# Patient Record
Sex: MALE | Race: WHITE | NOT HISPANIC OR LATINO | Employment: FULL TIME | URBAN - METROPOLITAN AREA
[De-identification: names, ages, dates, MRNs, and addresses within clinical notes are randomized per-mention and may not be internally consistent; named-entity substitution may affect disease eponyms.]

---

## 2017-01-25 ENCOUNTER — APPOINTMENT (OUTPATIENT)
Dept: LAB | Facility: HOSPITAL | Age: 42
End: 2017-01-25
Payer: COMMERCIAL

## 2017-01-25 ENCOUNTER — TRANSCRIBE ORDERS (OUTPATIENT)
Dept: LAB | Facility: HOSPITAL | Age: 42
End: 2017-01-25

## 2017-01-25 DIAGNOSIS — R80.9 PROTEINURIA: ICD-10-CM

## 2017-01-25 DIAGNOSIS — E78.1 PURE HYPERGLYCERIDEMIA: ICD-10-CM

## 2017-01-25 LAB
BILIRUB UR QL STRIP: NEGATIVE
CHOLEST SERPL-MCNC: 304 MG/DL (ref 50–200)
CLARITY UR: CLEAR
COLOR UR: NORMAL
GLUCOSE UR STRIP-MCNC: NEGATIVE MG/DL
HDLC SERPL-MCNC: 43 MG/DL (ref 40–60)
HGB UR QL STRIP.AUTO: NEGATIVE
KETONES UR STRIP-MCNC: NEGATIVE MG/DL
LEUKOCYTE ESTERASE UR QL STRIP: NEGATIVE
NITRITE UR QL STRIP: NEGATIVE
PH UR STRIP.AUTO: 6 [PH] (ref 4.5–8)
PROT UR STRIP-MCNC: NEGATIVE MG/DL
SP GR UR STRIP.AUTO: 1.02 (ref 1–1.03)
TRIGL SERPL-MCNC: 1459 MG/DL
UROBILINOGEN UR QL STRIP.AUTO: 1 E.U./DL

## 2017-01-25 PROCEDURE — 81003 URINALYSIS AUTO W/O SCOPE: CPT

## 2017-01-25 PROCEDURE — 36415 COLL VENOUS BLD VENIPUNCTURE: CPT

## 2017-01-25 PROCEDURE — 80061 LIPID PANEL: CPT

## 2017-01-30 ENCOUNTER — GENERIC CONVERSION - ENCOUNTER (OUTPATIENT)
Dept: OTHER | Facility: OTHER | Age: 42
End: 2017-01-30

## 2017-06-06 ENCOUNTER — GENERIC CONVERSION - ENCOUNTER (OUTPATIENT)
Dept: OTHER | Facility: OTHER | Age: 42
End: 2017-06-06

## 2017-06-20 ENCOUNTER — GENERIC CONVERSION - ENCOUNTER (OUTPATIENT)
Dept: OTHER | Facility: OTHER | Age: 42
End: 2017-06-20

## 2017-07-28 DIAGNOSIS — I10 ESSENTIAL (PRIMARY) HYPERTENSION: ICD-10-CM

## 2017-07-28 DIAGNOSIS — E78.1 PURE HYPERGLYCERIDEMIA: ICD-10-CM

## 2017-07-28 DIAGNOSIS — R80.9 PROTEINURIA: ICD-10-CM

## 2017-08-01 ENCOUNTER — GENERIC CONVERSION - ENCOUNTER (OUTPATIENT)
Dept: OTHER | Facility: OTHER | Age: 42
End: 2017-08-01

## 2017-12-15 ENCOUNTER — LAB CONVERSION - ENCOUNTER (OUTPATIENT)
Dept: OTHER | Facility: OTHER | Age: 42
End: 2017-12-15

## 2017-12-15 LAB
A/G RATIO (HISTORICAL): 1.5 (CALC) (ref 1–2.5)
ALBUMIN SERPL BCP-MCNC: 4.3 G/DL (ref 3.6–5.1)
ALP SERPL-CCNC: 25 U/L (ref 40–115)
ALT SERPL W P-5'-P-CCNC: 42 U/L (ref 9–46)
AST SERPL W P-5'-P-CCNC: 40 U/L (ref 10–40)
BACTERIA UR QL AUTO: ABNORMAL /HPF
BASOPHILS # BLD AUTO: 0.8 %
BASOPHILS # BLD AUTO: 50 CELLS/UL (ref 0–200)
BILIRUB SERPL-MCNC: 0.9 MG/DL (ref 0.2–1.2)
BILIRUB UR QL STRIP: NEGATIVE
BUN SERPL-MCNC: 12 MG/DL (ref 7–25)
BUN/CREA RATIO (HISTORICAL): ABNORMAL (CALC) (ref 6–22)
CALCIUM SERPL-MCNC: 8.9 MG/DL (ref 8.6–10.3)
CHLORIDE SERPL-SCNC: 104 MMOL/L (ref 98–110)
CHOLEST SERPL-MCNC: 284 MG/DL
CHOLEST/HDLC SERPL: 6.8 (CALC)
CO2 SERPL-SCNC: 20 MMOL/L (ref 20–31)
COLOR UR: ABNORMAL
COMMENT (HISTORICAL): ABNORMAL
CREAT SERPL-MCNC: 0.8 MG/DL (ref 0.6–1.35)
DEPRECATED RDW RBC AUTO: 13.1 % (ref 11–15)
EGFR AFRICAN AMERICAN (HISTORICAL): 130 ML/MIN/1.73M2
EGFR-AMERICAN CALC (HISTORICAL): 112 ML/MIN/1.73M2
EOSINOPHIL # BLD AUTO: 1.7 %
EOSINOPHIL # BLD AUTO: 107 CELLS/UL (ref 15–500)
FECAL OCCULT BLOOD DIAGNOSTIC (HISTORICAL): NEGATIVE
GAMMA GLOBULIN (HISTORICAL): 2.8 G/DL (CALC) (ref 1.9–3.7)
GLUCOSE (HISTORICAL): 101 MG/DL (ref 65–99)
GLUCOSE (HISTORICAL): NEGATIVE
HCT VFR BLD AUTO: 42 % (ref 38.5–50)
HDLC SERPL-MCNC: 42 MG/DL
HGB BLD-MCNC: 14.4 G/DL (ref 13.2–17.1)
HYALINE CASTS #/AREA URNS LPF: ABNORMAL /LPF
KETONES UR STRIP-MCNC: NEGATIVE MG/DL
LEUKOCYTE ESTERASE UR QL STRIP: NEGATIVE
LYMPHOCYTES # BLD AUTO: 1569 CELLS/UL (ref 850–3900)
LYMPHOCYTES # BLD AUTO: 24.9 %
MCH RBC QN AUTO: 34.2 PG (ref 27–33)
MCHC RBC AUTO-ENTMCNC: 34.3 G/DL (ref 32–36)
MCV RBC AUTO: 99.8 FL (ref 80–100)
MONOCYTES # BLD AUTO: 611 CELLS/UL (ref 200–950)
MONOCYTES (HISTORICAL): 9.7 %
NEUTROPHILS # BLD AUTO: 3963 CELLS/UL (ref 1500–7800)
NEUTROPHILS # BLD AUTO: 62.9 %
NITRITE UR QL STRIP: NEGATIVE
NON-HDL-CHOL (CHOL-HDL) (HISTORICAL): 242 MG/DL (CALC)
PH UR STRIP.AUTO: 6 [PH] (ref 5–8)
PLATELET # BLD AUTO: 171 THOUSAND/UL (ref 140–400)
PMV BLD AUTO: 11.4 FL (ref 7.5–12.5)
POTASSIUM SERPL-SCNC: 3.8 MMOL/L (ref 3.5–5.3)
PROT UR STRIP-MCNC: ABNORMAL MG/DL
RBC # BLD AUTO: 4.21 MILLION/UL (ref 4.2–5.8)
RBC (HISTORICAL): ABNORMAL /HPF
SODIUM SERPL-SCNC: 136 MMOL/L (ref 135–146)
SP GR UR STRIP.AUTO: 1.02 (ref 1–1.03)
SQUAMOUS EPITHELIAL CELLS (HISTORICAL): ABNORMAL /HPF
TOTAL PROTEIN (HISTORICAL): 7.1 G/DL (ref 6.1–8.1)
TRIGL SERPL-MCNC: 741 MG/DL
WBC # BLD AUTO: 6.3 THOUSAND/UL (ref 3.8–10.8)
WBC # BLD AUTO: ABNORMAL /HPF

## 2017-12-21 ENCOUNTER — ALLSCRIPTS OFFICE VISIT (OUTPATIENT)
Dept: OTHER | Facility: OTHER | Age: 42
End: 2017-12-21

## 2018-01-02 ENCOUNTER — GENERIC CONVERSION - ENCOUNTER (OUTPATIENT)
Dept: OTHER | Facility: OTHER | Age: 43
End: 2018-01-02

## 2018-01-08 ENCOUNTER — LAB CONVERSION - ENCOUNTER (OUTPATIENT)
Dept: OTHER | Facility: OTHER | Age: 43
End: 2018-01-08

## 2018-01-08 ENCOUNTER — GENERIC CONVERSION - ENCOUNTER (OUTPATIENT)
Dept: OTHER | Facility: OTHER | Age: 43
End: 2018-01-08

## 2018-01-08 LAB
CLINICAL COMMENT (HISTORICAL): NORMAL
HEPATITIS B SURFACE ANTIGEN (HISTORICAL): NORMAL
HEPATITIS C ANTIBODY (HISTORICAL): NORMAL
HERPES SIMPLEX VIRUS 1 IGG (HISTORICAL): <0.9 INDEX
HERPES SIMPLEX VIRUS 2 IGG (HISTORICAL): <0.9 INDEX
HIV AG/AB, 4TH GEN (HISTORICAL): NORMAL
RPR SCREEN (HISTORICAL): NORMAL
SIGNAL TO CUT-OFF (HISTORICAL): 0.01

## 2018-01-10 NOTE — MISCELLANEOUS
Provider Comments  Provider Comments:   Pt was a n/s  LMOM to call back and make new apt        Signatures   Electronically signed by : ALFREDO Knox ; Jan 30 2017  6:11PM EST                       (Review)

## 2018-01-14 NOTE — RESULT NOTES
Verified Results  (1) CBC/PLT/DIFF [id number] 08:16AM ki work     Test Name Result Flag Reference   WBC COUNT 5 71 Thousand/uL  4 31-10 16   RBC COUNT 4 51 Million/uL  3 88-5 62   HEMOGLOBIN 14 8 g/dL  12 0-17 0   HEMATOCRIT 43 4 %  36 5-49 3   MCV 96 fL  82-98   MCH 32 8 pg  26 8-34 3   MCHC 34 1 g/dL  31 4-37 4   RDW 12 2 %  11 6-15 1   MPV 10 6 fL  8 9-12 7   PLATELET COUNT 956 Thousands/uL  149-390   nRBC AUTOMATED 0 /100 WBCs     NEUTROPHILS RELATIVE PERCENT 60 %  43-75   LYMPHOCYTES RELATIVE PERCENT 28 %  14-44   MONOCYTES RELATIVE PERCENT 9 %  4-12   EOSINOPHILS RELATIVE PERCENT 2 %  0-6   BASOPHILS RELATIVE PERCENT 1 %  0-1   NEUTROPHILS ABSOLUTE COUNT 3 45 Thousands/?L  1 85-7 62   LYMPHOCYTES ABSOLUTE COUNT 1 58 Thousands/?L  0 60-4 47   MONOCYTES ABSOLUTE COUNT 0 52 Thousand/?L  0 17-1 22   EOSINOPHILS ABSOLUTE COUNT 0 12 Thousand/?L  0 00-0 61   BASOPHILS ABSOLUTE COUNT 0 03 Thousands/?L  0 00-0 10     (1) URINALYSIS (will reflex a microscopy if leukocytes, occult blood, protein or nitrites are not within normal limits) [id number] 08:16AM ki work     Test Name Result Flag Reference   COLOR Dk Yellow     CLARITY Clear     SPECIFIC GRAVITY UA 1 025  1 003-1 030   PH UA 6 0  4 5-8 0   LEUKOCYTE ESTERASE UA Negative  Negative   NITRITE UA Negative  Negative   PROTEIN UA Trace mg/dl A Negative   GLUCOSE UA Negative mg/dl  Negative   KETONES UA Negative mg/dl  Negative   UROBILINOGEN UA 1 0 E U /dl  0 2, 1 0 E U /dl   BILIRUBIN UA  A Negative   Interference- unable to analyze   The dipstick result may be falsely positive do to interfering substances  We recommend reliance upon serum bilirubin, liver & kidney function tests to guide patient care if clinically indicated     BLOOD UA Negative  Negative     (1) URINALYSIS (will reflex a microscopy if leukocytes, occult blood, protein or nitrites are not within normal limits) [id number] 08:16AM ki work     Test Name Result Flag Reference   BACTERIA Occasional /hpf  None Seen, Occasional   EPITHELIAL CELLS None Seen /hpf  None Seen, Occasional   RBC UA None Seen /hpf  None Seen   WBC UA None Seen /hpf  None Seen     (1) COMPREHENSIVE METABOLIC PANEL [id number] 69:94NK Natasha Capseo     Test Name Result Flag Reference   GLUCOSE,RANDM 110 mg/dL     If the patient is fasting, the ADA then defines impaired fasting glucose as > 100 mg/dL and diabetes as > or equal to 123 mg/dL  SODIUM 137 mmol/L  136-145   POTASSIUM 3 7 mmol/L  3 5-5 3   CHLORIDE 102 mmol/L  100-108   CARBON DIOXIDE 25 mmol/L  21-32   ANION GAP (CALC) 10 mmol/L  4-13   BLOOD UREA NITROGEN 13 mg/dL  5-25   CREATININE 0 90 mg/dL  0 60-1 30   Standardized to IDMS reference method   CALCIUM 8 5 mg/dL  8 3-10 1   BILI, TOTAL 0 99 mg/dL  0 20-1 00   ALK PHOSPHATAS 31 U/L L    ALT (SGPT) 60 U/L  12-78   AST(SGOT) 45 U/L  5-45   ALBUMIN 4 1 g/dL  3 5-5 0   TOTAL PROTEIN 7 5 g/dL  6 4-8 2   eGFR Non-African American      >60 0 ml/min/1 73sq [facility] Disease Education Program recommendations are as follows:  GFR calculation is accurate only with a steady state creatinine  Chronic Kidney disease less than 60 ml/min/1 73 sq  meters  Kidney failure less than 15 ml/min/1 73 sq  meters  (1) LIPID PANEL FASTING W DIRECT LDL REFLEX [id number] 08:16AM Natasha Capseo     Test Name Result Flag Reference   CHOLESTEROL 253 mg/dL H    LDL CHOLESTEROL CALCULATED (Report)  0-100   Calculated LDL invalid, triglycerides >400 mg/dl    Triglyceride:       Normal       <150 mg/dl     Borderline High  150-199 mg/dl     High        200-499 mg/dl     Very High     >499 mg/dl  Cholesterol:       Desirable    <200 mg/dl    Borderline High 200-239 mg/dl    High       >239 mg/dl  HDL Cholesterol:      High  >59 mg/dL    Low   <41 mg/dL  LDL CALCULATED:    This screening LDL is a calculated result    It does not have the accuracy of the Direct Measured LDL in the monitoring of patients with hyperlipidemia and/or statin therapy  Direct Measure LDL (AUZ459) must be ordered separately in these patients  Calculated LDL invalid, triglycerides >400 mg/dl    Triglyceride:         Normal              <150 mg/dl       Borderline High    150-199 mg/dl       High               200-499 mg/dl       Very High          >499 mg/dl  Cholesterol:         Desirable        <200 mg/dl      Borderline High  200-239 mg/dl      High             >239 mg/dl  HDL Cholesterol:        High    >59 mg/dL      Low     <41 mg/dL  LDL CALCULATED:    This screening LDL is a calculated result  It does not have the accuracy of the Direct Measured LDL in the monitoring of patients with hyperlipidemia and/or statin therapy  Direct Measure LDL (DMZ257) must be ordered separately in these patients  TRIGLYCERIDES 900 mg/dL H <=150   Specimen collection should occur prior to N-Acetylcysteine or Metamizole administration due to the potential for falsely depressed results  HDL,DIRECT 39 mg/dL L 40-60   Specimen collection should occur prior to Metamizole administration due to the potential for falsely depressed results  (1) LIPID PANEL FASTING W DIRECT LDL REFLEX [id number] 08:16AM Domingo Garay     Test Name Result Flag Reference   LDL, DIRECT 93 mg/dl  0-100   LDL Cholesterol:        Optimal          <100 mg/dl        Near Optimal     100-129 mg/dl        Above Optimal          Borderline High   130-159 mg/dl          High              160-189 mg/dl          Very High        >189 mg/dl       Discussion/Summary   Raji Marcelino, your triglyceride level is very high at 900  The rest of the labs look fine  Alcohol does raise this significantly  We can further discuss this on your follow up       Signatures   Electronically signed by : ALFREDO Palmer ; Sep  1 2016  5:30PM EST                       (Author)

## 2018-01-17 NOTE — PROGRESS NOTES
Assessment    1  Encounter for preventive health examination (V70 0) (Z00 00)   2  Alcoholism (303 90) (F10 20)   3  Depression with anxiety (300 4) (F41 8)   4  Benign essential hypertension (401 1) (I10)   5  Essential hypertriglyceridemia (272 1) (E78 1)    Plan  Alcoholism, Depression with anxiety    · 1 - Merary Hope (Licensed Social Worker) Physician Referral  Consult  Status:  Hold For - Scheduling  Requested for: 67LLW3741  Care Summary provided  : Yes  Benign essential hypertension    · Losartan Potassium 25 MG Oral Tablet; TAKE 1 TABLET DAILY  Benign essential hypertension, Essential hypertriglyceridemia    · (Q) CBC (INCLUDES DIFF/PLT) (REFL); Status:Active; Requested for:03Kbp7374;    · (Q) COMPREHENSIVE METABOLIC PNL W/ADJUSTED CALCIUM; Status:Active; Requested for:80Etm8447;    · (Q) LIPID PANEL WITH REFLEX TO DIRECT LDL; Status:Active; Requested  for:86Psy7034;    · (Q) URINALYSIS REFLEX; Status:Active; Requested for:98Lwa4769;     Discussion/Summary  Impression: health maintenance visit  Currently, he eats a poor diet and has an inadequate exercise regimen  Advice and education were given regarding alcohol use  Patient discussion: discussed with the patient, 30 minute visit, greater than half of the time was spent on counseling  I would like him to see Chris Gonzalez 2-3 weeks with blood work  The patient was counseled regarding instructions for management, impressions  History of Present Illness  , Adult Male: The patient is being seen for a health maintenance evaluation  The last health maintenance visit was >1 year(s) ago  Social History: Work status: working full time and occupation: working in a [employer]  General Health: The patient's health since the last visit is described as poor  Lifestyle:  He does not have a healthy diet  He has weight concerns  He uses tobacco  The patient is a former cigarette smoker  He consumes alcohol  He reports heavy alcohol consumption  He typically drinks hard liquor  Alcohol concern: tolerance to alcohol, personal concern about alcohol use, family concern about alcohol use and feeling guilty about alcohol use, but no attempts to cut alcohol use, no annoyance by criticism of alcohol use and no morning drinking  He is ready to quit drinking and wants alcohol treatment  Screening: Prostate cancer screening includes no previous evaluation  Colorectal cancer screening includes no previous screening  Metabolic screening includes lipid profile performed within the past five years and glucose screening performed 2014  Cardiovascular risk factors: hypertension, but no diabetes  HPI: Justina Morales was last seen in 2014  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)  UNfortunately, financially, it did not work out  He was there for 6months  He has been back for about a year, did not have insurance until 6months ago  He was hesitant to make an appt bec of poor health, heavy alcohol consumption  He would like to resume losartan for his BP  He also is ready for alcohol treatment  Years ago, after a breakup, he started drinking more, had DUI and went to rehab, AA  He found himself quite depressed staying away from his friends who drank as well  He went back to that lifestyle  He is worried he will go into depression again when he quits drinking  Review of Systems    Constitutional: recent weight loss and weight loss- physical work, better diet in general    Psychiatric: as noted in HPI and not suicidal       Active Problems    1  Anxiety (300 00) (F41 9)   2  Benign essential hypertension (401 1) (I10)   3  Depression (311) (F32 9)   4  Esophageal reflux (530 81) (K21 9)   5  Essential hypertriglyceridemia (272 1) (E78 1)   6  History of tobacco use (V15 82) (Z87 891)   7   Need for prophylactic vaccination and inoculation against influenza (V04 81) (Z23)    Past Medical History    · History of Blood pressure elevated (401  9) (I10)   · History of Cough (786 2) (R05)   · History of tendinitis (V13 59) (Z87 39)   · Need for prophylactic vaccination and inoculation against influenza (V04 81) (Z23)    Surgical History    · History of Knee Surgery   · History of Shoulder Arthroscopy With Biceps Tenodesis    Family History  Family History    · Family history of Coronary artery disease (414 00) (I25 10)   · Family history of Diabetes Mellitus (250 00)   · Family history of hypertension (V17 49) (Z82 49)   · Family history of malignant neoplasm (V16 9) (Z80 9)    Social History    · Being A Social Drinker   · History of Current Every Day Smoker (305 1)   · Denied: Current Smoker (305 1)   · History of tobacco use (V15 82) (Z87 891)    Current Meds   1  Omeprazole 20 MG Oral Capsule Delayed Release; TAKE ONE CAPSULE BY MOUTH   EVERY DAY; Therapy: 43Jvm5812 to (Evaluate:16Mar2015)  Requested for: 82Atu5930; Last   Rx:38Fwv5936 Ordered    Allergies    1  Neomycin-Bacitracin-Polymyxin CREA    2  No Known Environmental Allergies   3  No Known Food Allergies    Vitals   Recorded: 63FIA5312 66:90OO   Systolic 158, RUE, Sitting   Diastolic 374, RUE, Sitting   Heart Rate 72   Respiration 16   Height 5 ft 10 in   Weight 205 lb 0 8 oz   BMI Calculated 29 42   BSA Calculated 2 11     Physical Exam    Constitutional   General appearance: No acute distress, well appearing and well nourished  Head and Face   Head and face: Normal     Eyes   Conjunctiva and lids: No erythema, swelling or discharge  Ears, Nose, Mouth, and Throat   Otoscopic examination: Tympanic membranes translucent with normal light reflex  Canals patent without erythema  Oropharynx: Normal with no erythema, edema, exudate or lesions  Neck   Neck: Supple, symmetric, trachea midline, no masses  Thyroid: Normal, no thyromegaly  Pulmonary   Respiratory effort: No increased work of breathing or signs of respiratory distress      Auscultation of lungs: Clear to auscultation  Cardiovascular   Auscultation of heart: Normal rate and rhythm, normal S1 and S2, no murmurs  Abdomen   Abdomen: Non-tender, no masses  Liver and spleen: No hepatomegaly or splenomegaly  Lymphatic   Palpation of lymph nodes in neck: No lymphadenopathy      Musculoskeletal   Gait and station: Normal     Psychiatric   Orientation to person, place and time: Normal     Mood and affect: Normal        Signatures   Electronically signed by : ALFREDO Llanes ; Aug 16 2016  8:34AM EST                       (Author)

## 2018-01-18 NOTE — PSYCH
History of Present Illness  Psychotherapy Provided [facility]: Individual Psychotherapy 30 minutes minutes provided today  Goals addressed in session:   Patient looking for assistance in addressing his alcoholism  Has underlying anxiety issues but he is again drinking daily and is aware he needs to make this primary focus at moment  Patient doing well at new job and fearful this will be compromised because of his addiction  Did welll in rehab prior at [address] [place] [address] 8 years ago  AA has not been helpful over long haul  Patient verbal and cooperative  HPI - Psych: Patient struggling wiith alcoholism and this is extensive problem on his father's side of family  Continued use affecting relationships with friends and fearful of its effect on job where he is very happy  Has had some issues with withdrawal  Denies these issues today as he has not drank for two days  Denies feeling depressed other than after using  Denies any SI   Note   Note:   Began developing plan with him  Provided contact information for [facility] who has detox, inpatient, IOP and straight outpatient treatment at the [facility] location  He will contact them and then contact me with their plan/intervention  Stated we could augment thee services down the road to address issues with anxiety and low self--esteem that may contribute  Provided my contact information and will await his call  Assessment    1   Alcoholism (303 90) (F10 20)    Signatures   Electronically signed by : Teofilo Leone LCSW; Aug 24 2016  6:46PM EST                       (Author)

## 2018-01-22 VITALS
WEIGHT: 210.04 LBS | OXYGEN SATURATION: 95 % | HEART RATE: 99 BPM | SYSTOLIC BLOOD PRESSURE: 128 MMHG | TEMPERATURE: 98.4 F | DIASTOLIC BLOOD PRESSURE: 90 MMHG | RESPIRATION RATE: 18 BRPM | BODY MASS INDEX: 29.4 KG/M2 | HEIGHT: 71 IN

## 2018-01-23 NOTE — PROGRESS NOTES
Assessment    1  Encounter for preventive health examination (V70 0) (Z00 00)   2  Alcoholism (303 90) (F10 20)   3  Benign essential hypertension (401 1) (I10)   4  Esophageal reflux (530 81) (K21 9)   5  Essential hypertriglyceridemia (272 1) (E78 1)   6  Anxiety (300 00) (F41 9)    Plan  Benign essential hypertension    · Losartan Potassium 25 MG Oral Tablet; Take 1 tablet daily  Esophageal reflux    · Omeprazole 20 MG Oral Capsule Delayed Release; TAKE ONE CAPSULE BY  MOUTH EVERY DAY    Discussion/Summary  Impression: health maintenance visit  Currently, he eats a poor diet and has an inadequate exercise regimen  Prostate cancer screening: PSA is not indicated  Colorectal cancer screening: colorectal cancer screening is not indicated  The patient declines immunizations  Advice and education were given regarding alcohol use  I will call George Bustillo to get him set up for intense counseling  BP controlled  GERD controlled- worse with alcohol intake  Triglycerides high related to alcoholism  Anxiety-took trazodone in the past for sleep  Also took clonazepam in the past which helped  Tried a few SSRIs (Paxil Lexapro) but this made him worse  rto 2months  The patient was counseled regarding impressions  The treatment plan was reviewed with the patient/guardian  The patient/guardian understands and agrees with the treatment plan      Chief Complaint  The patient presents today for annual wellness exam       History of Present Illness  HM, Adult Male: The patient is being seen for a health maintenance evaluation  The last health maintenance visit was 1 year(s) ago  Social History: He is unmarried  Work status: working full time and   The patient is a former cigarette smoker  He reports frequent alcohol use  Alcohol concern:  personal concern about alcohol use and family concern about alcohol use  He is ready to quit drinking  General Health:  The patient's health since the last visit is described as poor  He has regular dental visits  He complains of vision problems  He denies hearing loss  Immunizations status: not up to date The patient needs the following immunization(s): influenza vaccine  Lifestyle:  He does not have a healthy diet  He has weight concerns  He does not exercise regularly  He does not use tobacco  He consumes alcohol  He reports heavy alcohol consumption  He typically drinks hard liquor  Alcohol concern: personal concern about alcohol use and family concern about alcohol use  He is ready to quit drinking and wants alcohol treatment  He denies drug use  Screening: Prostate cancer screening includes no previous evaluation  Colorectal cancer screening includes no previous screening  metabolic screening reviewed and current  risk screening reviewed and current  HPI: Still struggling with alcoholism  Tried calling Bruna Rinne but did not get a call back  Tried setting up with [facility] but did not find the building   He attempted to reschedule but did not hear back either  He was sober for a few months, only until January  Looking into counseling again  Back to drinking excessive amounts of alcohol      Review of Systems    Constitutional: recent weight gain, but no fever and no chills  Cardiovascular: no chest pain    The patient presents with complaints of occasional episodes of palpitations  Respiratory: no shortness of breath and no cough  Gastrointestinal: no abdominal pain, no constipation and no diarrhea  Psychiatric: anxiety and sleep disturbances  Active Problems    1  Alcoholism (303 90) (F10 20)   2  Anxiety (300 00) (F41 9)   3  Benign essential hypertension (401 1) (I10)   4  Depression (311) (F32 9)   5  Depression with anxiety (300 4) (F41 8)   6  Esophageal reflux (530 81) (K21 9)   7  Essential hypertriglyceridemia (272 1) (E78 1)   8  History of tobacco use (V15 82) (Z87 891)   9   Need for prophylactic vaccination and inoculation against influenza (V04 81) (Z23)   10  Proteinuria (791 0) (R80 9)    Past Medical History    · History of Blood pressure elevated (401 9) (I10)   · History of Cough (786 2) (R05)   · History of tendinitis (V13 59) (Z87 39)   · Need for prophylactic vaccination and inoculation against influenza (V04 81) (Z23)    Surgical History    · History of Knee Surgery   · History of Shoulder Arthroscopy With Biceps Tenodesis    Family History  Family History    · Family history of Coronary artery disease (414 00) (I25 10)   · Family history of Diabetes Mellitus (250 00)   · Family history of hypertension (V17 49) (Z82 49)   · Family history of malignant neoplasm (V16 9) (Z80 9)    Social History    · Being A Social Drinker   · History of Current Every Day Smoker (305 1)   · Denied: Current Smoker (305 1)   · History of tobacco use (V15 82) (Z87 891)    Current Meds   1  Losartan Potassium 25 MG Oral Tablet; Take 1 tablet daily; Therapy: 68KMD2625 to (Evaluate:30Oct2017)  Requested for: 56IOL0559; Last   CO:07HCW5768 Ordered   2  Omeprazole 20 MG Oral Capsule Delayed Release; TAKE ONE CAPSULE BY MOUTH   EVERY DAY; Therapy: 60Kvv3600 to (Evaluate:30Oct2017)  Requested for: 06IOA7815; Last   TT:20YRS8590 Ordered    Allergies    1  Neomycin-Bacitracin-Polymyxin CREA    2  No Known Environmental Allergies   3  No Known Food Allergies    Vitals   Recorded: 45Avk5540 10:09AM   Temperature 98 4 F, Oral   Heart Rate 99   Respiration 18   Systolic 303, Sitting   Diastolic 90, Sitting   Height 5 ft 11 in   Weight 210 lb 0 6 oz   BMI Calculated 29 29   BSA Calculated 2 15   O2 Saturation 95     Physical Exam    Constitutional   General appearance: No acute distress, well appearing and well nourished  Head and Face   Head and face: Normal     Eyes   Conjunctiva and lids: No erythema, swelling or discharge  Ears, Nose, Mouth, and Throat   Otoscopic examination: Tympanic membranes translucent with normal light reflex  Canals patent without erythema  Oropharynx: Normal with no erythema, edema, exudate or lesions  Neck   Neck: Supple, symmetric, trachea midline, no masses  Thyroid: Normal, no thyromegaly  Pulmonary   Respiratory effort: No increased work of breathing or signs of respiratory distress  Auscultation of lungs: Clear to auscultation  Cardiovascular   Auscultation of heart: Normal rate and rhythm, normal S1 and S2, no murmurs  Abdomen   Abdomen: Non-tender, no masses  Liver and spleen: No hepatomegaly or splenomegaly  Lymphatic   Palpation of lymph nodes in neck: No lymphadenopathy      Musculoskeletal   Gait and station: Normal     Psychiatric   Orientation to person, place and time: Normal     Mood and affect: Normal        Signatures   Electronically signed by : ALFREDO Salomon ; Dec 21 2017 10:42AM EST                       (Author)

## 2018-01-23 NOTE — RESULT NOTES
Message   Blood and urine tests are normal/negative        Verified Results  (Q) RPR (MONITOR) W/REFL TITER [id number] 11:18AM Merribeth Bang     Test Name Result Flag Reference   RPR (MONITOR)$W/REFL TITER NON-REACTIVE  NON-REACTIVE     (Q) HEPATITIS C ANTIBODY [id number] 11:18AM Merribeth Bang     Test Name Result Flag Reference   HEPATITIS C ANTIBODY NON-REACTIVE  NON-REACTIVE   SIGNAL TO CUT-OFF 0 01  <1 00     (1) HEP B SURFACE ANTIGEN [id number] 11:18AM Merribeth Bang     Test Name Result Flag Reference   HEPATITIS B SURFACE ANTIGEN NON-REACTIVE  NON-REACTIVE     (Q) HSV 1/2 IGG, HERPESELECT W/REFLEX [id number] 11:18AM Merribeth Bang     Test Name Result Flag Reference   HSV 1 IGG TYPE SPECIFIC$AB <0 90 index     HSV 2 IGG TYPE SPECIFIC$AB <0 90 index     Index          Interpretation                            -----          --------------                            <0 90          Negative                            0  90-1 09      Equivocal                            >1 09          Positive     This assay utilizes recombinant type-specific antigens  to differentiate HSV-1 from HSV-2 infections  A  positive result cannot distinguish between recent and  past infection  If recent HSV infection is suspected  but the results are negative or equivocal, the assay  should be repeated in 4-6 weeks  The performance  characteristics of the assay have not been established  for pediatric populations, immunocompromised patients,  or  screening      (1) HIV AG/AB Harpal Quintero GEN [id number] 11:18AM Merribeth Bang     Test Name Result Flag Reference   HIV AG/AB, 4TH GEN NON-REACTIVE  NON-REACTIVE   HIV-1 antigen and HIV-1/HIV-2 antibodies were not  detected  There is no laboratory evidence of HIV  infection  PLEASE NOTE: This information has been disclosed to  you from records whose confidentiality may be  protected by state law    If your state requires such  protection, then the state law prohibits you from  making any further disclosure of the information  without the specific written consent of the person  to whom it pertains, or as otherwise permitted by law  A general authorization for the release of medical or  other information is NOT sufficient for this purpose  For additional information please refer to  http://Find That File/faq/PFB469  (This link is being provided for informational/  educational purposes only )        The performance of this assay has not been clinically  validated in patients less than 3years old  SUREAB(R) CHLAMYDIA/ N  GONORRHOEAE RNA, TMA [id number] 11:18AM Angela Baker     Test Name Result Flag Reference   CHLAMYDIA TRACHOMATIS$RNA, TMA NOT DETECTED  NOT DETECTED   NEISSERIA [address], TMA NOT DETECTED  NOT DETECTED   This test was performed using the [facility]  ([address] )  The analytical performance characteristics of this   assay, when used to test Sure[employer] specimens have  been determined by [employer]               Signatures   Electronically signed by : ALFREDO Oliveira ; Jan 8 2018 11:17PM EST                       (Author)

## 2018-02-27 ENCOUNTER — OFFICE VISIT (OUTPATIENT)
Dept: INTERNAL MEDICINE CLINIC | Facility: CLINIC | Age: 43
End: 2018-02-27
Payer: COMMERCIAL

## 2018-02-27 VITALS
HEART RATE: 90 BPM | BODY MASS INDEX: 29.46 KG/M2 | DIASTOLIC BLOOD PRESSURE: 100 MMHG | OXYGEN SATURATION: 98 % | SYSTOLIC BLOOD PRESSURE: 154 MMHG | WEIGHT: 210.4 LBS | HEIGHT: 71 IN

## 2018-02-27 DIAGNOSIS — K21.9 GASTROESOPHAGEAL REFLUX DISEASE, ESOPHAGITIS PRESENCE NOT SPECIFIED: ICD-10-CM

## 2018-02-27 DIAGNOSIS — R80.9 PROTEINURIA, UNSPECIFIED TYPE: ICD-10-CM

## 2018-02-27 DIAGNOSIS — F41.8 DEPRESSION WITH ANXIETY: ICD-10-CM

## 2018-02-27 DIAGNOSIS — R39.15 URINARY URGENCY: ICD-10-CM

## 2018-02-27 DIAGNOSIS — F10.20 ALCOHOLISM (HCC): ICD-10-CM

## 2018-02-27 DIAGNOSIS — M75.112 INCOMPLETE TEAR OF LEFT ROTATOR CUFF: ICD-10-CM

## 2018-02-27 DIAGNOSIS — E78.1 ESSENTIAL HYPERTRIGLYCERIDEMIA: ICD-10-CM

## 2018-02-27 DIAGNOSIS — I10 BENIGN ESSENTIAL HYPERTENSION: Primary | ICD-10-CM

## 2018-02-27 PROCEDURE — 99214 OFFICE O/P EST MOD 30 MIN: CPT | Performed by: INTERNAL MEDICINE

## 2018-02-27 RX ORDER — LOSARTAN POTASSIUM 25 MG/1
1 TABLET ORAL DAILY
COMMUNITY
Start: 2016-08-16 | End: 2019-03-18 | Stop reason: SDUPTHER

## 2018-02-27 RX ORDER — OMEPRAZOLE 20 MG/1
1 CAPSULE, DELAYED RELEASE ORAL DAILY
COMMUNITY
Start: 2012-02-27 | End: 2019-09-23 | Stop reason: SDUPTHER

## 2018-02-27 NOTE — ASSESSMENT & PLAN NOTE
Ongoing counseling  Understands complications from long standing alcohol use  Discussed the seriousness of withdrawal from alcohol as well- he MUST tell his mother/family and go to the ER if he is worsening

## 2018-02-27 NOTE — PROGRESS NOTES
Assessment/Plan:    Incomplete tear of left rotator cuff  Dr Land Begun    Benign essential hypertension  Start checking BP at home as it is high today  May need to increase losartan    Alcoholism ([facility] [address] )  Ongoing counseling  Understands complications from long standing alcohol use  Discussed the seriousness of withdrawal from alcohol as well- he MUST tell his mother/family and go to the ER if he is worsening    Urinary urgency  Obtain US         Problem List Items Addressed This Visit     Alcoholism ([facility] [address] )     Ongoing counseling  Understands complications from long standing alcohol use  Discussed the seriousness of withdrawal from alcohol as well- he MUST tell his mother/family and go to the ER if he is worsening         Relevant Orders    Comprehensive metabolic panel    Lipid panel    Benign essential hypertension - Primary     Start checking BP at home as it is high today  May need to increase losartan         Relevant Medications    losartan (COZAAR) 25 mg tablet    Other Relevant Orders    Comprehensive metabolic panel    Lipid panel    Depression with anxiety    Esophageal reflux    Relevant Medications    omeprazole (PriLOSEC) 20 mg delayed release capsule    Essential hypertriglyceridemia    Relevant Orders    Comprehensive metabolic panel    Lipid panel    Proteinuria    Incomplete tear of left rotator cuff     Dr Shanda Gonsalves         Urinary urgency     Obtain US         Relevant Orders    US retroperitoneal complete            Subjective:      Patient ID: Tod Jaramillo is a 38 y o  male      HPI   Ongoing alcohol counseling weekly  Unfortunately, relapsed recently   Binged a week and a half ago and felt horrible, the worst he has ever felt primarily from the guilt of relapsing  Had withdrawal symptoms-fever, chills, shaking, stomach upset  Inpatient treatment has been recommended but he has a new job and does not think this is feasible  Says he only feels depressed and anxious after he relapses but fine after  Denies SI      The following portions of the patient's history were reviewed and updated as appropriate: allergies, current medications, past family history, past medical history, past social history, past surgical history and problem list     Review of Systems   Constitutional: Negative for fatigue, fever and unexpected weight change  HENT: Negative for ear pain, hearing loss, sinus pain, sinus pressure and sore throat  Respiratory: Negative for cough, shortness of breath and wheezing  Cardiovascular: Negative for chest pain, palpitations and leg swelling  Gastrointestinal: Negative for abdominal pain, constipation, diarrhea, nausea and vomiting  Genitourinary: Positive for urgency  Negative for difficulty urinating, discharge, dysuria, enuresis, flank pain, frequency, genital sores and hematuria  Constant burning discomfort in the penis since October   Musculoskeletal: Positive for arthralgias  Negative for myalgias  Left rotator cuff tear, just received an injection today  Will start PT   Psychiatric/Behavioral:        See hpi         Objective:      /100   Pulse 90   Ht 5' 11" (1 803 m)   Wt 95 4 kg (210 lb 6 4 oz)   SpO2 98%   BMI 29 34 kg/m²          Physical Exam   Constitutional: He is oriented to person, place, and time  He appears well-developed and well-nourished  HENT:   Head: Normocephalic and atraumatic  Right Ear: External ear normal    Left Ear: External ear normal    Mouth/Throat: Oropharynx is clear and moist    Eyes: Conjunctivae are normal    Neck: Neck supple  Cardiovascular: Normal rate, regular rhythm and normal heart sounds  No murmur heard  Pulmonary/Chest: Effort normal and breath sounds normal  No respiratory distress  He has no wheezes  He has no rales  Abdominal: Soft  Bowel sounds are normal  He exhibits no distension and no mass  There is no tenderness  There is no rebound and no guarding     Musculoskeletal: Normal range of motion  Neurological: He is alert and oriented to person, place, and time  Skin: Skin is warm and dry  Psychiatric: He has a normal mood and affect   His behavior is normal  Judgment and thought content normal

## 2018-03-16 ENCOUNTER — TRANSCRIBE ORDERS (OUTPATIENT)
Dept: RADIOLOGY | Facility: HOSPITAL | Age: 43
End: 2018-03-16

## 2018-03-16 ENCOUNTER — HOSPITAL ENCOUNTER (OUTPATIENT)
Dept: RADIOLOGY | Facility: HOSPITAL | Age: 43
Discharge: HOME | End: 2018-03-16
Payer: COMMERCIAL

## 2018-03-16 PROCEDURE — 76770 US EXAM ABDO BACK WALL COMP: CPT

## 2018-03-22 ENCOUNTER — TELEPHONE (OUTPATIENT)
Dept: INTERNAL MEDICINE CLINIC | Facility: CLINIC | Age: 43
End: 2018-03-22

## 2018-03-27 DIAGNOSIS — R18.8 ABDOMINAL FLUID COLLECTION: Primary | ICD-10-CM

## 2018-04-04 LAB
ALBUMIN SERPL-MCNC: 4.3 G/DL (ref 3.6–5.1)
ALBUMIN/GLOB SERPL: 1.7 (CALC) (ref 1–2.5)
ALP SERPL-CCNC: 27 U/L (ref 40–115)
ALT SERPL-CCNC: 29 U/L (ref 9–46)
AST SERPL-CCNC: 24 U/L (ref 10–40)
BILIRUB SERPL-MCNC: 0.6 MG/DL (ref 0.2–1.2)
BUN SERPL-MCNC: 15 MG/DL (ref 7–25)
BUN/CREAT SERPL: ABNORMAL (CALC) (ref 6–22)
CALCIUM SERPL-MCNC: 9.3 MG/DL (ref 8.6–10.3)
CHLORIDE SERPL-SCNC: 108 MMOL/L (ref 98–110)
CHOLEST SERPL-MCNC: 260 MG/DL
CHOLEST/HDLC SERPL: 7 (CALC)
CO2 SERPL-SCNC: 21 MMOL/L (ref 20–31)
CREAT SERPL-MCNC: 0.84 MG/DL (ref 0.6–1.35)
GLOBULIN SER CALC-MCNC: 2.5 G/DL (CALC) (ref 1.9–3.7)
GLUCOSE SERPL-MCNC: 88 MG/DL (ref 65–99)
HDLC SERPL-MCNC: 37 MG/DL
LDLC SERPL CALC-MCNC: 171 MG/DL (CALC)
NONHDLC SERPL-MCNC: 223 MG/DL (CALC)
POTASSIUM SERPL-SCNC: 3.9 MMOL/L (ref 3.5–5.3)
PROT SERPL-MCNC: 6.8 G/DL (ref 6.1–8.1)
SL AMB EGFR AFRICAN AMERICAN: 127 ML/MIN/1.73M2
SL AMB EGFR NON AFRICAN AMERICAN: 110 ML/MIN/1.73M2
SODIUM SERPL-SCNC: 140 MMOL/L (ref 135–146)
TRIGL SERPL-MCNC: 289 MG/DL

## 2018-04-09 ENCOUNTER — HOSPITAL ENCOUNTER (OUTPATIENT)
Dept: RADIOLOGY | Age: 43
Discharge: HOME | End: 2018-04-09
Payer: COMMERCIAL

## 2018-04-09 DIAGNOSIS — R18.8 ABDOMINAL FLUID COLLECTION: ICD-10-CM

## 2018-04-09 PROCEDURE — 74177 CT ABD & PELVIS W/CONTRAST: CPT

## 2018-04-09 RX ADMIN — IOHEXOL 100 ML: 350 INJECTION, SOLUTION INTRAVENOUS at 10:01

## 2018-04-12 DIAGNOSIS — R18.8 ABDOMINAL FLUID COLLECTION: Primary | ICD-10-CM

## 2018-04-13 ENCOUNTER — TELEPHONE (OUTPATIENT)
Dept: INTERNAL MEDICINE CLINIC | Facility: CLINIC | Age: 43
End: 2018-04-13

## 2018-04-13 NOTE — TELEPHONE ENCOUNTER
Pt states he had a CT scan done on Monday and would like you to please call him regarding the results when you are free - he states he got your message but would like to discuss it with you

## 2018-05-14 ENCOUNTER — HOSPITAL ENCOUNTER (OUTPATIENT)
Facility: HOSPITAL | Age: 43
Setting detail: OUTPATIENT SURGERY
Discharge: HOME | End: 2018-05-14
Attending: SURGERY | Admitting: SURGERY
Payer: COMMERCIAL

## 2018-05-14 ENCOUNTER — ANESTHESIA EVENT (OUTPATIENT)
Dept: GASTROENTEROLOGY | Facility: HOSPITAL | Age: 43
End: 2018-05-14
Payer: COMMERCIAL

## 2018-05-14 ENCOUNTER — ANESTHESIA (OUTPATIENT)
Dept: GASTROENTEROLOGY | Facility: HOSPITAL | Age: 43
End: 2018-05-14
Payer: COMMERCIAL

## 2018-05-14 VITALS
HEIGHT: 71 IN | BODY MASS INDEX: 29.4 KG/M2 | TEMPERATURE: 98 F | WEIGHT: 210 LBS | RESPIRATION RATE: 18 BRPM | HEART RATE: 96 BPM | SYSTOLIC BLOOD PRESSURE: 161 MMHG | OXYGEN SATURATION: 97 % | DIASTOLIC BLOOD PRESSURE: 109 MMHG

## 2018-05-14 DIAGNOSIS — R18.8 PELVIC FLUID COLLECTION: ICD-10-CM

## 2018-05-14 DIAGNOSIS — R10.9 ABDOMINAL PAIN: ICD-10-CM

## 2018-05-14 PROCEDURE — 88305 TISSUE EXAM BY PATHOLOGIST: CPT | Performed by: PATHOLOGY

## 2018-05-14 RX ORDER — ONDANSETRON 2 MG/ML
4 INJECTION INTRAMUSCULAR; INTRAVENOUS ONCE
Status: COMPLETED | OUTPATIENT
Start: 2018-05-14 | End: 2018-05-14

## 2018-05-14 RX ORDER — PROPOFOL 10 MG/ML
INJECTION, EMULSION INTRAVENOUS AS NEEDED
Status: DISCONTINUED | OUTPATIENT
Start: 2018-05-14 | End: 2018-05-14 | Stop reason: SURG

## 2018-05-14 RX ORDER — MIDAZOLAM HYDROCHLORIDE 1 MG/ML
2 INJECTION INTRAMUSCULAR; INTRAVENOUS ONCE
Status: COMPLETED | OUTPATIENT
Start: 2018-05-14 | End: 2018-05-14

## 2018-05-14 RX ORDER — SODIUM CHLORIDE 9 MG/ML
50 INJECTION, SOLUTION INTRAVENOUS CONTINUOUS
Status: DISCONTINUED | OUTPATIENT
Start: 2018-05-14 | End: 2018-05-14 | Stop reason: HOSPADM

## 2018-05-14 RX ADMIN — PROPOFOL 50 MG: 10 INJECTION, EMULSION INTRAVENOUS at 08:56

## 2018-05-14 RX ADMIN — PROPOFOL 150 MG: 10 INJECTION, EMULSION INTRAVENOUS at 08:48

## 2018-05-14 RX ADMIN — ONDANSETRON 4 MG: 2 INJECTION INTRAMUSCULAR; INTRAVENOUS at 09:40

## 2018-05-14 RX ADMIN — SODIUM CHLORIDE: 0.9 INJECTION, SOLUTION INTRAVENOUS at 09:05

## 2018-05-14 RX ADMIN — PROPOFOL 50 MG: 10 INJECTION, EMULSION INTRAVENOUS at 09:03

## 2018-05-14 RX ADMIN — PROPOFOL 100 MG: 10 INJECTION, EMULSION INTRAVENOUS at 08:51

## 2018-05-14 RX ADMIN — SODIUM CHLORIDE 50 ML/HR: 0.9 INJECTION, SOLUTION INTRAVENOUS at 08:26

## 2018-05-14 RX ADMIN — PROPOFOL 50 MG: 10 INJECTION, EMULSION INTRAVENOUS at 08:54

## 2018-05-14 RX ADMIN — PROPOFOL 50 MG: 10 INJECTION, EMULSION INTRAVENOUS at 08:58

## 2018-05-14 RX ADMIN — PROPOFOL 50 MG: 10 INJECTION, EMULSION INTRAVENOUS at 09:01

## 2018-05-14 RX ADMIN — MIDAZOLAM 2 MG: 1 INJECTION INTRAMUSCULAR; INTRAVENOUS at 09:48

## 2018-05-14 NOTE — ANESTHESIA PREPROCEDURE EVALUATION
Review of Systems/Medical History  Patient summary reviewed  Chart reviewed      Cardiovascular  Hyperlipidemia, Hypertension ,    Pulmonary       GI/Hepatic    GERD ,             Endo/Other     GYN       Hematology   Musculoskeletal       Neurology   Psychology   Anxiety,              Physical Exam    Airway    Mallampati score: II  TM Distance: >3 FB  Neck ROM: full     Dental   implants,     Cardiovascular  Rhythm: regular, Rate: normal, Cardiovascular exam normal    Pulmonary  Pulmonary exam normal Breath sounds clear to auscultation,     Other Findings        Anesthesia Plan  ASA Score- 2     Anesthesia Type- IV sedation with anesthesia with ASA Monitors  Additional Monitors:   Airway Plan:         Plan Factors-    Induction- intravenous  Postoperative Plan-     Informed Consent- Anesthetic plan and risks discussed with patient and spouse  I personally reviewed this patient with the CRNA  Discussed and agreed on the Anesthesia Plan with the CRNA  Natalia Emerson

## 2018-05-14 NOTE — ANESTHESIA POSTPROCEDURE EVALUATION
Post-Op Assessment Note      CV Status:  Stable    Mental Status:  Alert and awake    Hydration Status:  Euvolemic    PONV Controlled:  Controlled    Airway Patency:  Patent    Post Op Vitals Reviewed: Yes          Staff: CRNA           /99 (05/14/18 0909)    Temp      Pulse 103 (05/14/18 0909)   Resp 20 (05/14/18 0909)    SpO2 99 % (05/14/18 0909)

## 2018-05-14 NOTE — OP NOTE
OPERATIVE REPORT  PATIENT NAME: Adair Sherman    :  1975  MRN: [id number]  Pt Location: [facility] ROOM 01    SURGERY DATE: 2018    Surgeon(s) and Role:     * Rodriguez Durant DO - Primary    Preop Diagnosis:  Abdominal pain [R10 9]  Pelvic fluid collection [R18 8]    Post-Op Diagnosis Codes:     * Abdominal pain [R10 9]     * Pelvic fluid collection [R18 8]  Sigmoid polyp    Procedure(s) (LRB):  COLONOSCOPY (N/A)   Polypectomy    Specimen(s):  ID Type Source Tests Collected by Time Destination   1 : sigmoid colon polyp Tissue Polyp, Colorectal TISSUE EXAM Princess OlivierDO 2018 0903        Estimated Blood Loss:   Minimal    Drains:       Anesthesia Type:   Choice    Operative Indications:  Abdominal pain [R10 9]  Pelvic fluid collection [R18 8]      Operative Findings:  Single small sigmoid polyp  No signs of any diverticular disease on colonoscopy  Mucosa all appeared normal     Prep was adequate    Recommend repeat colonoscopy in 5 years      Complications:   None    Procedure and Technique:  Patient brought to the GI suite and placed in the left lateral decubitus position  Time-out was performed  After induction of IV sedation digital rectal exam revealed no specific abnormalities  Prostate was smooth bilobed without nodularity  Colonoscope was gently inserted in the rectum advanced up to the rectosigmoid area  No signs of any diverticular changes or mucosal irregularities were noted  Continued to advance the scope up the descending colon around the splenic flexure  Came across the transverse colon and hepatic flexure down into the cecum  Cecal strap and appendiceal orifice was noted  Scope was gently withdrawn viewing all sidewalls on the way out  A single small sigmoid polyp was noted  This was totally removed with the hot polypectomy  There was good hemostasis  Scope was gently withdrawn back into the rectum  Retroflexion of the scope revealed no abnormalities   Scope was removed and returned to the recovery area in stable condition     I was present for the entire procedure    Patient Disposition:  PACU     SIGNATURE: Lebron Teague DO  DATE: May 14, 2018  TIME: 9:10 AM

## 2018-08-13 ENCOUNTER — TELEPHONE (OUTPATIENT)
Dept: INTERNAL MEDICINE CLINIC | Facility: CLINIC | Age: 43
End: 2018-08-13

## 2018-08-13 DIAGNOSIS — I10 HYPERTENSION, UNSPECIFIED TYPE: Primary | ICD-10-CM

## 2018-08-14 NOTE — TELEPHONE ENCOUNTER
Labs ordered and sent via fax with appropriate dx code per Deysi Campos  Patient aware labs faxed to requested destination

## 2018-08-16 LAB — HBA1C MFR BLD HPLC: 5 %

## 2018-08-17 LAB
ALBUMIN SERPL-MCNC: 4.2 G/DL (ref 3.6–5.1)
ALBUMIN/GLOB SERPL: 1.4 (CALC) (ref 1–2.5)
ALP SERPL-CCNC: 26 U/L (ref 40–115)
ALT SERPL-CCNC: 14 U/L (ref 9–46)
AST SERPL-CCNC: 18 U/L (ref 10–40)
BILIRUB SERPL-MCNC: 0.6 MG/DL (ref 0.2–1.2)
BUN SERPL-MCNC: 19 MG/DL (ref 7–25)
BUN/CREAT SERPL: ABNORMAL (CALC) (ref 6–22)
CALCIUM SERPL-MCNC: 9.2 MG/DL (ref 8.6–10.3)
CHLORIDE SERPL-SCNC: 104 MMOL/L (ref 98–110)
CHOLEST SERPL-MCNC: 223 MG/DL
CHOLEST/HDLC SERPL: 7.4 (CALC)
CO2 SERPL-SCNC: 23 MMOL/L (ref 20–32)
CREAT SERPL-MCNC: 0.94 MG/DL (ref 0.6–1.35)
GLOBULIN SER CALC-MCNC: 2.9 G/DL (CALC) (ref 1.9–3.7)
GLUCOSE SERPL-MCNC: 87 MG/DL (ref 65–99)
HBA1C MFR BLD: 5 % OF TOTAL HGB
HDLC SERPL-MCNC: 30 MG/DL
NONHDLC SERPL-MCNC: 193 MG/DL (CALC)
POTASSIUM SERPL-SCNC: 4.2 MMOL/L (ref 3.5–5.3)
PROT SERPL-MCNC: 7.1 G/DL (ref 6.1–8.1)
SL AMB EGFR AFRICAN AMERICAN: 116 ML/MIN/1.73M2
SL AMB EGFR NON AFRICAN AMERICAN: 100 ML/MIN/1.73M2
SODIUM SERPL-SCNC: 137 MMOL/L (ref 135–146)
TRIGL SERPL-MCNC: 491 MG/DL

## 2018-08-22 ENCOUNTER — TELEPHONE (OUTPATIENT)
Dept: INTERNAL MEDICINE CLINIC | Facility: CLINIC | Age: 43
End: 2018-08-22

## 2018-08-22 NOTE — TELEPHONE ENCOUNTER
I'll need to see him prior to ordering the blood tests for the joint aches  He has an appt next week

## 2018-08-22 NOTE — TELEPHONE ENCOUNTER
Pt called in stating that starting Sunday - he started having Lower back pain - in his joints, feet knees, hands, shoulders  It was bad enough this morning he went to see the doctor at his place of employment @[facility] and they advised him to have you order lab tests for him  He states he just had labwork done but it was prior to when the pain started (we only have results of a1c) and states they should have done all the required labs then but he can't be sure they did   He wants to know is there a specific bloodtest that you can put in to see why/what is causing the pain in his joints?-Please advise, ty

## 2018-08-22 NOTE — TELEPHONE ENCOUNTER
Pt states he has tried the alive (what he's currently doing) and it is not really helping at all, if any  He states the pain is dull and achey    Wants to know what else can be taken to help with the pain or if something can be sent into his pharmacy?  Please advise, ty

## 2018-08-23 ENCOUNTER — OFFICE VISIT (OUTPATIENT)
Dept: INTERNAL MEDICINE CLINIC | Facility: CLINIC | Age: 43
End: 2018-08-23
Payer: COMMERCIAL

## 2018-08-23 VITALS
HEIGHT: 71 IN | DIASTOLIC BLOOD PRESSURE: 92 MMHG | SYSTOLIC BLOOD PRESSURE: 140 MMHG | HEART RATE: 108 BPM | WEIGHT: 210.4 LBS | BODY MASS INDEX: 29.46 KG/M2 | OXYGEN SATURATION: 98 %

## 2018-08-23 DIAGNOSIS — M25.50 MULTIPLE JOINT PAIN: Primary | ICD-10-CM

## 2018-08-23 DIAGNOSIS — G89.29 CHRONIC BILATERAL LOW BACK PAIN WITHOUT SCIATICA: ICD-10-CM

## 2018-08-23 DIAGNOSIS — M54.50 CHRONIC BILATERAL LOW BACK PAIN WITHOUT SCIATICA: ICD-10-CM

## 2018-08-23 PROCEDURE — 1036F TOBACCO NON-USER: CPT | Performed by: NURSE PRACTITIONER

## 2018-08-23 PROCEDURE — 3008F BODY MASS INDEX DOCD: CPT | Performed by: NURSE PRACTITIONER

## 2018-08-23 PROCEDURE — 99214 OFFICE O/P EST MOD 30 MIN: CPT | Performed by: NURSE PRACTITIONER

## 2018-08-23 RX ORDER — MELOXICAM 15 MG/1
15 TABLET ORAL DAILY
Qty: 30 TABLET | Refills: 0 | Status: SHIPPED | OUTPATIENT
Start: 2018-08-23 | End: 2019-08-01

## 2018-08-23 RX ORDER — METHYLPREDNISOLONE 4 MG/1
TABLET ORAL
Qty: 21 TABLET | Refills: 0 | Status: SHIPPED | OUTPATIENT
Start: 2018-08-23 | End: 2019-08-01

## 2018-08-23 NOTE — PROGRESS NOTES
Assessment/Plan:       Diagnoses and all orders for this visit:    Multiple joint pain  -     Comprehensive metabolic panel; Future  -     CBC and differential; Future  -     Lyme disease, western blot; Future  -     EBV acute panel; Future  -     Sedimentation rate, automated; Future  -     C-reactive protein; Future  -     LOIS Screen w/ Reflex to Titer/Pattern; Future  -     Vitamin D 25 hydroxy; Future  -     meloxicam (MOBIC) 15 mg tablet; Take 1 tablet (15 mg total) by mouth daily  -     Methylprednisolone 4 MG TBPK; Use as directed on package    Chronic bilateral low back pain without sciatica  -     Comprehensive metabolic panel; Future  -     CBC and differential; Future  -     Lyme disease, western blot; Future  -     EBV acute panel; Future  -     Sedimentation rate, automated; Future  -     C-reactive protein; Future  -     LOIS Screen w/ Reflex to Titer/Pattern; Future  -     Vitamin D 25 hydroxy; Future  -     meloxicam (MOBIC) 15 mg tablet; Take 1 tablet (15 mg total) by mouth daily  -     Methylprednisolone 4 MG TBPK; Use as directed on package        Heating pad for back      Subjective:      Patient ID: Joice Kawasaki is a 41 y o  male  Lower back pain for a few months non radiating     Hip pain, hands, feet now hurting for the past week  No swelling  Parents have arthritis    Left shoulder rotator cuff tear        The following portions of the patient's history were reviewed and updated as appropriate: allergies, current medications, past family history, past medical history, past social history, past surgical history and problem list     Review of Systems   HENT: Negative  Eyes: Negative  Respiratory: Negative  Cardiovascular: Negative  Gastrointestinal: Negative  Neurological: Negative          Family History   Problem Relation Age of Onset    Coronary artery disease Family     Diabetes Family     Hypertension Family     Cancer Family      Past Medical History:   Diagnosis Date    Anxiety     GERD (gastroesophageal reflux disease)     Hyperlipidemia     Hypertension     Tendinitis      Social History     Social History    Marital status: Single     Spouse name: N/A    Number of children: N/A    Years of education: N/A     Occupational History    Not on file  Social History Main Topics    Smoking status: Former Smoker    Smokeless tobacco: Never Used    Alcohol use 1 2 - 1 8 oz/week     2 - 3 Shots of liquor per week      Comment: everyday    Drug use: No    Sexual activity: Not on file     Other Topics Concern    Not on file     Social History Narrative    No narrative on file       Current Outpatient Prescriptions:     b complex vitamins tablet, Take 1 tablet by mouth daily, Disp: , Rfl:     losartan (COZAAR) 25 mg tablet, Take 1 tablet by mouth daily, Disp: , Rfl:     omeprazole (PriLOSEC) 20 mg delayed release capsule, Take 1 capsule by mouth daily, Disp: , Rfl:     meloxicam (MOBIC) 15 mg tablet, Take 1 tablet (15 mg total) by mouth daily, Disp: 30 tablet, Rfl: 0    Methylprednisolone 4 MG TBPK, Use as directed on package, Disp: 21 tablet, Rfl: 0  Allergies   Allergen Reactions    Neomycin Hives    Polymyxin B Hives         Objective:      /92   Pulse (!) 108   Ht 5' 11" (1 803 m)   Wt 95 4 kg (210 lb 6 4 oz)   SpO2 98%   BMI 29 34 kg/m²          Physical Exam   Constitutional: He is oriented to person, place, and time  He appears well-developed and well-nourished  HENT:   Head: Normocephalic and atraumatic  Eyes: Conjunctivae are normal  Pupils are equal, round, and reactive to light  Neck: Normal range of motion  Neck supple  Cardiovascular: Normal rate and regular rhythm  Pulmonary/Chest: Effort normal and breath sounds normal    Abdominal: Soft  Bowel sounds are normal    Musculoskeletal: Normal range of motion  Neurological: He is alert and oriented to person, place, and time  Skin: Skin is warm and dry

## 2018-08-29 ENCOUNTER — OFFICE VISIT (OUTPATIENT)
Dept: INTERNAL MEDICINE CLINIC | Facility: CLINIC | Age: 43
End: 2018-08-29
Payer: COMMERCIAL

## 2018-08-29 VITALS
HEART RATE: 101 BPM | SYSTOLIC BLOOD PRESSURE: 130 MMHG | WEIGHT: 206 LBS | BODY MASS INDEX: 28.84 KG/M2 | HEIGHT: 71 IN | DIASTOLIC BLOOD PRESSURE: 90 MMHG | OXYGEN SATURATION: 98 %

## 2018-08-29 DIAGNOSIS — M25.50 ARTHRALGIA OF MULTIPLE SITES, BILATERAL: Primary | ICD-10-CM

## 2018-08-29 DIAGNOSIS — F10.20 ALCOHOLISM (HCC): ICD-10-CM

## 2018-08-29 PROCEDURE — 99214 OFFICE O/P EST MOD 30 MIN: CPT | Performed by: INTERNAL MEDICINE

## 2018-08-29 NOTE — PROGRESS NOTES
Assessment/Plan:  I suspect this is viral   Labs to be requested from [facility]  Stay well hydrated  RTO 2 weeks     Problem List Items Addressed This Visit        Other    Alcoholism [facility])      Other Visit Diagnoses     Arthralgia of multiple sites, bilateral    -  Primary            Subjective:      Patient ID: Alonzo Ernandez is a 41 y o  male  HPI  Inpatient rehab for alcohol in June for 28 days   He was feeling well until about a week or so ago, he had pain in his hips, low back, feet, hands  Felt febrile, chills, no URI symptoms, nausea, vomiting, diarrhea  He called when his symptoms were severe  He was seen here the following day and labs ordered which he got 2 days ago  He is finishing the medrol dose pack and taking meloxicam  Feels dizzy like he has  a hangover exacerbated by changes in position like looking down, getting out of bed; trouble focusing, sleeping  LLQ pain yesterday that has resolved   Heart racing  and SOB with exertion like going up a flight of stairs  Going to AA, he has not had any alcohol since June    The following portions of the patient's history were reviewed and updated as appropriate: allergies, current medications, past family history, past medical history, past social history, past surgical history and problem list     Review of Systems   Constitutional: Negative for chills and fever  HENT: Negative for sinus pain, sinus pressure and sore throat  Respiratory: Positive for shortness of breath  Negative for cough and wheezing  Cardiovascular: Positive for palpitations  Negative for chest pain and leg swelling  Gastrointestinal: Negative for abdominal pain, constipation, diarrhea, nausea and vomiting  Musculoskeletal: Positive for arthralgias  Neurological: Positive for dizziness  Negative for headaches           Objective:      /90 (BP Location: Right arm, Patient Position: Sitting)   Pulse 101   Ht 5' 11" (1 803 m)   Wt 93 4 kg (206 lb)   SpO2 98%   BMI 28 73 kg/m²          Physical Exam   Constitutional: He is oriented to person, place, and time  He appears well-developed and well-nourished  HENT:   Head: Normocephalic and atraumatic  Right Ear: External ear normal    Left Ear: External ear normal    Mouth/Throat: Oropharynx is clear and moist    Eyes: Conjunctivae are normal    Cardiovascular: Normal rate, regular rhythm and normal heart sounds  No murmur heard  Pulmonary/Chest: Effort normal and breath sounds normal  No respiratory distress  He has no wheezes  He has no rales  Abdominal: Soft  Bowel sounds are normal  He exhibits no distension and no mass  There is no tenderness  There is no rebound and no guarding  Musculoskeletal: Normal range of motion  Neurological: He is alert and oriented to person, place, and time  Skin: Skin is warm and dry  Psychiatric: He has a normal mood and affect   His behavior is normal  Judgment and thought content normal

## 2018-08-30 DIAGNOSIS — R74.8 ELEVATED LIVER ENZYMES: Primary | ICD-10-CM

## 2018-08-30 DIAGNOSIS — E83.52 HYPERCALCEMIA: ICD-10-CM

## 2018-08-30 LAB
1,25(OH)2D SERPL-MCNC: 49 PG/ML (ref 18–72)
1,25(OH)2D2 SERPL-MCNC: <8 PG/ML
1,25(OH)2D3 SERPL-MCNC: 49 PG/ML
ALBUMIN SERPL-MCNC: 4.9 G/DL (ref 3.6–5.1)
ALBUMIN/GLOB SERPL: 1.8 (CALC) (ref 1–2.5)
ALP SERPL-CCNC: 32 U/L (ref 40–115)
ALT SERPL-CCNC: 133 U/L (ref 9–46)
ANA SER QL IF: NEGATIVE
ANA SER QL IF: NEGATIVE
AST SERPL-CCNC: 106 U/L (ref 10–40)
B BURGDOR IGG SER QL IB: NEGATIVE
B BURGDOR IGM SER QL IB: NEGATIVE
B BURGDOR18KD IGG SER QL IB: REACTIVE
B BURGDOR23KD IGG SER QL IB: ABNORMAL
B BURGDOR23KD IGM SER QL IB: ABNORMAL
B BURGDOR28KD IGG SER QL IB: ABNORMAL
B BURGDOR30KD IGG SER QL IB: ABNORMAL
B BURGDOR39KD IGG SER QL IB: ABNORMAL
B BURGDOR39KD IGM SER QL IB: ABNORMAL
B BURGDOR41KD IGG SER QL IB: ABNORMAL
B BURGDOR41KD IGM SER QL IB: ABNORMAL
B BURGDOR45KD IGG SER QL IB: REACTIVE
B BURGDOR58KD IGG SER QL IB: REACTIVE
B BURGDOR66KD IGG SER QL IB: REACTIVE
B BURGDOR93KD IGG SER QL IB: ABNORMAL
BASOPHILS # BLD AUTO: 72 CELLS/UL (ref 0–200)
BASOPHILS NFR BLD AUTO: 1 %
BILIRUB SERPL-MCNC: 1.4 MG/DL (ref 0.2–1.2)
BUN SERPL-MCNC: 11 MG/DL (ref 7–25)
BUN/CREAT SERPL: ABNORMAL (CALC) (ref 6–22)
CALCIUM SERPL-MCNC: 11.6 MG/DL (ref 8.6–10.3)
CHLORIDE SERPL-SCNC: 92 MMOL/L (ref 98–110)
CO2 SERPL-SCNC: 19 MMOL/L (ref 20–32)
CREAT SERPL-MCNC: 0.9 MG/DL (ref 0.6–1.35)
CRP SERPL-MCNC: 0.8 MG/L
EBV NA IGG SER IA-ACNC: 274 U/ML
EBV VCA IGG SER IA-ACNC: 79.7 U/ML
EBV VCA IGM SER IA-ACNC: <36 U/ML
EOSINOPHIL # BLD AUTO: 0 CELLS/UL (ref 15–500)
EOSINOPHIL NFR BLD AUTO: 0 %
ERYTHROCYTE [DISTWIDTH] IN BLOOD BY AUTOMATED COUNT: 12.2 % (ref 11–15)
ERYTHROCYTE [SEDIMENTATION RATE] IN BLOOD BY WESTERGREN METHOD: 6 MM/H
GLOBULIN SER CALC-MCNC: 2.8 G/DL (CALC) (ref 1.9–3.7)
GLUCOSE SERPL-MCNC: 86 MG/DL (ref 65–139)
HCT VFR BLD AUTO: 42.1 % (ref 38.5–50)
HGB BLD-MCNC: 14.3 G/DL (ref 13.2–17.1)
LYMPHOCYTES # BLD AUTO: 1058 CELLS/UL (ref 850–3900)
LYMPHOCYTES NFR BLD AUTO: 14.7 %
MCH RBC QN AUTO: 32.7 PG (ref 27–33)
MCHC RBC AUTO-ENTMCNC: 34 G/DL (ref 32–36)
MCV RBC AUTO: 96.3 FL (ref 80–100)
MONOCYTES # BLD AUTO: 655 CELLS/UL (ref 200–950)
MONOCYTES NFR BLD AUTO: 9.1 %
NEUTROPHILS # BLD AUTO: 5414 CELLS/UL (ref 1500–7800)
NEUTROPHILS NFR BLD AUTO: 75.2 %
PLATELET # BLD AUTO: 178 THOUSAND/UL (ref 140–400)
PMV BLD REES-ECKER: 10.7 FL (ref 7.5–12.5)
POTASSIUM SERPL-SCNC: 3.7 MMOL/L (ref 3.5–5.3)
PROT SERPL-MCNC: 7.7 G/DL (ref 6.1–8.1)
RBC # BLD AUTO: 4.37 MILLION/UL (ref 4.2–5.8)
SL AMB EGFR AFRICAN AMERICAN: 123 ML/MIN/1.73M2
SL AMB EGFR NON AFRICAN AMERICAN: 106 ML/MIN/1.73M2
SODIUM SERPL-SCNC: 134 MMOL/L (ref 135–146)
WBC # BLD AUTO: 7.2 THOUSAND/UL (ref 3.8–10.8)

## 2018-09-05 DIAGNOSIS — F10.10 ALCOHOL ABUSE: ICD-10-CM

## 2018-09-05 DIAGNOSIS — R79.89 ABNORMAL LFTS: Primary | ICD-10-CM

## 2019-03-18 DIAGNOSIS — I10 BENIGN ESSENTIAL HYPERTENSION: Primary | ICD-10-CM

## 2019-03-18 RX ORDER — LOSARTAN POTASSIUM 25 MG/1
25 TABLET ORAL DAILY
Qty: 30 TABLET | Refills: 5 | Status: SHIPPED | OUTPATIENT
Start: 2019-03-18 | End: 2019-08-16 | Stop reason: SDUPTHER

## 2019-08-01 ENCOUNTER — OFFICE VISIT (OUTPATIENT)
Dept: INTERNAL MEDICINE CLINIC | Facility: CLINIC | Age: 44
End: 2019-08-01
Payer: COMMERCIAL

## 2019-08-01 VITALS
HEIGHT: 71 IN | RESPIRATION RATE: 16 BRPM | TEMPERATURE: 98.4 F | WEIGHT: 230.8 LBS | BODY MASS INDEX: 32.31 KG/M2 | OXYGEN SATURATION: 98 % | SYSTOLIC BLOOD PRESSURE: 122 MMHG | DIASTOLIC BLOOD PRESSURE: 80 MMHG | HEART RATE: 75 BPM

## 2019-08-01 DIAGNOSIS — F32.5 MAJOR DEPRESSIVE DISORDER WITH SINGLE EPISODE, IN FULL REMISSION (HCC): ICD-10-CM

## 2019-08-01 DIAGNOSIS — E78.1 ESSENTIAL HYPERTRIGLYCERIDEMIA: ICD-10-CM

## 2019-08-01 DIAGNOSIS — K21.9 GASTROESOPHAGEAL REFLUX DISEASE, ESOPHAGITIS PRESENCE NOT SPECIFIED: ICD-10-CM

## 2019-08-01 DIAGNOSIS — M54.50 CHRONIC MIDLINE LOW BACK PAIN WITHOUT SCIATICA: ICD-10-CM

## 2019-08-01 DIAGNOSIS — I10 BENIGN ESSENTIAL HYPERTENSION: Primary | ICD-10-CM

## 2019-08-01 DIAGNOSIS — E66.9 OBESITY (BMI 30.0-34.9): ICD-10-CM

## 2019-08-01 DIAGNOSIS — G89.29 CHRONIC MIDLINE LOW BACK PAIN WITHOUT SCIATICA: ICD-10-CM

## 2019-08-01 PROBLEM — R18.8 ABDOMINAL FLUID COLLECTION: Status: RESOLVED | Noted: 2018-03-27 | Resolved: 2019-08-01

## 2019-08-01 PROCEDURE — 3074F SYST BP LT 130 MM HG: CPT | Performed by: INTERNAL MEDICINE

## 2019-08-01 PROCEDURE — 99214 OFFICE O/P EST MOD 30 MIN: CPT | Performed by: INTERNAL MEDICINE

## 2019-08-01 NOTE — PROGRESS NOTES
Assessment/Plan:    Problem List Items Addressed This Visit        Digestive    Esophageal reflux     Discussed dietary modifications, wean off omeprazole if possible and take only as prn  Continue H2B  Relevant Orders    CBC       Cardiovascular and Mediastinum    Benign essential hypertension - Primary     Normal today but reports he has had elevated bp at home  Has made dietary modifications  Recommend obtaining labs and continue home bp with follow up after  Relevant Orders    Lipid panel    Comprehensive metabolic panel    TSH, 3rd generation with Free T4 reflex    Urinalysis with reflex to microscopic    CBC       Other    Essential hypertriglyceridemia     No follow up for one year, previously on medications  Has stopped drinking  Will need re-eval          Relevant Orders    Lipid panel    Comprehensive metabolic panel    TSH, 3rd generation with Free T4 reflex    Depression     In remission  Will monitor  Chronic midline low back pain without sciatica     Recommend PT, take NSAIDs sparingly prn         Relevant Orders    Ambulatory referral to Physical Therapy      Other Visit Diagnoses     Obesity (BMI 30 0-34  9)            Subjective:      Patient ID: Moris Wetzel is a 41 y o  male  HPI  43yo male here as new patient  Reports he is a recovering alcoholic  Stopped alcohol use last year but admits had brief relapse  Has gained weight since, about 25pounds over the past year and has had job change where he knows sits at desk  He tries to exercise when he can but due to long commute has limited time  He is trying to eat healthier and also tries not to eat out as much  Reports he was previously on losartan 25mg daily for several years, off therapy for the past year due to lack of insurance  Home bp reportedly high at 140/90s  He has been trying to take OTC acid reducers and omeprazole every other day  Has heartburn    He has not been able to identify food triggers  Has history of hypertriglyceridemia, for a while was on gemfibrozil, but had moved and changed jobs  He was not restarted when he re-established because he quit drinking  Reports sharp back pain, occurring for several months, has had to miss work due to it  Is intermittent, can occur with prolonged standing  Denies paresthesia  Position of comfort is laying on his side  Has taken NSAIDs for relief  The following portions of the patient's history were reviewed and updated as appropriate: allergies, current medications, past family history, past medical history, past social history, past surgical history and problem list     Current Outpatient Medications:     b complex vitamins tablet, Take 1 tablet by mouth daily, Disp: , Rfl:     losartan (COZAAR) 25 mg tablet, Take 1 tablet (25 mg total) by mouth daily (Patient not taking: Reported on 8/1/2019), Disp: 30 tablet, Rfl: 5    omeprazole (PriLOSEC) 20 mg delayed release capsule, Take 1 capsule by mouth daily, Disp: , Rfl:     Review of Systems   Constitutional:        +weight gain   Respiratory: Negative  Cardiovascular: Negative  Gastrointestinal: Negative for abdominal pain and diarrhea  Reflux   Musculoskeletal: Positive for back pain  Neurological: Negative for dizziness, numbness and headaches  Objective:    /80 (BP Location: Left arm, Patient Position: Sitting)   Pulse 75   Temp 98 4 °F (36 9 °C)   Resp 16   Ht 5' 11" (1 803 m)   Wt 105 kg (230 lb 12 8 oz)   SpO2 98%   BMI 32 19 kg/m²      Physical Exam   Constitutional: He is oriented to person, place, and time  He appears well-developed and well-nourished  No distress  Morbid obesity   HENT:   Mouth/Throat: Oropharynx is clear and moist    Neck: Neck supple  Cardiovascular: Normal rate, regular rhythm, normal heart sounds and intact distal pulses  Pulmonary/Chest: Effort normal and breath sounds normal  No stridor  No respiratory distress  Abdominal: Soft  Bowel sounds are normal  He exhibits no distension  There is no tenderness  Musculoskeletal:        Lumbar back: He exhibits tenderness and spasm  BLE motor strength 5/5   Neurological: He is alert and oriented to person, place, and time  Psychiatric: He has a normal mood and affect  His behavior is normal    Vitals reviewed  PHQ-9 Depression Screening    PHQ-9:    Frequency of the following problems over the past two weeks:       Little interest or pleasure in doing things:  0 - not at all  Feeling down, depressed, or hopeless:  0 - not at all  Trouble falling or staying asleep, or sleeping too much:  0 - not at all  Feeling tired or having little energy:  0 - not at all  Poor appetite or overeatin - not at all  Feeling bad about yourself - or that you are a failure or have let yourself or your family down:  0 - not at all  Trouble concentrating on things, such as reading the newspaper or watching television:  0 - not at all  Moving or speaking so slowly that other people could have noticed  Or the opposite - being so fidgety or restless that you have been moving around a lot more than usual:  0 - not at all  Thoughts that you would be better off dead, or of hurting yourself in some way:  0 - not at all  PHQ-2 Score:  0  PHQ-9 Score:  0       ACE-7 Flowsheet Screening      Most Recent Value   Over the last two weeks, how often have you been bothered by the following problems? Feeling nervous, anxious, or on edge  0   Not being able to stop or control worrying  0   Worrying too much about different things  0   Trouble relaxing   0   Being so restless that it's hard to sit still  0   Becoming easily annoyed or irritable   0   Feeling afraid as if something awful might happen  0   How difficult have these problems made it for you to do your work, take care of things at home, or get along with other people? Not difficult at all   ACE Score   0      ]  BMI Counseling:  Body mass index is 32 19 kg/m²  Discussed the patient's BMI with him  The BMI is above average  BMI counseling and education was provided to the patient  Nutrition recommendations include reducing portion sizes, decreasing overall calorie intake and moderation in carbohydrate intake  Exercise recommendations include moderate aerobic physical activity for 150 minutes/week and strength training exercises

## 2019-08-01 NOTE — ASSESSMENT & PLAN NOTE
Discussed dietary modifications, wean off omeprazole if possible and take only as prn  Continue H2B

## 2019-08-01 NOTE — ASSESSMENT & PLAN NOTE
Normal today but reports he has had elevated bp at home  Has made dietary modifications  Recommend obtaining labs and continue home bp with follow up after

## 2019-08-01 NOTE — PATIENT INSTRUCTIONS
Obesity   AMBULATORY CARE:   Obesity  is when your body mass index (BMI) is greater than 30  Your healthcare provider will use your height and weight to measure your BMI  The risks of obesity include  many health problems, such as injuries or physical disability  You may need tests to check for the following:  · Diabetes     · High blood pressure or high cholesterol     · Heart disease     · Gallbladder or liver disease     · Cancer of the colon, breast, prostate, liver, or kidney     · Sleep apnea     · Arthritis or gout  Seek care immediately if:   · You have a severe headache, confusion, or difficulty speaking  · You have weakness on one side of your body  · You have chest pain, sweating, or shortness of breath  Contact your healthcare provider if:   · You have symptoms of gallbladder or liver disease, such as pain in your upper abdomen  · You have knee or hip pain and discomfort while walking  · You have symptoms of diabetes, such as intense hunger and thirst, and frequent urination  · You have symptoms of sleep apnea, such as snoring or daytime sleepiness  · You have questions or concerns about your condition or care  Treatment for obesity  focuses on helping you lose weight to improve your health  Even a small decrease in BMI can reduce the risk for many health problems  Your healthcare provider will help you set a weight-loss goal   · Lifestyle changes  are the first step in treating obesity  These include making healthy food choices and getting regular physical activity  Your healthcare provider may suggest a weight-loss program that involves coaching, education, and therapy  · Medicine  may help you lose weight when it is used with a healthy diet and physical activity  · Surgery  can help you lose weight if you are very obese and have other health problems  There are several types of weight-loss surgery  Ask your healthcare provider for more information    Be successful losing weight:   · Set small, realistic goals  An example of a small goal is to walk for 20 minutes 5 days a week  Anther goal is to lose 5% of your body weight  · Tell friends, family members, and coworkers about your goals  and ask for their support  Ask a friend to lose weight with you, or join a weight-loss support group  · Identify foods or triggers that may cause you to overeat , and find ways to avoid them  Remove tempting high-calorie foods from your home and workplace  Place a bowl of fresh fruit on your kitchen counter  If stress causes you to eat, then find other ways to cope with stress  · Keep a diary to track what you eat and drink  Also write down how many minutes of physical activity you do each day  Weigh yourself once a week and record it in your diary  Eating changes: You will need to eat 500 to 1,000 fewer calories each day than you currently eat to lose 1 to 2 pounds a week  The following changes will help you cut calories:  · Eat smaller portions  Use small plates, no larger than 9 inches in diameter  Fill your plate half full of fruits and vegetables  Measure your food using measuring cups until you know what a serving size looks like  · Eat 3 meals and 1 or 2 snacks each day  Plan your meals in advance  Reubin Trixie and eat at home most of the time  Eat slowly  · Eat fruits and vegetables at every meal   They are low in calories and high in fiber, which makes you feel full  Do not add butter, margarine, or cream sauce to vegetables  Use herbs to season steamed vegetables  · Eat less fat and fewer fried foods  Eat more baked or grilled chicken and fish  These protein sources are lower in calories and fat than red meat  Limit fast food  Dress your salads with olive oil and vinegar instead of bottled dressing  · Limit the amount of sugar you eat  Do not drink sugary beverages  Limit alcohol  Activity changes:  Physical activity is good for your body in many ways   It helps you burn calories and build strong muscles  It decreases stress and depression, and improves your mood  It can also help you sleep better  Talk to your healthcare provider before you begin an exercise program   · Exercise for at least 30 minutes 5 days a week  Start slowly  Set aside time each day for physical activity that you enjoy and that is convenient for you  It is best to do both weight training and an activity that increases your heart rate, such as walking, bicycling, or swimming  · Find ways to be more active  Do yard work and housecleaning  Walk up the stairs instead of using elevators  Spend your leisure time going to events that require walking, such as outdoor festivals or fairs  This extra physical activity can help you lose weight and keep it off  Follow up with your healthcare provider as directed: You may need to meet with a dietitian  Write down your questions so you remember to ask them during your visits  © 2017 [employer] [employer] Information is for End User's use only and may not be sold, redistributed or otherwise used for commercial purposes  All illustrations and images included in CareNotes® are the copyrighted property of [employer]  or Brayan Conway  The above information is an  only  It is not intended as medical advice for individual conditions or treatments  Talk to your doctor, nurse or pharmacist before following any medical regimen to see if it is safe and effective for you  Low Fat Diet   AMBULATORY CARE:   A low-fat diet  is an eating plan that is low in total fat, unhealthy fat, and cholesterol  You may need to follow a low-fat diet if you have trouble digesting or absorbing fat  You may also need to follow this diet if you have high cholesterol  You can also lower your cholesterol by increasing the amount of fiber in your diet  Soluble fiber is a type of fiber that helps to decrease cholesterol levels     Different types of fat in food:   · Limit unhealthy fats  A diet that is high in cholesterol, saturated fat, and trans fat may cause unhealthy cholesterol levels  Unhealthy cholesterol levels increase your risk of heart disease  ¨ Cholesterol:  Limit intake of cholesterol to less than 200 mg per day  Cholesterol is found in meat, eggs, and dairy  ¨ Saturated fat:  Limit saturated fat to less than 7% of your total daily calories  Ask your dietitian how many calories you need each day  Saturated fat is found in butter, cheese, ice cream, whole milk, and palm oil  Saturated fat is also found in meat, such as beef, pork, chicken skin, and processed meats  Processed meats include sausage, hot dogs, and bologna  ¨ Trans fat:  Avoid trans fat as much as possible  Trans fat is used in fried and baked foods  Foods that say trans fat free on the label may still have up to 0 5 grams of trans fat per serving  · Include healthy fats  Replace foods that are high in saturated and trans fat with foods high in healthy fats  This may help to decrease high cholesterol levels  ¨ Monounsaturated fats: These are found in avocados, nuts, and vegetable oils, such as olive, canola, and sunflower oil  ¨ Polyunsaturated fats: These can be found in vegetable oils, such as soybean or corn oil  Omega-3 fats can help to decrease the risk of heart disease  Omega-3 fats are found in fish, such as salmon, herring, trout, and tuna  Omega-3 fats can also be found in plant foods, such as walnuts, flaxseed, soybeans, and canola oil    Foods to limit or avoid:   · Grains:      ¨ Snacks that are made with partially hydrogenated oils, such as chips, regular crackers, and butter-flavored popcorn    ¨ High-fat baked goods, such as biscuits, croissants, doughnuts, pies, cookies, and pastries    · Dairy:      ¨ Whole milk, 2% milk, and yogurt and ice cream made with whole milk    ¨ Half and half creamer, heavy cream, and whipping cream    ¨ Cheese, cream cheese, and sour cream    · Meats and proteins:      ¨ High-fat cuts of meat (T-bone steak, regular hamburger, and ribs)    ¨ Fried meat, poultry (turkey and chicken), and fish    ¨ Poultry (chicken and turkey) with skin    ¨ Cold cuts (salami or bologna), hot dogs, chavez, and sausage    ¨ Whole eggs and egg yolks    · Vegetables and fruits with added fat:      ¨ Fried vegetables or vegetables in butter or high-fat sauces, such as cream or cheese sauces    ¨ Fried fruit or fruit served with butter or cream    · Fats:      ¨ Butter, stick margarine, and shortening    ¨ Coconut, palm oil, and palm kernel oil  Foods to include:   · Grains:      ¨ Whole-grain breads, cereals, pasta, and brown rice    ¨ Low-fat crackers and pretzels    · Vegetables and fruits:      ¨ Fresh, frozen, or canned vegetables (no salt or low-sodium)    ¨ Fresh, frozen, dried, or canned fruit (canned in light syrup or fruit juice)    ¨ Avocado    · Low-fat dairy products:      ¨ Nonfat (skim) or 1% milk    ¨ Nonfat or low-fat cheese, yogurt, and cottage cheese    · Meats and proteins:      ¨ Chicken or turkey with no skin    ¨ Baked or broiled fish    ¨ Lean beef and pork (loin, round, extra lean hamburger)    ¨ Beans and peas, unsalted nuts, soy products    ¨ Egg whites and substitutes    ¨ Seeds and nuts    · Fats:      ¨ Unsaturated oil, such as canola, olive, peanut, soybean, or sunflower oil    ¨ Soft or liquid margarine and vegetable oil spread    ¨ Low-fat salad dressing  Other ways to decrease fat:   · Read food labels before you buy foods  Choose foods that have less than 30% of calories from fat  Choose low-fat or fat-free dairy products  Remember that fat free does not mean calorie free  These foods still contain calories, and too many calories can lead to weight gain  · Trim fat from meat and avoid fried food  Trim all visible fat from meat before you cook it  Remove the skin from poultry  Do not titus meat, fish, or poultry  Bake, roast, boil, or broil these foods instead  Avoid fried foods  Eat a baked potato instead of [place] fries  Steam vegetables instead of sautéing them in butter  · Add less fat to foods  Use imitation chavez bits on salads and baked potatoes instead of regular chavez bits  Use fat-free or low-fat salad dressings instead of regular dressings  Use low-fat or nonfat butter-flavored topping instead of regular butter or margarine on popcorn and other foods  Ways to decrease fat in recipes:  Replace high-fat ingredients with low-fat or nonfat ones  This may cause baked goods to be drier than usual  You may need to use nonfat cooking spray on pans to prevent food from sticking  You also may need to change the amount of other ingredients, such as water, in the recipe  Try the following:  · Use low-fat or light margarine instead of regular margarine or shortening  · Use lean ground turkey breast or chicken, or lean ground beef (less than 5% fat) instead of hamburger  · Add 1 teaspoon of canola oil to 8 ounces of skim milk instead of using cream or half and half  · Use grated zucchini, carrots, or apples in breads instead of coconut  · Use blenderized, low-fat cottage cheese, plain tofu, or low-fat ricotta cheese instead of cream cheese  · Use 1 egg white and 1 teaspoon of canola oil, or use ¼ cup (2 ounces) of fat-free egg substitute instead of a whole egg  · Replace half of the oil that is called for in a recipe with applesauce when you bake  Use 3 tablespoons of cocoa powder and 1 tablespoon of canola oil instead of a square of baking chocolate  How to increase fiber:  Eat enough high-fiber foods to get 20 to 30 grams of fiber every day  Slowly increase your fiber intake to avoid stomach cramps, gas, and other problems  · Eat 3 ounces of whole-grain foods each day  An ounce is about 1 slice of bread  Eat whole-grain breads, such as whole-wheat bread   Whole wheat, whole-wheat flour, or other whole grains should be listed as the first ingredient on the food label  Replace white flour with whole-grain flour or use half of each in recipes  Whole-grain flour is heavier than white flour, so you may have to add more yeast or baking powder  · Eat a high-fiber cereal for breakfast   Oatmeal is a good source of soluble fiber  Look for cereals that have bran or fiber in the name  Choose whole-grain products, such as brown rice, barley, and whole-wheat pasta  · Eat more beans, peas, and lentils  For example, add beans to soups or salads  Eat at least 5 cups of fruits and vegetables each day  Eat fruits and vegetables with the peel because the peel is high in fiber  © 2017 [employer] Information is for End User's use only and may not be sold, redistributed or otherwise used for commercial purposes  All illustrations and images included in CareNotes® are the copyrighted property of [employer]  or [employer]rayan Conway  The above information is an  only  It is not intended as medical advice for individual conditions or treatments  Talk to your doctor, nurse or pharmacist before following any medical regimen to see if it is safe and effective for you  Heart Healthy Diet   AMBULATORY CARE:   A heart healthy diet  is an eating plan low in total fat, unhealthy fats, and sodium (salt)  A heart healthy diet helps decrease your risk for heart disease and stroke  Limit the amount of fat you eat to 25% to 35% of your total daily calories  Limit sodium to less than 2,300 mg each day  Healthy fats:  Healthy fats can help improve cholesterol levels  The risk for heart disease is decreased when cholesterol levels are normal  Choose healthy fats, such as the following:  · Unsaturated fat  is found in foods such as soybean, canola, olive, corn, and safflower oils  It is also found in soft tub margarine that is made with liquid vegetable oil       · Omega-3 fat  is found in certain fish, such as salmon, tuna, and trout, and in walnuts and flaxseed  Unhealthy fats:  Unhealthy fats can cause unhealthy cholesterol levels in your blood and increase your risk of heart disease  Limit unhealthy fats, such as the following:  · Cholesterol  is found in animal foods, such as eggs and lobster, and in dairy products made from whole milk  Limit cholesterol to less than 300 milligrams (mg) each day  You may need to limit cholesterol to 200 mg each day if you have heart disease  · Saturated fat  is found in meats, such as chavez and hamburger  It is also found in chicken or turkey skin, whole milk, and butter  Limit saturated fat to less than 7% of your total daily calories  Limit saturated fat to less than 6% if you have heart disease or are at increased risk for it  · Trans fat  is found in packaged foods, such as potato chips and cookies  It is also in hard margarine, some fried foods, and shortening  Avoid trans fats as much as possible    Heart healthy foods and drinks to include:  Ask your dietitian or healthcare provider how many servings to have from each of the following food groups:  · Grains:      ¨ Whole-wheat breads, cereals, and pastas, and brown rice    ¨ Low-fat, low-sodium crackers and chips    · Vegetables:      ¨ Broccoli, green beans, green peas, and spinach    ¨ Collards, kale, and lima beans    ¨ Carrots, sweet potatoes, tomatoes, and peppers    ¨ Canned vegetables with no salt added    · Fruits:      ¨ Bananas, peaches, pears, and pineapple    ¨ Grapes, raisins, and dates    ¨ Oranges, tangerines, grapefruit, orange juice, and grapefruit juice    ¨ Apricots, mangoes, melons, and papaya    ¨ Raspberries and strawberries    ¨ Canned fruit with no added sugar    · Low-fat dairy products:      ¨ Nonfat (skim) milk, 1% milk, and low-fat almond, cashew, or soy milks fortified with calcium    ¨ Low-fat cheese, regular or frozen yogurt, and cottage cheese    · Meats and proteins , such as lean cuts of beef and pork (loin, leg, round), skinless chicken and turkey, legumes, soy products, egg whites, and nuts  Foods and drinks to limit or avoid:  Ask your dietitian or healthcare provider about these and other foods that are high in unhealthy fat, sodium, and sugar:  · Snack or packaged foods , such as frozen dinners, cookies, macaroni and cheese, and cereals with more than 300 mg of sodium per serving    · Canned or dry mixes  for cakes, soups, sauces, or gravies    · Vegetables with added sodium , such as instant potatoes, vegetables with added sauces, or regular canned vegetables    · Other foods high in sodium , such as ketchup, barbecue sauce, salad dressing, pickles, olives, soy sauce, and miso    · High-fat dairy foods  such as whole or 2% milk, cream cheese, or sour cream, and cheeses     · High-fat protein foods  such as high-fat cuts of beef (T-bone steaks, ribs), chicken or turkey with skin, and organ meats, such as liver    · Cured or smoked meats , such as hot dogs, chavez, and sausage    · Unhealthy fats and oils , such as butter, stick margarine, shortening, and cooking oils such as coconut or palm oil    · Food and drinks high in sugar , such as soft drinks (soda), sports drinks, sweetened tea, candy, cake, cookies, pies, and doughnuts  Other diet guidelines to follow:   · Eat more foods containing omega-3 fats  Eat fish high in omega-3 fats at least 2 times a week  · Limit alcohol  Too much alcohol can damage your heart and raise your blood pressure  Women should limit alcohol to 1 drink a day  Men should limit alcohol to 2 drinks a day  A drink of alcohol is 12 ounces of beer, 5 ounces of wine, or 1½ ounces of liquor  · Choose low-sodium foods  High-sodium foods can lead to high blood pressure  Add little or no salt to food you prepare  Use herbs and spices in place of salt  · Eat more fiber  to help lower cholesterol levels   Eat at least 5 servings of fruits and vegetables each day  Eat 3 ounces of whole-grain foods each day  Legumes (beans) are also a good source of fiber  Lifestyle guidelines:   · Do not smoke  Nicotine and other chemicals in cigarettes and cigars can cause lung and heart damage  Ask your healthcare provider for information if you currently smoke and need help to quit  E-cigarettes or smokeless tobacco still contain nicotine  Talk to your healthcare provider before you use these products  · Exercise regularly  to help you maintain a healthy weight and improve your blood pressure and cholesterol levels  Ask your healthcare provider about the best exercise plan for you  Do not start an exercise program without asking your healthcare provider  Follow up with your healthcare provider as directed:  Write down your questions so you remember to ask them during your visits  © 2017 [address] [facility] Information is for End User's use only and may not be sold, redistributed or otherwise used for commercial purposes  All illustrations and images included in CareNotes® are the copyrighted property of [employer]  or Brayan Zoraida  The above information is an  only  It is not intended as medical advice for individual conditions or treatments  Talk to your doctor, nurse or pharmacist before following any medical regimen to see if it is safe and effective for you  Calorie Counting Diet   WHAT YOU NEED TO KNOW:   What is a calorie counting diet? It is a meal plan based on counting calories each day to reach a healthy body weight  You will need to eat fewer calories if you are trying to lose weight  Weight loss may decrease your risk for certain health problems or improve your health if you have health problems  Some of these health problems include heart disease, high blood pressure, and diabetes  What foods should I avoid?   Your dietitian will tell you if you need to avoid certain foods based on your body weight and health condition  You may need to avoid high-fat foods if you are at risk for or have heart disease  You may need to eat fewer foods from the breads and starches food group if you have diabetes  How many calories are in foods? The following is a list of foods and drinks with the approximate number of calories in each  Check the food label to find the exact number of calories  A dietitian can tell you how many calories you should have from each food group each day    · Carbohydrate:      ¨ ½ of a 3-inch bagel, 1 slice of bread, or ½ of a hamburger bun or hot dog bun (80)    ¨ 1 (8-inch) flour tortilla or ½ cup of cooked rice (100)    ¨ 1 (6-inch) corn tortilla (80)    ¨ 1 (6-inch) pancake or 1 cup of bran flakes cereal (110)    ¨ ½ cup of cooked cereal (80)    ¨ ½ cup of cooked pasta (85)    ¨ 1 ounce of pretzels (100)    ¨ 3 cups of air-popped popcorn without butter or oil (80)    · Dairy:      ¨ 1 cup of skim or 1% milk (90)    ¨ 1 cup of 2% milk (120)    ¨ 1 cup of whole milk (160)    ¨ 1 cup of 2% chocolate milk (220)    ¨ 1 ounce of low-fat cheese with 3 grams of fat per ounce (70)    ¨ 1 ounce of cheddar cheese (114)    ¨ ½ cup of 1% fat cottage cheese (80)    ¨ 1 cup of plain or sugar-free, fat-free yogurt (90)    · Protein foods:      ¨ 3 ounces of fish (not breaded or fried) (95)    ¨ 3 ounces of breaded, fried fish (195)    ¨ ¾ cup of tuna canned in water (105)    ¨ 3 ounces of chicken breast without skin (105)    ¨ 1 fried chicken breast with skin (350)    ¨ ¼ cup of fat free egg substitute (40)    ¨ 1 large egg (75)    ¨ 3 ounces of lean beef or pork (165)    ¨ 3 ounces of fried pork chop or ham (185)    ¨ ½ cup of cooked dried beans, such as kidney, painter, lentils, or navy (115)    ¨ 3 ounces of bologna or lunch meat (225)    ¨ 2 links of breakfast sausage (140)    · Vegetables:      ¨ ½ cup of sliced mushrooms (10)    ¨ 1 cup of salad greens, such as lettuce, spinach, or cecile (15)    ¨ ½ cup of steamed asparagus (20)    ¨ ½ cup of cooked summer squash, zucchini squash, or green or wax beans (25)    ¨ 1 cup of broccoli or cauliflower florets, or 1 medium tomato (25)    ¨ 1 large raw carrot or ½ cup of cooked carrots (40)    ¨ ? of a medium cucumber or 1 stalk of celery (5)    ¨ 1 small baked potato (160)    ¨ 1 cup of breaded, fried vegetables (230)    · Fruit:      ¨ 1 (6-inch) banana (55)     ¨ ½ of a 4-inch grapefruit (55)    ¨ 15 grapes (60)    ¨ 1 medium orange or apple (70)    ¨ 1 large peach (65)    ¨ 1 cup of fresh pineapple chunks (75)    ¨ 1 cup of melon cubes (50)    ¨ 1¼ cups of whole strawberries (45)    ¨ ½ cup of fruit canned in juice (55)    ¨ ½ cup of fruit canned in heavy syrup (110)    ¨ ?  cup of raisins (130)    ¨ ½ cup of unsweetened fruit juice (60)    ¨ ½ cup of grape, cranberry, or prune juice (90)    · Fat:      ¨ 10 peanuts or 2 teaspoons of peanut butter (55)    ¨ 2 tablespoons of avocado or 1 tablespoon of regular salad dressing (45)    ¨ 2 slices of chavez (90)    ¨ 1 teaspoon of oil, such as safflower, canola, corn, or olive oil (45)    ¨ 2 teaspoons of low-fat margarine, or 1 tablespoon of low-fat mayonnaise (50)    ¨ 1 teaspoon of regular margarine (40)    ¨ 1 tablespoon of regular mayonnaise (135)    ¨ 1 tablespoon of cream cheese or 2 tablespoons of low-fat cream cheese (45)    ¨ 2 tablespoons of vegetable shortening (215)    · Dessert and sweets:      ¨ 8 animal crackers or 5 vanilla wafers (80)    ¨ 1 frozen fruit juice bar (80)    ¨ ½ cup of ice milk or low-fat frozen yogurt (90)    ¨ ½ cup of sherbet or sorbet (125)    ¨ ½ cup of sugar-free pudding or custard (60)    ¨ ½ cup of ice cream (140)    ¨ ½ cup of pudding or custard (175)    ¨ 1 (2-inch) square chocolate brownie (185)    · Combination foods:      ¨ Bean burrito made with an 8-inch tortilla, without cheese (275)    ¨ Chicken breast sandwich with lettuce and tomato (325)    ¨ 1 cup of chicken noodle soup (60)    ¨ 1 beef taco (175)    ¨ Regular hamburger with lettuce and tomato (310)    ¨ Regular cheeseburger with lettuce and tomato (410)     ¨ ¼ of a 12-inch cheese pizza (280)    ¨ Fried fish sandwich with lettuce and tomato (425)    ¨ Hot dog and bun (275)    ¨ 1½ cups of macaroni and cheese (310)    ¨ Taco salad with a fried tortilla shell (870)    · Low-calorie foods:      ¨ 1 tablespoon of ketchup or 1 tablespoon of fat free sour cream (15)    ¨ 1 teaspoon of mustard (5)    ¨ ¼ cup of salsa (20)    ¨ 1 large dill pickle (15)    ¨ 1 tablespoon of fat free salad dressing (10)    ¨ 2 teaspoons of low-sugar, light jam or jelly, or 1 tablespoon of sugar-free syrup (15)    ¨ 1 sugar-free popsicle (15)    ¨ 1 cup of club soda, seltzer water, or diet soda (0)  CARE AGREEMENT:   You have the right to help plan your care  Discuss treatment options with your caregivers to decide what care you want to receive  You always have the right to refuse treatment  The above information is an  only  It is not intended as medical advice for individual conditions or treatments  Talk to your doctor, nurse or pharmacist before following any medical regimen to see if it is safe and effective for you  © 2017 [employer] Information is for End User's use only and may not be sold, redistributed or otherwise used for commercial purposes  All illustrations and images included in CareNotes® are the copyrighted property of A D A M , Inc  or Brayan Conway

## 2019-08-12 ENCOUNTER — APPOINTMENT (OUTPATIENT)
Dept: LAB | Facility: MEDICAL CENTER | Age: 44
End: 2019-08-12
Payer: COMMERCIAL

## 2019-08-12 DIAGNOSIS — K21.9 GASTROESOPHAGEAL REFLUX DISEASE, ESOPHAGITIS PRESENCE NOT SPECIFIED: ICD-10-CM

## 2019-08-12 DIAGNOSIS — E78.1 ESSENTIAL HYPERTRIGLYCERIDEMIA: ICD-10-CM

## 2019-08-12 DIAGNOSIS — I10 BENIGN ESSENTIAL HYPERTENSION: ICD-10-CM

## 2019-08-12 LAB
ALBUMIN SERPL BCP-MCNC: 4.5 G/DL (ref 3.5–5)
ALP SERPL-CCNC: 42 U/L (ref 46–116)
ALT SERPL W P-5'-P-CCNC: 45 U/L (ref 12–78)
ANION GAP SERPL CALCULATED.3IONS-SCNC: 9 MMOL/L (ref 4–13)
AST SERPL W P-5'-P-CCNC: 24 U/L (ref 5–45)
BILIRUB SERPL-MCNC: 0.56 MG/DL (ref 0.2–1)
BILIRUB UR QL STRIP: NEGATIVE
BUN SERPL-MCNC: 12 MG/DL (ref 5–25)
CALCIUM SERPL-MCNC: 9.8 MG/DL (ref 8.3–10.1)
CHLORIDE SERPL-SCNC: 106 MMOL/L (ref 100–108)
CHOLEST SERPL-MCNC: 250 MG/DL (ref 50–200)
CLARITY UR: CLEAR
CO2 SERPL-SCNC: 26 MMOL/L (ref 21–32)
COLOR UR: YELLOW
CREAT SERPL-MCNC: 0.82 MG/DL (ref 0.6–1.3)
ERYTHROCYTE [DISTWIDTH] IN BLOOD BY AUTOMATED COUNT: 12.9 % (ref 11.6–15.1)
GFR SERPL CREATININE-BSD FRML MDRD: 109 ML/MIN/1.73SQ M
GLUCOSE P FAST SERPL-MCNC: 88 MG/DL (ref 65–99)
GLUCOSE UR STRIP-MCNC: NEGATIVE MG/DL
HCT VFR BLD AUTO: 42.1 % (ref 36.5–49.3)
HDLC SERPL-MCNC: 36 MG/DL (ref 40–60)
HGB BLD-MCNC: 14.2 G/DL (ref 12–17)
HGB UR QL STRIP.AUTO: NEGATIVE
KETONES UR STRIP-MCNC: NEGATIVE MG/DL
LEUKOCYTE ESTERASE UR QL STRIP: NEGATIVE
MCH RBC QN AUTO: 31.6 PG (ref 26.8–34.3)
MCHC RBC AUTO-ENTMCNC: 33.7 G/DL (ref 31.4–37.4)
MCV RBC AUTO: 94 FL (ref 82–98)
NITRITE UR QL STRIP: NEGATIVE
NONHDLC SERPL-MCNC: 214 MG/DL
PH UR STRIP.AUTO: 6 [PH]
PLATELET # BLD AUTO: 298 THOUSANDS/UL (ref 149–390)
PMV BLD AUTO: 10.6 FL (ref 8.9–12.7)
POTASSIUM SERPL-SCNC: 4.2 MMOL/L (ref 3.5–5.3)
PROT SERPL-MCNC: 7.5 G/DL (ref 6.4–8.2)
PROT UR STRIP-MCNC: NEGATIVE MG/DL
RBC # BLD AUTO: 4.5 MILLION/UL (ref 3.88–5.62)
SODIUM SERPL-SCNC: 141 MMOL/L (ref 136–145)
SP GR UR STRIP.AUTO: 1.02 (ref 1–1.03)
TRIGL SERPL-MCNC: 804 MG/DL
TSH SERPL DL<=0.05 MIU/L-ACNC: 2.7 UIU/ML (ref 0.36–3.74)
UROBILINOGEN UR QL STRIP.AUTO: 0.2 E.U./DL
WBC # BLD AUTO: 5.3 THOUSAND/UL (ref 4.31–10.16)

## 2019-08-12 PROCEDURE — 80061 LIPID PANEL: CPT

## 2019-08-12 PROCEDURE — 36415 COLL VENOUS BLD VENIPUNCTURE: CPT

## 2019-08-12 PROCEDURE — 81003 URINALYSIS AUTO W/O SCOPE: CPT

## 2019-08-12 PROCEDURE — 85027 COMPLETE CBC AUTOMATED: CPT

## 2019-08-12 PROCEDURE — 80053 COMPREHEN METABOLIC PANEL: CPT

## 2019-08-12 PROCEDURE — 84443 ASSAY THYROID STIM HORMONE: CPT

## 2019-08-15 ENCOUNTER — EVALUATION (OUTPATIENT)
Dept: PHYSICAL THERAPY | Facility: MEDICAL CENTER | Age: 44
End: 2019-08-15
Payer: COMMERCIAL

## 2019-08-15 DIAGNOSIS — G89.29 CHRONIC MIDLINE LOW BACK PAIN WITHOUT SCIATICA: Primary | ICD-10-CM

## 2019-08-15 DIAGNOSIS — M54.50 CHRONIC MIDLINE LOW BACK PAIN WITHOUT SCIATICA: Primary | ICD-10-CM

## 2019-08-15 PROCEDURE — 97161 PT EVAL LOW COMPLEX 20 MIN: CPT | Performed by: PHYSICAL THERAPIST

## 2019-08-15 NOTE — PROGRESS NOTES
PT Evaluation     Today's date: 8/15/2019  Patient name: Mima Carver  : 1975  MRN: [id number]  Referring provider: Seamus Ballesteros DO  Dx:   Encounter Diagnosis     ICD-10-CM    1  Chronic midline low back pain without sciatica M54 5 Ambulatory referral to Physical Therapy    G89 29                   Assessment  Assessment details: Mima Carver is a 45 y o  male was evaluated on 8/15/2019  for Chronic midline low back pain without sciatica  (primary encounter diagnosis)  Mima Carver has the above listed impairments resulting in functional deficits and negative impact to quality of life  Patient is appropriate for skilled PT intervention to promote maximal return to function and patient specific goals  Patient agrees with outlined treatment plan and all questions were answered to their satisfaction  Impairments: abnormal muscle firing, abnormal muscle tone, abnormal or restricted ROM, impaired physical strength, lacks appropriate home exercise program and pain with function  Understanding of Dx/Px/POC: good   Prognosis: good    Goals  Patient will successfully transition to home exercise program   Patient will be able to manage symptoms independently      Patient will report no pain when standing at work   Patient will report no pain with getting up in morning   Patient will achieve pain free lumbar extension     Plan  Plan details: 1-2X per week   Patient would benefit from: skilled PT  Referral necessary: No  Planned modality interventions: thermotherapy: hydrocollator packs  Planned therapy interventions: home exercise program, manual therapy, neuromuscular re-education, patient education, functional ROM exercises, strengthening, stretching, joint mobilization, graded activity, graded exercise, therapeutic exercise, body mechanics training, motor coordination training and activity modification  Frequency: 1x week  Duration in weeks: 12  Treatment plan discussed with: patient        Subjective Evaluation    History of Present Illness  Mechanism of injury: Aydee Bhandari is a 45 y o  male presenting to therapy with complaints of 6 month history of low back pain  Denies any radiation or symptoms into lower extremities  He notes that pain is variable throughout week but consistently bad when he is standing in one position such as at work or [employer] and needs to sit down  Has not tried any other interventions at this time and is not taking prescription medication  No imaging at this time      Denies bowel or bladder dysfunction, no recent weight loss, no unremitting night pain  Pain  Current pain ratin  At best pain ratin  At worst pain ratin  Quality: dull ache and pressure          Objective   Red flag screening (-)  Dermatomes/myotomes intact  Reflexes 3+ bilateral patellar, 2+ achilles  Clonus (-)  Hernández (-)  Lumbar AROM:  Full flexion ROM with relief of pain, minor limitation in extension with pain  Repeated motion testing :  No pain with flexion, consistent end range pain with extension, no worse  Hip screen WNL   PAIVM:  Hypomobility throughout mid and lower thoracic    Painful L2-L4 spring testing     Comparable sign active extension: Improved with MWM to L3          Precautions: None      Manual  8/15            Supine mid and thoracolumbar PA thrust AF            Side lying lumbar extension rotation thrust bilaterally  AF                                                       Exercise Diary              Prone on elbow  2X            Follow with supine flexion 1 min                                                                                                                                                                                                                                                           Modalities

## 2019-08-15 NOTE — PROGRESS NOTES
Assessment/Plan:    Problem List Items Addressed This Visit        Cardiovascular and Mediastinum    Benign essential hypertension     BP high in office as well as at home  Will restart losartan that he was previously on but will increase dose to 50mg daily and add hctz 12 5mg daily with bmp in 3-5 days  Side effects discussed  Return in one month with bp log  Relevant Medications    losartan (COZAAR) 50 mg tablet    hydrochlorothiazide (HYDRODIURIL) 12 5 mg tablet    Other Relevant Orders    Basic metabolic panel       Other    Essential hypertriglyceridemia - Primary     Trigs>800 with normal TSH  He has remained sober  Start vascepa 1g two tabs bid, repeat lipid panel in 4 months  Relevant Medications    Icosapent Ethyl (VASCEPA) 1 g CAPS          Subjective:      Patient ID: Jaja Ward is a 45 y o  male  HPI  39yo female with HTN, reflux, hypertriglyceridemia, depression, chronic back pain here for follow up care  Has been sober over 1 year  Was previously on gemfibrozil but was stopped a year ago  He denies abdominal pain  He has started to incorporate more vegetables recently but admits to poor diet previously  He is trying to go for walks as well  He continues to have elevated bp at home, 150/90-100s  Gets occasional HA  Denies dizziness, CP, SOB  Previously was on losartan 25mg daily  Had dry cough with ACEI prior to that      The following portions of the patient's history were reviewed and updated as appropriate: allergies, current medications, past family history, past medical history, past social history, past surgical history and problem list     Current Outpatient Medications:     b complex vitamins tablet, Take 1 tablet by mouth daily, Disp: , Rfl:     hydrochlorothiazide (HYDRODIURIL) 12 5 mg tablet, Take 1 tablet (12 5 mg total) by mouth daily, Disp: 30 tablet, Rfl: 0    Icosapent Ethyl (VASCEPA) 1 g CAPS, Two capsules bid, Disp: 360 capsule, Rfl: 1   losartan (COZAAR) 50 mg tablet, Take 1 tablet (50 mg total) by mouth daily, Disp: 30 tablet, Rfl: 0    omeprazole (PriLOSEC) 20 mg delayed release capsule, Take 1 capsule by mouth daily, Disp: , Rfl:     Review of Systems   Respiratory: Negative  Cardiovascular: Negative for chest pain and leg swelling  Neurological: Positive for headaches  Negative for dizziness  Objective:    /100 (BP Location: Right arm, Patient Position: Sitting, Cuff Size: Large)   Pulse 84   Temp 98 2 °F (36 8 °C)   Resp 16   Ht 5' 11" (1 803 m)   Wt 105 kg (230 lb 6 4 oz)   SpO2 99%   BMI 32 13 kg/m²      Physical Exam   Constitutional: He is oriented to person, place, and time  He appears well-developed  Cardiovascular: Normal rate, regular rhythm, normal heart sounds and intact distal pulses  Pulmonary/Chest: Effort normal and breath sounds normal  No stridor  No respiratory distress  Abdominal: Soft  Bowel sounds are normal  He exhibits no distension  There is no tenderness  Neurological: He is alert and oriented to person, place, and time  Psychiatric: He has a normal mood and affect  Vitals reviewed        Recent Results (from the past 504 hour(s))   Lipid panel    Collection Time: 08/12/19  5:44 PM   Result Value Ref Range    Cholesterol 250 (H) 50 - 200 mg/dL    Triglycerides 804 (H) <=150 mg/dL    HDL, Direct 36 (L) 40 - 60 mg/dL    LDL Calculated      Non-HDL-Chol (CHOL-HDL) 214 mg/dl   Comprehensive metabolic panel    Collection Time: 08/12/19  5:44 PM   Result Value Ref Range    Sodium 141 136 - 145 mmol/L    Potassium 4 2 3 5 - 5 3 mmol/L    Chloride 106 100 - 108 mmol/L    CO2 26 21 - 32 mmol/L    ANION GAP 9 4 - 13 mmol/L    BUN 12 5 - 25 mg/dL    Creatinine 0 82 0 60 - 1 30 mg/dL    Glucose, Fasting 88 65 - 99 mg/dL    Calcium 9 8 8 3 - 10 1 mg/dL    AST 24 5 - 45 U/L    ALT 45 12 - 78 U/L    Alkaline Phosphatase 42 (L) 46 - 116 U/L    Total Protein 7 5 6 4 - 8 2 g/dL    Albumin 4 5 3 5 - 5 0 g/dL    Total Bilirubin 0 56 0 20 - 1 00 mg/dL    eGFR 109 ml/min/1 73sq m   TSH, 3rd generation with Free T4 reflex    Collection Time: 08/12/19  5:44 PM   Result Value Ref Range    TSH 3RD GENERATON 2 700 0 358 - 3 740 uIU/mL   Urinalysis with reflex to microscopic    Collection Time: 08/12/19  5:44 PM   Result Value Ref Range    Color, UA Yellow     Clarity, UA Clear     Specific Gravity, UA 1 020 1 003 - 1 030    pH, UA 6 0 4 5, 5 0, 5 5, 6 0, 6 5, 7 0, 7 5, 8 0    Leukocytes, UA Negative Negative    Nitrite, UA Negative Negative    Protein, UA Negative Negative mg/dl    Glucose, UA Negative Negative mg/dl    Ketones, UA Negative Negative mg/dl    Urobilinogen, UA 0 2 0 2, 1 0 E U /dl E U /dl    Bilirubin, UA Negative Negative    Blood, UA Negative Negative   CBC    Collection Time: 08/12/19  5:44 PM   Result Value Ref Range    WBC 5 30 4 31 - 10 16 Thousand/uL    RBC 4 50 3 88 - 5 62 Million/uL    Hemoglobin 14 2 12 0 - 17 0 g/dL    Hematocrit 42 1 36 5 - 49 3 %    MCV 94 82 - 98 fL    MCH 31 6 26 8 - 34 3 pg    MCHC 33 7 31 4 - 37 4 g/dL    RDW 12 9 11 6 - 15 1 %    Platelets 374 524 - 675 Thousands/uL    MPV 10 6 8 9 - 12 7 fL

## 2019-08-16 ENCOUNTER — OFFICE VISIT (OUTPATIENT)
Dept: INTERNAL MEDICINE CLINIC | Facility: CLINIC | Age: 44
End: 2019-08-16
Payer: COMMERCIAL

## 2019-08-16 VITALS
DIASTOLIC BLOOD PRESSURE: 100 MMHG | BODY MASS INDEX: 32.26 KG/M2 | RESPIRATION RATE: 16 BRPM | OXYGEN SATURATION: 99 % | HEART RATE: 84 BPM | TEMPERATURE: 98.2 F | HEIGHT: 71 IN | WEIGHT: 230.4 LBS | SYSTOLIC BLOOD PRESSURE: 160 MMHG

## 2019-08-16 DIAGNOSIS — E78.1 ESSENTIAL HYPERTRIGLYCERIDEMIA: Primary | ICD-10-CM

## 2019-08-16 DIAGNOSIS — I10 BENIGN ESSENTIAL HYPERTENSION: ICD-10-CM

## 2019-08-16 PROCEDURE — 1036F TOBACCO NON-USER: CPT | Performed by: INTERNAL MEDICINE

## 2019-08-16 PROCEDURE — 99214 OFFICE O/P EST MOD 30 MIN: CPT | Performed by: INTERNAL MEDICINE

## 2019-08-16 PROCEDURE — 3008F BODY MASS INDEX DOCD: CPT | Performed by: INTERNAL MEDICINE

## 2019-08-16 RX ORDER — ICOSAPENT ETHYL 1000 MG/1
CAPSULE ORAL
Qty: 360 CAPSULE | Refills: 1 | Status: SHIPPED | OUTPATIENT
Start: 2019-08-16 | End: 2019-09-25 | Stop reason: ALTCHOICE

## 2019-08-16 RX ORDER — HYDROCHLOROTHIAZIDE 12.5 MG/1
12.5 TABLET ORAL DAILY
Qty: 30 TABLET | Refills: 0 | Status: SHIPPED | OUTPATIENT
Start: 2019-08-16 | End: 2019-09-10 | Stop reason: SDUPTHER

## 2019-08-16 RX ORDER — LOSARTAN POTASSIUM 50 MG/1
50 TABLET ORAL DAILY
Qty: 30 TABLET | Refills: 0 | Status: SHIPPED | OUTPATIENT
Start: 2019-08-16 | End: 2019-09-10 | Stop reason: SDUPTHER

## 2019-08-19 ENCOUNTER — OFFICE VISIT (OUTPATIENT)
Dept: PHYSICAL THERAPY | Facility: MEDICAL CENTER | Age: 44
End: 2019-08-19
Payer: COMMERCIAL

## 2019-08-19 DIAGNOSIS — G89.29 CHRONIC MIDLINE LOW BACK PAIN WITHOUT SCIATICA: Primary | ICD-10-CM

## 2019-08-19 DIAGNOSIS — M54.50 CHRONIC MIDLINE LOW BACK PAIN WITHOUT SCIATICA: Primary | ICD-10-CM

## 2019-08-19 PROCEDURE — 97110 THERAPEUTIC EXERCISES: CPT | Performed by: PHYSICAL THERAPIST

## 2019-08-19 PROCEDURE — 97140 MANUAL THERAPY 1/> REGIONS: CPT | Performed by: PHYSICAL THERAPIST

## 2019-08-19 NOTE — PROGRESS NOTES
Daily Note     Today's date: 2019  Patient name: Sondra Layne  : 1975  MRN: [id number]  Referring provider: Malick Burgos DO  Dx:   Encounter Diagnosis     ICD-10-CM    1  Chronic midline low back pain without sciatica M54 5     G89 29                   Subjective: Stephan reports that he was feeling pretty good over the weekend, felt stiff and sore today but only for a bit in the morning       Objective: See treatment diary below      Assessment: Tolerated treatment well  Patient tolerating extension well balanced by flexion  Progress as able if mobility improves and pain does not worsen      Plan: Continue per plan of care        Precautions: None      Manual  8/15 8/19           Supine mid and thoracolumbar PA thrust AF AF           Side lying lumbar extension rotation thrust bilaterally  AF AF                                                      Exercise Diary              Prone on elbow  2X 2X           Follow with supine flexion 1 min  1 min            Foam roll on wall  5 min                                                                                                                                                                                                                                             Modalities

## 2019-08-21 NOTE — ASSESSMENT & PLAN NOTE
BP high in office as well as at home  Will restart losartan that he was previously on but will increase dose to 50mg daily and add hctz 12 5mg daily with bmp in 3-5 days  Side effects discussed  Return in one month with bp log 
Trigs>800 with normal TSH  He has remained sober  Start vascepa 1g two tabs bid, repeat lipid panel in 4 months 
08230 Detailed

## 2019-09-10 DIAGNOSIS — I10 BENIGN ESSENTIAL HYPERTENSION: ICD-10-CM

## 2019-09-10 RX ORDER — LOSARTAN POTASSIUM 50 MG/1
TABLET ORAL
Qty: 30 TABLET | Refills: 0 | Status: SHIPPED | OUTPATIENT
Start: 2019-09-10 | End: 2019-09-23 | Stop reason: SDUPTHER

## 2019-09-10 RX ORDER — HYDROCHLOROTHIAZIDE 12.5 MG/1
TABLET ORAL
Qty: 30 TABLET | Refills: 0 | Status: SHIPPED | OUTPATIENT
Start: 2019-09-10 | End: 2019-09-23 | Stop reason: SDUPTHER

## 2019-09-23 ENCOUNTER — OFFICE VISIT (OUTPATIENT)
Dept: FAMILY MEDICINE CLINIC | Facility: CLINIC | Age: 44
End: 2019-09-23
Payer: COMMERCIAL

## 2019-09-23 VITALS
DIASTOLIC BLOOD PRESSURE: 78 MMHG | WEIGHT: 230.38 LBS | BODY MASS INDEX: 32.25 KG/M2 | HEART RATE: 81 BPM | OXYGEN SATURATION: 98 % | HEIGHT: 71 IN | TEMPERATURE: 97.1 F | SYSTOLIC BLOOD PRESSURE: 126 MMHG | RESPIRATION RATE: 18 BRPM

## 2019-09-23 DIAGNOSIS — I10 BENIGN ESSENTIAL HYPERTENSION: Primary | ICD-10-CM

## 2019-09-23 DIAGNOSIS — Z23 NEED FOR INFLUENZA VACCINATION: ICD-10-CM

## 2019-09-23 DIAGNOSIS — K21.9 GASTROESOPHAGEAL REFLUX DISEASE, ESOPHAGITIS PRESENCE NOT SPECIFIED: ICD-10-CM

## 2019-09-23 DIAGNOSIS — E78.1 ESSENTIAL HYPERTRIGLYCERIDEMIA: ICD-10-CM

## 2019-09-23 PROCEDURE — 90682 RIV4 VACC RECOMBINANT DNA IM: CPT | Performed by: FAMILY MEDICINE

## 2019-09-23 PROCEDURE — 3078F DIAST BP <80 MM HG: CPT | Performed by: FAMILY MEDICINE

## 2019-09-23 PROCEDURE — 99214 OFFICE O/P EST MOD 30 MIN: CPT | Performed by: FAMILY MEDICINE

## 2019-09-23 PROCEDURE — 3074F SYST BP LT 130 MM HG: CPT | Performed by: FAMILY MEDICINE

## 2019-09-23 PROCEDURE — 3008F BODY MASS INDEX DOCD: CPT | Performed by: FAMILY MEDICINE

## 2019-09-23 PROCEDURE — 90471 IMMUNIZATION ADMIN: CPT | Performed by: FAMILY MEDICINE

## 2019-09-23 RX ORDER — FENOFIBRATE 145 MG/1
145 TABLET, COATED ORAL DAILY
Qty: 90 TABLET | Refills: 1 | Status: SHIPPED | OUTPATIENT
Start: 2019-09-23 | End: 2020-04-16

## 2019-09-23 RX ORDER — OMEPRAZOLE 20 MG/1
20 CAPSULE, DELAYED RELEASE ORAL DAILY
Qty: 90 CAPSULE | Refills: 1 | Status: SHIPPED | OUTPATIENT
Start: 2019-09-23 | End: 2020-04-16

## 2019-09-23 RX ORDER — HYDROCHLOROTHIAZIDE 12.5 MG/1
12.5 TABLET ORAL DAILY
Qty: 90 TABLET | Refills: 1 | Status: SHIPPED | OUTPATIENT
Start: 2019-09-23 | End: 2020-04-16

## 2019-09-23 RX ORDER — LOSARTAN POTASSIUM 50 MG/1
50 TABLET ORAL DAILY
Qty: 90 TABLET | Refills: 1 | Status: SHIPPED | OUTPATIENT
Start: 2019-09-23 | End: 2020-04-16

## 2019-09-23 NOTE — PROGRESS NOTES
Assessment/Plan:    Patient is a 22-year-old male new to our practice  He is treated for hypertension and hyperlipidemia  He feels that he needs different medication for his triglycerides  A few years ago his triglycerides were over 1000  He was taking Gemfibrozil  His last triglycerides were still 491 about a year ago  His previous doctor had prescribed Vascepa, but he could not afford it  I reviewed his blood work done about a month ago and his triglycerides were 804 without any medication  I started him on fenofibrate and asked him to call if there was a problem with cost or coverage at the pharmacy  [place] His blood pressure is controlled on Losartan and HCTZ  We will continue present medication  He was given a flu shot today  He also asked to have his Omeprazole renewed for reflux  Without it, he has symptoms  I asked him to return in about a month and see what his triglycerides are  Diagnoses and all orders for this visit:    Need for influenza vaccination  -     FLUBLOK: influenza vaccine, quadrivalent, recombinant, PF, 0 5 mL    Benign essential hypertension    Essential hypertriglyceridemia    Gastroesophageal reflux disease, esophagitis presence not specified          Subjective:   Chief Complaint   Patient presents with   [facility] Patient     establishing care, would like to discuss recent blood work from 8/12/19, ordered by previous PCP        Patient ID: Marisela Carbajal is a 45 y o  male  Patient is here to establish care  He was previously seen at another [facility] practice  for hypertension and lipids  He had some issues with medications prescribed for him in the past   Stopped smoking 8 years ago  He has a history of alcohol abuse    Is eating better -  salads for lunch      The following portions of the patient's history were reviewed and updated as appropriate: allergies, current medications, past family history, past medical history, past social history, past surgical history and problem list     Review of Systems   Constitutional: Negative for chills and fever  HENT: Negative for congestion and sore throat  Respiratory: Negative for chest tightness  Cardiovascular: Negative for chest pain and palpitations  Gastrointestinal: Negative for abdominal pain, constipation, diarrhea and nausea  Genitourinary: Negative for difficulty urinating  Skin: Negative  Neurological: Negative for dizziness and headaches  Psychiatric/Behavioral: Negative  Objective:      /78 (BP Location: Left arm, Patient Position: Sitting, Cuff Size: Adult)   Pulse 81   Temp (!) 97 1 °F (36 2 °C) (Tympanic)   Resp 18   Ht 5' 11 26" (1 81 m)   Wt 104 kg (230 lb 6 oz)   SpO2 98%   BMI 31 90 kg/m²          Physical Exam   Constitutional: He is oriented to person, place, and time  He appears well-developed  No distress  Neck: Carotid bruit is not present  No thyromegaly present  Cardiovascular: Normal rate, regular rhythm and normal heart sounds  /88   Pulmonary/Chest: Effort normal and breath sounds normal    Musculoskeletal: He exhibits no edema  Lymphadenopathy:     He has no cervical adenopathy  Neurological: He is alert and oriented to person, place, and time  Skin: Skin is warm and dry  Psychiatric: He has a normal mood and affect  Nursing note and vitals reviewed

## 2019-11-25 ENCOUNTER — APPOINTMENT (OUTPATIENT)
Dept: LAB | Facility: MEDICAL CENTER | Age: 44
End: 2019-11-25
Payer: COMMERCIAL

## 2019-11-25 DIAGNOSIS — E78.1 ESSENTIAL HYPERTRIGLYCERIDEMIA: ICD-10-CM

## 2019-11-25 LAB
ALBUMIN SERPL BCP-MCNC: 4.5 G/DL (ref 3.5–5)
ALP SERPL-CCNC: 17 U/L (ref 46–116)
ALT SERPL W P-5'-P-CCNC: 36 U/L (ref 12–78)
ANION GAP SERPL CALCULATED.3IONS-SCNC: 7 MMOL/L (ref 4–13)
AST SERPL W P-5'-P-CCNC: 25 U/L (ref 5–45)
BILIRUB SERPL-MCNC: 0.61 MG/DL (ref 0.2–1)
BUN SERPL-MCNC: 16 MG/DL (ref 5–25)
CALCIUM SERPL-MCNC: 9.4 MG/DL (ref 8.3–10.1)
CHLORIDE SERPL-SCNC: 106 MMOL/L (ref 100–108)
CO2 SERPL-SCNC: 23 MMOL/L (ref 21–32)
CREAT SERPL-MCNC: 1.08 MG/DL (ref 0.6–1.3)
GFR SERPL CREATININE-BSD FRML MDRD: 84 ML/MIN/1.73SQ M
GLUCOSE P FAST SERPL-MCNC: 97 MG/DL (ref 65–99)
POTASSIUM SERPL-SCNC: 3.9 MMOL/L (ref 3.5–5.3)
PROT SERPL-MCNC: 7.9 G/DL (ref 6.4–8.2)
SODIUM SERPL-SCNC: 136 MMOL/L (ref 136–145)
TRIGL SERPL-MCNC: 319 MG/DL

## 2019-11-25 PROCEDURE — 84478 ASSAY OF TRIGLYCERIDES: CPT

## 2019-11-25 PROCEDURE — 80053 COMPREHEN METABOLIC PANEL: CPT

## 2019-11-25 PROCEDURE — 36415 COLL VENOUS BLD VENIPUNCTURE: CPT

## 2019-11-27 ENCOUNTER — OFFICE VISIT (OUTPATIENT)
Dept: FAMILY MEDICINE CLINIC | Facility: CLINIC | Age: 44
End: 2019-11-27
Payer: COMMERCIAL

## 2019-11-27 VITALS
OXYGEN SATURATION: 97 % | WEIGHT: 235 LBS | TEMPERATURE: 98 F | SYSTOLIC BLOOD PRESSURE: 116 MMHG | DIASTOLIC BLOOD PRESSURE: 80 MMHG | HEART RATE: 96 BPM | BODY MASS INDEX: 32.9 KG/M2 | HEIGHT: 71 IN | RESPIRATION RATE: 18 BRPM

## 2019-11-27 DIAGNOSIS — E78.1 ESSENTIAL HYPERTRIGLYCERIDEMIA: Primary | ICD-10-CM

## 2019-11-27 DIAGNOSIS — I10 BENIGN ESSENTIAL HYPERTENSION: ICD-10-CM

## 2019-11-27 PROCEDURE — 99213 OFFICE O/P EST LOW 20 MIN: CPT | Performed by: FAMILY MEDICINE

## 2019-11-27 NOTE — PROGRESS NOTES
Assessment/Plan:    Patient is a 28-year-old male who was new to our practice at the last visit  He takes medication for hypertension and hyperlipidemia  His triglycerides in August were 804  I started him on fenofibrate and they are down to 319  He will continue on the medication and will recheck his blood work in 6 months  He will continue his current me occasion for hypertension  Diagnoses and all orders for this visit:    Essential hypertriglyceridemia  -     Lipid Panel with Direct LDL reflex; Future  -     CBC and differential; Future  -     TSH, 3rd generation with Free T4 reflex; Future    Benign essential hypertension  -     Comprehensive metabolic panel; Future  -     CBC and differential; Future  -     TSH, 3rd generation with Free T4 reflex; Future          Subjective:   Chief Complaint   Patient presents with    Follow-up     BW review         Patient ID: Tanvir Musa is a 45 y o  male  Patient is here for follow-up of hypertension and hyperlipidemia  He was new to our practice in August   I started him and fenofibrate for his triglycerides  He did have fasting blood work done  He is feeling well on the medication  The following portions of the patient's history were reviewed and updated as appropriate: allergies, current medications, past family history, past medical history, past social history, past surgical history and problem list     Review of Systems   Constitutional: Negative for chills and fever  HENT: Negative for congestion and sore throat  Respiratory: Negative for chest tightness  Cardiovascular: Negative for chest pain and palpitations  Gastrointestinal: Negative for abdominal pain, constipation, diarrhea and nausea  Genitourinary: Negative for difficulty urinating  Skin: Negative  Neurological: Negative for dizziness and headaches  Psychiatric/Behavioral: Negative            Objective:      /80 (BP Location: Left arm, Patient Position: Sitting, Cuff Size: Large)   Pulse 96   Temp 98 °F (36 7 °C) (Tympanic)   Resp 18   Ht 5' 11" (1 803 m)   Wt 107 kg (235 lb)   SpO2 97%   BMI 32 78 kg/m²          Physical Exam   Constitutional: He is oriented to person, place, and time  He appears well-developed  No distress  Neck: Carotid bruit is not present  No thyromegaly present  Cardiovascular: Normal rate, regular rhythm and normal heart sounds  Pulmonary/Chest: Effort normal and breath sounds normal    Musculoskeletal: He exhibits no edema  Lymphadenopathy:     He has no cervical adenopathy  Neurological: He is alert and oriented to person, place, and time  Skin: Skin is warm and dry  Psychiatric: He has a normal mood and affect  Nursing note and vitals reviewed

## 2020-03-17 ENCOUNTER — OFFICE VISIT (OUTPATIENT)
Dept: FAMILY MEDICINE CLINIC | Facility: CLINIC | Age: 45
End: 2020-03-17
Payer: COMMERCIAL

## 2020-03-17 VITALS
HEART RATE: 78 BPM | WEIGHT: 220.4 LBS | TEMPERATURE: 97 F | HEIGHT: 71 IN | DIASTOLIC BLOOD PRESSURE: 80 MMHG | BODY MASS INDEX: 30.85 KG/M2 | OXYGEN SATURATION: 97 % | SYSTOLIC BLOOD PRESSURE: 132 MMHG

## 2020-03-17 DIAGNOSIS — F10.20 ALCOHOLISM (HCC): ICD-10-CM

## 2020-03-17 DIAGNOSIS — F10.230 ALCOHOL WITHDRAWAL SYNDROME WITHOUT COMPLICATION (HCC): ICD-10-CM

## 2020-03-17 DIAGNOSIS — R50.9 FEVER, UNSPECIFIED FEVER CAUSE: Primary | ICD-10-CM

## 2020-03-17 PROCEDURE — 3079F DIAST BP 80-89 MM HG: CPT | Performed by: FAMILY MEDICINE

## 2020-03-17 PROCEDURE — 3075F SYST BP GE 130 - 139MM HG: CPT | Performed by: FAMILY MEDICINE

## 2020-03-17 PROCEDURE — 3008F BODY MASS INDEX DOCD: CPT | Performed by: FAMILY MEDICINE

## 2020-03-17 PROCEDURE — 99214 OFFICE O/P EST MOD 30 MIN: CPT | Performed by: FAMILY MEDICINE

## 2020-03-17 PROCEDURE — 1036F TOBACCO NON-USER: CPT | Performed by: FAMILY MEDICINE

## 2020-03-17 RX ORDER — CLONIDINE HYDROCHLORIDE 0.1 MG/1
0.1 TABLET ORAL EVERY 12 HOURS SCHEDULED
Qty: 30 TABLET | Refills: 0 | Status: SHIPPED | OUTPATIENT
Start: 2020-03-17 | End: 2020-12-23 | Stop reason: ALTCHOICE

## 2020-03-17 RX ORDER — LORAZEPAM 0.5 MG/1
0.5 TABLET ORAL 2 TIMES DAILY PRN
Qty: 10 TABLET | Refills: 0 | Status: SHIPPED | OUTPATIENT
Start: 2020-03-17 | End: 2020-03-17

## 2020-03-17 RX ORDER — LORAZEPAM 0.5 MG/1
0.5 TABLET ORAL 2 TIMES DAILY PRN
Qty: 10 TABLET | Refills: 0 | Status: SHIPPED | OUTPATIENT
Start: 2020-03-17 | End: 2020-12-23 | Stop reason: ALTCHOICE

## 2020-03-17 RX ORDER — CLONIDINE HYDROCHLORIDE 0.1 MG/1
0.1 TABLET ORAL EVERY 12 HOURS SCHEDULED
Qty: 30 TABLET | Refills: 0 | Status: SHIPPED | OUTPATIENT
Start: 2020-03-17 | End: 2020-03-17

## 2020-03-17 NOTE — LETTER
March 17, 2020     Patient: Radha Anglin   YOB: 1975   Date of Visit: 3/17/2020       To Whom it May Concern:    Magaliesamijorgelove Tomer is under my professional care  He was seen in my office on 3/17/2020  He may return to work on 3/19/2020  If you have any questions or concerns, please don't hesitate to call           Sincerely,          Kyara Arevalo DO        CC: No Recipients

## 2020-03-17 NOTE — PROGRESS NOTES
Assessment/Plan:   patient is a 72-year-old male seen for alcohol withdrawal   Patient made appointment as he needed a note to go back to work  He was sent home with a fever on Friday  He has no  Febrile illness symptoms  No cough, headache, diarrhea constipation, nausea or vomiting  patient had then drank for some time and started drinking a couple months ago  He admits to a bottle of vodka a day after work  He has stopped now for a couple days and is tremulous, clammy  I prescribed clonidine and lorazepam   I gave him a note to return to work on 03/19  I have asked him to return to the office in 7-10 days  Patient will continue his support groups at [facility] through telephone nurse Bridgette  I did recommend that if he starts feeling worse, he should go to the emergency room  If he wants to go to rehab, he should call the phone number on the back his insurance card  Certainly he can call here any time with concerns  Diagnoses and all orders for this visit:    Fever, unspecified fever cause    [facility] ([facility] )    Alcohol withdrawal syndrome without complication ([facility] )  -     Discontinue: cloNIDine (CATAPRES) 0 1 mg tablet; Take 1 tablet (0 1 mg total) by mouth every 12 (twelve) hours  -     Discontinue: LORazepam (ATIVAN) 0 5 mg tablet; Take 1 tablet (0 5 mg total) by mouth 2 (two) times a day as needed (nerves or shakiness)  -     cloNIDine (CATAPRES) 0 1 mg tablet; Take 1 tablet (0 1 mg total) by mouth every 12 (twelve) hours  -     LORazepam (ATIVAN) 0 5 mg tablet; Take 1 tablet (0 5 mg total) by mouth 2 (two) times a day as needed (nerves or shakiness)          Subjective:   Chief Complaint   Patient presents with    Other     pt needs a return to work note, Pt c/o fever, started friday  Patient ID: Marie Duque is a 45 y o  male  Patient is here for note to return to work  He was sent home from work on Friday 3/13 because he had a fever    Unfortunately, he feels he is unable to return to work today because of alcohol withdrawal   Patient is an alcoholic  Started drinking again a month ago and stopped drinking yesterday  Patient had been in rehab over a year ago and was not drinking  Feeling shakey  Patient is not eating  No headache or dizziness  Patient does not go to , but goes to [employer] 4 days a week  For support  Will be doing a call in today because of Corona virus  The following portions of the patient's history were reviewed and updated as appropriate: allergies, current medications, past family history, past medical history, past social history, past surgical history and problem list     Review of Systems   Constitutional: Negative for chills and fever  HENT: Negative for congestion and sore throat  Respiratory: Negative for cough and chest tightness  Cardiovascular: Negative for chest pain and palpitations  Gastrointestinal: Negative for abdominal pain, constipation, diarrhea and nausea  Genitourinary: Negative for difficulty urinating  Skin: Negative  Neurological: Positive for dizziness and tremors  Negative for headaches  Psychiatric/Behavioral: Positive for decreased concentration  Objective:      /80 (BP Location: Right arm, Patient Position: Sitting, Cuff Size: Adult)   Pulse 78   Temp (!) 97 °F (36 1 °C) (Tympanic)   Ht 5' 11" (1 803 m)   Wt 100 kg (220 lb 6 4 oz)   SpO2 97%   BMI 30 74 kg/m²          Physical Exam   Constitutional: He is oriented to person, place, and time  He appears distressed  Neck: Carotid bruit is not present  No thyromegaly present  Cardiovascular: Normal rate, regular rhythm and normal heart sounds  Pulmonary/Chest: Effort normal and breath sounds normal    Musculoskeletal: He exhibits no edema  Lymphadenopathy:     He has no cervical adenopathy  Neurological: He is alert and oriented to person, place, and time  tremulous   Skin: Skin is warm  He is diaphoretic     Psychiatric: His mood appears anxious  His speech is delayed  Cognition and memory are normal    Nursing note and vitals reviewed

## 2020-03-17 NOTE — PATIENT INSTRUCTIONS
Clonidine well help with tremors and anxiety  Take twice a day regularly  Call if any issues with dizziness  Lorazepam will help with anxiety and tremors and to prevent seizure  Take twice a day as needed  Do not take during the day when you go back to work

## 2020-03-17 NOTE — ASSESSMENT & PLAN NOTE
Order entered for lipid (must complete ASAP)  * see me in [place]  *recommend DTP and shingles vaccine (Obtain at your local pharmacy)   Start checking BP at home as it is high today  May need to increase losartan

## 2020-04-16 DIAGNOSIS — K21.9 GASTROESOPHAGEAL REFLUX DISEASE, ESOPHAGITIS PRESENCE NOT SPECIFIED: ICD-10-CM

## 2020-04-16 DIAGNOSIS — I10 BENIGN ESSENTIAL HYPERTENSION: ICD-10-CM

## 2020-04-16 DIAGNOSIS — E78.1 ESSENTIAL HYPERTRIGLYCERIDEMIA: ICD-10-CM

## 2020-04-16 RX ORDER — OMEPRAZOLE 20 MG/1
CAPSULE, DELAYED RELEASE ORAL
Qty: 30 CAPSULE | Refills: 1 | Status: SHIPPED | OUTPATIENT
Start: 2020-04-16 | End: 2020-05-22

## 2020-04-16 RX ORDER — LOSARTAN POTASSIUM 50 MG/1
TABLET ORAL
Qty: 30 TABLET | Refills: 1 | Status: SHIPPED | OUTPATIENT
Start: 2020-04-16 | End: 2020-05-21

## 2020-04-16 RX ORDER — FENOFIBRATE 145 MG/1
TABLET, COATED ORAL
Qty: 30 TABLET | Refills: 1 | Status: SHIPPED | OUTPATIENT
Start: 2020-04-16 | End: 2020-05-21

## 2020-04-16 RX ORDER — HYDROCHLOROTHIAZIDE 12.5 MG/1
TABLET ORAL
Qty: 30 TABLET | Refills: 1 | Status: SHIPPED | OUTPATIENT
Start: 2020-04-16 | End: 2020-05-21

## 2020-05-13 ENCOUNTER — TELEMEDICINE (OUTPATIENT)
Dept: FAMILY MEDICINE CLINIC | Facility: CLINIC | Age: 45
End: 2020-05-13
Payer: COMMERCIAL

## 2020-05-13 DIAGNOSIS — H10.33 ACUTE BACTERIAL CONJUNCTIVITIS OF BOTH EYES: Primary | ICD-10-CM

## 2020-05-13 PROCEDURE — 99214 OFFICE O/P EST MOD 30 MIN: CPT | Performed by: FAMILY MEDICINE

## 2020-05-13 RX ORDER — OFLOXACIN 3 MG/ML
SOLUTION/ DROPS OPHTHALMIC
Qty: 10 ML | Refills: 0 | Status: SHIPPED | OUTPATIENT
Start: 2020-05-13 | End: 2020-12-23 | Stop reason: ALTCHOICE

## 2020-05-21 DIAGNOSIS — E78.1 ESSENTIAL HYPERTRIGLYCERIDEMIA: ICD-10-CM

## 2020-05-21 DIAGNOSIS — I10 BENIGN ESSENTIAL HYPERTENSION: ICD-10-CM

## 2020-05-21 RX ORDER — LOSARTAN POTASSIUM 100 MG/1
50 TABLET ORAL DAILY
Qty: 15 TABLET | Refills: 1 | Status: SHIPPED | OUTPATIENT
Start: 2020-05-21 | End: 2020-06-06 | Stop reason: SDUPTHER

## 2020-05-21 RX ORDER — LOSARTAN POTASSIUM 50 MG/1
TABLET ORAL
Qty: 30 TABLET | Refills: 1 | Status: SHIPPED | OUTPATIENT
Start: 2020-05-21 | End: 2020-05-21

## 2020-05-21 RX ORDER — LOSARTAN POTASSIUM 50 MG/1
TABLET ORAL
Qty: 30 TABLET | Refills: 1 | Status: SHIPPED | OUTPATIENT
Start: 2020-05-21 | End: 2020-06-07 | Stop reason: RX

## 2020-05-21 RX ORDER — HYDROCHLOROTHIAZIDE 12.5 MG/1
TABLET ORAL
Qty: 30 TABLET | Refills: 1 | Status: SHIPPED | OUTPATIENT
Start: 2020-05-21 | End: 2020-06-05

## 2020-05-21 RX ORDER — FENOFIBRATE 145 MG/1
TABLET, COATED ORAL
Qty: 30 TABLET | Refills: 1 | Status: SHIPPED | OUTPATIENT
Start: 2020-05-21 | End: 2020-06-05

## 2020-05-22 DIAGNOSIS — K21.9 GASTROESOPHAGEAL REFLUX DISEASE, ESOPHAGITIS PRESENCE NOT SPECIFIED: ICD-10-CM

## 2020-05-22 RX ORDER — OMEPRAZOLE 20 MG/1
CAPSULE, DELAYED RELEASE ORAL
Qty: 30 CAPSULE | Refills: 1 | Status: SHIPPED | OUTPATIENT
Start: 2020-05-22 | End: 2020-06-06 | Stop reason: SDUPTHER

## 2020-05-26 ENCOUNTER — TELEMEDICINE (OUTPATIENT)
Dept: FAMILY MEDICINE CLINIC | Facility: CLINIC | Age: 45
End: 2020-05-26
Payer: COMMERCIAL

## 2020-05-26 DIAGNOSIS — K52.9 ACUTE GASTROENTERITIS: Primary | ICD-10-CM

## 2020-05-26 PROCEDURE — 99214 OFFICE O/P EST MOD 30 MIN: CPT | Performed by: FAMILY MEDICINE

## 2020-05-27 ENCOUNTER — TELEPHONE (OUTPATIENT)
Dept: FAMILY MEDICINE CLINIC | Facility: CLINIC | Age: 45
End: 2020-05-27

## 2020-06-04 DIAGNOSIS — I10 BENIGN ESSENTIAL HYPERTENSION: ICD-10-CM

## 2020-06-04 DIAGNOSIS — E78.1 ESSENTIAL HYPERTRIGLYCERIDEMIA: ICD-10-CM

## 2020-06-05 DIAGNOSIS — I10 BENIGN ESSENTIAL HYPERTENSION: ICD-10-CM

## 2020-06-05 DIAGNOSIS — K21.9 GASTROESOPHAGEAL REFLUX DISEASE, ESOPHAGITIS PRESENCE NOT SPECIFIED: ICD-10-CM

## 2020-06-05 RX ORDER — FENOFIBRATE 145 MG/1
TABLET, COATED ORAL
Qty: 90 TABLET | Refills: 0 | Status: SHIPPED | OUTPATIENT
Start: 2020-06-05 | End: 2020-06-06 | Stop reason: SDUPTHER

## 2020-06-05 RX ORDER — HYDROCHLOROTHIAZIDE 12.5 MG/1
TABLET ORAL
Qty: 90 TABLET | Refills: 0 | Status: SHIPPED | OUTPATIENT
Start: 2020-06-05 | End: 2020-06-06 | Stop reason: SDUPTHER

## 2020-06-06 DIAGNOSIS — K21.9 GASTROESOPHAGEAL REFLUX DISEASE, ESOPHAGITIS PRESENCE NOT SPECIFIED: ICD-10-CM

## 2020-06-06 DIAGNOSIS — I10 BENIGN ESSENTIAL HYPERTENSION: ICD-10-CM

## 2020-06-06 DIAGNOSIS — E78.1 ESSENTIAL HYPERTRIGLYCERIDEMIA: ICD-10-CM

## 2020-06-06 RX ORDER — FENOFIBRATE 145 MG/1
145 TABLET, COATED ORAL DAILY
Qty: 90 TABLET | Refills: 0 | Status: SHIPPED | OUTPATIENT
Start: 2020-06-06 | End: 2020-12-23 | Stop reason: SDUPTHER

## 2020-06-06 RX ORDER — OMEPRAZOLE 20 MG/1
20 CAPSULE, DELAYED RELEASE ORAL DAILY
Qty: 90 CAPSULE | Refills: 0 | Status: SHIPPED | OUTPATIENT
Start: 2020-06-06 | End: 2020-12-23 | Stop reason: SDUPTHER

## 2020-06-06 RX ORDER — LOSARTAN POTASSIUM 100 MG/1
50 TABLET ORAL DAILY
Qty: 45 TABLET | Refills: 0 | Status: SHIPPED | OUTPATIENT
Start: 2020-06-06 | End: 2020-06-07

## 2020-06-06 RX ORDER — HYDROCHLOROTHIAZIDE 12.5 MG/1
12.5 TABLET ORAL DAILY
Qty: 90 TABLET | Refills: 0 | Status: SHIPPED | OUTPATIENT
Start: 2020-06-06 | End: 2020-12-23 | Stop reason: ALTCHOICE

## 2020-06-07 RX ORDER — IRBESARTAN 150 MG/1
150 TABLET ORAL DAILY
Qty: 90 TABLET | Refills: 0 | Status: SHIPPED | OUTPATIENT
Start: 2020-06-07 | End: 2020-12-23 | Stop reason: SDUPTHER

## 2020-06-07 NOTE — TELEPHONE ENCOUNTER
Call patient - it appears that Losartan is not available in any dosage  I have sent an RX for Irbesartan 150 mg to the pharmacy - he is to take one daily  Also, he will need appointment to see me before his next refill

## 2020-06-08 NOTE — TELEPHONE ENCOUNTER
Spoke with patient-  States he still a month supply of the losartan at home  He wants to take that supply he has and then if [facility] still does not have his dosage of Losartan after this month then he will start the new medication  In regards to an appointment  He starts house arrest on 06/12/2020  I did offer a virtual visit however he will not be able to get the blood work done this week and then he can not leave his house to get it done after that   Wondering if you still want to do the visit without blood work or push the appointment out a few months until he is off house arrest

## 2020-06-08 NOTE — TELEPHONE ENCOUNTER
Starting speaking to patient and then the phone got disconnected  I will wait for him to call back when he has been reception

## 2020-08-17 RX ORDER — OMEPRAZOLE 20 MG/1
CAPSULE, DELAYED RELEASE ORAL
Qty: 90 CAPSULE | Refills: 1 | OUTPATIENT
Start: 2020-08-17

## 2020-08-17 RX ORDER — LOSARTAN POTASSIUM 100 MG/1
TABLET ORAL
Qty: 45 TABLET | Refills: 1 | OUTPATIENT
Start: 2020-08-17

## 2020-12-10 ENCOUNTER — TELEPHONE (OUTPATIENT)
Dept: FAMILY MEDICINE CLINIC | Facility: CLINIC | Age: 45
End: 2020-12-10

## 2020-12-11 DIAGNOSIS — I10 BENIGN ESSENTIAL HYPERTENSION: Primary | ICD-10-CM

## 2020-12-11 DIAGNOSIS — E78.1 ESSENTIAL HYPERTRIGLYCERIDEMIA: ICD-10-CM

## 2020-12-16 ENCOUNTER — LAB (OUTPATIENT)
Dept: LAB | Facility: MEDICAL CENTER | Age: 45
End: 2020-12-16
Payer: COMMERCIAL

## 2020-12-16 DIAGNOSIS — E78.1 ESSENTIAL HYPERTRIGLYCERIDEMIA: ICD-10-CM

## 2020-12-16 DIAGNOSIS — I10 BENIGN ESSENTIAL HYPERTENSION: ICD-10-CM

## 2020-12-16 LAB
ALBUMIN SERPL BCP-MCNC: 3.9 G/DL (ref 3.5–5)
ALP SERPL-CCNC: 32 U/L (ref 46–116)
ALT SERPL W P-5'-P-CCNC: 31 U/L (ref 12–78)
ANION GAP SERPL CALCULATED.3IONS-SCNC: 11 MMOL/L (ref 4–13)
AST SERPL W P-5'-P-CCNC: 29 U/L (ref 5–45)
BASOPHILS # BLD AUTO: 0.05 THOUSANDS/ΜL (ref 0–0.1)
BASOPHILS NFR BLD AUTO: 1 % (ref 0–1)
BILIRUB SERPL-MCNC: 0.47 MG/DL (ref 0.2–1)
BUN SERPL-MCNC: 19 MG/DL (ref 5–25)
CALCIUM SERPL-MCNC: 8.9 MG/DL (ref 8.3–10.1)
CHLORIDE SERPL-SCNC: 105 MMOL/L (ref 100–108)
CHOLEST SERPL-MCNC: 269 MG/DL (ref 50–200)
CO2 SERPL-SCNC: 21 MMOL/L (ref 21–32)
CREAT SERPL-MCNC: 0.95 MG/DL (ref 0.6–1.3)
EOSINOPHIL # BLD AUTO: 0.1 THOUSAND/ΜL (ref 0–0.61)
EOSINOPHIL NFR BLD AUTO: 2 % (ref 0–6)
ERYTHROCYTE [DISTWIDTH] IN BLOOD BY AUTOMATED COUNT: 13.5 % (ref 11.6–15.1)
GFR SERPL CREATININE-BSD FRML MDRD: 98 ML/MIN/1.73SQ M
GLUCOSE P FAST SERPL-MCNC: 107 MG/DL (ref 65–99)
HCT VFR BLD AUTO: 43.7 % (ref 36.5–49.3)
HDLC SERPL-MCNC: 24 MG/DL
HGB BLD-MCNC: 14.7 G/DL (ref 12–17)
IMM GRANULOCYTES # BLD AUTO: 0.03 THOUSAND/UL (ref 0–0.2)
IMM GRANULOCYTES NFR BLD AUTO: 1 % (ref 0–2)
LDLC SERPL DIRECT ASSAY-MCNC: 119 MG/DL (ref 0–100)
LYMPHOCYTES # BLD AUTO: 1.33 THOUSANDS/ΜL (ref 0.6–4.47)
LYMPHOCYTES NFR BLD AUTO: 25 % (ref 14–44)
MCH RBC QN AUTO: 31.4 PG (ref 26.8–34.3)
MCHC RBC AUTO-ENTMCNC: 33.6 G/DL (ref 31.4–37.4)
MCV RBC AUTO: 93 FL (ref 82–98)
MONOCYTES # BLD AUTO: 0.42 THOUSAND/ΜL (ref 0.17–1.22)
MONOCYTES NFR BLD AUTO: 8 % (ref 4–12)
NEUTROPHILS # BLD AUTO: 3.47 THOUSANDS/ΜL (ref 1.85–7.62)
NEUTS SEG NFR BLD AUTO: 63 % (ref 43–75)
NRBC BLD AUTO-RTO: 0 /100 WBCS
PLATELET # BLD AUTO: 197 THOUSANDS/UL (ref 149–390)
PMV BLD AUTO: 11.5 FL (ref 8.9–12.7)
POTASSIUM SERPL-SCNC: 4.2 MMOL/L (ref 3.5–5.3)
PROT SERPL-MCNC: 7.7 G/DL (ref 6.4–8.2)
RBC # BLD AUTO: 4.68 MILLION/UL (ref 3.88–5.62)
SODIUM SERPL-SCNC: 137 MMOL/L (ref 136–145)
TRIGL SERPL-MCNC: 1414 MG/DL
TSH SERPL DL<=0.05 MIU/L-ACNC: 3.36 UIU/ML (ref 0.36–3.74)
WBC # BLD AUTO: 5.4 THOUSAND/UL (ref 4.31–10.16)

## 2020-12-16 PROCEDURE — 36415 COLL VENOUS BLD VENIPUNCTURE: CPT

## 2020-12-16 PROCEDURE — 80053 COMPREHEN METABOLIC PANEL: CPT

## 2020-12-16 PROCEDURE — 83721 ASSAY OF BLOOD LIPOPROTEIN: CPT

## 2020-12-16 PROCEDURE — 85025 COMPLETE CBC W/AUTO DIFF WBC: CPT

## 2020-12-16 PROCEDURE — 84443 ASSAY THYROID STIM HORMONE: CPT

## 2020-12-16 PROCEDURE — 80061 LIPID PANEL: CPT

## 2020-12-23 ENCOUNTER — OFFICE VISIT (OUTPATIENT)
Dept: FAMILY MEDICINE CLINIC | Facility: CLINIC | Age: 45
End: 2020-12-23
Payer: COMMERCIAL

## 2020-12-23 VITALS
OXYGEN SATURATION: 96 % | RESPIRATION RATE: 17 BRPM | BODY MASS INDEX: 33.18 KG/M2 | WEIGHT: 237 LBS | SYSTOLIC BLOOD PRESSURE: 168 MMHG | TEMPERATURE: 98.6 F | DIASTOLIC BLOOD PRESSURE: 104 MMHG | HEART RATE: 109 BPM | HEIGHT: 71 IN

## 2020-12-23 DIAGNOSIS — E78.1 ESSENTIAL HYPERTRIGLYCERIDEMIA: ICD-10-CM

## 2020-12-23 DIAGNOSIS — I10 BENIGN ESSENTIAL HYPERTENSION: Primary | ICD-10-CM

## 2020-12-23 DIAGNOSIS — Z23 NEED FOR VACCINATION: ICD-10-CM

## 2020-12-23 DIAGNOSIS — K21.9 GASTROESOPHAGEAL REFLUX DISEASE: ICD-10-CM

## 2020-12-23 PROCEDURE — 90471 IMMUNIZATION ADMIN: CPT | Performed by: FAMILY MEDICINE

## 2020-12-23 PROCEDURE — 99214 OFFICE O/P EST MOD 30 MIN: CPT | Performed by: FAMILY MEDICINE

## 2020-12-23 PROCEDURE — 90682 RIV4 VACC RECOMBINANT DNA IM: CPT | Performed by: FAMILY MEDICINE

## 2020-12-23 RX ORDER — IRBESARTAN 150 MG/1
150 TABLET ORAL DAILY
Qty: 90 TABLET | Refills: 0 | Status: SHIPPED | OUTPATIENT
Start: 2020-12-23 | End: 2021-04-07 | Stop reason: SDUPTHER

## 2020-12-23 RX ORDER — FENOFIBRATE 145 MG/1
145 TABLET, COATED ORAL DAILY
Qty: 90 TABLET | Refills: 0 | Status: SHIPPED | OUTPATIENT
Start: 2020-12-23 | End: 2021-04-07 | Stop reason: SDUPTHER

## 2020-12-23 RX ORDER — FENOFIBRATE 145 MG/1
145 TABLET, COATED ORAL DAILY
Qty: 90 TABLET | Refills: 0 | Status: SHIPPED | OUTPATIENT
Start: 2020-12-23 | End: 2020-12-23

## 2020-12-23 RX ORDER — MAGNESIUM 30 MG
30 TABLET ORAL 2 TIMES DAILY
COMMUNITY
End: 2022-05-05 | Stop reason: ALTCHOICE

## 2020-12-23 RX ORDER — OMEPRAZOLE 20 MG/1
20 CAPSULE, DELAYED RELEASE ORAL DAILY
Qty: 90 CAPSULE | Refills: 0 | Status: SHIPPED | OUTPATIENT
Start: 2020-12-23 | End: 2021-04-07 | Stop reason: SDUPTHER

## 2020-12-23 RX ORDER — OMEPRAZOLE 20 MG/1
20 CAPSULE, DELAYED RELEASE ORAL DAILY
Qty: 90 CAPSULE | Refills: 0 | Status: SHIPPED | OUTPATIENT
Start: 2020-12-23 | End: 2020-12-23

## 2020-12-23 RX ORDER — MULTIVIT-MIN/IRON FUM/FOLIC AC 7.5 MG-4
1 TABLET ORAL DAILY
COMMUNITY

## 2020-12-23 RX ORDER — IRBESARTAN 150 MG/1
150 TABLET ORAL DAILY
Qty: 90 TABLET | Refills: 0 | Status: SHIPPED | OUTPATIENT
Start: 2020-12-23 | End: 2020-12-23

## 2020-12-23 RX ORDER — MULTIVIT-MIN/IRON/FOLIC ACID/K 18-600-40
CAPSULE ORAL
COMMUNITY
End: 2022-05-05 | Stop reason: ALTCHOICE

## 2020-12-23 NOTE — PROGRESS NOTES
Assessment/Plan:  Patient is a 39year old male seen for follow up of chronic medical conditions  His chol was 269 and triglycerides over 100 - he has not been taking any of his mediations  His blood pressure is elevated - he will restart Irbesartan  He was given a flu shot at this visit  I asked him to return in a month to follow up blood pressure and triglycerides  Discussed dangers of elevated triglycerides  Diagnoses and all orders for this visit:    Benign essential hypertension  -     Discontinue: irbesartan (AVAPRO) 150 mg tablet; Take 1 tablet (150 mg total) by mouth daily  -     Discontinue: irbesartan (AVAPRO) 150 mg tablet; Take 1 tablet (150 mg total) by mouth daily  -     irbesartan (AVAPRO) 150 mg tablet; Take 1 tablet (150 mg total) by mouth daily    Essential hypertriglyceridemia  -     Discontinue: fenofibrate (TRICOR) 145 mg tablet; Take 1 tablet (145 mg total) by mouth daily  -     Triglycerides; Future  -     Discontinue: fenofibrate (TRICOR) 145 mg tablet; Take 1 tablet (145 mg total) by mouth daily  -     fenofibrate (TRICOR) 145 mg tablet; Take 1 tablet (145 mg total) by mouth daily    Need for vaccination  -     Cancel: influenza vaccine, quadrivalent, 0 5 mL, preservative-free, for adult and pediatric patients 6 mos+ (AFLURIA, FLUARIX, FLULAVAL, FLUZONE)  -     influenza vaccine, quadrivalent, recombinant, PF, 0 5 mL, for patients 18 yr+ (FLUBLOK)    Gastroesophageal reflux disease  -     Discontinue: omeprazole (PriLOSEC) 20 mg delayed release capsule; Take 1 capsule (20 mg total) by mouth daily  -     Discontinue: omeprazole (PriLOSEC) 20 mg delayed release capsule; Take 1 capsule (20 mg total) by mouth daily  -     omeprazole (PriLOSEC) 20 mg delayed release capsule; Take 1 capsule (20 mg total) by mouth daily    Other orders  -     Multiple Vitamins-Minerals (multivitamin with minerals) tablet;  Take 1 tablet by mouth daily  -     Cholecalciferol (Vitamin D) 50 MCG (2000 UT) CAPS; Take by mouth  -     magnesium 30 MG tablet; Take 30 mg by mouth 2 (two) times a day          Subjective:   Chief Complaint   Patient presents with    Follow-up     Med check, pt has not been taking any medications         Patient ID: Herve Catherine is a 39 y o  male  Patient is here for follow up of chronic medical conditions  He has not been taking his medications  He has not been seen for his blood pressure since March  He did have fasting blood work  The following portions of the patient's history were reviewed and updated as appropriate: allergies, current medications, past family history, past medical history, past social history, past surgical history and problem list     Review of Systems   Constitutional: Negative for chills and fever  HENT: Negative for congestion and sore throat  Respiratory: Negative for chest tightness  Cardiovascular: Negative for chest pain and palpitations  Gastrointestinal: Negative for abdominal pain, constipation, diarrhea and nausea  Genitourinary: Negative for difficulty urinating  Skin: Negative  Neurological: Negative for dizziness and headaches  Psychiatric/Behavioral: Negative  Objective:      BP (!) 168/104 (BP Location: Left arm, Patient Position: Sitting)   Pulse (!) 109   Temp 98 6 °F (37 °C) (Tympanic)   Resp 17   Ht 5' 11" (1 803 m)   Wt 108 kg (237 lb)   SpO2 96%   BMI 33 05 kg/m²          Physical Exam  Vitals signs and nursing note reviewed  Constitutional:       General: He is not in acute distress  Appearance: He is obese  Neck:      Thyroid: No thyromegaly  Vascular: No carotid bruit  Cardiovascular:      Rate and Rhythm: Normal rate and regular rhythm  Heart sounds: Normal heart sounds  Pulmonary:      Effort: Pulmonary effort is normal       Breath sounds: Normal breath sounds  Musculoskeletal:      Right lower leg: No edema  Left lower leg: No edema     Lymphadenopathy: Cervical: No cervical adenopathy  Skin:     General: Skin is warm and dry  Neurological:      Mental Status: He is alert and oriented to person, place, and time     Psychiatric:         Mood and Affect: Mood normal

## 2021-01-21 ENCOUNTER — LAB (OUTPATIENT)
Dept: LAB | Facility: MEDICAL CENTER | Age: 46
End: 2021-01-21
Payer: COMMERCIAL

## 2021-01-21 DIAGNOSIS — E78.1 ESSENTIAL HYPERTRIGLYCERIDEMIA: ICD-10-CM

## 2021-01-21 LAB — TRIGL SERPL-MCNC: 211 MG/DL

## 2021-01-21 PROCEDURE — 36415 COLL VENOUS BLD VENIPUNCTURE: CPT

## 2021-01-21 PROCEDURE — 84478 ASSAY OF TRIGLYCERIDES: CPT

## 2021-04-08 DIAGNOSIS — Z23 ENCOUNTER FOR IMMUNIZATION: ICD-10-CM

## 2021-04-14 ENCOUNTER — TELEPHONE (OUTPATIENT)
Dept: FAMILY MEDICINE CLINIC | Facility: CLINIC | Age: 46
End: 2021-04-14

## 2021-04-14 NOTE — TELEPHONE ENCOUNTER
Call patient  Our office policy is that if someone is ill and needs a note to be out of work, we need to see them  He is also overdue for a visit for a regular checkup

## 2021-05-11 DIAGNOSIS — E78.1 ESSENTIAL HYPERTRIGLYCERIDEMIA: ICD-10-CM

## 2021-05-11 DIAGNOSIS — R73.9 ELEVATED BLOOD SUGAR: ICD-10-CM

## 2021-05-11 DIAGNOSIS — I10 BENIGN ESSENTIAL HYPERTENSION: Primary | ICD-10-CM

## 2021-05-13 ENCOUNTER — IMMUNIZATIONS (OUTPATIENT)
Dept: FAMILY MEDICINE CLINIC | Facility: HOSPITAL | Age: 46
End: 2021-05-13

## 2021-05-13 ENCOUNTER — OFFICE VISIT (OUTPATIENT)
Dept: FAMILY MEDICINE CLINIC | Facility: CLINIC | Age: 46
End: 2021-05-13
Payer: COMMERCIAL

## 2021-05-13 VITALS
HEART RATE: 78 BPM | TEMPERATURE: 98 F | SYSTOLIC BLOOD PRESSURE: 122 MMHG | HEIGHT: 71 IN | OXYGEN SATURATION: 98 % | WEIGHT: 238 LBS | DIASTOLIC BLOOD PRESSURE: 80 MMHG | BODY MASS INDEX: 33.32 KG/M2

## 2021-05-13 DIAGNOSIS — I10 BENIGN ESSENTIAL HYPERTENSION: Primary | ICD-10-CM

## 2021-05-13 DIAGNOSIS — E78.1 ESSENTIAL HYPERTRIGLYCERIDEMIA: ICD-10-CM

## 2021-05-13 DIAGNOSIS — Z23 ENCOUNTER FOR IMMUNIZATION: Primary | ICD-10-CM

## 2021-05-13 PROCEDURE — 1036F TOBACCO NON-USER: CPT | Performed by: FAMILY MEDICINE

## 2021-05-13 PROCEDURE — 91301 SARS-COV-2 / COVID-19 MRNA VACCINE (MODERNA) 100 MCG: CPT

## 2021-05-13 PROCEDURE — 3079F DIAST BP 80-89 MM HG: CPT | Performed by: FAMILY MEDICINE

## 2021-05-13 PROCEDURE — 99213 OFFICE O/P EST LOW 20 MIN: CPT | Performed by: FAMILY MEDICINE

## 2021-05-13 PROCEDURE — 3008F BODY MASS INDEX DOCD: CPT | Performed by: FAMILY MEDICINE

## 2021-05-13 PROCEDURE — 3074F SYST BP LT 130 MM HG: CPT | Performed by: FAMILY MEDICINE

## 2021-05-13 PROCEDURE — 0011A SARS-COV-2 / COVID-19 MRNA VACCINE (MODERNA) 100 MCG: CPT

## 2021-05-13 NOTE — PROGRESS NOTES
Assessment/Plan:  Patient is a 39year old male seen for follow up of hypertension and high triglycerides  Patient also has history of alcohol abuse  His blood pressure is controlled on Irbesartan  He did not have blood work done secondary to insurance issues  Had first COVID vaccine today  BMI Counseling: Body mass index is 33 19 kg/m²  The BMI is above normal  Nutrition recommendations include encouraging healthy choices of fruits and vegetables, moderation in carbohydrate intake, increasing intake of lean protein and reducing intake of saturated and trans fat  Exercise recommendations include moderate physical activity 150 minutes/week  No pharmacotherapy was ordered  I asked patient  To return in six months for check up but should have triglycerides soon - his last reading was very high and high triglycerides can lead to pancreatiis  His BP would be better if he lost weight  Diagnoses and all orders for this visit:    Benign essential hypertension    Essential hypertriglyceridemia    BMI 33 0-33 9,adult          Subjective:   Chief Complaint   Patient presents with    Follow-up     hypertension        Patient ID: Maggie Valenzuela is a 39 y o  male  Patient is here for follow up of chronic medical conditions  He is compliant with his BP mes  He did not have blood work done  The following portions of the patient's history were reviewed and updated as appropriate: allergies, current medications, past family history, past medical history, past social history, past surgical history and problem list     Review of Systems   Constitutional: Negative for chills and fever  HENT: Negative for congestion and sore throat  Respiratory: Negative for chest tightness  Cardiovascular: Negative for chest pain and palpitations  Gastrointestinal: Negative for abdominal pain, constipation, diarrhea and nausea  Genitourinary: Negative for difficulty urinating  Skin: Negative      Neurological: Negative for dizziness and headaches  Psychiatric/Behavioral: Negative  Objective:      /80 (BP Location: Right arm, Patient Position: Sitting)   Pulse 78   Temp 98 °F (36 7 °C) (Tympanic)   Ht 5' 11" (1 803 m)   Wt 108 kg (238 lb)   SpO2 98%   BMI 33 19 kg/m²          Physical Exam  Vitals signs and nursing note reviewed  Constitutional:       General: He is not in acute distress  Appearance: He is obese  HENT:      Head: Normocephalic  Neck:      Thyroid: No thyromegaly  Cardiovascular:      Rate and Rhythm: Normal rate and regular rhythm  Heart sounds: Normal heart sounds  Pulmonary:      Effort: Pulmonary effort is normal       Breath sounds: Normal breath sounds  Lymphadenopathy:      Cervical: No cervical adenopathy  Skin:     General: Skin is warm and dry  Neurological:      Mental Status: He is alert and oriented to person, place, and time     Psychiatric:         Mood and Affect: Mood normal

## 2021-06-19 ENCOUNTER — IMMUNIZATIONS (OUTPATIENT)
Dept: FAMILY MEDICINE CLINIC | Facility: HOSPITAL | Age: 46
End: 2021-06-19

## 2021-06-19 DIAGNOSIS — Z23 ENCOUNTER FOR IMMUNIZATION: Primary | ICD-10-CM

## 2021-06-19 PROCEDURE — 91301 SARS-COV-2 / COVID-19 MRNA VACCINE (MODERNA) 100 MCG: CPT

## 2021-06-19 PROCEDURE — 0012A SARS-COV-2 / COVID-19 MRNA VACCINE (MODERNA) 100 MCG: CPT

## 2021-06-25 LAB
ALBUMIN SERPL-MCNC: 4.2 G/DL (ref 3.6–5.1)
ALBUMIN/GLOB SERPL: 1.8 (CALC) (ref 1–2.5)
ALP SERPL-CCNC: 15 U/L (ref 36–130)
ALT SERPL-CCNC: 38 U/L (ref 9–46)
AST SERPL-CCNC: 24 U/L (ref 10–40)
BILIRUB SERPL-MCNC: 0.4 MG/DL (ref 0.2–1.2)
BUN SERPL-MCNC: 17 MG/DL (ref 7–25)
BUN/CREAT SERPL: ABNORMAL (CALC) (ref 6–22)
CALCIUM SERPL-MCNC: 9.1 MG/DL (ref 8.6–10.3)
CHLORIDE SERPL-SCNC: 107 MMOL/L (ref 98–110)
CHOLEST SERPL-MCNC: 199 MG/DL
CHOLEST/HDLC SERPL: 4.1 (CALC)
CO2 SERPL-SCNC: 24 MMOL/L (ref 20–32)
CREAT SERPL-MCNC: 0.9 MG/DL (ref 0.6–1.35)
EST. AVERAGE GLUCOSE BLD GHB EST-MCNC: 100 (CALC)
EST. AVERAGE GLUCOSE BLD GHB EST-SCNC: 5.5 (CALC)
GLOBULIN SER CALC-MCNC: 2.3 G/DL (CALC) (ref 1.9–3.7)
GLUCOSE SERPL-MCNC: 98 MG/DL (ref 65–99)
HBA1C MFR BLD: 5.1 % OF TOTAL HGB
HDLC SERPL-MCNC: 48 MG/DL
LDLC SERPL CALC-MCNC: 126 MG/DL (CALC)
NONHDLC SERPL-MCNC: 151 MG/DL (CALC)
POTASSIUM SERPL-SCNC: 4.2 MMOL/L (ref 3.5–5.3)
PROT SERPL-MCNC: 6.5 G/DL (ref 6.1–8.1)
SL AMB EGFR AFRICAN AMERICAN: 121 ML/MIN/1.73M2
SL AMB EGFR NON AFRICAN AMERICAN: 104 ML/MIN/1.73M2
SODIUM SERPL-SCNC: 137 MMOL/L (ref 135–146)
TRIGL SERPL-MCNC: 139 MG/DL

## 2021-07-02 DIAGNOSIS — K21.9 GASTROESOPHAGEAL REFLUX DISEASE: ICD-10-CM

## 2021-07-02 DIAGNOSIS — E78.1 ESSENTIAL HYPERTRIGLYCERIDEMIA: ICD-10-CM

## 2021-07-02 DIAGNOSIS — I10 BENIGN ESSENTIAL HYPERTENSION: ICD-10-CM

## 2021-07-02 RX ORDER — IRBESARTAN 150 MG/1
TABLET ORAL
Qty: 90 TABLET | Refills: 0 | Status: SHIPPED | OUTPATIENT
Start: 2021-07-02 | End: 2021-09-30

## 2021-07-02 RX ORDER — OMEPRAZOLE 20 MG/1
CAPSULE, DELAYED RELEASE ORAL
Qty: 90 CAPSULE | Refills: 0 | Status: SHIPPED | OUTPATIENT
Start: 2021-07-02 | End: 2021-09-30

## 2021-07-02 RX ORDER — FENOFIBRATE 145 MG/1
TABLET, COATED ORAL
Qty: 90 TABLET | Refills: 0 | Status: SHIPPED | OUTPATIENT
Start: 2021-07-02 | End: 2021-09-30

## 2021-09-30 DIAGNOSIS — K21.9 GASTROESOPHAGEAL REFLUX DISEASE: ICD-10-CM

## 2021-09-30 DIAGNOSIS — E78.1 ESSENTIAL HYPERTRIGLYCERIDEMIA: ICD-10-CM

## 2021-09-30 DIAGNOSIS — I10 BENIGN ESSENTIAL HYPERTENSION: ICD-10-CM

## 2021-09-30 RX ORDER — IRBESARTAN 150 MG/1
TABLET ORAL
Qty: 90 TABLET | Refills: 1 | Status: SHIPPED | OUTPATIENT
Start: 2021-09-30 | End: 2021-12-28 | Stop reason: SDUPTHER

## 2021-09-30 RX ORDER — OMEPRAZOLE 20 MG/1
CAPSULE, DELAYED RELEASE ORAL
Qty: 90 CAPSULE | Refills: 1 | Status: SHIPPED | OUTPATIENT
Start: 2021-09-30 | End: 2021-12-28 | Stop reason: SDUPTHER

## 2021-09-30 RX ORDER — FENOFIBRATE 145 MG/1
TABLET, COATED ORAL
Qty: 90 TABLET | Refills: 1 | Status: SHIPPED | OUTPATIENT
Start: 2021-09-30 | End: 2021-12-28 | Stop reason: SDUPTHER

## 2021-12-28 DIAGNOSIS — K21.9 GASTROESOPHAGEAL REFLUX DISEASE: ICD-10-CM

## 2021-12-28 DIAGNOSIS — E78.1 ESSENTIAL HYPERTRIGLYCERIDEMIA: ICD-10-CM

## 2021-12-28 DIAGNOSIS — I10 BENIGN ESSENTIAL HYPERTENSION: ICD-10-CM

## 2021-12-28 RX ORDER — OMEPRAZOLE 20 MG/1
20 CAPSULE, DELAYED RELEASE ORAL DAILY
Qty: 90 CAPSULE | Refills: 1 | Status: SHIPPED | OUTPATIENT
Start: 2021-12-28 | End: 2022-04-22 | Stop reason: SDUPTHER

## 2021-12-28 RX ORDER — FENOFIBRATE 145 MG/1
145 TABLET, COATED ORAL DAILY
Qty: 90 TABLET | Refills: 1 | Status: SHIPPED | OUTPATIENT
Start: 2021-12-28 | End: 2022-07-14

## 2021-12-28 RX ORDER — IRBESARTAN 150 MG/1
150 TABLET ORAL DAILY
Qty: 90 TABLET | Refills: 1 | Status: SHIPPED | OUTPATIENT
Start: 2021-12-28 | End: 2022-02-05 | Stop reason: HOSPADM

## 2022-02-01 ENCOUNTER — TELEMEDICINE (OUTPATIENT)
Dept: INTERNAL MEDICINE CLINIC | Facility: CLINIC | Age: 47
End: 2022-02-01
Payer: COMMERCIAL

## 2022-02-01 DIAGNOSIS — U07.1 COVID-19: Primary | ICD-10-CM

## 2022-02-01 DIAGNOSIS — R11.2 INTRACTABLE VOMITING WITH NAUSEA, UNSPECIFIED VOMITING TYPE: ICD-10-CM

## 2022-02-01 PROCEDURE — 99203 OFFICE O/P NEW LOW 30 MIN: CPT | Performed by: INTERNAL MEDICINE

## 2022-02-01 RX ORDER — ONDANSETRON 4 MG/1
4 TABLET, FILM COATED ORAL EVERY 8 HOURS PRN
Qty: 20 TABLET | Refills: 0 | Status: SHIPPED | OUTPATIENT
Start: 2022-02-01 | End: 2022-05-05 | Stop reason: ALTCHOICE

## 2022-02-01 NOTE — PROGRESS NOTES
COVID-19 Outpatient Progress Note    Assessment/Plan:    Problem List Items Addressed This Visit     None      Visit Diagnoses     COVID-19    -  Primary    Relevant Orders    COVID Only- Collected at [facility] or [facility] (Completed)    Intractable vomiting with nausea, unspecified vomiting type        Relevant Medications    ondansetron (ZOFRAN) 4 mg tablet         Disposition:     Patient has COVID-19 infection  Based off CDC guidelines, they were recommended to isolate for 5 days from the date of the positive test  If they remain asymptomatic, isolation may be ended followed by 5 days of wearing a mask when around othes to minimize risk of infecting others  If they have a fever, continue to stay home until fever resolves for at least 24 hours  His work isrequiring documentation of a + COVID PCR  If the test is negative which it could be, brittani after a week of symptoms,  I will provide him the note explaining that the dx is based on his report of a +home test      I have spent 15 minutes directly with the patient  Greater than 50% of this time was spent in counseling/coordination of care regarding: instructions for management, patient and family education and impressions  Encounter provider Florencio Suh MD    Provider located at [address] [place] [phone]    Recent Visits  Date Type Provider Dept   02/01/22 Jeannette Weaver MD [facility] recent visits within past 7 days and meeting all other requirements  Future Appointments  No visits were found meeting these conditions  Showing future appointments within next 150 days and meeting all other requirements     This virtual check-in was done via [employer] and patient was informed that this is a secure, HIPAA-compliant platform  He agrees to proceed      Patient agrees to participate in a virtual check in via telephone or video visit instead of presenting to the office to address urgent/immediate medical needs  Patient is aware this is a billable service  After connecting through [facility], the patient was identified by name and date of birth  Majo Cobos was informed that this was a telemedicine visit and that the exam was being conducted confidentially over secure lines  My office door was closed  No one else was in the room  Majo Cobos acknowledged consent and understanding of privacy and security of the telemedicine visit  I informed the patient that I have reviewed his record in Epic and presented the opportunity for him to ask any questions regarding the visit today  The patient agreed to participate  Verification of patient location:  Patient is located in the following state in which I hold an active license: [place]    Subjective:   Majo Cobos is a 42 y o  male who has been screened for COVID-19  Symptom change since last report: worsening  Patient's symptoms include fatigue, sore throat, loss of taste, shortness of breath, abdominal pain, nausea, vomiting, diarrhea, myalgias and headache  Patient denies fever, chills, congestion, rhinorrhea, anosmia, cough and chest tightness      - Date of symptom onset: 1/24/2022  - Date of positive COVID-19 test: 1/25/2022  Type of test: Home antigen  COVID-19 vaccination status: Fully vaccinated (primary series)    Gamaliel Gutierrez has been staying home and has isolated themselves in his home  He is taking care to not share personal items and is cleaning all surfaces that are touched often, like counters, tabletops, and doorknobs using household cleaning sprays or wipes  He is wearing a mask when he leaves his room       Diarrhea nausea and vomiting started 2 days ago  Work is asking him to have a + COVID PCR to prove he has COVID  He has a photo his home test which they are not accepting  Denies recent alcohol use  Denies new medications    Lab Results   Component Value Date    SARSCOV2 Negative 02/02/2022 Past Medical History:   Diagnosis Date    Anxiety     GERD (gastroesophageal reflux disease)     Hyperlipidemia     Hypertension     Tendinitis      Past Surgical History:   Procedure Laterality Date    KNEE SURGERY Left 1989    cyst removed    ID COLONOSCOPY FLX DX W/COLLJ SPEC WHEN PFRMD N/A 5/14/2018    Procedure: COLONOSCOPY;  Surgeon: Tj Crisostomo DO;  [facility]; Service: General    SHOULDER ARTHROSCOPY Right     with biceps tenodesis     Current Outpatient Medications   Medication Sig Dispense Refill    b complex vitamins tablet Take 1 tablet by mouth daily      Cholecalciferol (Vitamin D) 50 MCG (2000 UT) CAPS Take by mouth      fenofibrate (TRICOR) 145 mg tablet Take 1 tablet (145 mg total) by mouth daily 90 tablet 1    irbesartan (AVAPRO) 150 mg tablet Take 1 tablet (150 mg total) by mouth daily 90 tablet 1    Multiple Vitamins-Minerals (multivitamin with minerals) tablet Take 1 tablet by mouth daily      omeprazole (PriLOSEC) 20 mg delayed release capsule Take 1 capsule (20 mg total) by mouth daily 90 capsule 1    magnesium 30 MG tablet Take 30 mg by mouth 2 (two) times a day      ondansetron (ZOFRAN) 4 mg tablet Take 1 tablet (4 mg total) by mouth every 8 (eight) hours as needed for nausea or vomiting 20 tablet 0     No current facility-administered medications for this visit  Allergies   Allergen Reactions    Neomycin Hives    Polymyxin B Hives       Review of Systems   Constitutional: Positive for fatigue  Negative for chills and fever  HENT: Positive for sore throat  Negative for congestion and rhinorrhea  Respiratory: Positive for shortness of breath  Negative for cough and chest tightness  Gastrointestinal: Positive for abdominal pain, diarrhea, nausea and vomiting  Musculoskeletal: Positive for myalgias  Neurological: Positive for headaches  Objective: There were no vitals filed for this visit      Physical Exam    VIRTUAL VISIT [place] verbally agrees to participate in [employer]  Pt is aware that [employer] could be limited without vital signs or the ability to perform a full hands-on physical exam  Joseph Mariana Fabry understands he or the provider may request at any time to terminate the video visit and request the patient to seek care or treatment in person

## 2022-02-02 PROCEDURE — U0005 INFEC AGEN DETEC AMPLI PROBE: HCPCS | Performed by: INTERNAL MEDICINE

## 2022-02-02 PROCEDURE — U0003 INFECTIOUS AGENT DETECTION BY NUCLEIC ACID (DNA OR RNA); SEVERE ACUTE RESPIRATORY SYNDROME CORONAVIRUS 2 (SARS-COV-2) (CORONAVIRUS DISEASE [COVID-19]), AMPLIFIED PROBE TECHNIQUE, MAKING USE OF HIGH THROUGHPUT TECHNOLOGIES AS DESCRIBED BY CMS-2020-01-R: HCPCS | Performed by: INTERNAL MEDICINE

## 2022-02-03 ENCOUNTER — HOSPITAL ENCOUNTER (INPATIENT)
Facility: HOSPITAL | Age: 47
LOS: 2 days | Discharge: HOME | DRG: 683 | End: 2022-02-05
Attending: EMERGENCY MEDICINE | Admitting: HOSPITALIST
Payer: COMMERCIAL

## 2022-02-03 ENCOUNTER — APPOINTMENT (EMERGENCY)
Dept: RADIOLOGY | Facility: HOSPITAL | Age: 47
DRG: 683 | End: 2022-02-03
Payer: COMMERCIAL

## 2022-02-03 ENCOUNTER — TELEPHONE (OUTPATIENT)
Dept: INTERNAL MEDICINE CLINIC | Facility: CLINIC | Age: 47
End: 2022-02-03

## 2022-02-03 DIAGNOSIS — E87.6 HYPOKALEMIA: ICD-10-CM

## 2022-02-03 DIAGNOSIS — R42 VERTIGO: ICD-10-CM

## 2022-02-03 DIAGNOSIS — E87.1 HYPONATREMIA: ICD-10-CM

## 2022-02-03 DIAGNOSIS — R55 NEAR SYNCOPE: Primary | ICD-10-CM

## 2022-02-03 PROBLEM — Z20.822 COVID-19 RULED OUT: Status: ACTIVE | Noted: 2022-02-03

## 2022-02-03 PROBLEM — E86.0 DEHYDRATION: Status: ACTIVE | Noted: 2022-02-03

## 2022-02-03 LAB
25(OH)D3 SERPL-MCNC: 15.7 NG/ML (ref 30–100)
2HR DELTA HS TROPONIN: -2 NG/L
4HR DELTA HS TROPONIN: -2 NG/L
ALBUMIN SERPL BCP-MCNC: 4.3 G/DL (ref 3.5–5)
ALP SERPL-CCNC: 26 U/L (ref 46–116)
ALT SERPL W P-5'-P-CCNC: 57 U/L (ref 12–78)
ANION GAP SERPL CALCULATED.3IONS-SCNC: 12 MMOL/L (ref 4–13)
ANION GAP SERPL CALCULATED.3IONS-SCNC: 13 MMOL/L (ref 4–13)
ANION GAP SERPL CALCULATED.3IONS-SCNC: 13 MMOL/L (ref 4–13)
ANION GAP SERPL CALCULATED.3IONS-SCNC: 8 MMOL/L (ref 4–13)
AST SERPL W P-5'-P-CCNC: 68 U/L (ref 5–45)
ATRIAL RATE: 91 BPM
ATRIAL RATE: 97 BPM
BACTERIA UR QL AUTO: ABNORMAL /HPF
BASOPHILS # BLD AUTO: 0.05 THOUSANDS/ΜL (ref 0–0.1)
BASOPHILS NFR BLD AUTO: 1 % (ref 0–1)
BILIRUB SERPL-MCNC: 1.74 MG/DL (ref 0.2–1)
BILIRUB UR QL STRIP: ABNORMAL
BUN SERPL-MCNC: 18 MG/DL (ref 5–25)
BUN SERPL-MCNC: 21 MG/DL (ref 5–25)
CALCIUM SERPL-MCNC: 10.1 MG/DL (ref 8.3–10.1)
CALCIUM SERPL-MCNC: 9.6 MG/DL (ref 8.3–10.1)
CALCIUM SERPL-MCNC: 9.8 MG/DL (ref 8.3–10.1)
CALCIUM SERPL-MCNC: 9.9 MG/DL (ref 8.3–10.1)
CARDIAC TROPONIN I PNL SERPL HS: 5 NG/L
CARDIAC TROPONIN I PNL SERPL HS: 5 NG/L
CARDIAC TROPONIN I PNL SERPL HS: 7 NG/L
CHLORIDE SERPL-SCNC: 77 MMOL/L (ref 100–108)
CHLORIDE SERPL-SCNC: 80 MMOL/L (ref 100–108)
CHLORIDE SERPL-SCNC: 81 MMOL/L (ref 100–108)
CHLORIDE SERPL-SCNC: 82 MMOL/L (ref 100–108)
CHLORIDE UR-SCNC: 15 MMOL/L
CLARITY UR: CLEAR
CO2 SERPL-SCNC: 21 MMOL/L (ref 21–32)
CO2 SERPL-SCNC: 24 MMOL/L (ref 21–32)
CO2 SERPL-SCNC: 25 MMOL/L (ref 21–32)
CO2 SERPL-SCNC: 25 MMOL/L (ref 21–32)
COLOR UR: YELLOW
CREAT SERPL-MCNC: 1.07 MG/DL (ref 0.6–1.3)
CREAT SERPL-MCNC: 1.21 MG/DL (ref 0.6–1.3)
CREAT SERPL-MCNC: 1.31 MG/DL (ref 0.6–1.3)
CREAT SERPL-MCNC: 1.36 MG/DL (ref 0.6–1.3)
EOSINOPHIL # BLD AUTO: 0.04 THOUSAND/ΜL (ref 0–0.61)
EOSINOPHIL NFR BLD AUTO: 1 % (ref 0–6)
ERYTHROCYTE [DISTWIDTH] IN BLOOD BY AUTOMATED COUNT: 11.7 % (ref 11.6–15.1)
FOLATE SERPL-MCNC: >20 NG/ML (ref 3.1–17.5)
GFR SERPL CREATININE-BSD FRML MDRD: 62 ML/MIN/1.73SQ M
GFR SERPL CREATININE-BSD FRML MDRD: 65 ML/MIN/1.73SQ M
GFR SERPL CREATININE-BSD FRML MDRD: 72 ML/MIN/1.73SQ M
GFR SERPL CREATININE-BSD FRML MDRD: 83 ML/MIN/1.73SQ M
GLUCOSE SERPL-MCNC: 114 MG/DL (ref 65–140)
GLUCOSE SERPL-MCNC: 116 MG/DL (ref 65–140)
GLUCOSE SERPL-MCNC: 116 MG/DL (ref 65–140)
GLUCOSE SERPL-MCNC: 157 MG/DL (ref 65–140)
GLUCOSE UR STRIP-MCNC: NEGATIVE MG/DL
HCT VFR BLD AUTO: 36.1 % (ref 36.5–49.3)
HGB BLD-MCNC: 13.4 G/DL (ref 12–17)
HGB UR QL STRIP.AUTO: NEGATIVE
HYALINE CASTS #/AREA URNS LPF: ABNORMAL /LPF
IMM GRANULOCYTES # BLD AUTO: 0.06 THOUSAND/UL (ref 0–0.2)
IMM GRANULOCYTES NFR BLD AUTO: 1 % (ref 0–2)
KETONES UR STRIP-MCNC: ABNORMAL MG/DL
LEUKOCYTE ESTERASE UR QL STRIP: NEGATIVE
LYMPHOCYTES # BLD AUTO: 1.15 THOUSANDS/ΜL (ref 0.6–4.47)
LYMPHOCYTES NFR BLD AUTO: 14 % (ref 14–44)
MCH RBC QN AUTO: 32.7 PG (ref 26.8–34.3)
MCHC RBC AUTO-ENTMCNC: 37.1 G/DL (ref 31.4–37.4)
MCV RBC AUTO: 88 FL (ref 82–98)
MONOCYTES # BLD AUTO: 0.98 THOUSAND/ΜL (ref 0.17–1.22)
MONOCYTES NFR BLD AUTO: 12 % (ref 4–12)
NEUTROPHILS # BLD AUTO: 5.77 THOUSANDS/ΜL (ref 1.85–7.62)
NEUTS SEG NFR BLD AUTO: 71 % (ref 43–75)
NITRITE UR QL STRIP: NEGATIVE
NON-SQ EPI CELLS URNS QL MICRO: ABNORMAL /HPF
NRBC BLD AUTO-RTO: 0 /100 WBCS
OSMOLALITY UR: 551 MMOL/KG
P AXIS: 38 DEGREES
P AXIS: 50 DEGREES
PH UR STRIP.AUTO: 6.5 [PH]
PLATELET # BLD AUTO: 145 THOUSANDS/UL (ref 149–390)
PLATELET # BLD AUTO: 148 THOUSANDS/UL (ref 149–390)
PMV BLD AUTO: 11.1 FL (ref 8.9–12.7)
PMV BLD AUTO: 11.3 FL (ref 8.9–12.7)
POTASSIUM SERPL-SCNC: 2.4 MMOL/L (ref 3.5–5.3)
POTASSIUM SERPL-SCNC: 3 MMOL/L (ref 3.5–5.3)
POTASSIUM SERPL-SCNC: 3.2 MMOL/L (ref 3.5–5.3)
POTASSIUM SERPL-SCNC: 3.3 MMOL/L (ref 3.5–5.3)
PR INTERVAL: 152 MS
PR INTERVAL: 152 MS
PROT SERPL-MCNC: 7.9 G/DL (ref 6.4–8.2)
PROT UR STRIP-MCNC: NEGATIVE MG/DL
QRS AXIS: 30 DEGREES
QRS AXIS: 32 DEGREES
QRSD INTERVAL: 92 MS
QRSD INTERVAL: 96 MS
QT INTERVAL: 394 MS
QT INTERVAL: 404 MS
QTC INTERVAL: 496 MS
QTC INTERVAL: 500 MS
RBC # BLD AUTO: 4.1 MILLION/UL (ref 3.88–5.62)
RBC #/AREA URNS AUTO: ABNORMAL /HPF
SODIUM 24H UR-SCNC: 13 MOL/L
SODIUM SERPL-SCNC: 115 MMOL/L (ref 136–145)
SODIUM SERPL-SCNC: 116 MMOL/L (ref 136–145)
SP GR UR STRIP.AUTO: 1.01 (ref 1–1.03)
T WAVE AXIS: 3 DEGREES
T WAVE AXIS: 6 DEGREES
TSH SERPL DL<=0.05 MIU/L-ACNC: 2.62 UIU/ML (ref 0.36–3.74)
URATE SERPL-MCNC: 6 MG/DL (ref 4.2–8)
UROBILINOGEN UR QL STRIP.AUTO: 1 E.U./DL
VENTRICULAR RATE: 91 BPM
VENTRICULAR RATE: 97 BPM
VIT B12 SERPL-MCNC: 830 PG/ML (ref 100–900)
WBC # BLD AUTO: 8.05 THOUSAND/UL (ref 4.31–10.16)
WBC #/AREA URNS AUTO: ABNORMAL /HPF

## 2022-02-03 PROCEDURE — 71045 X-RAY EXAM CHEST 1 VIEW: CPT

## 2022-02-03 PROCEDURE — 85025 COMPLETE CBC W/AUTO DIFF WBC: CPT | Performed by: EMERGENCY MEDICINE

## 2022-02-03 PROCEDURE — G1004 CDSM NDSC: HCPCS

## 2022-02-03 PROCEDURE — 84550 ASSAY OF BLOOD/URIC ACID: CPT | Performed by: HOSPITALIST

## 2022-02-03 PROCEDURE — 80053 COMPREHEN METABOLIC PANEL: CPT | Performed by: EMERGENCY MEDICINE

## 2022-02-03 PROCEDURE — 36415 COLL VENOUS BLD VENIPUNCTURE: CPT

## 2022-02-03 PROCEDURE — 70450 CT HEAD/BRAIN W/O DYE: CPT

## 2022-02-03 PROCEDURE — 99285 EMERGENCY DEPT VISIT HI MDM: CPT

## 2022-02-03 PROCEDURE — 93005 ELECTROCARDIOGRAM TRACING: CPT

## 2022-02-03 PROCEDURE — 84443 ASSAY THYROID STIM HORMONE: CPT | Performed by: HOSPITALIST

## 2022-02-03 PROCEDURE — 83935 ASSAY OF URINE OSMOLALITY: CPT | Performed by: EMERGENCY MEDICINE

## 2022-02-03 PROCEDURE — 80048 BASIC METABOLIC PNL TOTAL CA: CPT | Performed by: HOSPITALIST

## 2022-02-03 PROCEDURE — 99255 IP/OBS CONSLTJ NEW/EST HI 80: CPT | Performed by: INTERNAL MEDICINE

## 2022-02-03 PROCEDURE — 99285 EMERGENCY DEPT VISIT HI MDM: CPT | Performed by: EMERGENCY MEDICINE

## 2022-02-03 PROCEDURE — 84484 ASSAY OF TROPONIN QUANT: CPT | Performed by: EMERGENCY MEDICINE

## 2022-02-03 PROCEDURE — 93010 ELECTROCARDIOGRAM REPORT: CPT | Performed by: INTERNAL MEDICINE

## 2022-02-03 PROCEDURE — 96365 THER/PROPH/DIAG IV INF INIT: CPT

## 2022-02-03 PROCEDURE — 84300 ASSAY OF URINE SODIUM: CPT | Performed by: EMERGENCY MEDICINE

## 2022-02-03 PROCEDURE — 82306 VITAMIN D 25 HYDROXY: CPT | Performed by: HOSPITALIST

## 2022-02-03 PROCEDURE — 82607 VITAMIN B-12: CPT | Performed by: HOSPITALIST

## 2022-02-03 PROCEDURE — 99223 1ST HOSP IP/OBS HIGH 75: CPT | Performed by: HOSPITALIST

## 2022-02-03 PROCEDURE — 81001 URINALYSIS AUTO W/SCOPE: CPT | Performed by: NURSE PRACTITIONER

## 2022-02-03 PROCEDURE — 85049 AUTOMATED PLATELET COUNT: CPT | Performed by: HOSPITALIST

## 2022-02-03 PROCEDURE — 82746 ASSAY OF FOLIC ACID SERUM: CPT | Performed by: HOSPITALIST

## 2022-02-03 PROCEDURE — 82436 ASSAY OF URINE CHLORIDE: CPT | Performed by: HOSPITALIST

## 2022-02-03 RX ORDER — POTASSIUM CHLORIDE 20 MEQ/1
20 TABLET, EXTENDED RELEASE ORAL ONCE
Status: COMPLETED | OUTPATIENT
Start: 2022-02-03 | End: 2022-02-03

## 2022-02-03 RX ORDER — FENOFIBRATE 145 MG/1
145 TABLET, COATED ORAL DAILY
Status: DISCONTINUED | OUTPATIENT
Start: 2022-02-04 | End: 2022-02-05 | Stop reason: HOSPADM

## 2022-02-03 RX ORDER — SODIUM CHLORIDE 9 MG/ML
50 INJECTION, SOLUTION INTRAVENOUS CONTINUOUS
Status: DISCONTINUED | OUTPATIENT
Start: 2022-02-03 | End: 2022-02-04

## 2022-02-03 RX ORDER — POTASSIUM CHLORIDE 14.9 MG/ML
20 INJECTION INTRAVENOUS
Status: COMPLETED | OUTPATIENT
Start: 2022-02-03 | End: 2022-02-03

## 2022-02-03 RX ORDER — PANTOPRAZOLE SODIUM 20 MG/1
20 TABLET, DELAYED RELEASE ORAL
Status: DISCONTINUED | OUTPATIENT
Start: 2022-02-04 | End: 2022-02-04

## 2022-02-03 RX ORDER — LOSARTAN POTASSIUM 50 MG/1
50 TABLET ORAL DAILY
Status: DISCONTINUED | OUTPATIENT
Start: 2022-02-04 | End: 2022-02-05

## 2022-02-03 RX ORDER — HEPARIN SODIUM 5000 [USP'U]/ML
5000 INJECTION, SOLUTION INTRAVENOUS; SUBCUTANEOUS EVERY 8 HOURS SCHEDULED
Status: DISCONTINUED | OUTPATIENT
Start: 2022-02-03 | End: 2022-02-05 | Stop reason: HOSPADM

## 2022-02-03 RX ADMIN — POTASSIUM CHLORIDE 20 MEQ: 14.9 INJECTION, SOLUTION INTRAVENOUS at 13:30

## 2022-02-03 RX ADMIN — MAGNESIUM OXIDE TAB 400 MG (241.3 MG ELEMENTAL MG) 400 MG: 400 (241.3 MG) TAB at 18:15

## 2022-02-03 RX ADMIN — POTASSIUM CHLORIDE 20 MEQ: 14.9 INJECTION, SOLUTION INTRAVENOUS at 16:02

## 2022-02-03 RX ADMIN — HEPARIN SODIUM 5000 UNITS: 5000 INJECTION INTRAVENOUS; SUBCUTANEOUS at 21:47

## 2022-02-03 RX ADMIN — POTASSIUM CHLORIDE 20 MEQ: 1500 TABLET, EXTENDED RELEASE ORAL at 13:37

## 2022-02-03 RX ADMIN — HEPARIN SODIUM 5000 UNITS: 5000 INJECTION INTRAVENOUS; SUBCUTANEOUS at 16:00

## 2022-02-03 RX ADMIN — SODIUM CHLORIDE 100 ML/HR: 0.9 INJECTION, SOLUTION INTRAVENOUS at 13:36

## 2022-02-03 NOTE — TELEPHONE ENCOUNTER
Return to work will depend on what happens in the hospital so he should call once he is discharged  He can also ask the doctor that sees him in the hospital to provide a note for work to excuse him during those days he is admitted

## 2022-02-03 NOTE — ASSESSMENT & PLAN NOTE
This hyponatremia could be secondary to patient not able to keep anything down for few days with nausea and vomiting  He had recent COVID  Repeat COVID test negative yesterday, on 02/02/20  Patient said that he has been drinking a lot of water for last 2 3 days  He used to drink a lot of vodka/ alcoholism about a month ago he quit drinking alcohol  Will check serum and urine osmolality, uric acid, urine lytes    Gentle hydration for now  BMP q 8 hours  Nephrology consult

## 2022-02-03 NOTE — ASSESSMENT & PLAN NOTE
Patient had a dizzy spell where he stumbled and almost fell    Neurochecks q 4 hours for 24 hours  Probably secondary to electrolyte derangement  Check orthostatics

## 2022-02-03 NOTE — H&P
[facility]  H&P- Evelyne Vargas 1975, 42 y o  male MRN: [id number]  Unit/Bed#: ED 13 Encounter: [id number]  Primary Care Provider: Bobby Pandya DO   Date and time admitted to hospital: 2/3/2022 10:49 AM    * Hyponatremia  Assessment & Plan  This hyponatremia could be secondary to patient not able to keep anything down for few days with nausea and vomiting  He had recent COVID  Repeat COVID test negative yesterday, on 02/02/20  Patient said that he has been drinking a lot of water for last 2 3 days  He used to drink a lot of vodka/ alcoholism about a month ago he quit drinking alcohol  Will check serum and urine osmolality, uric acid, urine lytes  Gentle hydration for now  BMP q 8 hours  Nephrology consult    Dizziness  Assessment & Plan  Patient had a dizzy spell where he stumbled and almost fell  Neurochecks q 4 hours for 24 hours  Probably secondary to electrolyte derangement  Check orthostatics      Hypokalemia  Assessment & Plan  Replacing    Dehydration  Assessment & Plan  Continue gentle hydration    COVID-19 ruled out  Assessment & Plan  Patient had COVID-19 symptoms with fever, cough about 2 weeks ago  And then he developed nausea and vomiting over the last weekend  For last 3 days he has been drinking more water    Follow-up on the chest x-ray  COVID-19 was negative yesterday    Esophageal reflux  Assessment & Plan  Stable now continue home medication    Benign essential hypertension  Assessment & Plan  Continue home medication    Alcoholism [facility])  Assessment & Plan  Monitor for any withdrawal symptoms  Patient said he is sober for last 1 month now      VTE Pharmacologic Prophylaxis:  Heparin subQ  Code Status:  Full code  Discussion with family:  No family at the bedside    Anticipated Length of Stay:  2-3 days  Total Time for Visit, including Counseling / Coordination of Care:  45 minutes Greater than 50% of this total time spent on direct patient counseling and coordination of care  Chief Complaint:  Dizziness and almost fell    History of Present Illness:  Mark Jimenez is a 42 y o  male with a PMH of hypertension, GERD, anxiety, alcoholism, dyslipidemia, recent COVID-23 infection who presented with complaint of feeling dizzy and lightheaded and almost fell when he was at work  Ambulance was called for him  Patient said that he was in his boss's office and he stood up pretty quickly and felt dizzy, stumbled, almost fell backward that he hit the wall with his buttock  But did not hit his head  If the wall was not there patient said he must have fallen on the floor  When the blood pressure was checked at that time reportedly it was too high so ambulance was called and according to the patient in the ambulance his blood pressure was fine  In the ED lab work shows sodium of 115, potassium of 2 4  Patient mentioned that he had nausea and vomiting multiple times a day over the weekend from Saturday to Monday  He denies having any diarrhea  He has been drinking a lot of water since yesterday  Today was his 1st day back to work after [employer] and he got dizzy       Review of Systems:  Review of Systems   Constitutional: Negative for fatigue and fever  HENT: Negative for sore throat  Eyes: Negative for visual disturbance  Respiratory: Positive for cough  Negative for chest tightness, shortness of breath and wheezing  Cardiovascular: Negative for chest pain, palpitations and leg swelling  Gastrointestinal: Positive for nausea and vomiting  Negative for abdominal pain and constipation  Endocrine: Positive for polydipsia  Genitourinary: Negative for dysuria and flank pain  Musculoskeletal: Negative for back pain  Neurological: Positive for dizziness and light-headedness  Negative for tremors, syncope, weakness and numbness  Psychiatric/Behavioral: Negative for agitation and behavioral problems         Past Medical and Surgical History: Past Medical History:   Diagnosis Date    Anxiety     GERD (gastroesophageal reflux disease)     Hyperlipidemia     Hypertension     Tendinitis        Past Surgical History:   Procedure Laterality Date    KNEE SURGERY Left 1989    cyst removed    OR COLONOSCOPY FLX DX W/COLLJ SPEC WHEN PFRMD N/A 5/14/2018    Procedure: COLONOSCOPY;  Surgeon: Meliza Crow DO;  [facility]; Service: General    SHOULDER ARTHROSCOPY Right     with biceps tenodesis       Meds/Allergies:  Prior to Admission medications    Medication Sig Start Date End Date Taking? Authorizing Provider   b complex vitamins tablet Take 1 tablet by mouth daily   Yes Historical Provider, MD   Cholecalciferol (Vitamin D) 50 MCG (2000 UT) CAPS Take by mouth   Yes Historical Provider, MD   fenofibrate (TRICOR) 145 mg tablet Take 1 tablet (145 mg total) by mouth daily 12/28/21  Yes Candy French DO   irbesartan (AVAPRO) 150 mg tablet Take 1 tablet (150 mg total) by mouth daily 12/28/21  Yes Candy French DO   magnesium 30 MG tablet Take 30 mg by mouth 2 (two) times a day   Yes Historical Provider, MD   Multiple Vitamins-Minerals (multivitamin with minerals) tablet Take 1 tablet by mouth daily   Yes Historical Provider, MD   omeprazole (PriLOSEC) 20 mg delayed release capsule Take 1 capsule (20 mg total) by mouth daily 12/28/21  Yes Candy French DO   ondansetron (ZOFRAN) 4 mg tablet Take 1 tablet (4 mg total) by mouth every 8 (eight) hours as needed for nausea or vomiting 2/1/22  Yes Justin Stein MD     Home meds reviewed    Allergies:    Allergies   Allergen Reactions    Neomycin Hives    Polymyxin B Hives       Social History:  Marital Status: Single   Patient Pre-hospital Living Situation:  Lives at home with a nephew  Patient Pre-hospital Level of Mobility:  Walks okay  Patient Pre-hospital Diet Restrictions:  Regular diet  Substance Use History:   Social History     Substance and Sexual Activity   Alcohol Use Not Currently    Alcohol/week: 2 0 - 3 0 standard drinks    Types: 2 - 3 Shots of liquor per week    Comment: 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, 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     Social History     Tobacco Use   Smoking Status Former Smoker    Packs/day: 1 00    Years: 15 00    Pack years: 15 00   Smokeless Tobacco Never Used     Social History     Substance and Sexual Activity   Drug Use No       Family History:  Grandmother had pancreatic cancer  Grandfather had brain cancer    Physical Exam:     Vitals:   Blood Pressure: 133/75 (02/03/22 1052)  Pulse: 86 (02/03/22 1052)  Temperature: 98 6 °F (37 °C) (02/03/22 1052)  Respirations: 20 (02/03/22 1052)  SpO2: 99 % (02/03/22 1052)     Awake alert oriented, NAD   S1 S2 audible  Clear to auscultation bilaterally, no wheezing or rhonchi  Abdomen soft nontender nondistended  No lower extremity edema, cyanosis or clubbing  Nonfocal neurological exam        Additional Data:     Lab Results:  Results from last 7 days   Lab Units 02/03/22  1057   WBC Thousand/uL 8 05   HEMOGLOBIN g/dL 13 4   HEMATOCRIT % 36 1*   PLATELETS Thousands/uL 148*   NEUTROS PCT % 71   LYMPHS PCT % 14   MONOS PCT % 12   EOS PCT % 1     Results from last 7 days   Lab Units 02/03/22  1057   SODIUM mmol/L 115*   POTASSIUM mmol/L 2 4*   CHLORIDE mmol/L 77*   CO2 mmol/L 25   BUN mg/dL 18   CREATININE mg/dL 1 36*   ANION GAP mmol/L 13   CALCIUM mg/dL 10 1   ALBUMIN g/dL 4 3   TOTAL BILIRUBIN mg/dL 1 74*   ALK PHOS U/L 26*   ALT U/L 57   AST U/L 68*   GLUCOSE RANDOM mg/dL 116                       Imaging:   XR chest 1 view portable    (Results Pending)   CT head without contrast    (Results Pending)       EKG and Other Studies Reviewed on Admission:   · EKG:  Pending    ** Please Note: This note has been constructed using a voice recognition system   **

## 2022-02-03 NOTE — TELEPHONE ENCOUNTER
The patient needs a return to work note  He was out from 1/24 and would like to return to work on Monday 2/7/22  He will need you to sign it  Is that able to uploaded into [employer]? If it cannot be then he will come in Monday to pick it up  He is currently in the hospital for low sodium and potassium

## 2022-02-03 NOTE — ED PROVIDER NOTES
History  Chief Complaint   Patient presents with    Dizziness     PT States at work he developed sob and dizziness  pt states he slid back and hit cubicle  denies loc, head strike, or blood thiners  Patient presents for evaluation after developing vertigo while at work today  He states that he stood up from a seated position and felt dizzy  He states that this caused him to fall backward into his bosses cuticle  He states that his boss him evaluated to and using a wrist cuff in the office he was found to have a blood pressure of 190/120  Currently, patient denies any complaints and is upset that he was brought here  Patient states that he was diagnosed with COVID 2 weeks ago and went back to work today for the 1st day today  Patient states that he was feeling well until Saturday when he developed some shortness of breath and diarrhea  He states that he had diarrhea for several days and today was the 1st day that he was feeling well  He denies any current shortness of breath  He denies any chest pain, headache, numbness, weakness  He does state that he feels as if he has a [place] fog from [facility]  No additional complaints  History provided by:  Patient  Dizziness  Quality:  Vertigo  Severity:  Moderate  Onset quality:  Sudden  Chronicity:  New  Context: not with loss of consciousness    Relieved by:  Nothing  Worsened by:  Nothing  Ineffective treatments:  None tried  Associated symptoms: diarrhea and vomiting ( recent)    Associated symptoms: no chest pain, no headaches and no shortness of breath  Syncope: Recent  Risk factors: no anemia and no hx of stroke        Prior to Admission Medications   Prescriptions Last Dose Informant Patient Reported? Taking?    Cholecalciferol (Vitamin D) 50 MCG (2000 UT) CAPS Past Month at Unknown time  Yes Yes   Sig: Take by mouth   Multiple Vitamins-Minerals (multivitamin with minerals) tablet Past Month at Unknown time  Yes Yes   Sig: Take 1 tablet by mouth daily   b complex vitamins tablet 2/3/2022 at Unknown time Self Yes Yes   Sig: Take 1 tablet by mouth daily   fenofibrate (TRICOR) 145 mg tablet 2/3/2022 at Unknown time  No Yes   Sig: Take 1 tablet (145 mg total) by mouth daily   irbesartan (AVAPRO) 150 mg tablet 2/3/2022 at Unknown time  No Yes   Sig: Take 1 tablet (150 mg total) by mouth daily   magnesium 30 MG tablet Past Month at Unknown time  Yes Yes   Sig: Take 30 mg by mouth 2 (two) times a day   omeprazole (PriLOSEC) 20 mg delayed release capsule 2/3/2022 at Unknown time  No Yes   Sig: Take 1 capsule (20 mg total) by mouth daily   ondansetron (ZOFRAN) 4 mg tablet Past Week at Unknown time  No Yes   Sig: Take 1 tablet (4 mg total) by mouth every 8 (eight) hours as needed for nausea or vomiting      Facility-Administered Medications: None       Past Medical History:   Diagnosis Date    Anxiety     GERD (gastroesophageal reflux disease)     Hyperlipidemia     Hypertension     Tendinitis        Past Surgical History:   Procedure Laterality Date    KNEE SURGERY Left 1989    cyst removed    [place] COLONOSCOPY FLX DX W/COLLJ SPEC WHEN PFRMD N/A 5/14/2018    Procedure: COLONOSCOPY;  Surgeon: Amadou Gutierrez DO;  Location: [facility]; Service: General    SHOULDER ARTHROSCOPY Right     with biceps tenodesis       Family History   Problem Relation Age of Onset    Melanoma Father     Melanoma Brother     Diabetes Maternal Grandfather     Stroke Paternal Grandmother     Hypertension Paternal Grandmother     Coronary artery disease Paternal Grandmother     Hypertension Paternal Grandfather     Coronary artery disease Paternal Grandfather     Brain cancer Paternal Grandfather     Liver cancer Paternal Uncle      I have reviewed and agree with the history as documented      E-Cigarette/Vaping    E-Cigarette Use Never User      E-Cigarette/Vaping Substances    Nicotine No     THC No     CBD No     Flavoring No     Other No     Unknown No      Social History     Tobacco Use    Smoking status: Former Smoker    Smokeless tobacco: Never Used   Vaping Use    Vaping Use: Never used   Substance Use Topics    Alcohol use: Not Currently     Alcohol/week: 2 0 - 3 0 standard drinks     Types: 2 - 3 Shots of liquor per week     Comment: went into rehab, sober 2018 though had relapse briefly    Drug use: No       Review of Systems   Constitutional: Negative for chills and fever  HENT: Negative for rhinorrhea and sinus pressure  Eyes: Negative for pain and redness  Respiratory: Negative for chest tightness and shortness of breath  Cardiovascular: Negative for chest pain  Syncope: Recent  Gastrointestinal: Positive for diarrhea and vomiting ( recent)  Negative for abdominal pain  Musculoskeletal: Negative for back pain and neck pain  Skin: Negative for color change and rash  Neurological: Positive for dizziness  Negative for speech difficulty (patient denies but have some difficulty on exam) and headaches  Psychiatric/Behavioral: Negative for agitation and confusion  All other systems reviewed and are negative  Physical Exam  Physical Exam  Vitals and nursing note reviewed  Constitutional:       Appearance: He is well-developed  HENT:      Head: Normocephalic and atraumatic  Nose: Nose normal       Mouth/Throat:      Mouth: Mucous membranes are moist       Pharynx: Oropharynx is clear  Eyes:      Conjunctiva/sclera: Conjunctivae normal       Pupils: Pupils are equal, round, and reactive to light  Cardiovascular:      Rate and Rhythm: Normal rate and regular rhythm  Heart sounds: No murmur heard  Pulmonary:      Effort: Pulmonary effort is normal  No respiratory distress  Breath sounds: Normal breath sounds  Abdominal:      Palpations: Abdomen is soft  Tenderness: There is no abdominal tenderness  Musculoskeletal:      Cervical back: Neck supple  Skin:     General: Skin is warm and dry     Neurological: General: No focal deficit present  Mental Status: He is alert and oriented to person, place, and time  Mental status is at baseline  Sensory: No sensory deficit  Motor: No weakness        Coordination: Coordination normal       Gait: Gait normal       Comments: Speech slightly clumsy, not exactly slurring but not speaking clearly         Vital Signs  ED Triage Vitals [02/03/22 1052]   Temperature Pulse Respirations Blood Pressure SpO2   98 6 °F (37 °C) 86 20 133/75 99 %      Temp src Heart Rate Source Patient Position - Orthostatic VS BP Location FiO2 (%)   -- Monitor -- -- --      Pain Score       No Pain           Vitals:    02/03/22 1052   BP: 133/75   Pulse: 86         Visual Acuity      ED Medications  Medications   potassium chloride 20 mEq IVPB (premix) (has no administration in time range)   potassium chloride (K-DUR,KLOR-CON) CR tablet 20 mEq (has no administration in time range)   sodium chloride 0 9 % infusion (has no administration in time range)       Diagnostic Studies  Results Reviewed     Procedure Component Value Units Date/Time    Osmolality-"If this is regarding a toxic alcohol, please STOP and consult medical  for further guidance " [[id number]]     Lab Status: No result Specimen: Blood     Osmolality, urine [[id number]]     Lab Status: No result Specimen: Urine     Sodium, urine, random [[id number]]     Lab Status: No result Specimen: Urine     BMP Q8 hours X 3 (Hyponatremia monitoring) [[id number]]     Lab Status: No result Specimen: Blood     HS Troponin I 4hr [[id number]]     Lab Status: No result Specimen: Blood     CBC and differential [[id number]]  (Abnormal) Collected: 02/03/22 1057    Lab Status: Final result Specimen: Blood from Arm, Right Updated: 02/03/22 1156     WBC 8 05 Thousand/uL      RBC 4 10 Million/uL      Hemoglobin 13 4 g/dL      Hematocrit 36 1 %      MCV 88 fL      MCH 32 7 pg      MCHC 37 1 g/dL      RDW 11 7 %      MPV 11 1 fL      Platelets 263 Thousands/uL      nRBC 0 /100 WBCs      Neutrophils Relative 71 %      Immat GRANS % 1 %      Lymphocytes Relative 14 %      Monocytes Relative 12 %      Eosinophils Relative 1 %      Basophils Relative 1 %      Neutrophils Absolute 5 77 Thousands/µL      Immature Grans Absolute 0 06 Thousand/uL      Lymphocytes Absolute 1 15 Thousands/µL      Monocytes Absolute 0 98 Thousand/µL      Eosinophils Absolute 0 04 Thousand/µL      Basophils Absolute 0 05 Thousands/µL     Comprehensive metabolic panel [[id number]]  (Abnormal) Collected: 02/03/22 1057    Lab Status: Final result Specimen: Blood from Arm, Right Updated: 02/03/22 1143     Sodium 115 mmol/L      Potassium 2 4 mmol/L      Chloride 77 mmol/L      CO2 25 mmol/L      ANION GAP 13 mmol/L      BUN 18 mg/dL      Creatinine 1 36 mg/dL      Glucose 116 mg/dL      Calcium 10 1 mg/dL      AST 68 U/L      ALT 57 U/L      Alkaline Phosphatase 26 U/L      Total Protein 7 9 g/dL      Albumin 4 3 g/dL      Total Bilirubin 1 74 mg/dL      eGFR 62 ml/min/1 73sq m     Narrative:      [place] guidelines for Chronic Kidney Disease (CKD):     Stage 1 with normal or high GFR (GFR > 90 mL/min/1 73 square meters)    Stage 2 Mild CKD (GFR = 60-89 mL/min/1 73 square meters)    Stage 3A Moderate CKD (GFR = 45-59 mL/min/1 73 square meters)    Stage 3B Moderate CKD (GFR = 30-44 mL/min/1 73 square meters)    Stage 4 Severe CKD (GFR = 15-29 mL/min/1 73 square meters)    Stage 5 End Stage CKD (GFR <15 mL/min/1 73 square meters)  Note: GFR calculation is accurate only with a steady state creatinine    HS Troponin 0hr (reflex protocol) [[id number]]  (Normal) Collected: 02/03/22 1057    Lab Status: Final result Specimen: Blood from Arm, Right Updated: 02/03/22 1132     hs TnI 0hr 7 ng/L     HS Troponin I 2hr [[id number]]     Lab Status: No result Specimen: Blood                  XR chest 1 view portable    (Results Pending)   CT head without contrast    (Results Pending)              Procedures  ECG 12 Lead Documentation Only    Date/Time: 2/3/2022 10:58 AM  Performed by: Matt Robles MD  Authorized by: Matt Robles MD     Indications / Diagnosis:  Vertigo  ECG reviewed by me, the ED Provider: yes    Patient location:  ED  Previous ECG:     Previous ECG:  Compared to current    Similarity:  No change  Interpretation:     Interpretation: normal    Rate:     ECG rate:  97    ECG rate assessment: normal    Rhythm:     Rhythm: sinus rhythm    Ectopy:     Ectopy: none    QRS:     QRS axis:  Normal  Conduction:     Conduction: normal    ST segments:     ST segments:  Normal  T waves:     T waves: normal               ED Course                               SBIRT 20yo+      Most Recent Value   SBIRT (22 yo +)    In order to provide better care to our patients, we are screening all of our patients for alcohol and drug use  Would it be okay to ask you these screening questions?  No Filed at: 02/03/2022 1221                    [facility]  Number of Diagnoses or Management Options  Hypokalemia: new and requires workup  Near syncope: new and requires workup  Vertigo: new and requires workup     Amount and/or Complexity of Data Reviewed  Clinical lab tests: ordered and reviewed  Tests in the radiology section of CPT®: ordered and reviewed    Risk of Complications, Morbidity, and/or Mortality  Presenting problems: moderate  Diagnostic procedures: moderate  Management options: moderate    Patient Progress  Patient progress: stable      Disposition  Final diagnoses:   Near syncope   Hypokalemia   Vertigo     Time reflects when diagnosis was documented in both MDM as applicable and the Disposition within this note     Time User Action Codes Description Comment    2/3/2022 12:56 PM Miller Adas Add [R55] Near syncope     2/3/2022 12:56 PM Miller Adas Add [E87 6] Hypokalemia     2/3/2022 12:56 PM Miller Adas Add [R42] Vertigo       ED Disposition     ED Disposition Condition Date/Time Comment    Admit Stable Thu Feb 3, 2022 12:56 PM Case was discussed with Dr Davi Tolbert and the patient's admission status was agreed to be Admission Status: inpatient status to the service of Dr Davi Tolbert   Follow-up Information    None         Patient's Medications   Discharge Prescriptions    No medications on file       No discharge procedures on file      PDMP Review       Value Time User    PDMP Reviewed  Yes 3/17/2020  9:08 AM Vinnie Encarnacion DO          ED Provider  Electronically Signed by           Emilee Renae MD  02/03/22 1257

## 2022-02-03 NOTE — ASSESSMENT & PLAN NOTE
Patient had COVID-19 symptoms with fever, cough about 2 weeks ago  And then he developed nausea and vomiting over the last weekend  For last 3 days he has been drinking more water    Follow-up on the chest x-ray  COVID-19 was negative yesterday

## 2022-02-03 NOTE — CONSULTS
Consultation - Nephrology   Maggie Valenzuela 42 y o  male MRN: [id number]  Unit/Bed#: JANETH Encounter: [id number]    ASSESSMENT and PLAN:  Acute kidney injury (POA):  Etiology: Suspect prerenal in the setting of decreased oral intake, loss of auto regulatory system with ARB,  recent NSAID use, recent COVID-19 infection, will need to rule out rhabdomyolysis with muscles pain  Assessment:   After review of medical records through [address] [place] [address] it appears that the patient has a baseline Creatinine of 0 8-1 0   Patient was admitted with a creatinine of 1 36   Irbesartan on hold   Clinically, patient is not uremic and there is no acute indication for renal replacement therapy (dialysis)  Plan:   Avoid nephrotoxins, adjust meds to appropriate GFR   Optimize hemodynamic status to avoid delay in renal recovery   Will check urinalysis with micro   Check bladder scan to rule out retention   Will check CPK to rule out rhabdomyolysis   Continue hold Arb this time   IV NSS fluids 50 cc an hour-continue for now   If renal function worsens consider renal ultrasound   Continue trend I/O, lab values volume status    Hyponatremia:  Assessment and plan  · Suspect prerenal with decreased solute intake  · Admission sodium 115  · Serum chloride 77  · Currently on IV normal saline 50 cc an hour  · Urine sodium urine osmolality-ordered per primary team  · Goal sodium 123 x 6 a m    · Will check BMP q 6 hours with call parameters for less than 115 or greater than 121    Blood pressure/Hypertension:  Assessment and Plan:   Current blood pressure acceptable   Home medications include: Irbesartan 150 mg daily    Current medications include:  None   Maximize hemodynamics to maintain MAP >65   Avoid hypotension or fluctuations in blood pressure   Will continue to trend     Electrolytes:  Assessment and Plan:  Hypokalemia:  Likely in the setting of decreased oral intake  · Current potassium 2 4  · Currently receiving IV potassium supplementation per primary team  · Will continue to trend  · Check magnesium in a m  HISTORY OF PRESENT ILLNESS:  Requesting Physician: Garrick Fuller MD  Reason for Consult:  Severe hyponatremia    Darien Ramsey is a 42 y o  male with past medical history of hypertension for 10 years, hyperlipidemia, GERD, anxiety, alcohol abuse class drink one month ago, recent COVID 19 infection two weeks ago, who presented to the emergency room with dizziness, decreased oral intake for three days, NSAID use for three days for muscle pain  Workup revealed elevated creatinine 1 36 and low sodium of 115, low potassium of 2 4 and a renal consultation is requested today for assistance in the management of hyponatremia and acute kidney injury  On discussion the patient reports decreased food intake over the last three days but was able to take water in approximately 6-8 20 oz glasses of water  He was experiencing muscle pains and was taking Advil over the last three days  Currently he denies chest pain, shortness of breath, dizziness, lightheadedness, nausea, vomiting, urinary issues, fevers, chills, lower extremity swelling  He is complaining of a sore throat and it was difficult for the patient to eat  PAST MEDICAL HISTORY:  Past Medical History:   Diagnosis Date    Anxiety     GERD (gastroesophageal reflux disease)     Hyperlipidemia     Hypertension     Tendinitis        PAST SURGICAL HISTORY:  Past Surgical History:   Procedure Laterality Date    KNEE SURGERY Left 1989    cyst removed    IN COLONOSCOPY FLX DX W/COLLJ SPEC WHEN PFRMD N/A 5/14/2018    Procedure: COLONOSCOPY;  Surgeon: Aries Garibay DO;  Location: [facility];   Service: General    SHOULDER ARTHROSCOPY Right     with biceps tenodesis       ALLERGIES:  Allergies   Allergen Reactions    Neomycin Hives    Polymyxin B Hives       SOCIAL HISTORY:  Social History     Substance and Sexual Activity   Alcohol Use Not Currently    Alcohol/week: 2 0 - 3 0 standard drinks    Types: 2 - 3 Shots of liquor per week    Comment: 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, 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     Social History     Substance and Sexual Activity   Drug Use No     Social History     Tobacco Use   Smoking Status Former Smoker    Packs/day: 1 00    Years: 15 00    Pack years: 15 00   Smokeless Tobacco Never Used       FAMILY HISTORY:  Family History   Problem Relation Age of Onset    Melanoma Father     Cancer Father     Melanoma Brother     Diabetes Maternal Grandfather     Stroke Paternal Grandmother     Hypertension Paternal Grandmother     Coronary artery disease Paternal Grandmother     Heart disease Paternal Grandmother     Hypertension Paternal Grandfather     Coronary artery disease Paternal Grandfather     Brain cancer Paternal Grandfather     Heart disease Paternal Grandfather     Liver cancer Paternal Uncle        MEDICATIONS:    Current Facility-Administered Medications:     potassium chloride 20 mEq IVPB (premix), 20 mEq, Intravenous, Q2H, Linda Flores MD, Last Rate: 50 mL/hr at 02/03/22 1330, 20 mEq at 02/03/22 1330    sodium chloride 0 9 % infusion, 50 mL/hr, Intravenous, Continuous, Nadine Waldron MD, Last Rate: 100 mL/hr at 02/03/22 1336, 100 mL/hr at 02/03/22 1336    Current Outpatient Medications:     b complex vitamins tablet, Take 1 tablet by mouth daily, Disp: , Rfl:     Cholecalciferol (Vitamin D) 50 MCG (2000 UT) CAPS, Take by mouth, Disp: , Rfl:     fenofibrate (TRICOR) 145 mg tablet, Take 1 tablet (145 mg total) by mouth daily, Disp: 90 tablet, Rfl: 1    irbesartan (AVAPRO) 150 mg tablet, Take 1 tablet (150 mg total) by mouth daily, Disp: 90 tablet, Rfl: 1    magnesium 30 MG tablet, Take 30 mg by mouth 2 (two) times a day, Disp: , Rfl:     Multiple Vitamins-Minerals (multivitamin with minerals) tablet, Take 1 tablet by mouth daily, Disp: , Rfl:     omeprazole (PriLOSEC) 20 mg delayed release capsule, Take 1 capsule (20 mg total) by mouth daily, Disp: 90 capsule, Rfl: 1    ondansetron (ZOFRAN) 4 mg tablet, Take 1 tablet (4 mg total) by mouth every 8 (eight) hours as needed for nausea or vomiting, Disp: 20 tablet, Rfl: 0    REVIEW OF SYSTEMS:  A more than 10 point review of systems was performed and found to be negative unless otherwise noted in the HPI        PHYSICAL EXAM:  Current Weight:    First Weight:    Vitals:    02/03/22 1052   BP: 133/75   Pulse: 86   Resp: 20   Temp: 98 6 °F (37 °C)   SpO2: 99%     No intake or output data in the 24 hours ending 02/03/22 1455  General: conscious, coherent, cooperative, not in acute distress  Skin: no rash, warm and dry  Eyes: pale conjunctivae, anicteric sclerae  ENT: fairly dry lips and mucous membranes  Respiratory: clear breath sounds  CVS: distinct S1 & S2, normal rate, regular rhythm,   Abdomen: soft, non-tender, non-distended, active bowel sounds  Neuro: awake, alert, oriented  Psych: appropriate affect        Lab Results:   Results from last 7 days   Lab Units 02/03/22  1057   WBC Thousand/uL 8 05   HEMOGLOBIN g/dL 13 4   HEMATOCRIT % 36 1*   PLATELETS Thousands/uL 148*   SODIUM mmol/L 115*   POTASSIUM mmol/L 2 4*   CHLORIDE mmol/L 77*   CO2 mmol/L 25   BUN mg/dL 18   CREATININE mg/dL 1 36*   CALCIUM mg/dL 10 1   ALK PHOS U/L 26*   ALT U/L 57   AST U/L 68*

## 2022-02-03 NOTE — PLAN OF CARE
Problem: Nutrition/Hydration-ADULT  Goal: Nutrient/Hydration intake appropriate for improving, restoring or maintaining nutritional needs  Description: Monitor and assess patient's nutrition/hydration status for malnutrition  Collaborate with interdisciplinary team and initiate plan and interventions as ordered  Monitor patient's weight and dietary intake as ordered or per policy  Utilize nutrition screening tool and intervene as necessary  Determine patient's food preferences and provide high-protein, high-caloric foods as appropriate       INTERVENTIONS:  - Monitor oral intake, urinary output, labs, and treatment plans  - Assess nutrition and hydration status and recommend course of action  - Evaluate amount of meals eaten  - Assist patient with eating if necessary   - Allow adequate time for meals  - Recommend/ encourage appropriate diets, oral nutritional supplements, and vitamin/mineral supplements  - Order, calculate, and assess calorie counts as needed  - Recommend, monitor, and adjust tube feedings and TPN/PPN based on assessed needs  - Assess need for intravenous fluids  - Provide specific nutrition/hydration education as appropriate  - Include patient/family/caregiver in decisions related to nutrition  Outcome: Progressing     Problem: PAIN - ADULT  Goal: Verbalizes/displays adequate comfort level or baseline comfort level  Description: Interventions:  - Encourage patient to monitor pain and request assistance  - Assess pain using appropriate pain scale  - Administer analgesics based on type and severity of pain and evaluate response  - Implement non-pharmacological measures as appropriate and evaluate response  - Consider cultural and social influences on pain and pain management  - Notify physician/advanced practitioner if interventions unsuccessful or patient reports new pain  Outcome: Progressing     Problem: INFECTION - ADULT  Goal: Absence or prevention of progression during hospitalization  Description: INTERVENTIONS:  - Assess and monitor for signs and symptoms of infection  - Monitor lab/diagnostic results  - Monitor all insertion sites, i e  indwelling lines, tubes, and drains  - Monitor endotracheal if appropriate and nasal secretions for changes in amount and color  - Coleman appropriate cooling/warming therapies per order  - Administer medications as ordered  - Instruct and encourage patient and family to use good hand hygiene technique  - Identify and instruct in appropriate isolation precautions for identified infection/condition  Outcome: Progressing     Problem: SAFETY ADULT  Goal: Patient will remain free of falls  Description: INTERVENTIONS:  - Educate patient/family on patient safety including physical limitations  - Instruct patient to call for assistance with activity   - Consult OT/PT to assist with strengthening/mobility   - Keep Call bell within reach  - Keep bed low and locked with side rails adjusted as appropriate  - Keep care items and personal belongings within reach  - Initiate and maintain comfort rounds  - Make Fall Risk Sign visible to staff  - Apply yellow socks and bracelet for high fall risk patients  - Consider moving patient to room near nurses station  Outcome: Progressing     Problem: DISCHARGE PLANNING  Goal: Discharge to home or other facility with appropriate resources  Description: INTERVENTIONS:  - Identify barriers to discharge w/patient and caregiver  - Arrange for needed discharge resources and transportation as appropriate  - Identify discharge learning needs (meds, wound care, etc )  - Arrange for interpretive services to assist at discharge as needed  - Refer to Case Management Department for coordinating discharge planning if the patient needs post-hospital services based on physician/advanced practitioner order or complex needs related to functional status, cognitive ability, or social support system  Outcome: Progressing

## 2022-02-04 VITALS
BODY MASS INDEX: 33.33 KG/M2 | WEIGHT: 238.1 LBS | OXYGEN SATURATION: 100 % | HEART RATE: 102 BPM | RESPIRATION RATE: 16 BRPM | SYSTOLIC BLOOD PRESSURE: 108 MMHG | TEMPERATURE: 98.1 F | DIASTOLIC BLOOD PRESSURE: 75 MMHG | HEIGHT: 71 IN

## 2022-02-04 LAB
ALBUMIN SERPL BCP-MCNC: 3.7 G/DL (ref 3.5–5)
ALP SERPL-CCNC: 19 U/L (ref 46–116)
ALT SERPL W P-5'-P-CCNC: 47 U/L (ref 12–78)
ANION GAP SERPL CALCULATED.3IONS-SCNC: 8 MMOL/L (ref 4–13)
ANION GAP SERPL CALCULATED.3IONS-SCNC: 9 MMOL/L (ref 4–13)
AST SERPL W P-5'-P-CCNC: 48 U/L (ref 5–45)
BASOPHILS # BLD AUTO: 0.04 THOUSANDS/ΜL (ref 0–0.1)
BASOPHILS NFR BLD AUTO: 1 % (ref 0–1)
BILIRUB SERPL-MCNC: 1.13 MG/DL (ref 0.2–1)
BUN SERPL-MCNC: 15 MG/DL (ref 5–25)
BUN SERPL-MCNC: 16 MG/DL (ref 5–25)
BUN SERPL-MCNC: 17 MG/DL (ref 5–25)
BUN SERPL-MCNC: 19 MG/DL (ref 5–25)
CALCIUM SERPL-MCNC: 9.1 MG/DL (ref 8.3–10.1)
CALCIUM SERPL-MCNC: 9.5 MG/DL (ref 8.3–10.1)
CALCIUM SERPL-MCNC: 9.8 MG/DL (ref 8.3–10.1)
CALCIUM SERPL-MCNC: 9.9 MG/DL (ref 8.3–10.1)
CHLORIDE SERPL-SCNC: 82 MMOL/L (ref 100–108)
CHLORIDE SERPL-SCNC: 85 MMOL/L (ref 100–108)
CHLORIDE SERPL-SCNC: 93 MMOL/L (ref 100–108)
CHLORIDE SERPL-SCNC: 95 MMOL/L (ref 100–108)
CK MB SERPL-MCNC: 3 NG/ML (ref 0–5)
CK MB SERPL-MCNC: <1 % (ref 0–2.5)
CK SERPL-CCNC: 389 U/L (ref 39–308)
CO2 SERPL-SCNC: 22 MMOL/L (ref 21–32)
CO2 SERPL-SCNC: 23 MMOL/L (ref 21–32)
CO2 SERPL-SCNC: 24 MMOL/L (ref 21–32)
CO2 SERPL-SCNC: 24 MMOL/L (ref 21–32)
CREAT SERPL-MCNC: 0.87 MG/DL (ref 0.6–1.3)
CREAT SERPL-MCNC: 0.87 MG/DL (ref 0.6–1.3)
CREAT SERPL-MCNC: 0.93 MG/DL (ref 0.6–1.3)
CREAT SERPL-MCNC: 1.01 MG/DL (ref 0.6–1.3)
EOSINOPHIL # BLD AUTO: 0.11 THOUSAND/ΜL (ref 0–0.61)
EOSINOPHIL NFR BLD AUTO: 2 % (ref 0–6)
ERYTHROCYTE [DISTWIDTH] IN BLOOD BY AUTOMATED COUNT: 11.6 % (ref 11.6–15.1)
GFR SERPL CREATININE-BSD FRML MDRD: 104 ML/MIN/1.73SQ M
GFR SERPL CREATININE-BSD FRML MDRD: 104 ML/MIN/1.73SQ M
GFR SERPL CREATININE-BSD FRML MDRD: 90 ML/MIN/1.73SQ M
GFR SERPL CREATININE-BSD FRML MDRD: 99 ML/MIN/1.73SQ M
GLUCOSE SERPL-MCNC: 105 MG/DL (ref 65–140)
GLUCOSE SERPL-MCNC: 119 MG/DL (ref 65–140)
GLUCOSE SERPL-MCNC: 121 MG/DL (ref 65–140)
GLUCOSE SERPL-MCNC: 98 MG/DL (ref 65–140)
HCT VFR BLD AUTO: 32.1 % (ref 36.5–49.3)
HGB BLD-MCNC: 11.9 G/DL (ref 12–17)
IMM GRANULOCYTES # BLD AUTO: 0.05 THOUSAND/UL (ref 0–0.2)
IMM GRANULOCYTES NFR BLD AUTO: 1 % (ref 0–2)
LYMPHOCYTES # BLD AUTO: 1.36 THOUSANDS/ΜL (ref 0.6–4.47)
LYMPHOCYTES NFR BLD AUTO: 22 % (ref 14–44)
MAGNESIUM SERPL-MCNC: 1.5 MG/DL (ref 1.6–2.6)
MAGNESIUM SERPL-MCNC: 1.9 MG/DL (ref 1.6–2.6)
MCH RBC QN AUTO: 33.4 PG (ref 26.8–34.3)
MCHC RBC AUTO-ENTMCNC: 37.1 G/DL (ref 31.4–37.4)
MCV RBC AUTO: 90 FL (ref 82–98)
MONOCYTES # BLD AUTO: 0.7 THOUSAND/ΜL (ref 0.17–1.22)
MONOCYTES NFR BLD AUTO: 11 % (ref 4–12)
NEUTROPHILS # BLD AUTO: 3.98 THOUSANDS/ΜL (ref 1.85–7.62)
NEUTS SEG NFR BLD AUTO: 63 % (ref 43–75)
NRBC BLD AUTO-RTO: 0 /100 WBCS
OSMOLALITY UR/SERPL-RTO: 246 MMOL/KG (ref 282–298)
PHOSPHATE SERPL-MCNC: 1.4 MG/DL (ref 2.7–4.5)
PHOSPHATE SERPL-MCNC: 1.8 MG/DL (ref 2.7–4.5)
PLATELET # BLD AUTO: 117 THOUSANDS/UL (ref 149–390)
PMV BLD AUTO: 10.9 FL (ref 8.9–12.7)
POTASSIUM SERPL-SCNC: 2.9 MMOL/L (ref 3.5–5.3)
POTASSIUM SERPL-SCNC: 3 MMOL/L (ref 3.5–5.3)
POTASSIUM SERPL-SCNC: 3.4 MMOL/L (ref 3.5–5.3)
POTASSIUM SERPL-SCNC: 3.8 MMOL/L (ref 3.5–5.3)
PROT SERPL-MCNC: 6.8 G/DL (ref 6.4–8.2)
RBC # BLD AUTO: 3.56 MILLION/UL (ref 3.88–5.62)
SODIUM SERPL-SCNC: 115 MMOL/L (ref 136–145)
SODIUM SERPL-SCNC: 118 MMOL/L (ref 136–145)
SODIUM SERPL-SCNC: 124 MMOL/L (ref 136–145)
SODIUM SERPL-SCNC: 126 MMOL/L (ref 136–145)
WBC # BLD AUTO: 6.24 THOUSAND/UL (ref 4.31–10.16)

## 2022-02-04 PROCEDURE — 84100 ASSAY OF PHOSPHORUS: CPT | Performed by: STUDENT IN AN ORGANIZED HEALTH CARE EDUCATION/TRAINING PROGRAM

## 2022-02-04 PROCEDURE — 80048 BASIC METABOLIC PNL TOTAL CA: CPT | Performed by: HOSPITALIST

## 2022-02-04 PROCEDURE — 83735 ASSAY OF MAGNESIUM: CPT | Performed by: NURSE PRACTITIONER

## 2022-02-04 PROCEDURE — 82550 ASSAY OF CK (CPK): CPT | Performed by: NURSE PRACTITIONER

## 2022-02-04 PROCEDURE — 84100 ASSAY OF PHOSPHORUS: CPT | Performed by: INTERNAL MEDICINE

## 2022-02-04 PROCEDURE — 83930 ASSAY OF BLOOD OSMOLALITY: CPT | Performed by: EMERGENCY MEDICINE

## 2022-02-04 PROCEDURE — 83735 ASSAY OF MAGNESIUM: CPT | Performed by: STUDENT IN AN ORGANIZED HEALTH CARE EDUCATION/TRAINING PROGRAM

## 2022-02-04 PROCEDURE — 99232 SBSQ HOSP IP/OBS MODERATE 35: CPT | Performed by: STUDENT IN AN ORGANIZED HEALTH CARE EDUCATION/TRAINING PROGRAM

## 2022-02-04 PROCEDURE — 97162 PT EVAL MOD COMPLEX 30 MIN: CPT

## 2022-02-04 PROCEDURE — 80053 COMPREHEN METABOLIC PANEL: CPT | Performed by: HOSPITALIST

## 2022-02-04 PROCEDURE — 80048 BASIC METABOLIC PNL TOTAL CA: CPT | Performed by: STUDENT IN AN ORGANIZED HEALTH CARE EDUCATION/TRAINING PROGRAM

## 2022-02-04 PROCEDURE — 85025 COMPLETE CBC W/AUTO DIFF WBC: CPT | Performed by: HOSPITALIST

## 2022-02-04 PROCEDURE — 99232 SBSQ HOSP IP/OBS MODERATE 35: CPT | Performed by: FAMILY MEDICINE

## 2022-02-04 PROCEDURE — 82553 CREATINE MB FRACTION: CPT | Performed by: NURSE PRACTITIONER

## 2022-02-04 RX ORDER — POTASSIUM CHLORIDE 20 MEQ/1
40 TABLET, EXTENDED RELEASE ORAL ONCE
Status: DISCONTINUED | OUTPATIENT
Start: 2022-02-04 | End: 2022-02-04

## 2022-02-04 RX ORDER — POTASSIUM CHLORIDE 20 MEQ/1
40 TABLET, EXTENDED RELEASE ORAL ONCE
Status: COMPLETED | OUTPATIENT
Start: 2022-02-04 | End: 2022-02-04

## 2022-02-04 RX ORDER — POTASSIUM CHLORIDE 20 MEQ/1
20 TABLET, EXTENDED RELEASE ORAL ONCE
Status: COMPLETED | OUTPATIENT
Start: 2022-02-04 | End: 2022-02-04

## 2022-02-04 RX ORDER — DESMOPRESSIN ACETATE 4 UG/ML
1 INJECTION, SOLUTION INTRAVENOUS; SUBCUTANEOUS ONCE
Status: COMPLETED | OUTPATIENT
Start: 2022-02-04 | End: 2022-02-04

## 2022-02-04 RX ADMIN — PANTOPRAZOLE SODIUM 20 MG: 20 TABLET, DELAYED RELEASE ORAL at 05:06

## 2022-02-04 RX ADMIN — POTASSIUM CHLORIDE 40 MEQ: 1500 TABLET, EXTENDED RELEASE ORAL at 12:21

## 2022-02-04 RX ADMIN — POTASSIUM CHLORIDE 20 MEQ: 1500 TABLET, EXTENDED RELEASE ORAL at 09:27

## 2022-02-04 RX ADMIN — HEPARIN SODIUM 5000 UNITS: 5000 INJECTION INTRAVENOUS; SUBCUTANEOUS at 05:04

## 2022-02-04 RX ADMIN — HEPARIN SODIUM 5000 UNITS: 5000 INJECTION INTRAVENOUS; SUBCUTANEOUS at 14:00

## 2022-02-04 RX ADMIN — DESMOPRESSIN ACETATE 1 MCG: 4 SOLUTION INTRAVENOUS at 23:33

## 2022-02-04 RX ADMIN — MAGNESIUM OXIDE TAB 400 MG (241.3 MG ELEMENTAL MG) 400 MG: 400 (241.3 MG) TAB at 09:28

## 2022-02-04 RX ADMIN — DEXTROSE 250 ML: 5 SOLUTION INTRAVENOUS at 12:14

## 2022-02-04 RX ADMIN — POTASSIUM & SODIUM PHOSPHATES POWDER PACK 280-160-250 MG 1 PACKET: 280-160-250 PACK at 12:17

## 2022-02-04 RX ADMIN — FENOFIBRATE 145 MG: 145 TABLET, FILM COATED ORAL at 09:29

## 2022-02-04 RX ADMIN — MAGNESIUM OXIDE TAB 400 MG (241.3 MG ELEMENTAL MG) 400 MG: 400 (241.3 MG) TAB at 09:27

## 2022-02-04 RX ADMIN — LOSARTAN POTASSIUM 50 MG: 50 TABLET, FILM COATED ORAL at 09:28

## 2022-02-04 RX ADMIN — HEPARIN SODIUM 5000 UNITS: 5000 INJECTION INTRAVENOUS; SUBCUTANEOUS at 22:16

## 2022-02-04 RX ADMIN — DEXTROSE 500 ML: 5 SOLUTION INTRAVENOUS at 18:40

## 2022-02-04 NOTE — PLAN OF CARE
Problem: Nutrition/Hydration-ADULT  Goal: Nutrient/Hydration intake appropriate for improving, restoring or maintaining nutritional needs  Description: Monitor and assess patient's nutrition/hydration status for malnutrition  Collaborate with interdisciplinary team and initiate plan and interventions as ordered  Monitor patient's weight and dietary intake as ordered or per policy  Utilize nutrition screening tool and intervene as necessary  Determine patient's food preferences and provide high-protein, high-caloric foods as appropriate       INTERVENTIONS:  - Monitor oral intake, urinary output, labs, and treatment plans  - Assess nutrition and hydration status and recommend course of action  - Evaluate amount of meals eaten  - Assist patient with eating if necessary   - Allow adequate time for meals  - Recommend/ encourage appropriate diets, oral nutritional supplements, and vitamin/mineral supplements  - Order, calculate, and assess calorie counts as needed  - Recommend, monitor, and adjust tube feedings and TPN/PPN based on assessed needs  - Assess need for intravenous fluids  - Provide specific nutrition/hydration education as appropriate  - Include patient/family/caregiver in decisions related to nutrition  Outcome: Progressing     Problem: PAIN - ADULT  Goal: Verbalizes/displays adequate comfort level or baseline comfort level  Description: Interventions:  - Encourage patient to monitor pain and request assistance  - Assess pain using appropriate pain scale  - Administer analgesics based on type and severity of pain and evaluate response  - Implement non-pharmacological measures as appropriate and evaluate response  - Consider cultural and social influences on pain and pain management  - Notify physician/advanced practitioner if interventions unsuccessful or patient reports new pain  Outcome: Progressing     Problem: INFECTION - ADULT  Goal: Absence or prevention of progression during hospitalization  Description: INTERVENTIONS:  - Assess and monitor for signs and symptoms of infection  - Monitor lab/diagnostic results  - Monitor all insertion sites, i e  indwelling lines, tubes, and drains  - Monitor endotracheal if appropriate and nasal secretions for changes in amount and color  - [place] appropriate cooling/warming therapies per order  - Administer medications as ordered  - Instruct and encourage patient and family to use good hand hygiene technique  - Identify and instruct in appropriate isolation precautions for identified infection/condition  Outcome: Progressing     Problem: SAFETY ADULT  Goal: Patient will remain free of falls  Description: INTERVENTIONS:  - Educate patient/family on patient safety including physical limitations  - Instruct patient to call for assistance with activity   - Consult OT/PT to assist with strengthening/mobility   - Keep Call bell within reach  - Keep bed low and locked with side rails adjusted as appropriate  - Keep care items and personal belongings within reach  - Initiate and maintain comfort rounds  - Make Fall Risk Sign visible to staff  - Apply yellow socks and bracelet for high fall risk patients  - Consider moving patient to room near nurses station  Outcome: Progressing     Problem: DISCHARGE PLANNING  Goal: Discharge to home or other facility with appropriate resources  Description: INTERVENTIONS:  - Identify barriers to discharge w/patient and caregiver  - Arrange for needed discharge resources and transportation as appropriate  - Identify discharge learning needs (meds, wound care, etc )  - Arrange for interpretive services to assist at discharge as needed  - Refer to Case Management Department for coordinating discharge planning if the patient needs post-hospital services based on physician/advanced practitioner order or complex needs related to functional status, cognitive ability, or social support system  Outcome: Progressing     Problem: Potential for Falls  Goal: Patient will remain free of falls  Description: INTERVENTIONS:  - Educate patient/family on patient safety including physical limitations  - Instruct patient to call for assistance with activity   - Consult OT/PT to assist with strengthening/mobility   - Keep Call bell within reach  - Keep bed low and locked with side rails adjusted as appropriate  - Keep care items and personal belongings within reach  - Initiate and maintain comfort rounds  - Make Fall Risk Sign visible to staff  - Apply yellow socks and bracelet for high fall risk patients  - Consider moving patient to room near nurses station  Outcome: Progressing

## 2022-02-04 NOTE — PROGRESS NOTES
NEPHROLOGY PROGRESS NOTE   Mounika Burgess 42 y o  male MRN: [id number]  Unit/Bed#: [facility] 813-01 Encounter: [id number]  Reason for Consult: Hyponatremia     ASSESSMENT AND PLAN:  43 yo man with PMH  hypertension, hyperlipidemia, obesity, alcohol use, GERD, anxiety admitted with complaints of feeling dizziness as well as decreased p o  Intake, status post COVID infection about 2 weeks ago  Nephrology consulted for evaluation management of acute hyponatremia  #Hyposomolar Hyponatremia   · Sodium on admission: 115mEq/L    Serum osm: 246 (low)   Urine osm: 551 (elevated) reflects presence of ADH   Urine Sodium: 13 (after IV)   Etiology: Likely secondary to volume depletion secondary to N/V and poor oral intake in the settings of COVID 19 infection    Rate of correction: 115>118> slow correction , not at goal this morning    Plan:   Continue NS 50cc/h for now until we see next sodium from this am , replete    Other electrolytes as below    Next set of labs now (bmp, serum osm, urine osm at 11:00h and then Q4h)   Avoid overcorrection: no more than 6-8mEq in the next 24hr, goal </=125   Please monitor UOP   Will monitor labs, call renal if SNa+ <118 or >125   Risk of ODS:  ? etoh  ? Avoid Hypokalemia (now 3 8 borderline)     #Hypomagnesemia  · Serum Mg 1 5mg/dL  · Etiology: secondary to N/V and poor oral intake  worsening by PPI   · Replete Mg as needed (received this am )  · Hold Pantoprazol for now       #Hypophosphatemia   · Phosphorus 1 8mg/dL   · Etiology: secondary to N/V and poor oral intake    · Replete as needed if no improvement with PO will need IV phos     #Hypokalemia   · Serum potassium 2 9mEq/L  · Etiology: secondary to N/V and poor oral intake  · Replete with oral potassium as needed (ordered this am )      SUBJECTIVE:  Patient seen and examined at bedside  Patient is feeling better, no N/V, he is tolerating PO   No dizziness      OBJECTIVE:  Current Weight: Weight - Scale: 108 kg (238 lb 1 6 oz)  Vitals:    02/04/22 0732   BP: 124/87   Pulse:    Resp: 20   Temp: 98 °F (36 7 °C)   SpO2:        Intake/Output Summary (Last 24 hours) at 2/4/2022 0941  Last data filed at 2/4/2022 0601  Gross per 24 hour   Intake 1803 33 ml   Output 400 ml   Net 1403 33 ml     Wt Readings from Last 3 Encounters:   02/03/22 108 kg (238 lb 1 6 oz)   05/13/21 108 kg (238 lb)   12/23/20 108 kg (237 lb)     Temp Readings from Last 3 Encounters:   02/04/22 98 °F (36 7 °C)   05/13/21 98 °F (36 7 °C) (Tympanic)   12/23/20 98 6 °F (37 °C) (Tympanic)     BP Readings from Last 3 Encounters:   02/04/22 124/87   05/13/21 122/80   12/23/20 (!) 168/104     Pulse Readings from Last 3 Encounters:   02/03/22 95   05/13/21 78   12/23/20 (!) 109        General:  no acute distress at this time  Skin:  No acute rash  Eyes:  No scleral icterus and noninjected  ENT:  mucous membranes moist  Neck:   no jugular venous distention, no carotid bruits,  Chest:  Clear to auscultation percussion, good respiratory effort, no use of accessory respiratory muscles  CVS:  Regular rate and rhythm without a murmur rub   Abdomen:   soft and nontender   Extremities:  No LE edema   Neuro:  No gross focality  Psych:  Alert , cooperative     Medications:    Current Facility-Administered Medications:     fenofibrate (TRICOR) tablet 145 mg, 145 mg, Oral, Daily, Edgard Waldron MD, 145 mg at 02/04/22 0929    heparin (porcine) subcutaneous injection 5,000 Units, 5,000 Units, Subcutaneous, Q8H [facility], 5,000 Units at 02/04/22 0504 **AND** [COMPLETED] Platelet count, , , Once, Edgard Waldron MD    losartan (COZAAR) tablet 50 mg, 50 mg, Oral, Daily, Edgard Waldron MD, 50 mg at 02/04/22 0175    magnesium oxide (MAG-OX) tablet 400 mg, 400 mg, Oral, Daily, Edgard Waldron MD, 400 mg at 02/04/22 4315    potassium-sodium phosphates (PHOS-NAK) packet 1 packet, 1 packet, Oral, Once, Yana Boland MD    sodium chloride 0 9 % infusion, 50 mL/hr, Intravenous, Continuous, Edgard Waldron, MD, Last Rate: 50 mL/hr at 02/03/22 1602, 50 mL/hr at 02/03/22 1602    Laboratory Results:  Results from last 7 days   Lab Units 02/04/22  0504 02/04/22  0011 02/03/22  2157 02/03/22  1921 02/03/22  1543 02/03/22  1542 02/03/22  1057   WBC Thousand/uL 6 24  --   --   --   --   --  8 05   HEMOGLOBIN g/dL 11 9*  --   --   --   --   --  13 4   HEMATOCRIT % 32 1*  --   --   --   --   --  36 1*   PLATELETS Thousands/uL 117*  --   --   --   --  145* 148*   SODIUM mmol/L 118* 115* 115* 115* 116*  --  115*   POTASSIUM mmol/L 2 9* 3 0* 3 2* 3 3* 3 0*  --  2 4*   CHLORIDE mmol/L 85* 82* 82* 81* 80*  --  77*   CO2 mmol/L 24 24 25 21 24  --  25   BUN mg/dL 16 19 21 21 21  --  18   CREATININE mg/dL 0 87 1 01 1 07 1 31* 1 21  --  1 36*   CALCIUM mg/dL 9 1 9 9 9 6 9 9 9 8  --  10 1   MAGNESIUM mg/dL 1 5*  --   --   --   --   --   --    PHOSPHORUS mg/dL 1 8*  --   --   --   --   --   --        CT head without contrast   Final Result by Carlos Blue DO (02/03 1538)      No acute intracranial abnormality  Workstation performed: [id number]         XR chest 1 view portable   Final Result by Micki Reynolds DO (02/03 1517)      No acute cardiopulmonary disease  Workstation performed: [id number]             Portions of the record may have been created with voice recognition software  Occasional wrong word or "sound a like" substitutions may have occurred due to the inherent limitations of voice recognition software  Read the chart carefully and recognize, using context, where substitutions have occurred

## 2022-02-04 NOTE — PLAN OF CARE
Problem: Nutrition/Hydration-ADULT  Goal: Nutrient/Hydration intake appropriate for improving, restoring or maintaining nutritional needs  Description: Monitor and assess patient's nutrition/hydration status for malnutrition  Collaborate with interdisciplinary team and initiate plan and interventions as ordered  Monitor patient's weight and dietary intake as ordered or per policy  Utilize nutrition screening tool and intervene as necessary  Determine patient's food preferences and provide high-protein, high-caloric foods as appropriate       INTERVENTIONS:  - Monitor oral intake, urinary output, labs, and treatment plans  - Assess nutrition and hydration status and recommend course of action  - Evaluate amount of meals eaten  - Assist patient with eating if necessary   - Allow adequate time for meals  - Recommend/ encourage appropriate diets, oral nutritional supplements, and vitamin/mineral supplements  - Order, calculate, and assess calorie counts as needed  - Recommend, monitor, and adjust tube feedings and TPN/PPN based on assessed needs  - Assess need for intravenous fluids  - Provide specific nutrition/hydration education as appropriate  - Include patient/family/caregiver in decisions related to nutrition  Outcome: Progressing     Problem: PAIN - ADULT  Goal: Verbalizes/displays adequate comfort level or baseline comfort level  Description: Interventions:  - Encourage patient to monitor pain and request assistance  - Assess pain using appropriate pain scale  - Administer analgesics based on type and severity of pain and evaluate response  - Implement non-pharmacological measures as appropriate and evaluate response  - Consider cultural and social influences on pain and pain management  - Notify physician/advanced practitioner if interventions unsuccessful or patient reports new pain  Outcome: Progressing     Problem: INFECTION - ADULT  Goal: Absence or prevention of progression during hospitalization  Description: INTERVENTIONS:  - Assess and monitor for signs and symptoms of infection  - Monitor lab/diagnostic results  - Monitor all insertion sites, i e  indwelling lines, tubes, and drains  - Monitor endotracheal if appropriate and nasal secretions for changes in amount and color  - [place] appropriate cooling/warming therapies per order  - Administer medications as ordered  - Instruct and encourage patient and family to use good hand hygiene technique  - Identify and instruct in appropriate isolation precautions for identified infection/condition  Outcome: Progressing     Problem: DISCHARGE PLANNING  Goal: Discharge to home or other facility with appropriate resources  Description: INTERVENTIONS:  - Identify barriers to discharge w/patient and caregiver  - Arrange for needed discharge resources and transportation as appropriate  - Identify discharge learning needs (meds, wound care, etc )  - Arrange for interpretive services to assist at discharge as needed  - Refer to Case Management Department for coordinating discharge planning if the patient needs post-hospital services based on physician/advanced practitioner order or complex needs related to functional status, cognitive ability, or social support system  Outcome: Progressing

## 2022-02-04 NOTE — ASSESSMENT & PLAN NOTE
This hyponatremia could be secondary to patient not able to keep anything down for few days with nausea and vomiting  He had recent COVID  Repeat COVID test negative yesterday, on 02/02/20  Patient said that he has been drinking a lot of water for last 2-3 days  He used to drink a lot of vodka/ alcoholism about a month ago he quit drinking alcohol    BMP q 8 hours  Nephrology consult appreciated  Slow correction of sodium with gentle NS at 50 cc/hour

## 2022-02-04 NOTE — QUICK NOTE
Labs reviewed:  Sodium is overcorrecting 118>>124 6mEq in 6 hours     Results for Kayla Steele (MRN [id number]) as of 2/4/2022 12:07   Ref   Range 2/4/2022 11:17   Sodium Latest Ref Range: 136 - 145 mmol/L 124 (L)   Potassium Latest Ref Range: 3 5 - 5 3 mmol/L 3 4 (L)   Chloride Latest Ref Range: 100 - 108 mmol/L 93 (L)   CO2 Latest Ref Range: 21 - 32 mmol/L 23   Anion Gap Latest Ref Range: 4 - 13 mmol/L 8   BUN Latest Ref Range: 5 - 25 mg/dL 17   Creatinine Latest Ref Range: 0 60 - 1 30 mg/dL 0 93   Glucose, Random Latest Ref Range: 65 - 140 mg/dL 119   Calcium Latest Ref Range: 8 3 - 10 1 mg/dL 9 5   eGFR Latest Units: ml/min/1 73sq m 99     Plan:  · Dc NS  · Give Dw5 250ml  · BMP at 4pm

## 2022-02-04 NOTE — UTILIZATION REVIEW
Initial Clinical Review    Admission: Date/Time/Statement:   Admission Orders (From admission, onward)     Ordered        02/03/22 1414  Inpatient Admission  Once                      Orders Placed This Encounter   Procedures    Inpatient Admission     Standing Status:   Standing     Number of Occurrences:   1     Order Specific Question:   Level of Care     Answer:   Med Surg [16]     Order Specific Question:   Estimated length of stay     Answer:   More than 2 Midnights     Order Specific Question:   Certification     Answer:   I certify that inpatient services are medically necessary for this patient for a duration of greater than two midnights  See H&P and MD Progress Notes for additional information about the patient's course of treatment  ED Arrival Information     Expected Arrival Acuity    - 2/3/2022 10:49 Urgent         Means of arrival Escorted by Service Admission type    Ambulance [facility] Urgent         Arrival complaint    Dizziness        Chief Complaint   Patient presents with    Dizziness     PT States at work he developed sob and dizziness  pt states he slid back and hit cubicle  denies loc, head strike, or blood thiners  Initial Presentation:   42 yom to ER from work via EMS for vertigo, s/p fall backward, hypertensive @ 190/120  Hx COVID 2 weeks ago with residual "brain fog", hypertension, GERD, anxiety, alcoholism, dyslipidemia  Presents as above with speech slightly clumsy, not exactly slurring but not speaking clearly  Admission work-up showing multiple electrolyte abnormalities  Admitted to inpatient status for hyponatremia, started on IVF, electrolyte repletion, renal consulted  Per renal: EDSON most likely secondary to prerenal azotemia in light of decreased p o  Intake plus failure to auto regulate presence of hemodynamic perturbations/ARB of irbesartan as well as NSAID use plus possible COVID associated acute kidney injury    Would like to rule out rhabdomyolysis  Check CPK  Continue IVF  Date: 2/4/22   Day 2:   Persistent hyponatremia with Na @ 188  Rate of correction: 115>118> slow correction , not at goal this morning  Avoid overcorrection: no more than 6-8mEq in the next 24hr, goal </=125  Continued electrolyte repletion per renal with IVF maintained  BMP q4h      ED Triage Vitals [02/03/22 1052]   Temperature Pulse Respirations Blood Pressure SpO2   98 6 °F (37 °C) 86 20 133/75 99 %      Temp src Heart Rate Source Patient Position - Orthostatic VS BP Location FiO2 (%)   -- Monitor -- -- --      Pain Score       No Pain          Wt Readings from Last 1 Encounters:   02/03/22 108 kg (238 lb 1 6 oz)     Additional Vital Signs:   02/04/22 07:32:50 98 °F (36 7 °C) -- 20 124/87 99 -- --   02/03/22 21:37:13 98 °F (36 7 °C) 95 18 128/85 99 100 % --   02/03/22 1527 98 3 °F (36 8 °C) 90 18 116/72 87 99 % --   02/03/22 1225 -- -- -- -- -- -- None (Room air)   02/03/22 1052 98 6 °F (37 °C) 86 20 133/75 -- 99 % None (Room air)     Pertinent Labs/Diagnostic Test Results:   Results from last 7 days   Lab Units 02/02/22  1015   SARS-COV-2  Negative     Results from last 7 days   Lab Units 02/04/22  0504 02/03/22  1542 02/03/22  1057   WBC Thousand/uL 6 24  --  8 05   HEMOGLOBIN g/dL 11 9*  --  13 4   HEMATOCRIT % 32 1*  --  36 1*   PLATELETS Thousands/uL 117* 145* 148*   NEUTROS ABS Thousands/µL 3 98  --  5 77     Results from last 7 days   Lab Units 02/04/22  0504 02/04/22  0011 02/03/22  2157 02/03/22  1921 02/03/22  1543   SODIUM mmol/L 118* 115* 115* 115* 116*   POTASSIUM mmol/L 2 9* 3 0* 3 2* 3 3* 3 0*   CHLORIDE mmol/L 85* 82* 82* 81* 80*   CO2 mmol/L 24 24 25 21 24   ANION GAP mmol/L 9 9 8 13 12   BUN mg/dL 16 19 21 21 21   CREATININE mg/dL 0 87 1 01 1 07 1 31* 1 21   EGFR ml/min/1 73sq m 104 90 83 65 72   CALCIUM mg/dL 9 1 9 9 9 6 9 9 9 8   MAGNESIUM mg/dL 1 5*  --   --   --   --    PHOSPHORUS mg/dL 1 8*  --   --   --   --      Results from last 7 days   Lab Units 02/04/22  0504 02/03/22  1057   AST U/L 48* 68*   ALT U/L 47 57   ALK PHOS U/L 19* 26*   TOTAL PROTEIN g/dL 6 8 7 9   ALBUMIN g/dL 3 7 4 3   TOTAL BILIRUBIN mg/dL 1 13* 1 74*     Results from last 7 days   Lab Units 02/04/22  0504 02/04/22  0011 02/03/22  2157 02/03/22  1921 02/03/22  1543 02/03/22  1057   GLUCOSE RANDOM mg/dL 98 105 116 157* 114 116     Results from last 7 days   Lab Units 02/04/22  0011   OSMOLALITY, SERUM mmol/*     Results from last 7 days   Lab Units 02/04/22  0504   CK TOTAL U/L 389*   CK MB INDEX % <1 0   CK MB ng/mL 3 0     Results from last 7 days   Lab Units 02/03/22  1542 02/03/22  1453 02/03/22  1057   HS TNI 0HR ng/L  --   --  7   HS TNI 2HR ng/L  --  5  --    HSTNI D2 ng/L  --  -2  --    HS TNI 4HR ng/L 5  --   --    HSTNI D4 ng/L -2  --   --      Results from last 7 days   Lab Units 02/03/22  1542   TSH 3RD GENERATON uIU/mL 2 620     Results from last 7 days   Lab Units 02/04/22  0011 02/03/22  2044   OSMOLALITY, SERUM mmol/*  --    OSMO UR mmol/KG  --  551     Results from last 7 days   Lab Units 02/03/22  2044   CLARITY UA  Clear   COLOR UA  Yellow   SPEC GRAV UA  1 010   PH UA  6 5   GLUCOSE UA mg/dl Negative   KETONES UA mg/dl Trace*   BLOOD UA  Negative   PROTEIN UA mg/dl Negative   NITRITE UA  Negative   BILIRUBIN UA  Interference- unable to analyze*   UROBILINOGEN UA E U /dl 1 0   LEUKOCYTES UA  Negative   WBC UA /hpf None Seen   RBC UA /hpf None Seen   BACTERIA UA /hpf None Seen   EPITHELIAL CELLS WET PREP /hpf None Seen   SODIUM UR  13     2/3  Cxr=  No acute cardiopulmonary disease  CT head=  No acute intracranial abnormality    Ekg=  Normal sinus rhythm  Prolonged QT    ED Treatment:   Medication Administration from 02/03/2022 1049 to 02/03/2022 1519       Date/Time Order Dose Route Action     02/03/2022 1330 potassium chloride 20 mEq IVPB (premix) 20 mEq Intravenous New Bag     02/03/2022 1337 potassium chloride (K-DUR,KLOR-CON) CR tablet 20 mEq 20 mEq Oral Given     02/03/2022 1336 sodium chloride 0 9 % infusion 100 mL/hr Intravenous New Bag        Past Medical History:   Diagnosis Date    Anxiety     GERD (gastroesophageal reflux disease)     Hyperlipidemia     Hypertension     Tendinitis      Present on Admission:   Benign essential hypertension   Alcoholism ([facility] [address] )    Admitting Diagnosis: Hypokalemia [E87 6]  Dizziness [R42]  Hyponatremia [E87 1]  Vertigo [R42]  Near syncope [R55]  Age/Sex: 42 y o  male  Admission Orders:  Neuro checks  Orthostatic BP  Consult renal  Pt eval & tx  Scd/foot pumps  Cont pulse ox    Scheduled Medications:  fenofibrate, 145 mg, Oral, Daily  heparin (porcine), 5,000 Units, Subcutaneous, Q8H [place]  losartan, 50 mg, Oral, Daily  magnesium oxide, 400 mg, Oral, Daily  potassium chloride, 40 mEq, Oral, Once  potassium chloride 20 mEq IVPB x 2 doses, 2/3  potassium-sodium phosphates, 1 packet, Oral, Once    Continuous IV Infusions:  sodium chloride, 50 mL/hr, Intravenous, Continuous    PRN Meds:  phenol, 1 spray, Mouth/Throat, Q2H PRN    Network Utilization Review Department  ATTENTION: Please call with any questions or concerns to [phone] and carefully listen to the prompts so that you are directed to the right person  All voicemails are confidential   Andi Granados all requests for admission clinical reviews, approved or denied determinations and any other requests to dedicated fax number below belonging to the campus where the patient is receiving treatment   List of dedicated fax numbers for the Facilities:  [address] DENIALS (Administrative/Medical Necessity) [phone]   [address] (Maternity/NICU/Pediatrics) 261 [facility],[address]  [address] [place] [address] [address] [address] [place] [address] [address] [phone]

## 2022-02-04 NOTE — QUICK NOTE
Labs Reviewed:  Still overcorrecting after D5w  Plan:  · D5W 500 ml + DDAVP 1mcg   · BMP again at 11 pm   · Please call nephrology if sodium <120 or > 126    Results for Chris Munoz (MRN [id number]) as of 2/4/2022 18:22   Ref   Range 2/4/2022 16:23   Sodium Latest Ref Range: 136 - 145 mmol/L 126 (L)   Potassium Latest Ref Range: 3 5 - 5 3 mmol/L 3 8   Chloride Latest Ref Range: 100 - 108 mmol/L 95 (L)   CO2 Latest Ref Range: 21 - 32 mmol/L 22   Anion Gap Latest Ref Range: 4 - 13 mmol/L 9   BUN Latest Ref Range: 5 - 25 mg/dL 15   Creatinine Latest Ref Range: 0 60 - 1 30 mg/dL 0 87   Glucose, Random Latest Ref Range: 65 - 140 mg/dL 121   Calcium Latest Ref Range: 8 3 - 10 1 mg/dL 9 8   eGFR Latest Units: ml/min/1 73sq m 104   Phosphorus Latest Ref Range: 2 7 - 4 5 mg/dL 1 4 (L)   Magnesium Latest Ref Range: 1 6 - 2 6 mg/dL 1 9       Power Beth MD  Nephrology Attending

## 2022-02-04 NOTE — PROGRESS NOTES
[facility]  Progress Note - David Ill 1975, 42 y o  male MRN: [id number]  Unit/Bed#: [facility] 813-01 Encounter: [id number]  Primary Care Provider: Yesenia Lyn DO   Date and time admitted to hospital: 2/3/2022 10:49 AM    * Hyponatremia  Assessment & Plan  This hyponatremia could be secondary to patient not able to keep anything down for few days with nausea and vomiting  He had recent COVID  Repeat COVID test negative yesterday, on 02/02/20  Patient said that he has been drinking a lot of water for last 2-3 days  He used to drink a lot of vodka/ alcoholism about a month ago he quit drinking alcohol  BMP q 8 hours  Nephrology consult appreciated  Slow correction of sodium with gentle NS at 50 cc/hour    COVID-19 ruled out  Assessment & Plan  Patient had COVID-19 symptoms with fever, cough about 2 weeks ago  And then he developed nausea and vomiting over the last weekend  For last 3 days he has been drinking more water  Follow-up on the chest x-ray  COVID-19 was negative yesterday    Dehydration  Assessment & Plan  Continue gentle hydration    Hypokalemia  Assessment & Plan  Replacing    Dizziness  Assessment & Plan  Patient had a dizzy spell where he stumbled and almost fell  Neurochecks q 4 hours for 24 hours  Probably secondary to electrolyte derangement  Check orthostatics      Benign essential hypertension  Assessment & Plan  Continue home medication    Alcoholism [facility])  Assessment & Plan  Monitor for any withdrawal symptoms  Patient said he is sober for last 1 month now     VTE Pharmacologic Prophylaxis:   Pharmacologic: Heparin  Mechanical VTE Prophylaxis in Place: Yes    Patient Centered Rounds: I have performed bedside rounds with nursing staff today       Current Length of Stay: 1 day(s)    Current Patient Status: Inpatient   Certification Statement: The patient will continue to require additional inpatient hospital stay due to not medicallty clear, still hyponatremic    Discharge Plan: pending progress    Code Status: Level 1 - Full Code      Subjective:   No overnight events  Patient comfortable  Sleeping     Objective:     Vitals:   Temp (24hrs), Av 2 °F (36 8 °C), Min:98 °F (36 7 °C), Max:98 6 °F (37 °C)    Temp:  [98 °F (36 7 °C)-98 6 °F (37 °C)] 98 °F (36 7 °C)  HR:  [86-95] 95  Resp:  [18-20] 20  BP: (116-133)/(72-87) 124/87  SpO2:  [99 %-100 %] 100 %  Body mass index is 33 21 kg/m²  Input and Output Summary (last 24 hours): Intake/Output Summary (Last 24 hours) at 2022 1018  Last data filed at 2022 0601  Gross per 24 hour   Intake 1803 33 ml   Output 400 ml   Net 1403 33 ml       Physical Exam:     Physical Exam  Vitals and nursing note reviewed  Constitutional:       General: He is not in acute distress  Appearance: Normal appearance  HENT:      Head: Normocephalic and atraumatic  Right Ear: External ear normal       Left Ear: External ear normal       Nose: Nose normal    Eyes:      Extraocular Movements: Extraocular movements intact  Pulmonary:      Effort: Pulmonary effort is normal    Musculoskeletal:      Cervical back: Normal range of motion  Neurological:      Mental Status: He is alert  Mental status is at baseline     Psychiatric:         Mood and Affect: Mood normal             Results from last 7 days   Lab Units 22  0504   WBC Thousand/uL 6 24   HEMOGLOBIN g/dL 11 9*   HEMATOCRIT % 32 1*   PLATELETS Thousands/uL 117*   NEUTROS PCT % 63   LYMPHS PCT % 22   MONOS PCT % 11   EOS PCT % 2     Results from last 7 days   Lab Units 22  0504   SODIUM mmol/L 118*   POTASSIUM mmol/L 2 9*   CHLORIDE mmol/L 85*   CO2 mmol/L 24   BUN mg/dL 16   CREATININE mg/dL 0 87   ANION GAP mmol/L 9   CALCIUM mg/dL 9 1   ALBUMIN g/dL 3 7   TOTAL BILIRUBIN mg/dL 1 13*   ALK PHOS U/L 19*   ALT U/L 47   AST U/L 48*   GLUCOSE RANDOM mg/dL 98                              Recent Cultures (last 7 days):           Last 24 Hours Medication List:   Current Facility-Administered Medications   Medication Dose Route Frequency Provider Last Rate    fenofibrate  145 mg Oral Daily Huseyin Valenzuela MD      heparin (porcine)  5,000 Units Subcutaneous [facility] Huseyin Valenzuela MD      losartan  50 mg Oral Daily Huseyin Valenzuela MD      magnesium oxide  400 mg Oral Daily Huseyin Valenzuela MD      phenol  1 spray Mouth/Throat Q2H PRN Reny Keys MD      potassium chloride  40 mEq Oral Once Reny Keys MD      potassium-sodium phosphates  1 packet Oral Once Justin Chicas MD      sodium chloride  50 mL/hr Intravenous Continuous Huseyin Valenzuela MD 50 mL/hr (02/03/22 1602)        Today, Patient Was Seen By: Reny Keys MD    ** Please Note: Dictation voice to text software may have been used in the creation of this document   **

## 2022-02-04 NOTE — QUICK NOTE
Labs reviewed   HypoK, HypoNa, Hypophos, HypoMg    Results for Sandro Nascimento (MRN [id number]) as of 2/4/2022 07:09   Ref   Range 2/4/2022 05:04   Sodium Latest Ref Range: 136 - 145 mmol/L 118 (L)   Potassium Latest Ref Range: 3 5 - 5 3 mmol/L 2 9 (L)   Chloride Latest Ref Range: 100 - 108 mmol/L 85 (L)   CO2 Latest Ref Range: 21 - 32 mmol/L 24   Anion Gap Latest Ref Range: 4 - 13 mmol/L 9   BUN Latest Ref Range: 5 - 25 mg/dL 16   Creatinine Latest Ref Range: 0 60 - 1 30 mg/dL 0 87   Glucose, Random Latest Ref Range: 65 - 140 mg/dL 98   Calcium Latest Ref Range: 8 3 - 10 1 mg/dL 9 1   AST Latest Ref Range: 5 - 45 U/L 48 (H)   ALT Latest Ref Range: 12 - 78 U/L 47   Alkaline Phosphatase Latest Ref Range: 46 - 116 U/L 19 (L)   Total Protein Latest Ref Range: 6 4 - 8 2 g/dL 6 8   Albumin Latest Ref Range: 3 5 - 5 0 g/dL 3 7   TOTAL BILIRUBIN Latest Ref Range: 0 20 - 1 00 mg/dL 1 13 (H)   eGFR Latest Units: ml/min/1 73sq m 104   Phosphorus Latest Ref Range: 2 7 - 4 5 mg/dL 1 8 (L)   Magnesium Latest Ref Range: 1 6 - 2 6 mg/dL 1 5 (L)     Plan:  · Replete electrolytes aggressively (oral)  · Would not change IVF rate for now as sodium will increase with other electrolytes repletion  · Formal progress note to follow   · Discontinue Pantoprazole for hypoMg  · BMP Q 4h       Waldo Mas MD  Nephrology Attending

## 2022-02-04 NOTE — UTILIZATION REVIEW
Inpatient Admission Authorization Request   NOTIFICATION OF INPATIENT ADMISSION/INPATIENT AUTHORIZATION REQUEST   SERVICING FACILITY:   [facility]  Address: [address] [place] [address], [address] [place] [address]  Tax ID: [id number]  NPI: [id number]  Place of Service: Inpatient [address] [facility] Code: 24     ATTENDING PROVIDER:  Attending Name and NPI#: Declan Chu [[id number]]  Address: [address] [place] [address], [address] [place] [address]  Phone: [phone]     UTILIZATION REVIEW CONTACT:  [employer] Utilization   Network Utilization Review Department  Phone: [phone]  Fax: [phone]  Email: [email or web address]     PHYSICIAN ADVISORY SERVICES:  FOR [id number] REVIEW - MEDICAL NECESSITY DENIAL  Phone: [phone]  Fax: [phone]  Email: [email or web address]     TYPE OF REQUEST:  Inpatient Status     ADMISSION INFORMATION:  ADMISSION DATE/TIME: 2/3/22  2:09 PM  PATIENT DIAGNOSIS CODE/DESCRIPTION:  Hypokalemia [E87 6]  Dizziness [R42]  Hyponatremia [E87 1]  Vertigo [R42]  Near syncope [R55]  DISCHARGE DATE/TIME: No discharge date for patient encounter  DISCHARGE DISPOSITION (IF DISCHARGED): Home/Self Care     IMPORTANT INFORMATION:  Please contact the [employer] directly with any questions or concerns regarding this request  Department voicemails are confidential     Send requests for admission clinical reviews, concurrent reviews, approvals, and administrative denials due to lack of clinical to fax [phone]

## 2022-02-04 NOTE — PHYSICAL THERAPY NOTE
Physical Therapy Evaluation    Patient's Name: Brittanie Murrell    Admitting Diagnosis  Hypokalemia [E87 6]  Dizziness [R42]  Hyponatremia [E87 1]  Vertigo [R42]  Near syncope [R55]    Problem List  Patient Active Problem List   Diagnosis    Alcoholism ([address] )    Benign essential hypertension    Anxiety    Esophageal reflux    Essential hypertriglyceridemia    Incomplete tear of left rotator cuff    Urinary urgency    Depression    Chronic midline low back pain without sciatica    Dizziness    Hyponatremia    Hypokalemia    Dehydration    COVID-19 ruled out       Past Medical History  Past Medical History:   Diagnosis Date    Anxiety     GERD (gastroesophageal reflux disease)     Hyperlipidemia     Hypertension     Tendinitis        Past Surgical History  Past Surgical History:   Procedure Laterality Date    KNEE SURGERY Left 1989    cyst removed    NC COLONOSCOPY FLX DX W/COLLJ SPEC WHEN PFRMD N/A 5/14/2018    Procedure: COLONOSCOPY;  Surgeon: Lizeth Galdamez DO;  Location: [facility];   Service: General    SHOULDER ARTHROSCOPY Right     with biceps tenodesis      02/04/22 1012   PT Last Visit   PT Visit Date 02/04/22   Note Type   Note type Evaluation   Pain Assessment   Pain Assessment Tool 0-10   Pain Score No Pain   Restrictions/Precautions   Weight Bearing Precautions Per Order No   Other Precautions Multiple lines   Home Living   Type of Home Apartment  (2nd floor)   Home Layout One level;Performs ADLs on one level;Stairs to enter with rails  ([address])   Home Equipment Other (Comment)  (no DME)   Additional Comments Pt lives w/ nephew in a 2nd floor apartment w/ [address]   Prior Function   Level of [place] Independent with ADLs and functional mobility   Lives With Family  [employer]; pt reports having adequate family support )   Receives Help From Family   ADL Assistance Independent   IADLs Independent   Falls in the last 6 months 1 to 4  (Syncopal episode)   Vocational Full time employment   Comments Pt reports being independent w/ ADLs and functional mobility w/o AD use PTA   General   Family/Caregiver Present No   Cognition   Overall Cognitive Status WFL   Arousal/Participation Alert   Orientation Level Oriented X4   Memory Within functional limits   Following Commands Follows all commands and directions without difficulty   Comments Pt is pelasant and cooperative   Subjective   Subjective Pt agreeable to work w/ PT for eval    RLE Assessment   RLE Assessment WFL   LLE Assessment   LLE Assessment WFL   Bed Mobility   Supine to Sit 6  Modified independent   Additional items Increased time required   Additional Comments Pt performing bed mobility at Tanisha level   Transfers   Sit to Stand 6  Modified independent   Additional items Increased time required   Stand to Sit 6  Modified independent   Additional items Increased time required   Additional Comments Pt performing STS and stand <> sit transfers at Tanisha level w/o use of AD   Ambulation/Elevation   Gait pattern Step through pattern;Excessively slow; Inconsistent tiago   Gait Assistance 5  Supervision   Additional items Assist x 1   Assistive Device None   Stair Management Assistance 5  Supervision   Additional items Assist x 1;Verbal cues   Stair Management Technique One rail R;Alternating pattern   Number of Stairs 8   Ambulation/Elevation Additional Comments Pt ambulated >400 ft w/ no AD at supervision level; pt ascended/descended 8 stairs w/ use of right HR under sueprvision status as well; number of stairs limited by IV    Balance   Static Sitting Fair +   Dynamic Sitting Fair +   Static Standing Fair   Dynamic Standing Fair   Ambulatory Fair -   Endurance Deficit   Endurance Deficit No   Activity Tolerance   Activity Tolerance Patient tolerated treatment well   Nurse Made Aware ok to see per RN   Assessment   Prognosis Good   Assessment Pt is a 42 y o  male seen for PT evaluation s/p admit to [facility] on 2/3/2022 with c/o SOB and dizziness  Pt was admitted with a primary dx of: Hyponatremia  PT now consulted with active PT eval and treat and ambulate orders  Pts current comorbidities effecting treatment include: Hyponatremia, dizziness, hypokalemia, dehydration, esophageal reflux, recent COVID dx, essential HTN, and hx alcoholism  Pts current clinical presentation is Evolving (medium complexity) due to Ongoing medical management for primary dx, Decreased activity tolerance compared to baseline, Increased assistance needed from caregiver at current time, Continuous pulse oximetry monitoring   Pt lives w/ nephew in a 2nd story apartment w/ [address] and reports havign adequate family/social support  Prior to admission, pt reports being independent w/ all ADLs and functional mobility w/o use of AD  Upon evaluation, pt currently is Tanisha status for bed mobility; Tanisha status for STS and stand <> sit transfers w/o AD  PT ambulated community distances w/o AD under Tanisha status and initiated stair trial mirroring home set-up w/ use of right HR under supervision status  Pt presents at PT eval functioning near baseline mobility level  At this time, no further skilled acute PT needs identified-PT signing off  At conclusion of PT session pt returned to bed-side chair with all needs within reach  Pt denies any further questions at this time  The patient's AM-PAC Basic Mobility Inpatient Short Form Raw Score is 22  A Raw score of greater than 16 suggests the patient may benefit from discharge to home  Please also refer to the recommendation of the Physical Therapist for safe discharge planning  PT recommending home w/ no PT needs upon hospital D/C     Barriers to Discharge None   Goals   Patient Goals to return home   PT Treatment Day 0   Plan   PT Frequency Other (Comment)  (1x visit; d/c PT)   Recommendation   PT Discharge Recommendation No rehabilitation needs   Equipment Recommended Other (Comment)  (no DME)   [facility] [address] Mobility Inpatient Turning in Bed Without Bedrails 4   Lying on Back to Sitting on Edge of Flat Bed 4   Moving Bed to Chair 4   Standing Up From Chair 4   Walk in Room 3   Climb 3-5 Stairs 3   Basic Mobility Inpatient Raw Score 22   Basic Mobility Standardized Score 47 4   Highest Level Of Mobility   JH-HLM Goal 7: Walk 25 feet or more   JH-HLM Highest Level of Mobility 8: Walk 250 feet ot more   JH-HLM Goal Achieved Yes   Modified Minh Scale   Modified Minh Scale 2   End of Consult   Patient Position at End of Consult Bedside chair; All needs within reach         Stephanie Mallory, SPT

## 2022-02-04 NOTE — CASE MANAGEMENT
Case Management Assessment & Discharge Planning Note    Patient name Jb Chopra  Location [facility] 813/PPHP 875-79 MRN [id number]  : 1975 Date 2022       Current Admission Date: 2/3/2022  Current Admission Diagnosis:Hyponatremia   Patient Active Problem List    Diagnosis Date Noted    Dizziness 2022    Hyponatremia 2022    Hypokalemia 2022    Dehydration 2022    COVID-19 ruled out 2022    Chronic midline low back pain without sciatica 2019    Incomplete tear of left rotator cuff 2018    Urinary urgency 2018    Alcoholism ([facility] [address] ) 2016    Anxiety 2013    Benign essential hypertension 10/02/2012    Essential hypertriglyceridemia 2012    Esophageal reflux 2012    Depression 2012      LOS (days): 1  Geometric Mean LOS (GMLOS) (days):   Days to GMLOS:     OBJECTIVE:    Risk of Unplanned Readmission Score: 11         Current admission status: Inpatient       Preferred Pharmacy:   [facility]/pharmacy #[id number]  [facility] [address]  Phone: [phone] Fax: [phone]    Primary Care Provider: Collette Lin, DO    Primary Insurance: BLUE CROSS  Secondary Insurance:     ASSESSMENT:  Active Health Care Agents    There are no active Health Care Agents on file  Advance Directives  Does patient have a [address]?: No  Does patient have Advance Directives?: No              Patient Information  Admitted from[de-identified] Home  Mental Status: Alert  During Assessment patient was accompanied by: Not accompanied during assessment  Assessment information provided by[de-identified] Patient  Primary Caregiver: Self  Support Systems: [facility] entry access options   Select all that apply : Stairs  Number of steps to enter home : One Flight  Type of Current Residence: Apartment  Floor Level: 2  Upon entering residence, is there a bedroom on the main floor (no further steps)?: Yes  Upon entering residence, is there a bathroom on the main floor (no further steps)?: Yes  In the last 12 months, was there a time when you were not able to pay the mortgage or rent on time?: No  In the last 12 months, was there a time when you did not have a steady place to sleep or slept in a shelter (including now)?: No  Homeless/housing insecurity resource given?: N/A  Living Arrangements: Lives Alone    Activities of Daily Living Prior to Admission  Functional Status: Independent  Completes ADLs independently?: Yes  Ambulates independently?: Yes  Does patient use assisted devices?: No  Does patient currently own DME?: No  Does patient have a history of Outpatient Therapy (PT/OT)?: No  Does the patient have a history of Short-Term Rehab?: No  Does patient have a history of HHC?: No         Patient Information Continued  Income Source: Employed  Within the past 12 months, you worried that your food would run out before you got the money to buy more : Never true  Within the past 12 months, the food you bought just didnt last and you didnt have money to get more : Never true  Food insecurity resource given?: N/A  Does patient receive dialysis treatments?: No  Does patient have a history of substance abuse?: Yes  Historical substance use preference: Alcohol/ETOH  History of Withdrawal Symptoms: Denies past symptoms  Is patient currently in treatment for substance abuse?: Yes (pt is in process of getting assistance, declined HOST referral)         Means of Transportation  Means of Transport to Appts[de-identified] Drives Self  In the past 12 months, has lack of transportation kept you from medical appointments or from getting medications?: No  In the past 12 months, has lack of transportation kept you from meetings, work, or from getting things needed for daily living?: No  Was application for public transport provided?: N/A        DISCHARGE DETAILS:    Discharge planning discussed with[de-identified] pt  Freedom of Choice: Yes Contacts  Patient Contacts: mother Fernando Valdes  Relationship to Patient[de-identified] Family  Contact Method: Phone  Phone Number: [phone]  Reason/Outcome: Continuity of [address]              Other Referral/Resources/Interventions Provided:  Referral Comments: refused HOST    Would you like to participate in our [address] service program?  : No - Declined    CM reviewed d/c planning process including the following: identifying help at home, patient preference for d/c planning needs, Discharge Lounge, [facility] Meds to Bed program, availability of treatment team to discuss questions or concerns patient and/or family may have regarding understanding medications and recognizing signs and symptoms once discharged  CM also encouraged patient to follow up with all recommended appointments after discharge  Patient advised of importance for patient and family to participate in managing patients medical well being  Patient/caregiver received discharge checklist  Content reviewed  Patient/caregiver encouraged to participate in discharge plan of care prior to discharge home

## 2022-02-05 PROBLEM — E87.6 HYPOKALEMIA: Status: RESOLVED | Noted: 2022-02-03 | Resolved: 2022-02-05

## 2022-02-05 PROBLEM — R42 DIZZINESS: Status: RESOLVED | Noted: 2022-02-03 | Resolved: 2022-02-05

## 2022-02-05 PROBLEM — E86.0 DEHYDRATION: Status: RESOLVED | Noted: 2022-02-03 | Resolved: 2022-02-05

## 2022-02-05 LAB
ANION GAP SERPL CALCULATED.3IONS-SCNC: 5 MMOL/L (ref 4–13)
ANION GAP SERPL CALCULATED.3IONS-SCNC: 5 MMOL/L (ref 4–13)
BUN SERPL-MCNC: 14 MG/DL (ref 5–25)
BUN SERPL-MCNC: 14 MG/DL (ref 5–25)
CALCIUM SERPL-MCNC: 9.1 MG/DL (ref 8.3–10.1)
CALCIUM SERPL-MCNC: 9.5 MG/DL (ref 8.3–10.1)
CHLORIDE SERPL-SCNC: 98 MMOL/L (ref 100–108)
CHLORIDE SERPL-SCNC: 98 MMOL/L (ref 100–108)
CO2 SERPL-SCNC: 24 MMOL/L (ref 21–32)
CO2 SERPL-SCNC: 24 MMOL/L (ref 21–32)
CREAT SERPL-MCNC: 0.82 MG/DL (ref 0.6–1.3)
CREAT SERPL-MCNC: 0.92 MG/DL (ref 0.6–1.3)
GFR SERPL CREATININE-BSD FRML MDRD: 100 ML/MIN/1.73SQ M
GFR SERPL CREATININE-BSD FRML MDRD: 107 ML/MIN/1.73SQ M
GLUCOSE SERPL-MCNC: 105 MG/DL (ref 65–140)
GLUCOSE SERPL-MCNC: 106 MG/DL (ref 65–140)
POTASSIUM SERPL-SCNC: 3.7 MMOL/L (ref 3.5–5.3)
POTASSIUM SERPL-SCNC: 3.8 MMOL/L (ref 3.5–5.3)
SODIUM SERPL-SCNC: 127 MMOL/L (ref 136–145)
SODIUM SERPL-SCNC: 127 MMOL/L (ref 136–145)

## 2022-02-05 PROCEDURE — 80048 BASIC METABOLIC PNL TOTAL CA: CPT | Performed by: STUDENT IN AN ORGANIZED HEALTH CARE EDUCATION/TRAINING PROGRAM

## 2022-02-05 PROCEDURE — 99232 SBSQ HOSP IP/OBS MODERATE 35: CPT | Performed by: INTERNAL MEDICINE

## 2022-02-05 PROCEDURE — 99232 SBSQ HOSP IP/OBS MODERATE 35: CPT | Performed by: FAMILY MEDICINE

## 2022-02-05 PROCEDURE — 99239 HOSP IP/OBS DSCHRG MGMT >30: CPT | Performed by: FAMILY MEDICINE

## 2022-02-05 RX ORDER — SODIUM CHLORIDE 1000 MG
3 TABLET, SOLUBLE MISCELLANEOUS 2 TIMES DAILY WITH MEALS
Status: DISCONTINUED | OUTPATIENT
Start: 2022-02-05 | End: 2022-02-05 | Stop reason: HOSPADM

## 2022-02-05 RX ORDER — SODIUM CHLORIDE 1000 MG
3 TABLET, SOLUBLE MISCELLANEOUS 2 TIMES DAILY WITH MEALS
Qty: 60 TABLET | Refills: 0 | Status: SHIPPED | OUTPATIENT
Start: 2022-02-05 | End: 2022-05-05 | Stop reason: ALTCHOICE

## 2022-02-05 RX ADMIN — FENOFIBRATE 145 MG: 145 TABLET, FILM COATED ORAL at 08:48

## 2022-02-05 RX ADMIN — SODIUM CHLORIDE TAB 1 GM 3 G: 1 TAB at 08:48

## 2022-02-05 RX ADMIN — MAGNESIUM OXIDE TAB 400 MG (241.3 MG ELEMENTAL MG) 400 MG: 400 (241.3 MG) TAB at 08:50

## 2022-02-05 RX ADMIN — LOSARTAN POTASSIUM 50 MG: 50 TABLET, FILM COATED ORAL at 08:50

## 2022-02-05 NOTE — DISCHARGE SUMMARY
[facility]  Discharge- Brittanie Murrell 1975, 42 y o  male MRN: [id number]  [facility] Encounter: [id number]  Primary Care Provider: Evelyne Cummings DO   Date and time admitted to hospital: 2/3/2022 10:49 AM    * Hyponatremia  Assessment & Plan  This hyponatremia could be secondary to patient not able to keep anything down for few days with nausea and vomiting  He had recent COVID  Repeat COVID test negative yesterday, on 02/02/20  Patient said that he has been drinking a lot of water for last 2-3 days  He used to drink a lot of vodka/ alcoholism about a month ago he quit drinking alcohol  BMP q 8 hours  Nephrology consult appreciated  currently off of IVF  Started on salt tablet by nephro  Due to patient preference, after discussing with nephro, will discharge patient home today, with repeat labs on Monday    Benign essential hypertension  Assessment & Plan  I held his BP meds due to borderline low BP in hospital      Dizziness-resolved as of 2/5/2022  Assessment & Plan  Patient had a dizzy spell where he stumbled and almost fell    Neurochecks q 4 hours for 24 hours  Probably secondary to electrolyte derangement         Discharging Physician / Practitioner: Antonio Farah MD  PCP: Evelyne Cummings DO  Admission Date:   Admission Orders (From admission, onward)     Ordered        02/03/22 1414  Inpatient Admission  Once                      Discharge Date: 02/05/22    Medical Problems             Resolved Problems  Date Reviewed: 2/5/2022          Resolved    Dizziness 2/5/2022     Resolved by  Antonio Farah MD    Hypokalemia 2/5/2022     Resolved by  Antonio Farah MD    Dehydration 2/5/2022     Resolved by  Antonio Farah MD                Consultations During Hospital Stay:  · nephrology    Procedures Performed:   · none    Significant Findings / Test Results:   Component      Latest Ref Rng & Units 12/14/2017 4/3/2018 8/16/2018 8/27/2018                Sodium      136 - 145 mmol/L 136 140 137 134 (L)   Potassium      3 5 - 5 3 mmol/L 3 8 3 9 4 2 3 7   Chloride      100 - 108 mmol/L 104 108 104 92 (L)   CO2      21 - 32 mmol/L 20 21 23 19 (L)   Anion Gap      4 - 13 mmol/L       BUN      5 - 25 mg/dL 12 15 19 11   Creatinine      0 60 - 1 30 mg/dL 0 80 0 84 0 94 0 90   Glucose, Random      65 - 140 mg/dL  88 87 86   Calcium      8 3 - 10 1 mg/dL 8 9 9 3 9 2 11 6 (H)   eGFR      ml/min/1 73sq m         Component      Latest Ref Rng & Units 8/12/2019 11/25/2019 12/16/2020 6/25/2021                Sodium      136 - 145 mmol/L 141 136 137 137   Potassium      3 5 - 5 3 mmol/L 4 2 3 9 4 2 4 2   Chloride      100 - 108 mmol/L 106 106 105 107   CO2      21 - 32 mmol/L 26 23 21 24   Anion Gap      4 - 13 mmol/L 9 7 11    BUN      5 - 25 mg/dL 12 16 19 17   Creatinine      0 60 - 1 30 mg/dL 0 82 1 08 0 95 0 90   Glucose, Random      65 - 140 mg/dL    98   Calcium      8 3 - 10 1 mg/dL 9 8 9 4 8 9 9 1   eGFR      ml/min/1 73sq m 109 84 98      Component      Latest Ref Rng & Units 2/3/2022 2/3/2022 2/3/2022 2/3/2022          10:57 AM  3:43 PM  7:21 PM  9:57 PM   Sodium      136 - 145 mmol/L 115 (L) 116 (L) 115 (L) 115 (L)   Potassium      3 5 - 5 3 mmol/L 2 4 (LL) 3 0 (L) 3 3 (L) 3 2 (L)   Chloride      100 - 108 mmol/L 77 (L) 80 (L) 81 (L) 82 (L)   CO2      21 - 32 mmol/L 25 24 21 25   Anion Gap      4 - 13 mmol/L 13 12 13 8   BUN      5 - 25 mg/dL 18 21 21 21   Creatinine      0 60 - 1 30 mg/dL 1 36 (H) 1 21 1 31 (H) 1 07   Glucose, Random      65 - 140 mg/dL 116 114 157 (H) 116   Calcium      8 3 - 10 1 mg/dL 10 1 9 8 9 9 9 6   eGFR      ml/min/1 73sq m 62 72 65 83     Component      Latest Ref Rng & Units 2/4/2022 2/4/2022 2/4/2022 2/4/2022          12:11 AM  5:04 AM 11:17 AM  4:23 PM   Sodium      136 - 145 mmol/L 115 (L) 118 (L) 124 (L) 126 (L)   Potassium      3 5 - 5 3 mmol/L 3 0 (L) 2 9 (L) 3 4 (L) 3 8   Chloride      100 - 108 mmol/L 82 (L) 85 (L) 93 (L) 95 (L)   CO2      21 - 32 mmol/L 24 24 23 22   Anion Gap      4 - 13 mmol/L 9 9 8 9   BUN      5 - 25 mg/dL 19 16 17 15   Creatinine      0 60 - 1 30 mg/dL 1 01 0 87 0 93 0 87   Glucose, Random      65 - 140 mg/dL 105 98 119 121   Calcium      8 3 - 10 1 mg/dL 9 9 9 1 9 5 9 8   eGFR      ml/min/1 73sq m 90 104 99 104     Component      Latest Ref Rng & Units 2/4/2022 2/5/2022          11:52 PM    Sodium      136 - 145 mmol/L 127 (L) 127 (L)   Potassium      3 5 - 5 3 mmol/L 3 8 3 7   Chloride      100 - 108 mmol/L 98 (L) 98 (L)   CO2      21 - 32 mmol/L 24 24   Anion Gap      4 - 13 mmol/L 5 5   BUN      5 - 25 mg/dL 14 14   Creatinine      0 60 - 1 30 mg/dL 0 92 0 82   Glucose, Random      65 - 140 mg/dL 106 105   Calcium      8 3 - 10 1 mg/dL 9 5 9 1   eGFR      ml/min/1 73sq m 100 107   ·      Outpatient Tests Requested:  · Repeat BMP in two days    Complications:  none    Reason for Admission: Nausea/vomiting, dehydration, electrolyte abnormalities    Hospital Course:     Shannon Slade is a 42 y o  male patient who originally presented to the hospital on 2/3/2022 due to nausea, vomiting, dizziness and dehydration  Labs reveals significant hyponatremia and hypokalemia on admission  Patient placed on  Normal Saline IVF with imp[rvong sodium  Due to overcorrection yesterday, he was given D5W and DDAVP  Today patient was placed on salt tablet  Patients symptoms completely resolved  He is ambulatory at room without any issues  patient will be discharged on salt tablet with f/u labs in 2 days    Please see above list of diagnoses and related plan for additional information  Condition at Discharge: good     Discharge Day Visit / Exam:     * Please refer to separate progress note for these details *     Discharge instructions/Information to patient and family:   See after visit summary for information provided to patient and family        Provisions for Follow-Up Care:  See after visit summary for information related to follow-up care and any pertinent home health orders  Disposition:     Home     Planned Readmission: no     Discharge Statement:  I spent 45 minutes discharging the patient  This time was spent on the day of discharge  I had direct contact with the patient on the day of discharge  Greater than 50% of the total time was spent examining patient, answering all patient questions, arranging and discussing plan of care with patient as well as directly providing post-discharge instructions  Additional time then spent on discharge activities  Discharge Medications:  See after visit summary for reconciled discharge medications provided to patient and family        ** Please Note: This note has been constructed using a voice recognition system **

## 2022-02-05 NOTE — ASSESSMENT & PLAN NOTE
Patient had a dizzy spell where he stumbled and almost fell    Neurochecks q 4 hours for 24 hours  Probably secondary to electrolyte derangement

## 2022-02-05 NOTE — ASSESSMENT & PLAN NOTE
This hyponatremia could be secondary to patient not able to keep anything down for few days with nausea and vomiting  He had recent COVID  Repeat COVID test negative yesterday, on 02/02/20  Patient said that he has been drinking a lot of water for last 2-3 days  He used to drink a lot of vodka/ alcoholism about a month ago he quit drinking alcohol    BMP q 8 hours  Nephrology consult appreciated  currently off of IVF  Started on salt tablet by nephro  Due to patient preference, after discussing with nephro, will discharge patient home today, with repeat labs on Monday

## 2022-02-05 NOTE — PROGRESS NOTES
[facility]  Progress Note - Sharyn Foley 1975, 42 y o  male MRN: [id number]  Unit/Bed#: [facility] 813-01 Encounter: [id number]  Primary Care Provider: Vinnie Encarnacion DO   Date and time admitted to hospital: 2/3/2022 10:49 AM    * Hyponatremia  Assessment & Plan  This hyponatremia could be secondary to patient not able to keep anything down for few days with nausea and vomiting  He had recent COVID  Repeat COVID test negative yesterday, on 02/02/20  Patient said that he has been drinking a lot of water for last 2-3 days  He used to drink a lot of vodka/ alcoholism about a month ago he quit drinking alcohol  BMP q 8 hours  Nephrology consult appreciated  currently off of IVF  Started on salt tablet by nephro    COVID-19 ruled out  Assessment & Plan  Patient had COVID-19 symptoms with fever, cough about 2 weeks ago  And then he developed nausea and vomiting over the last weekend  For last 3 days he has been drinking more water  Follow-up on the chest x-ray  COVID-19 was negative yesterday    Dizziness  Assessment & Plan  Patient had a dizzy spell where he stumbled and almost fell  Neurochecks q 4 hours for 24 hours  Probably secondary to electrolyte derangement      Benign essential hypertension  Assessment & Plan  Continue home medication    Alcoholism [facility])  Assessment & Plan  Monitor for any withdrawal symptoms  Patient said he is sober for last 1 month now    Dehydration-resolved as of 2/5/2022  Assessment & Plan  Resolved with IVF    Hypokalemia-resolved as of 2/5/2022  Assessment & Plan  Replacing        VTE Pharmacologic Prophylaxis:   Pharmacologic: Heparin  Mechanical VTE Prophylaxis in Place: Yes    Patient Centered Rounds: I have performed bedside rounds with nursing staff today       Current Length of Stay: 2 day(s)    Current Patient Status: Inpatient   Certification Statement: The patient will continue to require additional inpatient hospital stay due to pending progress, likely home tomorrow    Discharge Plan: hopefully dc tomorrow to home    Code Status: Level 1 - Full Code      Subjective:   No overnight events  No further dizziness  patient ambulating     Objective:     Vitals:   Temp (24hrs), Av 2 °F (36 8 °C), Min:98 1 °F (36 7 °C), Max:98 3 °F (36 8 °C)    Temp:  [98 1 °F (36 7 °C)-98 3 °F (36 8 °C)] 98 1 °F (36 7 °C)  HR:  [102-103] 102  Resp:  [16] 16  BP: (100-108)/(63-75) 108/75  SpO2:  [100 %] 100 %  Body mass index is 33 21 kg/m²  Input and Output Summary (last 24 hours): Intake/Output Summary (Last 24 hours) at 2022 1228  Last data filed at 2022 1100  Gross per 24 hour   Intake 950 ml   Output 200 ml   Net 750 ml       Physical Exam:     Physical Exam  Vitals and nursing note reviewed  Constitutional:       General: He is not in acute distress  Appearance: Normal appearance  HENT:      Head: Normocephalic and atraumatic  Right Ear: External ear normal       Left Ear: External ear normal       Nose: Nose normal    Eyes:      Extraocular Movements: Extraocular movements intact  Pulmonary:      Effort: Pulmonary effort is normal    Musculoskeletal:      Cervical back: Normal range of motion  Neurological:      Mental Status: He is alert  Mental status is at baseline     Psychiatric:         Mood and Affect: Mood normal             Labs:    Results from last 7 days   Lab Units 22  0504   WBC Thousand/uL 6 24   HEMOGLOBIN g/dL 11 9*   HEMATOCRIT % 32 1*   PLATELETS Thousands/uL 117*   NEUTROS PCT % 63   LYMPHS PCT % 22   MONOS PCT % 11   EOS PCT % 2     Results from last 7 days   Lab Units 22  0312 22  1117 22  0504   SODIUM mmol/L 127*   < > 118*   POTASSIUM mmol/L 3 7   < > 2 9*   CHLORIDE mmol/L 98*   < > 85*   CO2 mmol/L 24   < > 24   BUN mg/dL 14   < > 16   CREATININE mg/dL 0 82   < > 0 87   ANION GAP mmol/L 5   < > 9   CALCIUM mg/dL 9 1   < > 9 1   ALBUMIN g/dL  --   --  3 7   TOTAL BILIRUBIN mg/dL  --   --  1 13*   ALK PHOS U/L  --   --  19*   ALT U/L  --   --  47   AST U/L  --   --  48*   GLUCOSE RANDOM mg/dL 105   < > 98    < > = values in this interval not displayed  Recent Cultures (last 7 days):           Last 24 Hours Medication List:   Current Facility-Administered Medications   Medication Dose Route Frequency Provider Last Rate    fenofibrate  145 mg Oral Daily Nick Dillard MD      heparin (porcine)  5,000 Units Subcutaneous [facility] Nick Dillard MD      magnesium oxide  400 mg Oral Daily Nick Dillard MD      phenol  1 spray Mouth/Throat Q2H PRN Ashlie Shelley MD      sodium chloride  3 g Oral BID With Jerrod Price MD          Today, Patient Was Seen By: Ashlie Shelley MD    ** Please Note: Dictation voice to text software may have been used in the creation of this document   **

## 2022-02-05 NOTE — ASSESSMENT & PLAN NOTE
This hyponatremia could be secondary to patient not able to keep anything down for few days with nausea and vomiting  He had recent COVID  Repeat COVID test negative yesterday, on 02/02/20  Patient said that he has been drinking a lot of water for last 2-3 days  He used to drink a lot of vodka/ alcoholism about a month ago he quit drinking alcohol    BMP q 8 hours  Nephrology consult appreciated  currently off of IVF  Started on salt tablet by nephro

## 2022-02-05 NOTE — PROGRESS NOTES
Follow up Consultation    Nephrology   Villa Cherry 42 y o  male MRN: [id number]  Unit/Bed#: [facility] 813-01 Encounter: [id number]      Physician Requesting Consult: Gustavo Musa MD        ASSESSMENT/PLAN:  44-year-old male with multiple comorbidities including hypertension, hyperlipidemia, obesity, alcohol use, GERD, anxiety admitted with complaints of feeling dizziness as well as decreased p o  Intake, status post COVID infection about 2 weeks ago  Nephrology consulted for evaluation management of acute hyponatremia         Acute kidney injury (POA):  EDSON most likely secondary to prerenal azotemia in light of decreased p o  Intake plus failure to auto regulate presence of hemodynamic perturbations/ARB of irbesartan as well as NSAID use plus possible COVID associated acute kidney injury  Would like to rule out rhabdomyolysis  After review of records In [facility] as well as Care everywhere it appears that the patient has a baseline Creatinine of 0 8-1 0 mg/dL  patient was admitted with a creatinine of 1 36 mg/dL on 02/03/2022  patient's creatinine today is at 0 82 mg/dL  Resolved  Q 12 BMP  Optimize hemodynamic status to avoid delay in renal recovery  Avoid nephrotoxins, adjust meds to appropriate GFR  Strict I/O  Daily weights  Urinary retention protocol if patient does not have a Green  will need to set up patient for follow up with Nephrology as an outpatient post hospitalization      Blood pressure/hypertension:  current medications:   losartan  recommendations:   hold losartan for now in light of low blood pressures  Optimize hemodynamics    Maintain MAP > 65mmHg  Avoid BP fluctuations      H/H/anemia:  most recent hemoglobin at 11 9 grams/deciliter  maintain hemoglobin greater than 8 grams/deciliter     Acid-base electrolytes:  Electrolytes:    Acute Hyponatremia:  Admitted with serum sodium 115 mEq on 02/03/2022  Most recent sodium at 127 mEq  Baseline serum sodium normal  No acute indication for hypertonic saline at this time  Acute hyponatremia likely secondary to prerenal azotemia plus may have some component of increased ADH secondary to pain and polydipsia  Check BMP Q 12  Start on salt tablets 3 g p o  B i d      Hypokalemia:  Most recent serum potassium 3 7 mEq  Resolved     Hypochloremia:  Most recent chloride at 98  Resolved     Hypercalcemia:  Most recent serum calcium 9 1 mEq  Resolved     Acid-base:    Most recent bicarb at 24 , improved     Other medical problems:     GERD:  Management per primary team      Thanks for the consult  Will continue to follow  Please call with questions/ concerns  Above-mentioned orders and Plan in terms of hyponatremia was discussed with the team in [address] MD, FASN, 2022, 12:03 PM              Objective :   Patient seen and examined in his room no overnight events blood pressure is slightly lower urine output 250 cc plus remains afebrile  Hoping his sodium will correct appropriately soon and he can be discharged soon  PHYSICAL EXAM  /75   Pulse 102   Temp 98 1 °F (36 7 °C) (Oral)   Resp 16   Ht 5' 11" (1 803 m)   Wt 108 kg (238 lb 1 6 oz)   SpO2 100%   BMI 33 21 kg/m²   Temp (24hrs), Av 2 °F (36 8 °C), Min:98 1 °F (36 7 °C), Max:98 3 °F (36 8 °C)        Intake/Output Summary (Last 24 hours) at 2022 1203  Last data filed at 2022 1100  Gross per 24 hour   Intake 1950 ml   Output 450 ml   Net 1500 ml       I/O last 24 hours: In:  [P O :820; I V :1000; IV Piggyback:250]  Out: 450 [Urine:450]      Current Weight: Weight - Scale: 108 kg (238 lb 1 6 oz)  First Weight: Weight - Scale: 108 kg (238 lb 1 6 oz)  Physical Exam  Vitals and nursing note reviewed  Constitutional:       General: He is not in acute distress  Appearance: Normal appearance  He is obese  He is not ill-appearing, toxic-appearing or diaphoretic     HENT:      Mouth/Throat:      Mouth: Mucous membranes are moist       Pharynx: Oropharynx is clear  No oropharyngeal exudate  Eyes:      General: No scleral icterus  Conjunctiva/sclera: Conjunctivae normal    Cardiovascular:      Rate and Rhythm: Normal rate  Heart sounds: Normal heart sounds  No friction rub  Pulmonary:      Effort: Pulmonary effort is normal  No respiratory distress  Breath sounds: Normal breath sounds  No stridor  No wheezing  Abdominal:      General: There is no distension  Palpations: Abdomen is soft  There is no mass  Tenderness: There is no abdominal tenderness  Musculoskeletal:         General: No swelling  Cervical back: Normal range of motion and neck supple  No rigidity  Skin:     General: Skin is warm  Coloration: Skin is not jaundiced  Neurological:      General: No focal deficit present  Mental Status: He is alert and oriented to person, place, and time  Psychiatric:         Mood and Affect: Mood normal          Behavior: Behavior normal              Review of Systems   Constitutional: Negative for chills and fever  HENT: Negative for congestion  Respiratory: Negative for cough, shortness of breath and wheezing  Cardiovascular: Negative for leg swelling  Gastrointestinal: Negative for abdominal pain, constipation, diarrhea, nausea and vomiting  Genitourinary: Negative for dysuria  Musculoskeletal: Negative for back pain  Skin: Negative for rash  Neurological: Negative for headaches  Psychiatric/Behavioral: Negative for agitation and confusion  All other systems reviewed and are negative        Scheduled Meds:  Current Facility-Administered Medications   Medication Dose Route Frequency Provider Last Rate    fenofibrate  145 mg Oral Daily Gearldean Skiff, MD      heparin (porcine)  5,000 Units Subcutaneous [facility] Gearldean Skiff, MD      losartan  50 mg Oral Daily Gearldean Skiff, MD      magnesium oxide  400 mg Oral Daily Gearldean Skiff, MD      phenol  1 spray Mouth/Throat Q2H PRN Min Montanez MD      sodium chloride  3 g Oral BID With Meals Patricia Long MD         PRN Meds: phenol    Continuous Infusions:       Invasive Devices: Invasive Devices  Report    Peripheral Intravenous Line            Peripheral IV 02/03/22 Right Antecubital 2 days                  LABORATORY:    Results from last 7 days   Lab Units 02/05/22  0312 02/04/22  2352 02/04/22  1623 02/04/22  1117 02/04/22  0504 02/04/22  0011 02/03/22  2157 02/03/22  1543 02/03/22  1542 02/03/22  1057   WBC Thousand/uL  --   --   --   --  6 24  --   --   --   --  8 05   HEMOGLOBIN g/dL  --   --   --   --  11 9*  --   --   --   --  13 4   HEMATOCRIT %  --   --   --   --  32 1*  --   --   --   --  36 1*   PLATELETS Thousands/uL  --   --   --   --  117*  --   --   --  145* 148*   POTASSIUM mmol/L 3 7 3 8 3 8 3 4* 2 9* 3 0* 3 2*   < >  --  2 4*   CHLORIDE mmol/L 98* 98* 95* 93* 85* 82* 82*   < >  --  77*   CO2 mmol/L 24 24 22 23 24 24 25   < >  --  25   BUN mg/dL 14 14 15 17 16 19 21   < >  --  18   CREATININE mg/dL 0 82 0 92 0 87 0 93 0 87 1 01 1 07   < >  --  1 36*   CALCIUM mg/dL 9 1 9 5 9 8 9 5 9 1 9 9 9 6   < >  --  10 1   MAGNESIUM mg/dL  --   --  1 9  --  1 5*  --   --   --   --   --    PHOSPHORUS mg/dL  --   --  1 4*  --  1 8*  --   --   --   --   --     < > = values in this interval not displayed  rest all reviewed    RADIOLOGY:  CT head without contrast   Final Result by Carlos Anna DO (02/03 1538)      No acute intracranial abnormality  Workstation performed: [id number]         XR chest 1 view portable   Final Result by Adriana Aguilar DO (02/03 1517)      No acute cardiopulmonary disease  Workstation performed: [id number]           Rest all reviewed    Portions of the record may have been created with voice recognition software  Occasional wrong word or "sound a like" substitutions may have occurred due to the inherent limitations of voice recognition software   Read the chart carefully and recognize, using context, where substitutions have occurred  If you have any questions, please contact the dictating provider

## 2022-02-07 ENCOUNTER — APPOINTMENT (OUTPATIENT)
Dept: LAB | Facility: HOSPITAL | Age: 47
End: 2022-02-07
Attending: FAMILY MEDICINE
Payer: COMMERCIAL

## 2022-02-07 ENCOUNTER — TELEPHONE (OUTPATIENT)
Dept: FAMILY MEDICINE CLINIC | Facility: CLINIC | Age: 47
End: 2022-02-07

## 2022-02-07 DIAGNOSIS — E87.1 HYPONATREMIA: ICD-10-CM

## 2022-02-07 LAB
ANION GAP SERPL CALCULATED.3IONS-SCNC: 6 MMOL/L (ref 4–13)
BUN SERPL-MCNC: 15 MG/DL (ref 5–25)
CALCIUM SERPL-MCNC: 9.5 MG/DL (ref 8.3–10.1)
CHLORIDE SERPL-SCNC: 106 MMOL/L (ref 100–108)
CO2 SERPL-SCNC: 23 MMOL/L (ref 21–32)
CREAT SERPL-MCNC: 0.97 MG/DL (ref 0.6–1.3)
GFR SERPL CREATININE-BSD FRML MDRD: 94 ML/MIN/1.73SQ M
GLUCOSE SERPL-MCNC: 110 MG/DL (ref 65–140)
POTASSIUM SERPL-SCNC: 3.9 MMOL/L (ref 3.5–5.3)
SODIUM SERPL-SCNC: 135 MMOL/L (ref 136–145)

## 2022-02-07 PROCEDURE — 36415 COLL VENOUS BLD VENIPUNCTURE: CPT

## 2022-02-07 PROCEDURE — 80048 BASIC METABOLIC PNL TOTAL CA: CPT

## 2022-02-07 NOTE — TELEPHONE ENCOUNTER
Patient discharged from [facility] 2/5/22  Upon review of Epic, it appears patient has contacted [facility] as PCP  Tried to confirm with patient that we are no longer PCP but no answer, no voicemail  Will try again later

## 2022-02-07 NOTE — UTILIZATION REVIEW
Notification of Discharge   This is a Notification of Discharge from our facility [address]  Please be advised that this patient has been discharge from our facility  Below you will find the admission and discharge date and time including the patients disposition  UTILIZATION REVIEW CONTACT:  Marifer Barreto  Utilization   Network Utilization Review Department  Phone: [phone] x carefully listen to the prompts  All voicemails are confidential   Email: [email or web address]     PHYSICIAN ADVISORY SERVICES:  FOR WOBR-LA-ITQO REVIEW - MEDICAL NECESSITY DENIAL  Phone: [phone]  Fax: [phone]  Email: [email or web address]     PRESENTATION DATE: 2/3/2022 10:49 AM  OBERVATION ADMISSION DATE:   INPATIENT ADMISSION DATE: 2/3/22  2:09 PM   DISCHARGE DATE: 2/5/2022  2:37 PM  DISPOSITION: Home/Self Care Home/Self Care      IMPORTANT INFORMATION:  Send all requests for admission clinical reviews, approved or denied determinations and any other requests to dedicated fax number below belonging to the campus where the patient is receiving treatment   List of dedicated fax numbers:  [address] DENIALS (Administrative/Medical Necessity) [phone]   1000  [facility] (Maternity/NICU/Pediatrics) [phone]   Everet Poot [phone]   130 [facility] [phone]   [address] [place] [address] [phone]   2000 [facility] [address] [place] [address],[address] [phone]   [facility]  [phone]   2205 [facility]  2401 [facility] [address] [facility] [phone]

## 2022-02-08 ENCOUNTER — TELEPHONE (OUTPATIENT)
Dept: NEPHROLOGY | Facility: CLINIC | Age: 47
End: 2022-02-08

## 2022-02-08 DIAGNOSIS — E87.1 HYPONATREMIA: Primary | ICD-10-CM

## 2022-02-08 NOTE — TELEPHONE ENCOUNTER
----- Message from Grady Boucher MD sent at 2/8/2022 11:05 AM EST -----  Regarding: RE: Hospital discharge  Please advised patient sodium is stable and improved have him repeat blood work in 2 weeks and for now decrease his salt tablets to once a day of 3 g  We can see what his labs are in 2 weeks if sodium continues to improve we can taper off his salt tablets and then determine if he wishes to come back to the office are not  Thanks    Dr mendez  ----- Message -----  From: Jennifer Doss  Sent: 2/7/2022  11:21 AM EST  To: Grady Boucher MD  Subject: RE: Hospital discharge                           Patient has limited availability  I spoke with him on the phone to try to get him in and no times fit his availability  (only available after 3:30)     Patient would like to know if he can recheck blood work in one month and determine from there if apt is neccessary  Just let me know, thanks!  ----- Message -----  From: Grady Boucher MD  Sent: 2/5/2022   4:40 PM EST  To: Nephrology [facility]  Subject: Hospital discharge                               Please arrange for office follow-up in 2-3 weeks and order for BMP in 1 month  Hospital discharge for acute hyponatremia  (0) No drift; leg holds 30 degree position for full 5 secs

## 2022-02-08 NOTE — TELEPHONE ENCOUNTER
Spoke with patient and advised him that okay to hold off on sodium tablets  Re check blood work in two weeks to determine  Patient has verbalized understanding and has no questions or concerns at this time

## 2022-02-08 NOTE — TELEPHONE ENCOUNTER
Please advised the patient if he has not started taking the salt pills then do not start them at this time and does not need to fill the prescription  Just have repeat blood work in 2 weeks if serum sodium stays stable then he does not need any further follow-up or repeat testing    Thanks

## 2022-02-08 NOTE — TELEPHONE ENCOUNTER
Patient returned phone call and I have advised him of the above to repeat bmp in 2 weeks and decrease salt tabs to once a day of 3g  However, patient has not been on sodium tablets since hospital d/c as pharmacy was out and he will be able to  tablets today  Patient would like to know if this changes the plan and would like to know what dose, if any he is to take  Please advise

## 2022-02-10 NOTE — UTILIZATION REVIEW
Initial Clinical Review    Admission: Date/Time/Statement:   Admission Orders (From admission, onward)     Ordered        02/03/22 1414  Inpatient Admission  Once                      Orders Placed This Encounter   Procedures    Inpatient Admission     Standing Status:   Standing     Number of Occurrences:   1     Order Specific Question:   Level of Care     Answer:   Med Surg [16]     Order Specific Question:   Estimated length of stay     Answer:   More than 2 Midnights     Order Specific Question:   Certification     Answer:   I certify that inpatient services are medically necessary for this patient for a duration of greater than two midnights  See H&P and MD Progress Notes for additional information about the patient's course of treatment  ED Arrival Information     Expected Arrival Acuity    - 2/3/2022 10:49 Urgent         Means of arrival Escorted by Service Admission type    Ambulance [facility] Urgent         Arrival complaint    Dizziness        Chief Complaint   Patient presents with    Dizziness     PT States at work he developed sob and dizziness  pt states he slid back and hit cubicle  denies loc, head strike, or blood thiners  Initial Presentation:   42 yom to ER from work via EMS for vertigo, s/p fall backward, hypertensive @ 190/120  Hx COVID 2 weeks ago with residual "brain fog", hypertension, GERD, anxiety, alcoholism, dyslipidemia  Presents as above with speech slightly clumsy, not exactly slurring but not speaking clearly  Admission work-up showing multiple electrolyte abnormalities  Admitted to inpatient status for hyponatremia, started on IVF, electrolyte repletion, renal consulted  Per renal: EDSON most likely secondary to prerenal azotemia in light of decreased p o  Intake plus failure to auto regulate presence of hemodynamic perturbations/ARB of irbesartan as well as NSAID use plus possible COVID associated acute kidney injury    Would like to rule out rhabdomyolysis  Check CPK  Continue IVF  Date: 2/4/22   Day 2:   Persistent hyponatremia with Na @ 188  Rate of correction: 115>118> slow correction , not at goal this morning  Avoid overcorrection: no more than 6-8mEq in the next 24hr, goal </=125  Continued electrolyte repletion per renal with IVF maintained  BMP q4h      ED Triage Vitals   Temperature Pulse Respirations Blood Pressure SpO2   02/03/22 1052 02/03/22 1052 02/03/22 1052 02/03/22 1052 02/03/22 1052   98 6 °F (37 °C) 86 20 133/75 99 %      Temp Source Heart Rate Source Patient Position - Orthostatic VS BP Location FiO2 (%)   02/04/22 2214 02/03/22 1052 -- -- --   Oral Monitor         Pain Score       02/03/22 1052       No Pain          Wt Readings from Last 1 Encounters:   02/03/22 108 kg (238 lb 1 6 oz)     Additional Vital Signs:   02/04/22 07:32:50 98 °F (36 7 °C) -- 20 124/87 99 -- --   02/03/22 21:37:13 98 °F (36 7 °C) 95 18 128/85 99 100 % --   02/03/22 1527 98 3 °F (36 8 °C) 90 18 116/72 87 99 % --   02/03/22 1225 -- -- -- -- -- -- None (Room air)   02/03/22 1052 98 6 °F (37 °C) 86 20 133/75 -- 99 % None (Room air)     Pertinent Labs/Diagnostic Test Results:       Results from last 7 days   Lab Units 02/04/22  0504   WBC Thousand/uL 6 24   HEMOGLOBIN g/dL 11 9*   HEMATOCRIT % 32 1*   PLATELETS Thousands/uL 117*   NEUTROS ABS Thousands/µL 3 98     Results from last 7 days   Lab Units 02/07/22  1616 02/05/22  0312 02/04/22  2352 02/04/22  1623 02/04/22  1117 02/04/22  0504 02/04/22  0504   SODIUM mmol/L 135* 127* 127* 126* 124*   < > 118*   POTASSIUM mmol/L 3 9 3 7 3 8 3 8 3 4*   < > 2 9*   CHLORIDE mmol/L 106 98* 98* 95* 93*   < > 85*   CO2 mmol/L 23 24 24 22 23   < > 24   ANION GAP mmol/L 6 5 5 9 8   < > 9   BUN mg/dL 15 14 14 15 17   < > 16   CREATININE mg/dL 0 97 0 82 0 92 0 87 0 93   < > 0 87   EGFR ml/min/1 73sq m 94 107 100 104 99   < > 104   CALCIUM mg/dL 9 5 9 1 9 5 9 8 9 5   < > 9 1   MAGNESIUM mg/dL  --   --   --  1 9  --   -- 1 5*   PHOSPHORUS mg/dL  --   --   --  1 4*  --   --  1 8*    < > = values in this interval not displayed  Results from last 7 days   Lab Units 02/04/22  0504   AST U/L 48*   ALT U/L 47   ALK PHOS U/L 19*   TOTAL PROTEIN g/dL 6 8   ALBUMIN g/dL 3 7   TOTAL BILIRUBIN mg/dL 1 13*     Results from last 7 days   Lab Units 02/07/22  1616 02/05/22  0312 02/04/22  2352 02/04/22  1623 02/04/22  1117 02/04/22  0504 02/04/22  0011 02/03/22  2157 02/03/22  1921   GLUCOSE RANDOM mg/dL 110 105 106 121 119 98 105 116 157*     Results from last 7 days   Lab Units 02/04/22  0011   OSMOLALITY, SERUM mmol/*     Results from last 7 days   Lab Units 02/04/22  0504   CK TOTAL U/L 389*   CK MB INDEX % <1 0   CK MB ng/mL 3 0             Results from last 7 days   Lab Units 02/04/22  0011 02/03/22  2044   OSMOLALITY, SERUM mmol/*  --    OSMO UR mmol/KG  --  551     Results from last 7 days   Lab Units 02/03/22  2044   CLARITY UA  Clear   COLOR UA  Yellow   SPEC GRAV UA  1 010   PH UA  6 5   GLUCOSE UA mg/dl Negative   KETONES UA mg/dl Trace*   BLOOD UA  Negative   PROTEIN UA mg/dl Negative   NITRITE UA  Negative   BILIRUBIN UA  Interference- unable to analyze*   UROBILINOGEN UA E U /dl 1 0   LEUKOCYTES UA  Negative   WBC UA /hpf None Seen   RBC UA /hpf None Seen   BACTERIA UA /hpf None Seen   EPITHELIAL CELLS WET PREP /hpf None Seen   SODIUM UR  13     2/3  Cxr=  No acute cardiopulmonary disease  CT head=  No acute intracranial abnormality    Ekg=  Normal sinus rhythm  Prolonged QT    ED Treatment:   Medication Administration from 02/03/2022 1049 to 02/03/2022 1519       Date/Time Order Dose Route Action     02/03/2022 1330 potassium chloride 20 mEq IVPB (premix) 20 mEq Intravenous New Bag     02/03/2022 1337 potassium chloride (K-DUR,KLOR-CON) CR tablet 20 mEq 20 mEq Oral Given     02/03/2022 1336 sodium chloride 0 9 % infusion 100 mL/hr Intravenous New Bag        Past Medical History:   Diagnosis Date    Anxiety     GERD (gastroesophageal reflux disease)     Hyperlipidemia     Hypertension     Tendinitis      Present on Admission:   Benign essential hypertension   Alcoholism ([facility] [address] )    Admitting Diagnosis: Hypokalemia [E87 6]  Dizziness [R42]  Hyponatremia [E87 1]  Vertigo [R42]  Near syncope [R55]  Age/Sex: 42 y o  male  Admission Orders:  Neuro checks  Orthostatic BP  Consult renal  Pt eval & tx  Scd/foot pumps  Cont pulse ox    Scheduled Medications:  fenofibrate, 145 mg, Oral, Daily  heparin (porcine), 5,000 Units, Subcutaneous, Q8H HARRISON  losartan, 50 mg, Oral, Daily  magnesium oxide, 400 mg, Oral, Daily  potassium chloride, 40 mEq, Oral, Once  potassium chloride 20 mEq IVPB x 2 doses, 2/3  potassium-sodium phosphates, 1 packet, Oral, Once    Continuous IV Infusions:  sodium chloride, 50 mL/hr, Intravenous, Continuous    PRN Meds:  phenol, 1 spray, Mouth/Throat, Q2H PRN    Network Utilization Review Department  ATTENTION: Please call with any questions or concerns to [phone] and carefully listen to the prompts so that you are directed to the right person  All voicemails are confidential   Ty Limb all requests for admission clinical reviews, approved or denied determinations and any other requests to dedicated fax number below belonging to the campus where the patient is receiving treatment   List of dedicated fax numbers for the Facilities:  [address] DENIALS (Administrative/Medical Necessity) [phone]   [address] [place] [address] (Maternity/NICU/Pediatrics) [phone]   [address] [phone]   [address]  [address] [facility] [place] [address] [address] [address] [facility] [address] [place] [address] [facility] [facility] [phone]       Notification of Discharge   This is a Notification of Discharge from our facility [facility]  Please be advised that this patient has been discharge from our facility  Below you will find the admission and discharge date and time including the patients disposition  UTILIZATION REVIEW CONTACT:  Kei Pack  Utilization   Network Utilization Review Department  Phone: [phone] x carefully listen to the prompts  All voicemails are confidential   Email: [email or web address]     PHYSICIAN ADVISORY SERVICES:  FOR HPRV-OB-YLVU REVIEW - MEDICAL NECESSITY DENIAL  Phone: [phone]  Fax: [phone]  Email: [email or web address]     PRESENTATION DATE: 2/3/2022 10:49 AM  OBERVATION ADMISSION DATE:   INPATIENT ADMISSION DATE: 2/3/22  2:09 PM   DISCHARGE DATE: 2/5/2022  2:37 PM  DISPOSITION: Home/Self Care Home/Self Care      IMPORTANT INFORMATION:  Send all requests for admission clinical reviews, approved or denied determinations and any other requests to dedicated fax number below belonging to the campus where the patient is receiving treatment   List of dedicated fax numbers:  [address] DENIALS (Administrative/Medical Necessity) [phone]   [address] (Maternity/NICU/Pediatrics) [phone]   [facility] [phone]   [address] [facility] [phone]   [address] [place] [address] [phone]   [address] [place] [address],[address] [phone]   [facility] [address] [phone]   Abdirahman Shultz [address] [phone]

## 2022-02-14 NOTE — TELEPHONE ENCOUNTER
02/13/22 8:33 PM     Thank you for your request  Your request has been received, reviewed, and noted that it no longer requires attention; new PCP office contacted to update PCP field  This message will now be completed      Thank you  Mandie Walton

## 2022-02-21 ENCOUNTER — NURSE TRIAGE (OUTPATIENT)
Dept: OTHER | Facility: OTHER | Age: 47
End: 2022-02-21

## 2022-02-21 ENCOUNTER — TELEMEDICINE (OUTPATIENT)
Dept: INTERNAL MEDICINE CLINIC | Facility: CLINIC | Age: 47
End: 2022-02-21
Payer: COMMERCIAL

## 2022-02-21 DIAGNOSIS — R10.9 ABDOMINAL PAIN, UNSPECIFIED ABDOMINAL LOCATION: Primary | ICD-10-CM

## 2022-02-21 PROBLEM — Z20.822 COVID-19 RULED OUT: Status: RESOLVED | Noted: 2022-02-03 | Resolved: 2022-02-21

## 2022-02-21 PROCEDURE — 1036F TOBACCO NON-USER: CPT | Performed by: INTERNAL MEDICINE

## 2022-02-21 PROCEDURE — 99214 OFFICE O/P EST MOD 30 MIN: CPT | Performed by: INTERNAL MEDICINE

## 2022-02-21 NOTE — TELEPHONE ENCOUNTER
Patient stated he needs note to return to work; overall feeling better but still tired and feels a little nauseous at times  Patient preferring virtual appointment

## 2022-02-21 NOTE — TELEPHONE ENCOUNTER
Reason for Disposition   MILD to MODERATE vomiting (e g , 1-5 times/day) and lasts > 48 hours (2 days)    Answer Assessment - Initial Assessment Questions  1  VOMITING SEVERITY: "How many times have you vomited in the past 24 hours?"      - MILD:  1 - 2 times/day     - MODERATE: 3 - 5 times/day, decreased oral intake without significant weight loss or symptoms of dehydration     - SEVERE: 6 or more times/day, vomits everything or nearly everything, with significant weight loss, symptoms of dehydration       Was vomiting for past 3 days; not a lot per patient but felt sick; was vomiting 1-2 times a day  2  ONSET: "When did the vomiting begin?"       Monday   3  FLUIDS: "What fluids or food have you vomited up today?" "Have you been able to keep any fluids down?"      Has been able to keep fluids down now; been drinking water, also eating food  4  ABDOMINAL PAIN: "Are your having any abdominal pain?" If yes : "How bad is it and what does it feel like?" (e g , crampy, dull, intermittent, constant)       Denies  5  DIARRHEA: "Is there any diarrhea?" If Yes, ask: "How many times today?"      Diarrhea has gotten better as well; only mild  6  CONTACTS: "Is there anyone else in the family with the same symptoms?"       N/A  7  CAUSE: "What do you think is causing your vomiting?"      Unsure  8  HYDRATION STATUS: "Any signs of dehydration?" (e g , dry mouth [not only dry lips], too weak to stand) "When did you last urinate?"      Urinating ok  9   OTHER SYMPTOMS: "Do you have any other symptoms?" (e g , fever, headache, vertigo, vomiting blood or coffee grounds, recent head injury)      Unsure if had other symptoms    Protocols used: VOMITING-ADULT-OH

## 2022-02-21 NOTE — PROGRESS NOTES
Virtual Brief Visit    Patient is located in the following state in which I hold an active license [place]      Assessment/Plan: It was difficult to get a history since he seemed a little confused-was not sure and cannot name the medications he is taking, inconsistent with details of his illness  When I asked if there was somebody with him at home, he said his cat is there  I asked if there was someone with his at home that I can talk to but he did not want to disturb his 18yo niece  I asked him to make an appointment in the office so I can evaluate him and he agreed  Advised that if the pain gets worse, he must go to the ER  I have spent >30 minutes with Patient  today in which greater than 50% of this time was spent in counseling/coordination of care regarding Impressions and nature of his symptoms  Problem List Items Addressed This Visit     None      Visit Diagnoses     Abdominal pain, unspecified abdominal location    -  Primary      It was my intent to perform this visit via video technology but the patient was not able to do a video connection so the visit was completed via audio telephone only  Explained to him that I have a nurse practitioner student present in the room    C/o Abd pain for a week without nausea diarrhea  Pain is in the center of the abdomen, comes and goes  Vomited 1-2 times 4 days ago and not since  He has been able to drink but he is not eating much  No fever chills cough colds  No new medications, supplements  Last alcohol use was 4 days  The abdominal pain actually feels better today    Recent Visits  No visits were found meeting these conditions  Showing recent visits within past 7 days and meeting all other requirements  Today's Visits  Date Type Provider Dept   02/21/22 Telemedicine Merlinda Abraham, MD [place] today's visits and meeting all other requirements  Future Appointments  No visits were found meeting these conditions    Showing future appointments within next 150 days and meeting all other requirements         I spent 30 minutes directly with the patient during this visit

## 2022-03-16 ENCOUNTER — TELEPHONE (OUTPATIENT)
Dept: NEPHROLOGY | Facility: CLINIC | Age: 47
End: 2022-03-16

## 2022-03-17 ENCOUNTER — TELEPHONE (OUTPATIENT)
Dept: INTERNAL MEDICINE CLINIC | Facility: CLINIC | Age: 47
End: 2022-03-17

## 2022-03-17 NOTE — TELEPHONE ENCOUNTER
This patient is in [address] for alcohol abuse  He is being given vivitrol to prevent his cravings  Nghia Jaramillo is giving him his first dose  He will be discharged before his 2nd dose it due  The second dose will be in April  Stevie Galvez would like to know if we could take over administering this medication to the patient  The facility has the vivitrol and could send it to us  Is this something we can do?  Please advise, thank you    Please contact Alessia Hernández at  [phone]

## 2022-03-17 NOTE — TELEPHONE ENCOUNTER
No, we do not administer that here   Please ask what options the patient has on where else it can be given

## 2022-03-18 NOTE — TELEPHONE ENCOUNTER
Spoke to Nora Nguyen at [facility] and informed her that we do not administer Vivitrol in our office  She did advise that they are going to be setting the patient up to see an outpatient provider that does administer the Vivitrol however they require the patient to attend treatment which conflicts with insurance so they need to look into it further  They will notify the office once that is done to let us know where patient was referred to

## 2022-04-20 ENCOUNTER — HOSPITAL ENCOUNTER (OUTPATIENT)
Dept: RADIOLOGY | Facility: HOSPITAL | Age: 47
Discharge: HOME | End: 2022-04-20
Payer: COMMERCIAL

## 2022-04-20 ENCOUNTER — OFFICE VISIT (OUTPATIENT)
Dept: OBGYN CLINIC | Facility: HOSPITAL | Age: 47
End: 2022-04-20
Payer: COMMERCIAL

## 2022-04-20 VITALS — HEIGHT: 71 IN | WEIGHT: 238 LBS | BODY MASS INDEX: 33.32 KG/M2

## 2022-04-20 DIAGNOSIS — M25.512 ACUTE PAIN OF BOTH SHOULDERS: Primary | ICD-10-CM

## 2022-04-20 DIAGNOSIS — M25.511 ACUTE PAIN OF BOTH SHOULDERS: Primary | ICD-10-CM

## 2022-04-20 DIAGNOSIS — M75.81 TENDONITIS OF BOTH ROTATOR CUFFS: ICD-10-CM

## 2022-04-20 DIAGNOSIS — M25.511 ACUTE PAIN OF BOTH SHOULDERS: ICD-10-CM

## 2022-04-20 DIAGNOSIS — M75.82 TENDONITIS OF BOTH ROTATOR CUFFS: ICD-10-CM

## 2022-04-20 DIAGNOSIS — M25.512 ACUTE PAIN OF BOTH SHOULDERS: ICD-10-CM

## 2022-04-20 PROCEDURE — 3008F BODY MASS INDEX DOCD: CPT | Performed by: PHYSICIAN ASSISTANT

## 2022-04-20 PROCEDURE — 1036F TOBACCO NON-USER: CPT | Performed by: PHYSICIAN ASSISTANT

## 2022-04-20 PROCEDURE — 99204 OFFICE O/P NEW MOD 45 MIN: CPT | Performed by: PHYSICIAN ASSISTANT

## 2022-04-20 PROCEDURE — 20610 DRAIN/INJ JOINT/BURSA W/O US: CPT | Performed by: PHYSICIAN ASSISTANT

## 2022-04-20 PROCEDURE — 73030 X-RAY EXAM OF SHOULDER: CPT

## 2022-04-20 RX ORDER — BETAMETHASONE SODIUM PHOSPHATE AND BETAMETHASONE ACETATE 3; 3 MG/ML; MG/ML
12 INJECTION, SUSPENSION INTRA-ARTICULAR; INTRALESIONAL; INTRAMUSCULAR; SOFT TISSUE
Status: COMPLETED | OUTPATIENT
Start: 2022-04-20 | End: 2022-04-20

## 2022-04-20 RX ORDER — BUPIVACAINE HYDROCHLORIDE 2.5 MG/ML
4 INJECTION, SOLUTION INFILTRATION; PERINEURAL
Status: COMPLETED | OUTPATIENT
Start: 2022-04-20 | End: 2022-04-20

## 2022-04-20 RX ADMIN — BETAMETHASONE SODIUM PHOSPHATE AND BETAMETHASONE ACETATE 12 MG: 3; 3 INJECTION, SUSPENSION INTRA-ARTICULAR; INTRALESIONAL; INTRAMUSCULAR; SOFT TISSUE at 16:08

## 2022-04-20 RX ADMIN — BUPIVACAINE HYDROCHLORIDE 4 ML: 2.5 INJECTION, SOLUTION INFILTRATION; PERINEURAL at 16:08

## 2022-04-20 NOTE — PROGRESS NOTES
Assessment:    Bilateral shoulder impingement / rotator cuff arthropathy  History of right labrum repair (Coordinated, Dr Leonard Waldron)    Plan:    Subacromial bursa injections provided bilaterally today and the patient tolerated well   Initiate outpatient physical therapy for range of motion and strengthening program   Continue over-the-counter anti-inflammatory medications as needed  Follow-up in 6-8 weeks with our sports medicine team           Problem List Items Addressed This Visit        Other    Acute pain of both shoulders - Primary    Relevant Orders    XR shoulder 2+ vw right    XR shoulder 2+ vw left                   Subjective:     Patient ID:  Brown Hale is a 42 y o  male    HPI     82-year-old male presenting for evaluation of his shoulders  According to the patient, he was playing volleyball about one month ago and since then has had pain in the left greater than right anterior lateral shoulder with associated decreased range of motion  He states he has a history of rotator cuff arthropathy on the left side and has been dealing with it for about five years a thinks he aggravated his symptoms while playing volleyball  There is no lino tear or pop that he heard, and there was no injury to the area  Pain is made worse when he goes past 90 degrees,  And this is when he feels sharp shooting pain  He has tried ibuprofen with some improvement in the symptoms  He is slowly regaining range of motion however it is still significantly limited compared to his baseline  No associated numbness and tingling  He did do formal outpatient physical therapy for his rotator cuff arthropathy in the past   Of note, the patient has a history of right shoulder labral repair done at [place] [address]  about eight years ago (Dr Leonard Waldron)      The following portions of the patient's history were reviewed and updated as appropriate: allergies, current medications, past family history, past medical history, past social history, past surgical history and problem list     Review of Systems     Objective:    Imaging:  Bilateral shoulder x-rays   Mild AC spurring on the right side, no acute fracture or dislocation, minimal degenerative changes  Physical Exam     Orthopedic Examination:  Bilateral shoulders    Inspection: no open wounds or erythema  No effusion of the shoulders  Shoulder joints are symmetric  No Roderick deformity  Palpation:  Generalized tenderness to palpation anterior lateral shoulder left greater than right   The proximal biceps, clavicle, AC joint scapula are all nontender to palpation       Range-of-motion:  0 to 150 degrees of active forward flexion and shoulder abduction   60 degrees of external rotation, internal rotation causes pain  Strength: 5/5 deltoid supraspinatus infraspinatus subscapularis    Sensation: intact axillary muscular cutaneous median radial ulnar nerve distribution    Special Tests:  + Hawkin's / Neers impingement  + empty can  Negative drop-arm   Negative hook test   Negative speed's   Positive [place]'s on the left, negative on the right    Large joint arthrocentesis: bilateral subacromial bursa  Universal Protocol:  Consent: Verbal consent obtained    Risks and benefits: risks, benefits and alternatives were discussed  Consent given by: patient  Patient identity confirmed: verbally with patient    Supporting Documentation  Indications: pain   Procedure Details  Location: shoulder - bilateral subacromial bursa  Preparation: Patient was prepped and draped in the usual sterile fashion  Needle size: 22 G  Approach: lateral    Medications (Right): 4 mL bupivacaine 0 25 %; 12 mg betamethasone acetate-betamethasone sodium phosphate 6 (3-3) mg/mLMedications (Left): 4 mL bupivacaine 0 25 %; 12 mg betamethasone acetate-betamethasone sodium phosphate 6 (3-3) mg/mL   Patient tolerance: patient tolerated the procedure well with no immediate complications  Dressing:  Sterile dressing applied

## 2022-05-02 ENCOUNTER — EVALUATION (OUTPATIENT)
Dept: PHYSICAL THERAPY | Facility: REHABILITATION | Age: 47
End: 2022-05-02
Payer: COMMERCIAL

## 2022-05-02 DIAGNOSIS — M25.511 ACUTE PAIN OF BOTH SHOULDERS: ICD-10-CM

## 2022-05-02 DIAGNOSIS — M25.512 ACUTE PAIN OF BOTH SHOULDERS: ICD-10-CM

## 2022-05-02 PROCEDURE — 97112 NEUROMUSCULAR REEDUCATION: CPT | Performed by: PHYSICAL THERAPIST

## 2022-05-02 PROCEDURE — 97162 PT EVAL MOD COMPLEX 30 MIN: CPT | Performed by: PHYSICAL THERAPIST

## 2022-05-02 NOTE — PROGRESS NOTES
PT Evaluation     Today's date: 2022  Patient name: Kathleen Castro  : 1975  MRN: [id number]  Referring provider: Karina Plaza  Dx:   Encounter Diagnosis     ICD-10-CM    1  Acute pain of both shoulders  M25 511 Ambulatory Referral to Physical Therapy    M25 512                   Assessment  Assessment details: Kathleen Castro is a pleasant 42 y o  male who presents with B/L shoulder pain  The patient's greatest concerns are avoiding surgery, tolerating work without restriction, performing B/L ADLs without restriction  No further referral appears necessary at this time based upon examination results  Primary movement impairment diagnosis of L shoulder hypomobility, L shoulder weakness, periscapular neuromuscular control deficit  Patient demonstrates significant active and passive range of motion deficits, which make formal strength testing inappropriate  Special testing cannot be performed based on limited range of motion and high symptom irritability  Patient is able to perform sidelying shoulder ER to neutral and is able to meet resistance @ 0 and 90 deg  Pt  will benefit from skilled PT services that includes manual therapy techniques to enhance tissue extensibility, neuromuscular re-education to facilitate motor control, therapeutic exercise to increase functional mobility, and modalities prn to reduce pain and inflammation  Primary Impairments:  1) L shoulder hypomobility   2) L shoulder weakness  3) periscapular neuromuscular control deficit       Impairments: abnormal muscle firing, abnormal muscle tone, abnormal or restricted ROM, abnormal movement, activity intolerance, impaired physical strength, lacks appropriate home exercise program, pain with function, poor posture  and poor body mechanics    Symptom irritability: highUnderstanding of Dx/Px/POC: good   Prognosis: good    Goals  STG  Patient will be independent with home exercise program in 1-2 visits    Patient will demonstrate full shoulder PROM in 4 weeks  Patient will perform shoulder flexion AROM to 90 deg without pain in 4 weeks  LTG  Patient will be independent in comprehensive HEP upon discharge  Patient will achieve goal FOTO score upon discharge  Patient will perform full shoulder AROM upon discharge  Patient will achieve >4/5 shoulder MMT upon discharge  Patient will perform 25# box lift without pain upon discharge  Patient will be able to shower without restriction upon discharge  Plan  Patient would benefit from: skilled physical therapy  Planned therapy interventions: activity modification, joint mobilization, manual therapy, motor coordination training, neuromuscular re-education, patient education, self care, therapeutic activities, therapeutic exercise, graded activity, home exercise program, behavior modification, graded exercise, functional ROM exercises and strengthening  Frequency: 2x week  Duration in weeks: 8  Treatment plan discussed with: patient        Subjective Evaluation    History of Present Illness  Mechanism of injury: Patient presents with L>R shoulder pain for the past 4-5 years  He reports that symptoms were aggravated by doing the monkey bars at an obstacle course  He had an unsuccessful course of physical therapy  Recently his symptoms were exacerbated by playing a game of volleyball  He had B/L CSI that alleviated his R shoulder pain, but did not improve his L shoulder pain  He has had MRI previously done that demonstrated some partial thickness tearing of his RTC in his L shoulder  He does have to do some lifting at work, but has not been significantly limited at this point  He does report significant limitation with overhead reaching, and cross body reaching with his L shoulder     Pain  Current pain ratin  At best pain ratin  At worst pain ratin  Location: L anterior shoulder  Quality: dull ache, sharp and tight  Aggravating factors: overhead activity and lifting (cross body reaching)  Progression: worsening    Treatments  Previous treatment: physical therapy and injection treatment  Patient Goals  Patient goals for therapy: increased strength, independence with ADLs/IADLs, increased motion and decreased pain          Objective     General Comments:      Shoulder Comments   RED flags: unremarkable    Palpation:TTP L AC joint    Scapular position: B/L 4 finger width from SP    Scapular mobility:  Flexion: R mild limitation; L unable to asses  Abduction: R mild limitation; L unable to asses    Clinical Tests:   Impingement Cluster:   Azael Lubin: unable to tolerate  Infraspinatus: p!; able to meet some resistance  Painful Arc: unable to tolerate  Drop Arm: unable to tolerate  Yerganson's: neg     Strength: unable to assess based on limited ROM  Flexion: L  R 5/5  ER @ 0: L  R 5/5  ER @ 90: L  R 4/5  IR @ 0: L  R 5/5    Shoulder Active Range of Motion:   Flexion:   L 60 deg p! R 160 deg  Abduction:   L 80 deg p! R 160 deg  Functional ER:  L ipsilateral ear p! R T1  Functional IR:   L T10   R L1    Shoulder Passive Range of Motion:   Flexion:   L 45 deg p! Abduction:   L 90 deg p! IR @ 0:   L WFL    ER @ 0:  L WFL    IR @ 90:   L 25 deg p! ER @ 90:  L 20 deg p!           Flowsheet Rows      Most Recent Value   PT/OT G-Codes    Current Score 46   Projected Score 69             Precautions: hx of R labrum repair      Manuals 5/2       PROM TB       MRE                        Neuro Re-Ed        Scapular retraction        Row        Ext        ER @ 0 20x; sidelying; HEP       Supine flexion AAROM 20x; HEP       Supine protraction AAROM                                        Ther Ex        UBE        Pulleys        Table slides 20x HEP; flexion       Table PBall rolls        Wall slides                                Ther Activity                        Gait Training                        Modalities

## 2022-05-09 ENCOUNTER — OFFICE VISIT (OUTPATIENT)
Dept: PHYSICAL THERAPY | Facility: REHABILITATION | Age: 47
End: 2022-05-09
Payer: COMMERCIAL

## 2022-05-09 ENCOUNTER — OFFICE VISIT (OUTPATIENT)
Dept: INTERNAL MEDICINE CLINIC | Facility: CLINIC | Age: 47
End: 2022-05-09
Payer: COMMERCIAL

## 2022-05-09 VITALS
TEMPERATURE: 97.4 F | BODY MASS INDEX: 32.59 KG/M2 | SYSTOLIC BLOOD PRESSURE: 108 MMHG | DIASTOLIC BLOOD PRESSURE: 72 MMHG | HEIGHT: 71 IN | HEART RATE: 70 BPM | RESPIRATION RATE: 14 BRPM | OXYGEN SATURATION: 97 % | WEIGHT: 232.8 LBS

## 2022-05-09 DIAGNOSIS — M25.512 ACUTE PAIN OF BOTH SHOULDERS: Primary | ICD-10-CM

## 2022-05-09 DIAGNOSIS — Z23 ENCOUNTER FOR IMMUNIZATION: ICD-10-CM

## 2022-05-09 DIAGNOSIS — I10 BENIGN ESSENTIAL HYPERTENSION: ICD-10-CM

## 2022-05-09 DIAGNOSIS — M25.511 ACUTE PAIN OF BOTH SHOULDERS: Primary | ICD-10-CM

## 2022-05-09 DIAGNOSIS — K21.9 GASTROESOPHAGEAL REFLUX DISEASE: ICD-10-CM

## 2022-05-09 DIAGNOSIS — E78.1 ESSENTIAL HYPERTRIGLYCERIDEMIA: ICD-10-CM

## 2022-05-09 DIAGNOSIS — Z00.00 ANNUAL PHYSICAL EXAM: Primary | ICD-10-CM

## 2022-05-09 PROCEDURE — 1036F TOBACCO NON-USER: CPT | Performed by: INTERNAL MEDICINE

## 2022-05-09 PROCEDURE — 90472 IMMUNIZATION ADMIN EACH ADD: CPT

## 2022-05-09 PROCEDURE — G0009 ADMIN PNEUMOCOCCAL VACCINE: HCPCS

## 2022-05-09 PROCEDURE — 97112 NEUROMUSCULAR REEDUCATION: CPT | Performed by: PHYSICAL THERAPIST

## 2022-05-09 PROCEDURE — 90677 PCV20 VACCINE IM: CPT

## 2022-05-09 PROCEDURE — 99396 PREV VISIT EST AGE 40-64: CPT | Performed by: INTERNAL MEDICINE

## 2022-05-09 PROCEDURE — 90715 TDAP VACCINE 7 YRS/> IM: CPT

## 2022-05-09 PROCEDURE — 97140 MANUAL THERAPY 1/> REGIONS: CPT | Performed by: PHYSICAL THERAPIST

## 2022-05-09 PROCEDURE — 3008F BODY MASS INDEX DOCD: CPT | Performed by: INTERNAL MEDICINE

## 2022-05-09 RX ORDER — NICOTINE 14MG/24HR
PATCH, TRANSDERMAL 24 HOURS TRANSDERMAL DAILY
COMMUNITY

## 2022-05-09 RX ORDER — PHENOL 1.4 %
AEROSOL, SPRAY (ML) MUCOUS MEMBRANE
COMMUNITY

## 2022-05-09 RX ORDER — IRBESARTAN 150 MG/1
150 TABLET ORAL DAILY
Qty: 30 TABLET | Refills: 1 | Status: SHIPPED | OUTPATIENT
Start: 2022-05-09 | End: 2022-05-18

## 2022-05-09 RX ORDER — OMEPRAZOLE 20 MG/1
20 CAPSULE, DELAYED RELEASE ORAL DAILY
Qty: 30 CAPSULE | Refills: 1 | Status: SHIPPED | OUTPATIENT
Start: 2022-05-09 | End: 2022-05-18

## 2022-05-09 NOTE — PATIENT INSTRUCTIONS

## 2022-05-09 NOTE — PROGRESS NOTES
Daily Note     Today's date: 2022  Patient name: Cayetano Krishnamurthy  : 1975  MRN: [id number]  Referring provider: Yazan Gleason  Dx:   Encounter Diagnosis     ICD-10-CM    1  Acute pain of both shoulders  M25 511     M25 512                   Subjective: Patient has been compliant with HEP  No significant change in status  Objective: See treatment diary below      Assessment: Patient demonstrates better tolerance to PROM in scaption compared to flexion  HEP updated to include rows to address periscap neuromuscular control deficits  Patient required manual cueing for appropriate scapular retraction without GH extension compensation  Plan: Continue per plan of care        Precautions: hx of R labrum repair      Manuals       PROM TB TB; flex/scaption/ER @ 0      MRE                        Neuro Re-Ed        Scapular retraction        B/L row  2x15; otb      ER @ 0 20x; sidelying; HEP 20x; ytb      Supine flexion AAROM 20x; HEP       Sidelying abduction AROM  2x10; thumb up      Hor abd AAROM in stool  20x; cueing for scapular retraction                                      Ther Ex        UBE        Pulleys  6 min      Table slides 20x HEP; flexion       Table PBall rolls        Wall slides                                Ther Activity                        Gait Training                        Modalities

## 2022-05-09 NOTE — PROGRESS NOTES
Assessment/Plan:    No problem-specific Assessment & Plan notes found for this encounter  Problem List Items Addressed This Visit     None      Visit Diagnoses     Encounter for immunization                Subjective:      Patient ID: Osmani Crouch is a 42 y o  male      HPI    The following portions of the patient's history were reviewed and updated as appropriate: allergies, current medications, past family history, past medical history, past social history, past surgical history and problem list     Review of Systems      Objective:      /72   Pulse 70   Temp (!) 97 4 °F (36 3 °C)   Resp 14   Ht 5' 11" (1 803 m)   Wt 106 kg (232 lb 12 8 oz)   SpO2 97%   BMI 32 47 kg/m²          Physical Exam

## 2022-05-09 NOTE — PROGRESS NOTES
[facility]    NAME: Tanvir Musa  AGE: 42 y o  SEX: male  : 1975     DATE: 2022     Assessment and Plan:     Problem List Items Addressed This Visit        Cardiovascular and Mediastinum    Benign essential hypertension    Relevant Medications    irbesartan (AVAPRO) 150 mg tablet    Other Relevant Orders    CBC and differential    Comprehensive metabolic panel       Other    Essential hypertriglyceridemia    Relevant Orders    Lipid Panel with Direct LDL reflex      Other Visit Diagnoses     Annual physical exam    -  Primary    Encounter for immunization        Relevant Orders    Pneumococcal Conjugate Vaccine 20-valent (PCV20) (Completed)    TDAP VACCINE GREATER THAN OR EQUAL TO 8YO IM (Completed)    Gastroesophageal reflux disease        Relevant Medications    omeprazole (PriLOSEC) 20 mg delayed release capsule          Immunizations and preventive care screenings were discussed with patient today  Appropriate education was printed on patient's after visit summary  Counseling:  Exercise: the importance of regular exercise/physical activity was discussed  Recommend exercise 3-5 times per week for at least 30 minutes  Return in about 6 months (around 2022)  Chief Complaint:     Chief Complaint   Patient presents with   [address] [place] [address] Patient Visit     Re-establish care      History of Present Illness:     Adult Annual Physical   Patient here for a comprehensive physical exam  The patient reports no problems  Went to alcohol rehab in Feb  He went voluntarily this time  Going to [employer] [facility] [employer] have helped  Vivitrol injection caused significant joint aches so did not continue    Diet and Physical Activity  Diet/Nutrition: well balanced diet  Exercise: no formal exercise  Depression Screening  PHQ-2/9 Depression Screening         General Health  Sleep: sleeps well     Hearing: normal - bilateral   Vision: uses reading glasses  Dental: regular dental visits   Health  Symptoms include: none     Review of Systems:     Review of Systems   Constitutional: Negative for chills, fatigue, fever and unexpected weight change  Respiratory: Negative for cough and shortness of breath  Cardiovascular: Negative for chest pain, palpitations and leg swelling  Gastrointestinal: Negative for abdominal pain, constipation, diarrhea, nausea and vomiting  Genitourinary: Negative for difficulty urinating  Musculoskeletal: Positive for arthralgias (left shoulder onging PT)  Neurological: Negative for dizziness and headaches  Past Medical History:     Past Medical History:   Diagnosis Date    Anxiety     COVID-19 ruled out 2/3/2022    GERD (gastroesophageal reflux disease)     Hyperlipidemia     Hypertension     Tendinitis       Past Surgical History:     Past Surgical History:   Procedure Laterality Date    KNEE SURGERY Left 1989    cyst removed    OK COLONOSCOPY FLX DX W/COLLJ SPEC WHEN PFRMD N/A 5/14/2018    Procedure: COLONOSCOPY;  Surgeon: Sarah Dodson DO;  Location: [facility];   Service: General    SHOULDER ARTHROSCOPY Right     with biceps tenodesis      Family History:     Family History   Problem Relation Age of Onset    Melanoma Father     Cancer Father     Melanoma Brother     Diabetes Maternal Grandfather     Stroke Paternal Grandmother     Hypertension Paternal Grandmother     Coronary artery disease Paternal Grandmother     Heart disease Paternal Grandmother     Hypertension Paternal Grandfather     Coronary artery disease Paternal Grandfather     Brain cancer Paternal Grandfather     Heart disease Paternal Grandfather     Liver cancer Paternal Uncle       Social History:     Social History     Socioeconomic History    Marital status: Single     Spouse name: None    Number of children: None    Years of education: None    Highest education level: None Occupational History    Occupation: [employer]   Tobacco Use    Smoking status: Former Smoker     Packs/day: 1 00     Years: 20 00     Pack years: 20 00     Start date: 1992     Quit date: 2013     Years since quittin 0    Smokeless tobacco: Never Used   Vaping Use    Vaping Use: Never used   Substance and Sexual Activity    Alcohol use: Not Currently     Alcohol/week: 2 0 - 3 0 standard drinks     Types: 2 - 3 Shots of liquor per week     Comment: 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, 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    Drug use: No    Sexual activity: Not Currently   Other Topics Concern    None   Social History Narrative    Works for [address] Strain: Not on file   Food Insecurity: No Food Insecurity    Worried About [facility] in the Last Year: Never true    0 [facility] in the Last Year: Never true   Transportation Needs: No Transportation Needs    Lack of Transportation (Medical): No    Lack of Transportation (Non-Medical):  No   Physical Activity: Not on file   Stress: Not on file   Social Connections: Not on file   Intimate Partner Violence: Not on file   Housing Stability: Unknown    Unable to Pay for Housing in the Last Year: No    Number of Places Lived in the Last Year: Not on file    Unstable Housing in the Last Year: No      Current Medications:     Current Outpatient Medications   Medication Sig Dispense Refill    fenofibrate (TRICOR) 145 mg tablet Take 1 tablet (145 mg total) by mouth daily 90 tablet 1    irbesartan (AVAPRO) 150 mg tablet Take 1 tablet (150 mg total) by mouth daily 30 tablet 1    Melatonin 10 MG TABS Take by mouth daily at bedtime as needed      Multiple Vitamins-Minerals (multivitamin with minerals) tablet Take 1 tablet by mouth daily      omeprazole (PriLOSEC) 20 mg delayed release capsule Take 1 capsule (20 mg total) by mouth daily 30 capsule 1    Saccharomyces boulardii (Probiotic) 250 MG CAPS Take by mouth in the morning      b complex vitamins tablet Take 1 tablet by mouth daily (Patient not taking: Reported on 5/9/2022 )       No current facility-administered medications for this visit  Allergies: Allergies   Allergen Reactions    Neomycin Hives    Polymyxin B Hives      Physical Exam:     /72   Pulse 70   Temp (!) 97 4 °F (36 3 °C)   Resp 14   Ht 5' 11" (1 803 m)   Wt 106 kg (232 lb 12 8 oz)   SpO2 97%   BMI 32 47 kg/m²     Physical Exam  Vitals and nursing note reviewed  Constitutional:       General: He is not in acute distress  Appearance: He is well-developed  He is obese  He is not ill-appearing, toxic-appearing or diaphoretic  HENT:      Head: Normocephalic and atraumatic  Eyes:      Conjunctiva/sclera: Conjunctivae normal    Cardiovascular:      Rate and Rhythm: Normal rate and regular rhythm  Heart sounds: No murmur heard  Pulmonary:      Effort: Pulmonary effort is normal  No respiratory distress  Breath sounds: Normal breath sounds  Abdominal:      Palpations: Abdomen is soft  Tenderness: There is no abdominal tenderness  Musculoskeletal:      Cervical back: Neck supple  Right lower leg: No edema  Left lower leg: No edema  Skin:     General: Skin is warm and dry  Neurological:      Mental Status: He is alert     Psychiatric:         Mood and Affect: Mood normal          Behavior: Behavior normal           Osiris Leach MD  [facility]

## 2022-05-11 ENCOUNTER — OFFICE VISIT (OUTPATIENT)
Dept: PHYSICAL THERAPY | Facility: REHABILITATION | Age: 47
End: 2022-05-11
Payer: COMMERCIAL

## 2022-05-11 DIAGNOSIS — M25.512 ACUTE PAIN OF BOTH SHOULDERS: Primary | ICD-10-CM

## 2022-05-11 DIAGNOSIS — M25.511 ACUTE PAIN OF BOTH SHOULDERS: Primary | ICD-10-CM

## 2022-05-11 PROCEDURE — 97112 NEUROMUSCULAR REEDUCATION: CPT | Performed by: PHYSICAL THERAPIST

## 2022-05-11 NOTE — PROGRESS NOTES
Daily Note     Today's date: 2022  Patient name: Isael Chao  : 1975  MRN: [id number]  Referring provider: Tony Zavala  Dx:   Encounter Diagnosis     ICD-10-CM    1  Acute pain of both shoulders  M25 511     M25 512                   Subjective: Patient has been compliant with HEP  No significant change in status  Objective: See treatment diary below      Assessment: Patient demonstrates improvement in sidelying abduction AROM with manual scapular assistance  Patient able to achieve approx 100 deg of shoulder flexion AROM without pain after performing flexion assist exercise  Continue to implement motor control exercises as tolerated  Plan: Continue per plan of care        Precautions: hx of R labrum repair      Manuals      PROM L TB TB; flex/scaption/ER @ 0 TB; flex/scaption/ER @ 0     MRE   TB; abd @ 90                     Neuro Re-Ed        Scapular retraction        B/L row  2x15; otb      ER @ 0 20x; sidelying; HEP 20x; ytb      Supine flexion AAROM 20x; HEP       Sidelying abduction AROM  2x10; thumb up 2x10; thumb up     Hor abd AAROM in stool  20x; cueing for scapular retraction      Serratus foam roll @ wall   2x10     Unilateral ext w/ flex assist @ yissel   10x @ 2 5#; 10x @ 5#     Scapular protraction @ rail   20x; HEP                     Ther Ex        UBE        Pulleys  6 min      Table slides 20x HEP; flexion       Table PBall rolls        Wall slides                                Ther Activity                        Gait Training                        Modalities

## 2022-05-16 ENCOUNTER — OFFICE VISIT (OUTPATIENT)
Dept: PHYSICAL THERAPY | Facility: REHABILITATION | Age: 47
End: 2022-05-16
Payer: COMMERCIAL

## 2022-05-16 DIAGNOSIS — M25.511 ACUTE PAIN OF BOTH SHOULDERS: Primary | ICD-10-CM

## 2022-05-16 DIAGNOSIS — M25.512 ACUTE PAIN OF BOTH SHOULDERS: Primary | ICD-10-CM

## 2022-05-16 PROCEDURE — 97112 NEUROMUSCULAR REEDUCATION: CPT | Performed by: PHYSICAL THERAPIST

## 2022-05-16 PROCEDURE — 97140 MANUAL THERAPY 1/> REGIONS: CPT | Performed by: PHYSICAL THERAPIST

## 2022-05-16 NOTE — PROGRESS NOTES
Daily Note     Today's date: 2022  Patient name: Florecita Prasad  : 1975  MRN: [id number]  Referring provider: Edie Aggarwal  Dx:   Encounter Diagnosis     ICD-10-CM    1  Acute pain of both shoulders  M25 511     M25 512                   Subjective: Patient reports no significant change in status since last visit  Objective: See treatment diary below      Assessment: Patient responds well to cueing to promote scapular protraction to improve shoulder AROM  Shoulder flexion AROM is achieve with less pain with shoulder ER  Continue to implement motor control exercises as tolerated  Plan: Continue per plan of care        Precautions: hx of R labrum repair      Manuals     PROM L TB TB; flex/scaption/ER @ 0 TB; flex/scaption/ER @ 0 TB; ER @ 0/abd    MRE   TB; abd @ 80 TB; abd/add @ 90    Rhythmic stab    TB; @ 90 deg flex            Neuro Re-Ed        Scapular retraction        B/L row  2x15; otb      ER @ 0 20x; sidelying; HEP 20x; ytb      Supine flexion AAROM 20x; HEP       Supine flexion AROM    10x    Sidelying abduction AROM  2x10; thumb up 2x10; thumb up     Hor abd AAROM in stool  20x; cueing for scapular retraction      Serratus foam roll @ wall   2x10     Unilateral ext w/ flex assist @ yissel   10x @ 2 5#; 10x @ 5# 3x10; 5#; split stance w/ forward lean to increase flex ROM    Scapular protraction @ rail   20x; HEP     Scapular protraction @ EOT w/ backward stool roll    10x                    Ther Ex        UBE        Pulleys  6 min      Table slides 20x HEP; flexion       Table PBall rolls        Wall slides                                Ther Activity                        Gait Training                        Modalities

## 2022-05-18 ENCOUNTER — OFFICE VISIT (OUTPATIENT)
Dept: PHYSICAL THERAPY | Facility: REHABILITATION | Age: 47
End: 2022-05-18
Payer: COMMERCIAL

## 2022-05-18 DIAGNOSIS — M25.511 ACUTE PAIN OF BOTH SHOULDERS: Primary | ICD-10-CM

## 2022-05-18 DIAGNOSIS — M25.512 ACUTE PAIN OF BOTH SHOULDERS: Primary | ICD-10-CM

## 2022-05-18 PROCEDURE — 97110 THERAPEUTIC EXERCISES: CPT | Performed by: PHYSICAL THERAPIST

## 2022-05-18 PROCEDURE — 97112 NEUROMUSCULAR REEDUCATION: CPT | Performed by: PHYSICAL THERAPIST

## 2022-05-18 PROCEDURE — 97140 MANUAL THERAPY 1/> REGIONS: CPT | Performed by: PHYSICAL THERAPIST

## 2022-05-18 NOTE — PROGRESS NOTES
Daily Note     Today's date: 2022  Patient name: Farhana Epps  : 1975  MRN: [id number]  Referring provider: Rahul Munguia  Dx:   Encounter Diagnosis     ICD-10-CM    1  Acute pain of both shoulders  M25 511     M25 512                   Subjective: Patient reports no significant change in status since last visit  Objective: See treatment diary below      Assessment: Further improvement in flexion AROM at the end of tx  Patient has been responding positively to periscapular motor control interventions to exaggerate protraction/retraction ROM  Continue to implement motor control exercises as tolerated  Plan: Continue per plan of care        Precautions: hx of R labrum repair      Manuals    PROM L TB TB; flex/scaption/ER @ 0 TB; flex/scaption/ER @ 0 TB; ER @ 0/abd TB; ER @ 0/abd   MRE   TB; abd @ 80 TB; abd/add @ 80 TB; abd/add @ 90   Rhythmic stab    TB; @ 90 deg flex TB; @ 90 deg flex   Scapular protraction MRE     TB; on  foam                   Neuro Re-Ed        Supine flexion AROM    10x    Sidelying abduction AROM  2x10; thumb up 2x10; thumb up     Hor abd AAROM in stool  20x; cueing for scapular retraction      Serratus foam roll @ wall   2x10     Unilateral ext w/ flex assist @ yissel   10x @ 2 5#; 10x @ 5# 3x10; 5#; split stance w/ forward lean to increase flex ROM 2x10; 4#; split stance w/ forward lean to increase flex ROM   Split stance unilateral row     2x10; 10#; promote scapular protraction   Full can raise (hor abd)     2x10; ytb; to tolerance   Scapular protraction @ rail   20x; HEP     Scapular protraction @ EOT w/ backward stool roll    10x                    Ther Ex        UBE        Pulleys  6 min   8 min   Table slides 20x HEP; flexion       Table PBall rolls     3x10; promote scapular protraction   Wall slides                                Ther Activity                        Gait Training                        Modalities

## 2022-05-23 ENCOUNTER — APPOINTMENT (OUTPATIENT)
Dept: PHYSICAL THERAPY | Facility: REHABILITATION | Age: 47
End: 2022-05-23
Payer: COMMERCIAL

## 2022-05-25 ENCOUNTER — OFFICE VISIT (OUTPATIENT)
Dept: PHYSICAL THERAPY | Facility: REHABILITATION | Age: 47
End: 2022-05-25
Payer: COMMERCIAL

## 2022-05-25 DIAGNOSIS — M25.511 ACUTE PAIN OF BOTH SHOULDERS: Primary | ICD-10-CM

## 2022-05-25 DIAGNOSIS — M25.512 ACUTE PAIN OF BOTH SHOULDERS: Primary | ICD-10-CM

## 2022-05-25 PROCEDURE — 97110 THERAPEUTIC EXERCISES: CPT | Performed by: PHYSICAL THERAPIST

## 2022-05-25 PROCEDURE — 97112 NEUROMUSCULAR REEDUCATION: CPT | Performed by: PHYSICAL THERAPIST

## 2022-05-25 PROCEDURE — 97140 MANUAL THERAPY 1/> REGIONS: CPT | Performed by: PHYSICAL THERAPIST

## 2022-05-25 NOTE — PROGRESS NOTES
Daily Note     Today's date: 2022  Patient name: Cayetano Krishnamurthy  : 1975  MRN: [id number]  Referring provider: Yazan Gleason  Dx:   Encounter Diagnosis     ICD-10-CM    1  Acute pain of both shoulders  M25 511     M25 512                   Subjective: Patient reports no significant change in status since last visit  Objective: See treatment diary below      Assessment: Patient demonstrates improvement in flexion and abduction PROM  Continue to implement motor control exercises as tolerated  Treatment modified because patient arrived late  Plan: Continue per plan of care        Precautions: hx of R labrum repair      Manuals    PROM L TB TB; flex/scaption/ER @ 0 TB; flex/scaption/ER @ 0 TB; ER @ 0/abd TB; ER @ 0/abd TB; ER @ 0/abd   MRE   TB; abd @ 80 TB; abd/add @ 80 TB; abd/add @ 80 TB; abd/add @ 90   Rhythmic stab    TB; @ 90 deg flex TB; @ 90 deg flex    Scapular protraction MRE     TB; on  foam                      Neuro Re-Ed         Supine flexion AROM    10x     Sidelying abduction AROM  2x10; thumb up 2x10; thumb up      Hor abd AAROM in stool  20x; cueing for scapular retraction       Serratus foam roll @ wall   2x10      Unilateral ext w/ flex assist @ yissel   10x @ 2 5#; 10x @ 5# 3x10; 5#; split stance w/ forward lean to increase flex ROM 2x10; 4#; split stance w/ forward lean to increase flex ROM 2x10; 4#; split stance w/ forward lean to increase flex ROM   Split stance unilateral row     2x10; 10#; promote scapular protraction 2x10; 10#; promote scapular protraction   Full can raise (hor abd)     2x10; ytb; to tolerance 2x10; ytb; to tolerance   Scapular protraction @ rail   20x; HEP      Scapular protraction @ EOT w/ backward stool roll    10x                       Ther Ex         UBE         Pulleys  6 min   8 min    Table slides 20x HEP; flexion        Table PBall rolls     3x10; promote scapular protraction 3x10; promote scapular protraction   Wall slides                                    Ther Activity                           Gait Training                           Modalities

## 2022-05-26 ENCOUNTER — OFFICE VISIT (OUTPATIENT)
Dept: PHYSICAL THERAPY | Facility: REHABILITATION | Age: 47
End: 2022-05-26
Payer: COMMERCIAL

## 2022-05-26 DIAGNOSIS — M25.511 ACUTE PAIN OF BOTH SHOULDERS: Primary | ICD-10-CM

## 2022-05-26 DIAGNOSIS — M25.512 ACUTE PAIN OF BOTH SHOULDERS: Primary | ICD-10-CM

## 2022-05-26 PROCEDURE — 97112 NEUROMUSCULAR REEDUCATION: CPT | Performed by: PHYSICAL THERAPIST

## 2022-05-26 PROCEDURE — 97110 THERAPEUTIC EXERCISES: CPT | Performed by: PHYSICAL THERAPIST

## 2022-05-26 PROCEDURE — 97140 MANUAL THERAPY 1/> REGIONS: CPT | Performed by: PHYSICAL THERAPIST

## 2022-05-26 NOTE — PROGRESS NOTES
Daily Note     Today's date: 2022  Patient name: Shannan Crowley  : 1975  MRN: [id number]  Referring provider: Mayra Mccann  Dx:   Encounter Diagnosis     ICD-10-CM    1  Acute pain of both shoulders  M25 511     M25 512                   Subjective: Patient reports some soreness after yesterday  Objective: See treatment diary below      Assessment: Patient's tolerance for PROM is worse than usual, probably due to higher symptom irritability secondary to tx yesterday  Patient is able to tolerate progression in volume of periscapular motor control interventions of row and shoulder extension  Plan: Continue per plan of care        Precautions: hx of R labrum repair      Manuals    PROM L TB; flex/scaption/ER @ 0 TB; ER @ 0/abd TB; ER @ 0/abd TB; ER @ 0/abd TB; ER @ 0/abd   MRE TB; abd @ 80 TB; abd/add @ 80 TB; abd/add @ 80 TB; abd/add @ 80 TB; abd/add @ 90   Rhythmic stab  TB; @ 90 deg flex TB; @ 90 deg flex     Scapular protraction MRE   TB; on  foam                     Neuro Re-Ed        Supine flexion AROM  10x      Sidelying abduction AROM 2x10; thumb up       Unilateral ext w/ flex assist @ yissel 10x @ 2 5#; 10x @ 5# 3x10; 5#; split stance w/ forward lean to increase flex ROM 2x10; 4#; split stance w/ forward lean to increase flex ROM 2x10; 4#; split stance w/ forward lean to increase flex ROM 3x10; 5#; split stance w/ forward lean to increase flex ROM   Split stance unilateral row   2x10; 10#; promote scapular protraction 2x10; 10#; promote scapular protraction 3x10; 12#; promote scapular protraction   Full can raise (hor abd)   2x10; ytb; to tolerance 2x10; ytb; to tolerance    Body blade     4x30s; ER/IR @ 0                   Ther Ex        UBE        Pulleys   8 min  6 min   Table slides        Table PBall rolls   3x10; promote scapular protraction 3x10; promote scapular protraction 3x10; promote scapular protraction   Wall slides Ther Activity                        Gait Training                        Modalities

## 2022-06-01 ENCOUNTER — OFFICE VISIT (OUTPATIENT)
Dept: PHYSICAL THERAPY | Facility: REHABILITATION | Age: 47
End: 2022-06-01
Payer: COMMERCIAL

## 2022-06-01 DIAGNOSIS — M25.512 ACUTE PAIN OF BOTH SHOULDERS: Primary | ICD-10-CM

## 2022-06-01 DIAGNOSIS — M25.511 ACUTE PAIN OF BOTH SHOULDERS: Primary | ICD-10-CM

## 2022-06-01 PROCEDURE — 97140 MANUAL THERAPY 1/> REGIONS: CPT | Performed by: PHYSICAL THERAPIST

## 2022-06-01 PROCEDURE — 97110 THERAPEUTIC EXERCISES: CPT | Performed by: PHYSICAL THERAPIST

## 2022-06-01 PROCEDURE — 97112 NEUROMUSCULAR REEDUCATION: CPT | Performed by: PHYSICAL THERAPIST

## 2022-06-01 NOTE — PROGRESS NOTES
Daily Note     Today's date: 2022  Patient name: Lillian Moreno  : 1975  MRN: [id number]  Referring provider: Gerry Williamson  Dx:   Encounter Diagnosis     ICD-10-CM    1  Acute pain of both shoulders  M25 511     M25 512                   Subjective: Patient reports some soreness after sleeping weird on his shoulder last night  Objective: See treatment diary below      Assessment: Patient demonstrates fair tolerance to PROM: 105 deg abd/95 deg flex  Improvement in flexion AROM after addition of unilateral overhead crossover pulldown, suggestive of periscapular motor control deficits being a significant component to shoulder hypomobility  Continue to address periscap motor control deficits as tolerated  Plan: Continue per plan of care        Precautions: hx of R labrum repair      Manuals    PROM L TB; flex/scaption/ER @ 0 TB; ER @ 0/abd TB; ER @ 0/abd TB; ER @ 0/abd TB; ER @ 0/abd TB; ER @ 0/abd/flex   MRE TB; abd @ 80 TB; abd/add @ 80 TB; abd/add @ 80 TB; abd/add @ 80 TB; abd/add @ 90    Rhythmic stab  TB; @ 90 deg flex TB; @ 90 deg flex      Scapular protraction MRE   TB; on  foam                        Neuro Re-Ed         Supine flexion AROM  10x       Sidelying abduction AROM 2x10; thumb up        Unilateral ext w/ flex assist @ yissel 10x @ 2 5#; 10x @ 5# 3x10; 5#; split stance w/ forward lean to increase flex ROM 2x10; 4#; split stance w/ forward lean to increase flex ROM 2x10; 4#; split stance w/ forward lean to increase flex ROM 3x10; 5# split stance w/ forward lean to increase flex ROM; 3x10; 5#; split stance w/ forward lean   Split stance unilateral row   2x10; 10#; promote scapular protraction 2x10; 10#; promote scapular protraction 3x10; 12#; promote scapular protraction 2x15; 12#; promote scapular protraction   Unilateral cross over overhead pull down      2x15; 5 6#   Full can raise (hor abd)   2x10; ytb; to tolerance 2x10; ytb; to tolerance     Body blade     4x30s; ER/IR @ 0 4x30s; ER/IR @ 0                     Ther Ex         UBE         Pulleys   8 min  6 min 6 min   Table slides         Table PBall rolls   3x10; promote scapular protraction 3x10; promote scapular protraction 3x10; promote scapular protraction    Wall slides                                    Ther Activity                           Gait Training                           Modalities

## 2022-06-06 ENCOUNTER — OFFICE VISIT (OUTPATIENT)
Dept: OBGYN CLINIC | Facility: OTHER | Age: 47
End: 2022-06-06
Payer: COMMERCIAL

## 2022-06-06 VITALS
SYSTOLIC BLOOD PRESSURE: 114 MMHG | DIASTOLIC BLOOD PRESSURE: 81 MMHG | HEART RATE: 68 BPM | HEIGHT: 71 IN | BODY MASS INDEX: 32.47 KG/M2

## 2022-06-06 DIAGNOSIS — M75.102 ROTATOR CUFF SYNDROME OF LEFT SHOULDER: Primary | ICD-10-CM

## 2022-06-06 DIAGNOSIS — R29.898 SHOULDER WEAKNESS: ICD-10-CM

## 2022-06-06 PROCEDURE — 99214 OFFICE O/P EST MOD 30 MIN: CPT | Performed by: ORTHOPAEDIC SURGERY

## 2022-06-06 RX ORDER — LIDOCAINE 50 MG/G
OINTMENT TOPICAL 3 TIMES DAILY PRN
Qty: 35.44 G | Refills: 0 | Status: SHIPPED | OUTPATIENT
Start: 2022-06-06

## 2022-06-06 NOTE — PROGRESS NOTES
Chief Complaint:  Left shoulder pain    HPI:    Brown Hale is a 42year old Male who presents today for evaluation of left shoulder pain    Description of symptoms:  Patient was earlier seen for bilateral shoulder pain on 4/20/2022 by orthopedic ZULY Berg and suspected to have bilateral shoulder impingement and rotator cuff syndrome for which he received bilateral subacromial bursa cortisone injection  He has subsequently also done physical therapy rehabilitation  Overall he has had over 6 weeks of treatment in this regard  He reports good improvement of the right shoulder pain and function  Unfortunately, he continues to have left shoulder pain and some weakness  Symptoms are worse with arm abduction/lifting activity  Denies any new injury  Does report an old injury of the left shoulder rotator cuff several years ago which was treated non operatively  I have personally reviewed pertinent films in PACS  Patient Active Problem List   Diagnosis    Alcoholism ([address] )    Benign essential hypertension    Anxiety    Esophageal reflux    Essential hypertriglyceridemia    Incomplete tear of left rotator cuff    Urinary urgency    Depression    Chronic midline low back pain without sciatica    Hyponatremia    Acute pain of both shoulders        Current Outpatient Medications on File Prior to Visit   Medication Sig Dispense Refill    b complex vitamins tablet Take 1 tablet by mouth daily (Patient not taking: Reported on 5/9/2022 )      fenofibrate (TRICOR) 145 mg tablet Take 1 tablet (145 mg total) by mouth daily 90 tablet 1    irbesartan (AVAPRO) 150 mg tablet Take 1 tablet (150 mg total) by mouth in the morning   90 tablet 0    Melatonin 10 MG TABS Take by mouth daily at bedtime as needed      Multiple Vitamins-Minerals (multivitamin with minerals) tablet Take 1 tablet by mouth daily      omeprazole (PriLOSEC) 20 mg delayed release capsule Take 1 capsule (20 mg total) by mouth in the morning  90 capsule 0    Saccharomyces boulardii (Probiotic) 250 MG CAPS Take by mouth in the morning       No current facility-administered medications on file prior to visit  Allergies   Allergen Reactions    Neomycin Hives    Polymyxin B Hives        Tobacco Use: Medium Risk    Smoking Tobacco Use: Former Smoker    Smokeless Tobacco Use: Never Used        Social Determinants of Health     Tobacco Use: Medium Risk    Smoking Tobacco Use: Former Smoker    Smokeless Tobacco Use: Never Used   Alcohol Use: Not on file   Financial Resource Strain: Not on file   Food Insecurity: No Food Insecurity    Worried About Running Out of Food in the Last Year: Never true    [place] of Food in the Last Year: Never true   Transportation Needs: No Transportation Needs    Lack of Transportation (Medical): No    Lack of Transportation (Non-Medical): No   Physical Activity: Not on file   Stress: Not on file   Social Connections: Not on file   Intimate Partner Violence: Not on file   Depression: Not on file   Housing Stability: Unknown    Unable to Pay for Housing in the Last Year: No    Number of Places Lived in the Last Year: Not on file    Unstable Housing in the Last Year: No               Review of Systems     There is no height or weight on file to calculate BMI  Physical Exam     Ortho Exam:    Body part: left shoulder    Inspection:  No visible deformity or asymmetry    Palpation:  Tender to palpation over the supraspinatus tendon and some mild tenderness over the long head of the biceps tendon    Range of motion: Forward flexion is 90°, abduction is 90°, external rotation in adduction is 70° and internal rotation is at the level of L4-L5  Normal cross adduction    Special Tests:  Positive empty can test, has some discomfort with belly press test, positive Kimball and positive Neer's, negative speed's and Yergason's and equivocal Perth's  No anterior apprehension    Rotator cuff strength is subscapularis 4/5, supraspinatus 4/5, infraspinatus and teres minor are 5/5  Distal Neurovascular Status: Intact, Yes    Right shoulder exam:  No visible deformity  No areas of bony or joint line tenderness  Full range of right shoulder motion in all directions and normal rotator cuff strength  No shoulder apprehension  Clinically intact distal neurovascular status  Procedures       Assessment:     Diagnosis ICD-10-CM Associated Orders   1  Rotator cuff syndrome of left shoulder  M75 102 MRI shoulder left wo contrast     lidocaine (XYLOCAINE) 5 % ointment   2  Shoulder weakness  R29 [id number] MRI shoulder left wo contrast        Plan:    Explained my current clinical findings and reviewed radiological findings with Kvng Perkins today  He has had good improvement of the right shoulder pain and function with physical therapy rehabilitation, subacromial cortisone injection and conservative management  Unfortunately, he did not have similar results on the left side and has persistent symptoms with weakness of the left shoulder  Hence, I will request an MRI of the left shoulder foot to further evaluate for underlying rotator cuff tear  In the interim, he suggested to continue with strengthening exercises and range of motion exercises of the left shoulder  Will follow-up after left shoulder MRI  Total time spent in this encounter was over 30 minutes which included pre encounter chart review, face-to-face clinical encounter and counseling and post encounter charting  Portions of the record may have been created with voice recognition software  Occasional wrong word or "sound alike" substitutions may have occurred due to the inherent limitations of voice recognition software  Please review the chart carefully and recognize, using context, where substitutions/typographical errors may have occurred

## 2022-06-09 ENCOUNTER — OFFICE VISIT (OUTPATIENT)
Dept: INTERNAL MEDICINE CLINIC | Facility: CLINIC | Age: 47
End: 2022-06-09
Payer: COMMERCIAL

## 2022-06-09 VITALS
RESPIRATION RATE: 16 BRPM | HEART RATE: 68 BPM | BODY MASS INDEX: 31.86 KG/M2 | SYSTOLIC BLOOD PRESSURE: 122 MMHG | DIASTOLIC BLOOD PRESSURE: 72 MMHG | HEIGHT: 71 IN | OXYGEN SATURATION: 97 % | WEIGHT: 227.6 LBS

## 2022-06-09 DIAGNOSIS — N52.9 ERECTILE DYSFUNCTION, UNSPECIFIED ERECTILE DYSFUNCTION TYPE: Primary | ICD-10-CM

## 2022-06-09 PROCEDURE — 3008F BODY MASS INDEX DOCD: CPT | Performed by: NURSE PRACTITIONER

## 2022-06-09 PROCEDURE — 99213 OFFICE O/P EST LOW 20 MIN: CPT | Performed by: NURSE PRACTITIONER

## 2022-06-09 RX ORDER — SILDENAFIL CITRATE 20 MG/1
20 TABLET ORAL AS NEEDED
Qty: 30 TABLET | Refills: 2 | Status: SHIPPED | OUTPATIENT
Start: 2022-06-09

## 2022-06-09 NOTE — PROGRESS NOTES
Assessment/Plan:    Erectile dysfunction  Start sildenafil  Check testosterone level  May need referral to urology       Diagnoses and all orders for this visit:    Erectile dysfunction, unspecified erectile dysfunction type  -     sildenafil (REVATIO) 20 mg tablet; Take 1 tablet (20 mg total) by mouth if needed (erectile dysfunction)  -     Testosterone; Future  -     TSH, 3rd generation with Free T4 reflex; Future          Subjective:      Patient ID: Brown Hale is a 42 y o  male  Patient is here for ongoing issues with erectile dysfunction  He quit drinking and has been sober  Everything improved with his health but he is having new ED      The following portions of the patient's history were reviewed and updated as appropriate: allergies, current medications, past family history, past medical history, past social history, past surgical history and problem list     Review of Systems   Constitutional: Negative  HENT: Negative  Eyes: Negative  Respiratory: Negative  Cardiovascular: Negative  Gastrointestinal: Negative  Musculoskeletal: Negative  Neurological: Negative  Objective:      /72   Pulse 68   Resp 16   Ht 5' 11" (1 803 m)   Wt 103 kg (227 lb 9 6 oz)   SpO2 97%   BMI 31 74 kg/m²          Physical Exam  Vitals reviewed  Constitutional:       Appearance: Normal appearance  HENT:      Head: Normocephalic and atraumatic  Musculoskeletal:         General: Normal range of motion  Skin:     General: Skin is warm and dry  Neurological:      General: No focal deficit present  Mental Status: He is alert and oriented to person, place, and time     Psychiatric:         Mood and Affect: Mood normal

## 2022-06-12 ENCOUNTER — HOSPITAL ENCOUNTER (OUTPATIENT)
Dept: RADIOLOGY | Facility: HOSPITAL | Age: 47
Discharge: HOME | End: 2022-06-12
Attending: ORTHOPAEDIC SURGERY
Payer: COMMERCIAL

## 2022-06-12 DIAGNOSIS — M75.102 ROTATOR CUFF SYNDROME OF LEFT SHOULDER: ICD-10-CM

## 2022-06-12 DIAGNOSIS — R29.898 SHOULDER WEAKNESS: ICD-10-CM

## 2022-06-12 PROBLEM — N52.9 ERECTILE DYSFUNCTION: Status: ACTIVE | Noted: 2022-06-12

## 2022-06-12 PROCEDURE — G1004 CDSM NDSC: HCPCS

## 2022-06-12 PROCEDURE — 73221 MRI JOINT UPR EXTREM W/O DYE: CPT

## 2022-06-15 ENCOUNTER — TELEPHONE (OUTPATIENT)
Dept: NEPHROLOGY | Facility: CLINIC | Age: 47
End: 2022-06-15

## 2022-06-15 ENCOUNTER — LAB (OUTPATIENT)
Dept: LAB | Facility: HOSPITAL | Age: 47
End: 2022-06-15
Payer: COMMERCIAL

## 2022-06-15 DIAGNOSIS — E78.1 ESSENTIAL HYPERTRIGLYCERIDEMIA: ICD-10-CM

## 2022-06-15 DIAGNOSIS — I10 BENIGN ESSENTIAL HYPERTENSION: ICD-10-CM

## 2022-06-15 DIAGNOSIS — N52.9 ERECTILE DYSFUNCTION, UNSPECIFIED ERECTILE DYSFUNCTION TYPE: ICD-10-CM

## 2022-06-15 DIAGNOSIS — E87.1 HYPONATREMIA: ICD-10-CM

## 2022-06-15 LAB
ALBUMIN SERPL BCP-MCNC: 3.8 G/DL (ref 3.5–5)
ALP SERPL-CCNC: 21 U/L (ref 46–116)
ALT SERPL W P-5'-P-CCNC: 29 U/L (ref 12–78)
ANION GAP SERPL CALCULATED.3IONS-SCNC: 4 MMOL/L (ref 4–13)
AST SERPL W P-5'-P-CCNC: 17 U/L (ref 5–45)
BASOPHILS # BLD AUTO: 0.05 THOUSANDS/ΜL (ref 0–0.1)
BASOPHILS NFR BLD AUTO: 1 % (ref 0–1)
BILIRUB SERPL-MCNC: 0.9 MG/DL (ref 0.2–1)
BUN SERPL-MCNC: 17 MG/DL (ref 5–25)
CALCIUM SERPL-MCNC: 9.1 MG/DL (ref 8.3–10.1)
CHLORIDE SERPL-SCNC: 112 MMOL/L (ref 100–108)
CHOLEST SERPL-MCNC: 183 MG/DL
CO2 SERPL-SCNC: 25 MMOL/L (ref 21–32)
CREAT SERPL-MCNC: 0.96 MG/DL (ref 0.6–1.3)
EOSINOPHIL # BLD AUTO: 0.09 THOUSAND/ΜL (ref 0–0.61)
EOSINOPHIL NFR BLD AUTO: 2 % (ref 0–6)
ERYTHROCYTE [DISTWIDTH] IN BLOOD BY AUTOMATED COUNT: 11.8 % (ref 11.6–15.1)
GFR SERPL CREATININE-BSD FRML MDRD: 95 ML/MIN/1.73SQ M
GLUCOSE P FAST SERPL-MCNC: 120 MG/DL (ref 65–99)
HCT VFR BLD AUTO: 40.9 % (ref 36.5–49.3)
HDLC SERPL-MCNC: 33 MG/DL
HGB BLD-MCNC: 14.2 G/DL (ref 12–17)
IMM GRANULOCYTES # BLD AUTO: 0.01 THOUSAND/UL (ref 0–0.2)
IMM GRANULOCYTES NFR BLD AUTO: 0 % (ref 0–2)
LDLC SERPL CALC-MCNC: 106 MG/DL (ref 0–100)
LYMPHOCYTES # BLD AUTO: 1.55 THOUSANDS/ΜL (ref 0.6–4.47)
LYMPHOCYTES NFR BLD AUTO: 28 % (ref 14–44)
MCH RBC QN AUTO: 30.2 PG (ref 26.8–34.3)
MCHC RBC AUTO-ENTMCNC: 34.7 G/DL (ref 31.4–37.4)
MCV RBC AUTO: 87 FL (ref 82–98)
MONOCYTES # BLD AUTO: 0.46 THOUSAND/ΜL (ref 0.17–1.22)
MONOCYTES NFR BLD AUTO: 8 % (ref 4–12)
NEUTROPHILS # BLD AUTO: 3.47 THOUSANDS/ΜL (ref 1.85–7.62)
NEUTS SEG NFR BLD AUTO: 61 % (ref 43–75)
NRBC BLD AUTO-RTO: 0 /100 WBCS
PLATELET # BLD AUTO: 239 THOUSANDS/UL (ref 149–390)
PMV BLD AUTO: 10.8 FL (ref 8.9–12.7)
POTASSIUM SERPL-SCNC: 4 MMOL/L (ref 3.5–5.3)
PROT SERPL-MCNC: 7.2 G/DL (ref 6.4–8.2)
RBC # BLD AUTO: 4.7 MILLION/UL (ref 3.88–5.62)
SODIUM SERPL-SCNC: 141 MMOL/L (ref 136–145)
TESTOST SERPL-MCNC: 433 NG/DL (ref 113–1065)
TRIGL SERPL-MCNC: 219 MG/DL
TSH SERPL DL<=0.05 MIU/L-ACNC: 2.34 UIU/ML (ref 0.45–4.5)
WBC # BLD AUTO: 5.63 THOUSAND/UL (ref 4.31–10.16)

## 2022-06-15 PROCEDURE — 84443 ASSAY THYROID STIM HORMONE: CPT

## 2022-06-15 PROCEDURE — 84403 ASSAY OF TOTAL TESTOSTERONE: CPT

## 2022-06-15 PROCEDURE — 36415 COLL VENOUS BLD VENIPUNCTURE: CPT

## 2022-06-15 PROCEDURE — 80053 COMPREHEN METABOLIC PANEL: CPT

## 2022-06-15 PROCEDURE — 80061 LIPID PANEL: CPT

## 2022-06-15 PROCEDURE — 85025 COMPLETE CBC W/AUTO DIFF WBC: CPT

## 2022-06-15 NOTE — RESULT ENCOUNTER NOTE
Please let the patient know that most recent lab work in terms of renal parameters are stable and his serum sodium continues to be stable and normal and there is no need for salt tablets to be initiated      Please let me know if he has any questions or concerns otherwise he can continue follow up with his PCP and does not need to return back to Nephrology  Thanks

## 2022-06-15 NOTE — TELEPHONE ENCOUNTER
----- Message from Reny Smith MD sent at 6/15/2022  1:21 PM EDT -----  Please let the patient know that most recent lab work in terms of renal parameters are stable and his serum sodium continues to be stable and normal and there is no need for salt tablets to be initiated      Please let me know if he has any questions or concerns otherwise he can continue follow up with his PCP and does not need to return back to Nephrology  Thanks

## 2022-06-29 ENCOUNTER — TELEPHONE (OUTPATIENT)
Dept: OBGYN CLINIC | Facility: OTHER | Age: 47
End: 2022-06-29

## 2022-06-29 DIAGNOSIS — M19.012 ARTHRITIS OF LEFT ACROMIOCLAVICULAR JOINT: ICD-10-CM

## 2022-06-29 DIAGNOSIS — M75.102 ROTATOR CUFF SYNDROME OF LEFT SHOULDER: Primary | ICD-10-CM

## 2022-06-29 NOTE — TELEPHONE ENCOUNTER
I called the patient today at 12:55 p m  And explained him the left shoulder MRI results  This reveals a small bursal side tear of the anterior supraspinatus insertion along with moderate to severe osteoarthritis exerting mass effect on the supraspinatus tendon  The patient has already tried conservative management including subacromial bursa cortisone injection and physical therapy and has residual symptoms  Hence, at this time I recommend him a surgical evaluation  A referral to Dr Gaston Calle is being made in this regard  The patient was agreeable with this plan  Home

## 2022-07-07 NOTE — PROGRESS NOTES
Patient has been referred to ortho surgery for a consult; current POC will be D/C at this time  Patient may return for a new evaluation as needed

## 2022-07-14 DIAGNOSIS — E78.1 ESSENTIAL HYPERTRIGLYCERIDEMIA: ICD-10-CM

## 2022-07-14 RX ORDER — FENOFIBRATE 145 MG/1
TABLET, COATED ORAL
Qty: 90 TABLET | Refills: 1 | Status: SHIPPED | OUTPATIENT
Start: 2022-07-14

## 2022-07-18 ENCOUNTER — OFFICE VISIT (OUTPATIENT)
Dept: OBGYN CLINIC | Facility: OTHER | Age: 47
End: 2022-07-18
Payer: COMMERCIAL

## 2022-07-18 VITALS
HEIGHT: 71 IN | HEART RATE: 94 BPM | WEIGHT: 220.6 LBS | SYSTOLIC BLOOD PRESSURE: 149 MMHG | DIASTOLIC BLOOD PRESSURE: 95 MMHG | BODY MASS INDEX: 30.88 KG/M2

## 2022-07-18 DIAGNOSIS — M75.102 TEAR OF LEFT SUPRASPINATUS TENDON: Primary | ICD-10-CM

## 2022-07-18 PROCEDURE — 99244 OFF/OP CNSLTJ NEW/EST MOD 40: CPT | Performed by: ORTHOPAEDIC SURGERY

## 2022-07-18 RX ORDER — CHLORHEXIDINE GLUCONATE 0.12 MG/ML
15 RINSE ORAL ONCE
Status: CANCELLED | OUTPATIENT
Start: 2022-07-18 | End: 2022-07-18

## 2022-07-18 NOTE — PATIENT INSTRUCTIONS
You are being scheduled for a shoulder arthroscopy to treat your symptoms  Below are some instructions and information on what to expect before and after your surgery  Pre-Surgical Preparation for Arthroscopic Shoulder Surgery: You will be contacted the evening prior to your surgery to confirm the scheduled time of the procedure and when to arrive at the hospital    Do not eat or drink anything after midnight the night before your surgery  Since you are having out-patient surgery, make sure that you have someone who can drive you home later in the day  Also, prepare that person for a long day, as the process of safely preparing for and recovering from the procedure is more time consuming than the actual procedure! As you will be in a sling after surgery, please wear or bring a loose fitting button-down shirt so that you can easily place this over the sling when you leave the surgical suite  This avoids having to place the operative arm in a sleeve  Most patients find that this is the easiest outfit to wear for the first week or so after surgery so you may want to plan accordingly  Most patients find that lying down in bed after shoulder surgery accentuates their discomfort  This is likely related to the effect of gravity on the swelling in the shoulder  As a result, most patients sleep better in a recliner or in bed with pillows propped up behind their back for the first few days or weeks after surgery  It is a good idea to plan for this ahead of time so there will be less hassle getting things set up the night after surgery  What to Expect After Arthroscopic Shoulder Surgery: It is normal to have swelling and discomfort in the shoulder for several days or a week after surgery  It is also normal to have a small amount of drainage from the surgical wounds (especially the first few days after surgery), as we put fluid into the shoulder to visualize the structures during surgery  It is NOT normal to have foul smelling, purulent drainage and if this is noted, please contact the office immediately or proceed to the emergency room for evaluation as this may indicate an infection  Applying ice bags to the shoulder may help with pain that is not controlled by the regional block  Ice should be applied 20-30 minutes at a time, every hour or two  Make sure to put a thin towel or T-shirt next to your skin to avoid direct contact of the ice with the skin  Icing is most helpful in the first 48 hours, although many people find that continuing past this time frame lessens their postoperative pain  Please note that your post-operative dressing is not conductive to ice, so if you need to, it is okay to remove that dressing even the night after surgery and place band-aids over the wounds in order for the ice to take effect  Pain Control    Most patients will receive a nerve block, the local anesthetic may keep your whole arm numb for up to 4 days  You will be given a prescription for narcotic pain medication when you are discharged from the hospital   With the newer nerve block that is being utilized, patients are rarely requiring the use of this narcotic pain medication  If you find you do not tolerate that type of pain medicine well, call our office and we will try another one  In addition to the narcotic pain medication, it is safe to use an anti-inflammatory (unless the patient has a medical condition that would not allow safe use of this mediation)  This includes the Advil, Motrin, Ibuprofen and Alleve category of medications  Simply follow the over the counter dosing on the package and use as indicated as another adjunct  Importantly since these medications are all very similar, use only one of them  Tylenol is a separate medication that can be utilized as well and can be taken at the same time as the other medication or given in a "staggered" manner    Just make sure that you follow the dosing on the over the counter bottle instructions  Also make sure that the pain medication prescribed by Dr Yamil Shrestha team does not contain acetaminophen (this is found in Percocet and Vicodin)  Typically we do not prescribe those types of pain medications but if for some reason that has been prescribed DO NOT add more Tylenol (acetaminophen) as you could end up taking too much of that medication  As mentioned above, most patients find that lying down accentuates their discomfort  You might sleep better in a recliner, or propped up in bed  Dr Janae Bar encourages patients to safely ambulate around the house as much as possible in the first few days after the procedure as this can help with blood circulation in the legs  While the incidence of blood clots is very rare following shoulder surgery, early ambulation is a great way to help decrease the already low rate  24 hours after the surgery you may remove the bandage and cover incisions with Band-Aids if needed  At that time you may shower, the wounds will have a surgical glue that will protect them from shower water but do not submerge your incisions directly (bathing or swimming) until at least 2 weeks post-operatively  It is safe to let the arm hang at the side and take a shower and put on a shirt without the sling on  Just make sure that you do not use the operative are to reach out and grab anything as that may damage the repair  When you are done showering and getting dressed please return the operative arm to the sling  Unless noted otherwise in your discharge paperwork, Dr Janae Bar uses absorbable sutures so they do not need to be removed  Dr J Carlos Smith physician assistant (PA) will see you in the office a few days after the procedure to review the intra-operative findings and to initiate physical therapy if appropriate    A post-operative appointment should have been scheduled for you already, but if for some reason this did not happen, please call the office to make one  Physical therapy is important after nearly all shoulder surgeries and a detailed rehabilitation plan based on the specific intra-operative findings and procedures will be provided to your therapist at the first post-operative office visit  Most patients have post-operative therapy appointments scheduled pre-operatively, but if you do not, that will be handled at the first post-operative office visit  Unless expressly directed otherwise it is safe to remove the sling even the first day after the surgery and let the arm hang by the side  This allows patients to shower and even put a shirt on (bad arm in the sleeve first)  It is also safe to flex and extend their wrist, hand and fingers as much as possible when the block wears off  These simple motions can serve to pump fluid out of the forearm and decrease swelling in the arm

## 2022-07-18 NOTE — PROGRESS NOTES
Assessment  Diagnoses and all orders for this visit:    Tear of left supraspinatus tendon    Discussion and Plan:    · Explained to the patient that his MRI does reveal a full thickness tear of the bursal side of his supraspinatus tendon of the left shoulder  He continues to be symptomatic despite physical therapy/HEP and a cortisone injection performed by Dr Kerry Tenorio  At this time, he is a candidate to have a rotator cuff repair if his symptoms limit his daily activities  He was in agreement with this treatment plan and wished to proceed  · A thorough discussion was performed with the patient regarding the risks and benefit of operative and nonoperative treatment of their rotator cuff tear  Risks discussed include but were not limited to infection, neurovascular injury, recurrent tear, nonhealing of the repair, need for further surgery, need for biceps tenodesis or tenotomy, stiffness, need for prolonged rehabilitation, as well as the risk of anesthesia  After this discussion all questions were answered and informed consent was obtained in the office for arthroscopic rotator cuff repair of the left shoulder  The patient will be scheduled for this procedure accordingly  · Follow up post operatively with Kylah Rosas PA-C    Subjective:   Patient ID: Bladimir Isaac is a 42 y o  male    The patient presents today for an orthopedic surgery consultation visit at the request of Dr Kerry Tenorio on 6/29/2022 with a chief complaint of left shoulder pain  The pain began a few month(s) ago and is associated with an acute injury  Patient reports that several years ago he was injured after doing monkey bars and then recently while playing volleyball  The patient describes the pain as aching and dull in intensity,  intermittent, occurring with increasing frequency in timing, and localizes the pain to the  left subacromial joint, deltoid    The pain is worse with overhead work, overuse and raising arm over head and relieved by rest, ice, avoiding the painful activities  The pain is not associated with numbness and tingling  The pain is not associated with constitutional symptoms  The patient is awoken at night by the pain  The patient has had prior treatment in the form of physical therapy/HEP and cortisone injection with minimal benefit  The following portions of the patient's history were reviewed and updated as appropriate: allergies, current medications, past family history, past medical history, past social history, past surgical history and problem list     Objective:  /95   Pulse 94   Ht 5' 11" (1 803 m)   Wt 100 kg (220 lb 9 6 oz)   BMI 30 77 kg/m²       Left Shoulder Exam     Tenderness   The patient is experiencing no tenderness  Range of Motion   External rotation: 60   Left shoulder forward flexion: AROM: 120  Full PROM  Muscle Strength   Abduction: 4/5     Tests   Drop arm: positive    Other   Erythema: absent  Sensation: normal  Pulse: present             Physical Exam  Constitutional:       General: He is not in acute distress  Appearance: He is well-developed  Eyes:      Conjunctiva/sclera: Conjunctivae normal       Pupils: Pupils are equal, round, and reactive to light  Cardiovascular:      Rate and Rhythm: Normal rate and regular rhythm  Pulses: Normal pulses  Heart sounds: Normal heart sounds  Pulmonary:      Effort: Pulmonary effort is normal       Breath sounds: Normal breath sounds  Abdominal:      General: Bowel sounds are normal       Palpations: Abdomen is soft  Musculoskeletal:      Cervical back: Normal range of motion and neck supple  Skin:     General: Skin is warm and dry  Findings: No erythema or rash  Neurological:      Mental Status: He is alert and oriented to person, place, and time  Deep Tendon Reflexes: Reflexes are normal and symmetric     Psychiatric:         Behavior: Behavior normal          I have personally reviewed pertinent films in PACS and my interpretation is as follows  MRI Left Shoulder 6/12/2022: Likely, full thickness tear of the bursal side of the supraspinatus tendon  X Ray Left Shoulder 4/20/2022: No acute osseous abnormalities  Mild AC joint osteoarthritis  No glenohumeral joint osteoarthritis       Scribe Attestation    I,:  Stephanie Baltazar am acting as a scribe while in the presence of the attending physician :       I,:  Evie Ardon MD personally performed the services described in this documentation    as scribed in my presence :

## 2022-08-18 DIAGNOSIS — K21.9 GASTROESOPHAGEAL REFLUX DISEASE: ICD-10-CM

## 2022-08-18 DIAGNOSIS — I10 BENIGN ESSENTIAL HYPERTENSION: ICD-10-CM

## 2022-08-18 RX ORDER — IRBESARTAN 150 MG/1
150 TABLET ORAL DAILY
Qty: 90 TABLET | Refills: 0 | Status: SHIPPED | OUTPATIENT
Start: 2022-08-18 | End: 2022-11-16

## 2022-08-18 RX ORDER — OMEPRAZOLE 20 MG/1
20 CAPSULE, DELAYED RELEASE ORAL DAILY
Qty: 90 CAPSULE | Refills: 0 | Status: SHIPPED | OUTPATIENT
Start: 2022-08-18 | End: 2022-11-16

## 2022-08-26 ENCOUNTER — ANESTHESIA EVENT (OUTPATIENT)
Dept: PERIOP | Facility: AMBULARY SURGERY CENTER | Age: 47
End: 2022-08-26
Payer: COMMERCIAL

## 2022-09-02 ENCOUNTER — EVALUATION (OUTPATIENT)
Dept: PHYSICAL THERAPY | Facility: OTHER | Age: 47
End: 2022-09-02
Payer: COMMERCIAL

## 2022-09-02 DIAGNOSIS — M75.102 TEAR OF LEFT SUPRASPINATUS TENDON: Primary | ICD-10-CM

## 2022-09-02 PROCEDURE — 97161 PT EVAL LOW COMPLEX 20 MIN: CPT | Performed by: PHYSICAL MEDICINE & REHABILITATION

## 2022-09-02 NOTE — PROGRESS NOTES
PT Evaluation     Today's date: 2022  Patient name: Mercedes Finnegan  : 1975  MRN: [id number]  Referring provider: Shanel Barton  Dx:   Encounter Diagnosis     ICD-10-CM    1  Tear of left supraspinatus tendon  M75 102 Ambulatory Referral to Physical Therapy                Assessment  Assessment details: Pt is a pleasant 41 y o  male presenting to outpatient physical therapy with sgs/sxs that appear consistent with referring dx and planned course of surgical treatment  Pt presents with pain, decreased range of motion, decreased strength, and decreased tolerance to activity  All patient questions regarding expected recovery answered, patient prepared for surgery given previous surgical history on R shoulder  Following surgical intervention patient will be a good candidate for outpatient physical therapy and will benefit from skilled physical therapy to address limitations and achieve goals, ensure safe return to PLOF  Thank you for this referral    Impairments: abnormal coordination, abnormal or restricted ROM, activity intolerance, impaired physical strength and pain with function  Understanding of Dx/Px/POC: good   Prognosis: good    Goals  ST  Patient will report 25% decrease in pain in 4 weeks  2  Patient will demonstrate 25% improvement in ROM in 4 weeks  3  Patient will demonstrate 1/2 grade improvement in strength in 4 weeks  LT  Patient will be able to perform IADLS without restriction or pain by discharge  2  Patient will be independent in HEP by discharge  3  Patient will be able to return to recreational/work duties without restriction or pain by discharge        Plan  Patient would benefit from: PT eval and skilled PT  Planned modality interventions: cryotherapy and thermotherapy: hydrocollator packs  Planned therapy interventions: IADL retraining, body mechanics training, flexibility, functional ROM exercises, home exercise program, neuromuscular re-education, manual therapy, postural training, strengthening, stretching, therapeutic activities, therapeutic exercise and joint mobilization  Frequency: 2x week  Duration in visits: 12  Duration in weeks: 6  Treatment plan discussed with: patient        Subjective Evaluation    History of Present Illness  Mechanism of injury: Patient presents with c/o B shoulder pain  L shoulder is scheduled for surgery 22  Original injury sustained years ago during and obstacle course race, recent exacerbation in February while playing volleyball  Patient denies loss of  strength, N/T  Increased pain and difficulty with upper body dressing/ADLs  Patient has a mostly sedentary job, will need to be able to type/use computer  H/o R labral repair, R shoulder surgery  Patient is RHD  Pain  Current pain ratin  At worst pain rating: 10          Objective     Active Range of Motion   Left Shoulder   Flexion: 70 degrees   Abduction: 85 degrees   External rotation BTH: Active external rotation behind the head: ear  Internal rotation BTB: sacrum     Additional Active Range of Motion Details  Discomfort in all planes    Passive Range of Motion   Left Shoulder   Flexion: 95 degrees   Abduction: 110 degrees   External rotation 45°: 45 degrees   Internal rotation 45°: Left shoulder passive external rotation at 45 degrees: to body       Additional Passive Range of Motion Details  Discomfort at end range in all planes    General Comments:      Shoulder Comments   Mild TTP through [address] area               Precautions: L RCR 22, h/o R labral repair     Manuals             L shoulder PROM per protocol                                                    Neuro Re-Ed             Scap squeeze                                                                                           Ther Ex             Pendulums             Table slides             Griping             Wrist and elbow ROM Ther Activity                                       Gait Training                                       Modalities

## 2022-09-08 NOTE — PRE-PROCEDURE INSTRUCTIONS
Pre-Surgery Instructions:   Medication Instructions    fenofibrate (TRICOR) 145 mg tablet Hold day of surgery   irbesartan (AVAPRO) 150 mg tablet Hold day of surgery   Multiple Vitamins-Minerals (multivitamin with minerals) tablet Hold day of surgery   omeprazole (PriLOSEC) 20 mg delayed release capsule Take as directed    Saccharomyces boulardii (Probiotic) 250 MG CAPS Take as directed    sildenafil (REVATIO) 20 mg tablet PRN      My Surgical Experience    The following information was developed to assist you to prepare for your operation  What do I need to do before coming to the hospital?   Arrange for a responsible person to drive you to and from the hospital    Arrange care for your children at home  Children are not allowed in the recovery areas of the hospital   Plan to wear clothing that is easy to put on and take off  If you are having shoulder surgery, wear a shirt that buttons or zippers in the front  Bathing  o Shower the evening before and the morning of your surgery with an antibacterial soap  Please refer to the Pre Op Showering Instructions for Surgery Patients Sheet   o Remove nail polish and all body piercing jewelry  o Do not shave any body part for at least 24 hours before surgery-this includes face, arms, legs and upper body  Food  o Nothing to eat or drink after midnight the night before your surgery  This includes candy and chewing gum  o Exception: If your surgery is after 12:00pm (noon), you may have clear liquids such as 7-Up®, ginger ale, apple or cranberry juice, Jell-O®, water, or clear broth until 8:00 am  o Do not drink milk or juice with pulp on the morning before surgery  o Do not drink alcohol 24 hours before surgery  Medicine  o Follow instructions you received from your surgeon about which medicines you may take on the day of surgery  o If instructed to take medicine on the morning of surgery, take pills with just a small sip of water   Call your prescribing doctor for specific infroamtion on what to do if you take insulin    What should I bring to the hospital?    Bring:  Soni Muslim or a walker, if you have them, for foot or knee surgery   A list of the daily medicines, vitamins, minerals, herbals and nutritional supplements you take  Include the dosages of medicines and the time you take them each day   Glasses, dentures or hearing aids   Minimal clothing; you will be wearing hospital sleepwear   Photo ID; required to verify your identity   If you have a Living Will or Power of , bring a copy of the documents   If you have an ostomy, bring an extra pouch and any supplies you use    Do not bring   Medicines or inhalers   Money, valuables or jewelry    What other information should I know about the day of surgery?  Notify your surgeons if you develop a cold, sore throat, cough, fever, rash or any other illness   Report to the Ambulatory Surgical/Same Day Surgery Unit   You will be instructed to stop at Registration only if you have not been pre-registered   Inform your  fi they do not stay that they will be asked by the staff to leave a phone number where they can be reached   Be available to be reached before surgery  In the event the operating room schedule changes, you may be asked to come in earlier or later than expected    *It is important to tell your doctor and others involved in your health care if you are taking or have been taking any non-prescription drugs, vitamins, minerals, herbals or other nutritional supplements   Any of these may interact with some food or medicines and cause a reaction

## 2022-09-09 ENCOUNTER — ANESTHESIA (OUTPATIENT)
Dept: PERIOP | Facility: AMBULARY SURGERY CENTER | Age: 47
End: 2022-09-09
Payer: COMMERCIAL

## 2022-09-09 ENCOUNTER — HOSPITAL ENCOUNTER (OUTPATIENT)
Facility: AMBULARY SURGERY CENTER | Age: 47
Setting detail: OUTPATIENT SURGERY
Discharge: HOME | End: 2022-09-09
Attending: ORTHOPAEDIC SURGERY | Admitting: ORTHOPAEDIC SURGERY
Payer: COMMERCIAL

## 2022-09-09 VITALS
DIASTOLIC BLOOD PRESSURE: 79 MMHG | HEART RATE: 86 BPM | OXYGEN SATURATION: 94 % | RESPIRATION RATE: 18 BRPM | TEMPERATURE: 97 F | SYSTOLIC BLOOD PRESSURE: 128 MMHG

## 2022-09-09 DIAGNOSIS — M75.102 TEAR OF LEFT SUPRASPINATUS TENDON: Primary | ICD-10-CM

## 2022-09-09 PROCEDURE — 29827 SHO ARTHRS SRG RT8TR CUF RPR: CPT | Performed by: PHYSICIAN ASSISTANT

## 2022-09-09 PROCEDURE — C1713 ANCHOR/SCREW BN/BN,TIS/BN: HCPCS | Performed by: ORTHOPAEDIC SURGERY

## 2022-09-09 PROCEDURE — NC001 PR NO CHARGE: Performed by: ORTHOPAEDIC SURGERY

## 2022-09-09 PROCEDURE — 29827 SHO ARTHRS SRG RT8TR CUF RPR: CPT | Performed by: ORTHOPAEDIC SURGERY

## 2022-09-09 PROCEDURE — C9290 INJ, BUPIVACAINE LIPOSOME: HCPCS | Performed by: ANESTHESIOLOGY

## 2022-09-09 DEVICE — ANCHOR SUT 5.5 X 14.7MM BCMPS CRKSCR FLLY THRD TRIPLEPLAY: Type: IMPLANTABLE DEVICE | Site: SHOULDER | Status: FUNCTIONAL

## 2022-09-09 RX ORDER — DEXAMETHASONE SODIUM PHOSPHATE 10 MG/ML
INJECTION, SOLUTION INTRAMUSCULAR; INTRAVENOUS AS NEEDED
Status: DISCONTINUED | OUTPATIENT
Start: 2022-09-09 | End: 2022-09-09

## 2022-09-09 RX ORDER — BUPIVACAINE HYDROCHLORIDE 5 MG/ML
INJECTION, SOLUTION PERINEURAL
Status: COMPLETED | OUTPATIENT
Start: 2022-09-09 | End: 2022-09-09

## 2022-09-09 RX ORDER — OXYCODONE HYDROCHLORIDE 5 MG/1
TABLET ORAL
Qty: 13 TABLET | Refills: 0 | Status: SHIPPED | OUTPATIENT
Start: 2022-09-09

## 2022-09-09 RX ORDER — HYDROMORPHONE HCL/PF 1 MG/ML
0.2 SYRINGE (ML) INJECTION
Status: DISCONTINUED | OUTPATIENT
Start: 2022-09-09 | End: 2022-09-09 | Stop reason: HOSPADM

## 2022-09-09 RX ORDER — CHLORHEXIDINE GLUCONATE 0.12 MG/ML
15 RINSE ORAL ONCE
Status: DISCONTINUED | OUTPATIENT
Start: 2022-09-09 | End: 2022-09-09 | Stop reason: HOSPADM

## 2022-09-09 RX ORDER — PROPOFOL 10 MG/ML
INJECTION, EMULSION INTRAVENOUS AS NEEDED
Status: DISCONTINUED | OUTPATIENT
Start: 2022-09-09 | End: 2022-09-09

## 2022-09-09 RX ORDER — EPHEDRINE SULFATE 50 MG/ML
INJECTION INTRAVENOUS AS NEEDED
Status: DISCONTINUED | OUTPATIENT
Start: 2022-09-09 | End: 2022-09-09

## 2022-09-09 RX ORDER — OXYCODONE HYDROCHLORIDE 5 MG/1
5 TABLET ORAL EVERY 4 HOURS PRN
Status: DISCONTINUED | OUTPATIENT
Start: 2022-09-09 | End: 2022-09-09 | Stop reason: HOSPADM

## 2022-09-09 RX ORDER — PROMETHAZINE HYDROCHLORIDE 25 MG/ML
25 INJECTION, SOLUTION INTRAMUSCULAR; INTRAVENOUS ONCE AS NEEDED
Status: DISCONTINUED | OUTPATIENT
Start: 2022-09-09 | End: 2022-09-09 | Stop reason: HOSPADM

## 2022-09-09 RX ORDER — ONDANSETRON 2 MG/ML
4 INJECTION INTRAMUSCULAR; INTRAVENOUS EVERY 6 HOURS PRN
Status: DISCONTINUED | OUTPATIENT
Start: 2022-09-09 | End: 2022-09-09 | Stop reason: HOSPADM

## 2022-09-09 RX ORDER — CEFAZOLIN SODIUM 2 G/50ML
2000 SOLUTION INTRAVENOUS ONCE
Status: COMPLETED | OUTPATIENT
Start: 2022-09-09 | End: 2022-09-09

## 2022-09-09 RX ORDER — FENTANYL CITRATE/PF 50 MCG/ML
25 SYRINGE (ML) INJECTION
Status: DISCONTINUED | OUTPATIENT
Start: 2022-09-09 | End: 2022-09-09 | Stop reason: HOSPADM

## 2022-09-09 RX ORDER — SODIUM CHLORIDE, SODIUM LACTATE, POTASSIUM CHLORIDE, CALCIUM CHLORIDE 600; 310; 30; 20 MG/100ML; MG/100ML; MG/100ML; MG/100ML
INJECTION, SOLUTION INTRAVENOUS CONTINUOUS PRN
Status: DISCONTINUED | OUTPATIENT
Start: 2022-09-09 | End: 2022-09-09

## 2022-09-09 RX ORDER — ACETAMINOPHEN 325 MG/1
650 TABLET ORAL EVERY 6 HOURS PRN
Status: DISCONTINUED | OUTPATIENT
Start: 2022-09-09 | End: 2022-09-09 | Stop reason: HOSPADM

## 2022-09-09 RX ORDER — MIDAZOLAM HYDROCHLORIDE 2 MG/2ML
INJECTION, SOLUTION INTRAMUSCULAR; INTRAVENOUS AS NEEDED
Status: DISCONTINUED | OUTPATIENT
Start: 2022-09-09 | End: 2022-09-09

## 2022-09-09 RX ORDER — ONDANSETRON 2 MG/ML
INJECTION INTRAMUSCULAR; INTRAVENOUS AS NEEDED
Status: DISCONTINUED | OUTPATIENT
Start: 2022-09-09 | End: 2022-09-09

## 2022-09-09 RX ORDER — FENTANYL CITRATE 50 UG/ML
INJECTION, SOLUTION INTRAMUSCULAR; INTRAVENOUS AS NEEDED
Status: DISCONTINUED | OUTPATIENT
Start: 2022-09-09 | End: 2022-09-09

## 2022-09-09 RX ORDER — LIDOCAINE HYDROCHLORIDE 10 MG/ML
INJECTION, SOLUTION EPIDURAL; INFILTRATION; INTRACAUDAL; PERINEURAL AS NEEDED
Status: DISCONTINUED | OUTPATIENT
Start: 2022-09-09 | End: 2022-09-09

## 2022-09-09 RX ORDER — ONDANSETRON 2 MG/ML
4 INJECTION INTRAMUSCULAR; INTRAVENOUS ONCE AS NEEDED
Status: DISCONTINUED | OUTPATIENT
Start: 2022-09-09 | End: 2022-09-09 | Stop reason: HOSPADM

## 2022-09-09 RX ADMIN — DEXAMETHASONE SODIUM PHOSPHATE 10 MG: 10 INJECTION, SOLUTION INTRAMUSCULAR; INTRAVENOUS at 07:44

## 2022-09-09 RX ADMIN — BUPIVACAINE HYDROCHLORIDE 5 ML: 5 INJECTION, SOLUTION PERINEURAL at 07:24

## 2022-09-09 RX ADMIN — SODIUM CHLORIDE, SODIUM LACTATE, POTASSIUM CHLORIDE, AND CALCIUM CHLORIDE: .6; .31; .03; .02 INJECTION, SOLUTION INTRAVENOUS at 06:45

## 2022-09-09 RX ADMIN — LIDOCAINE HYDROCHLORIDE 30 MG: 10 INJECTION, SOLUTION EPIDURAL; INFILTRATION; INTRACAUDAL at 07:36

## 2022-09-09 RX ADMIN — FENTANYL CITRATE 25 MCG: 50 INJECTION, SOLUTION INTRAMUSCULAR; INTRAVENOUS at 07:41

## 2022-09-09 RX ADMIN — BUPIVACAINE 20 ML: 13.3 INJECTION, SUSPENSION, LIPOSOMAL INFILTRATION at 07:24

## 2022-09-09 RX ADMIN — PROPOFOL 200 MG: 10 INJECTION, EMULSION INTRAVENOUS at 07:36

## 2022-09-09 RX ADMIN — MIDAZOLAM HYDROCHLORIDE 1 MG: 1 INJECTION, SOLUTION INTRAMUSCULAR; INTRAVENOUS at 07:31

## 2022-09-09 RX ADMIN — MIDAZOLAM HYDROCHLORIDE 1 MG: 1 INJECTION, SOLUTION INTRAMUSCULAR; INTRAVENOUS at 07:22

## 2022-09-09 RX ADMIN — EPHEDRINE SULFATE 10 MG: 50 INJECTION, SOLUTION INTRAVENOUS at 07:59

## 2022-09-09 RX ADMIN — CEFAZOLIN SODIUM 2000 MG: 2 SOLUTION INTRAVENOUS at 07:45

## 2022-09-09 RX ADMIN — EPHEDRINE SULFATE 10 MG: 50 INJECTION, SOLUTION INTRAVENOUS at 08:11

## 2022-09-09 RX ADMIN — ONDANSETRON 4 MG: 2 INJECTION INTRAMUSCULAR; INTRAVENOUS at 07:44

## 2022-09-09 NOTE — ANESTHESIA POSTPROCEDURE EVALUATION
Post-Op Assessment Note    CV Status:  Stable    Pain management: adequate     Mental Status:  Alert and awake   Hydration Status:  Euvolemic   PONV Controlled:  Controlled   Airway Patency:  Patent      Post Op Vitals Reviewed: Yes            No complications documented      BP   119/79   Temp  97 1   Pulse  111   Resp   15   SpO2   98

## 2022-09-09 NOTE — DISCHARGE INSTRUCTIONS
You are being scheduled for a shoulder arthroscopy to treat your symptoms  Below are some instructions and information on what to expect before and after your surgery  Pre-Surgical Preparation for Arthroscopic Shoulder Surgery: You will be contacted the evening prior to your surgery to confirm the scheduled time of the procedure and when to arrive at the hospital    Do not eat or drink anything after midnight the night before your surgery  Since you are having out-patient surgery, make sure that you have someone who can drive you home later in the day  Also, prepare that person for a long day, as the process of safely preparing for and recovering from the procedure is more time consuming than the actual procedure! As you will be in a sling after surgery, please wear or bring a loose fitting button-down shirt so that you can easily place this over the sling when you leave the surgical suite  This avoids having to place the operative arm in a sleeve  Most patients find that this is the easiest outfit to wear for the first week or so after surgery so you may want to plan accordingly  Most patients find that lying down in bed after shoulder surgery accentuates their discomfort  This is likely related to the effect of gravity on the swelling in the shoulder  As a result, most patients sleep better in a recliner or in bed with pillows propped up behind their back for the first few days or weeks after surgery  It is a good idea to plan for this ahead of time so there will be less hassle getting things set up the night after surgery  What to Expect After Arthroscopic Shoulder Surgery: It is normal to have swelling and discomfort in the shoulder for several days or a week after surgery  It is also normal to have a small amount of drainage from the surgical wounds (especially the first few days after surgery), as we put fluid into the shoulder to visualize the structures during surgery  It is NOT normal to have foul smelling, purulent drainage and if this is noted, please contact the office immediately or proceed to the emergency room for evaluation as this may indicate an infection  Applying ice bags to the shoulder may help with pain that is not controlled by the regional block  Ice should be applied 20-30 minutes at a time, every hour or two  Make sure to put a thin towel or T-shirt next to your skin to avoid direct contact of the ice with the skin  Icing is most helpful in the first 48 hours, although many people find that continuing past this time frame lessens their postoperative pain  Please note that your post-operative dressing is not conductive to ice, so if you need to, it is okay to remove that dressing even the night after surgery and place band-aids over the wounds in order for the ice to take effect  Pain Control    Most patients will receive a nerve block, the local anesthetic may keep your whole arm numb for up to 4 days  You will be given a prescription for narcotic pain medication when you are discharged from the hospital   With the newer nerve block that is being utilized, patients are rarely requiring the use of this narcotic pain medication  If you find you do not tolerate that type of pain medicine well, call our office and we will try another one  In addition to the narcotic pain medication, it is safe to use an anti-inflammatory (unless the patient has a medical condition that would not allow safe use of this mediation)  This includes the Advil, Motrin, Ibuprofen and Alleve category of medications  Simply follow the over the counter dosing on the package and use as indicated as another adjunct  Importantly since these medications are all very similar, use only one of them  Tylenol is a separate medication that can be utilized as well and can be taken at the same time as the other medication or given in a "staggered" manner    Just make sure that you follow the dosing on the over the counter bottle instructions  Also make sure that the pain medication prescribed by Dr Jarocho Cheung team does not contain acetaminophen (this is found in Percocet and Vicodin)  Typically we do not prescribe those types of pain medications but if for some reason that has been prescribed DO NOT add more Tylenol (acetaminophen) as you could end up taking too much of that medication  As mentioned above, most patients find that lying down accentuates their discomfort  You might sleep better in a recliner, or propped up in bed  Dr Mimi Murphy encourages patients to safely ambulate around the house as much as possible in the first few days after the procedure as this can help with blood circulation in the legs  While the incidence of blood clots is very rare following shoulder surgery, early ambulation is a great way to help decrease the already low rate  24 hours after the surgery you may remove the bandage and cover incisions with Band-Aids if needed  At that time you may shower, the wounds will have a surgical glue that will protect them from shower water but do not submerge your incisions directly (bathing or swimming) until at least 2 weeks post-operatively  It is safe to let the arm hang at the side and take a shower and put on a shirt without the sling on  Just make sure that you do not use the operative are to reach out and grab anything as that may damage the repair  When you are done showering and getting dressed please return the operative arm to the sling  Unless noted otherwise in your discharge paperwork, Dr Mimi Murphy uses absorbable sutures so they do not need to be removed  Dr Mercedes Curiel physician assistant (PA) will see you in the office a few days after the procedure to review the intra-operative findings and to initiate physical therapy if appropriate    A post-operative appointment should have been scheduled for you already, but if for some reason this did not happen, please call the office to make one  Physical therapy is important after nearly all shoulder surgeries and a detailed rehabilitation plan based on the specific intra-operative findings and procedures will be provided to your therapist at the first post-operative office visit  Most patients have post-operative therapy appointments scheduled pre-operatively, but if you do not, that will be handled at the first post-operative office visit  Unless expressly directed otherwise it is safe to remove the sling even the first day after the surgery and let the arm hang by the side  This allows patients to shower and even put a shirt on (bad arm in the sleeve first)  It is also safe to flex and extend their wrist, hand and fingers as much as possible when the block wears off  These simple motions can serve to pump fluid out of the forearm and decrease swelling in the arm

## 2022-09-09 NOTE — ANESTHESIA PROCEDURE NOTES
Peripheral Block    Patient location during procedure: pre-op  Start time: 9/9/2022 7:24 AM  Reason for block: at surgeon's request and post-op pain management  Staffing  Performed: Anesthesiologist   Anesthesiologist: Wilson Harris MD  Preanesthetic Checklist  Completed: patient identified, IV checked, site marked, risks and benefits discussed, surgical consent, monitors and equipment checked, pre-op evaluation and timeout performed  Peripheral Block  Patient position: supine  Prep: ChloraPrep and site prepped and draped  Patient monitoring: continuous pulse ox, frequent blood pressure checks, heart rate and cardiac monitor  Block type: interscalene  Laterality: left  Injection technique: single-shot  Procedures: ultrasound guided, Ultrasound guidance required for the procedure to increase accuracy and safety of medication placement and decrease risk of complications    Ultrasound permanent image savedbupivacaine (MARCAINE) 0 5 % - Perineural   5 mL - 9/9/2022 7:24:00 AM  Needle  Needle type: Stimuplex   Needle gauge: 22 G  Needle length: 5 cm  Needle localization: ultrasound guidance  Test dose: negative  Assessment  Injection assessment: incremental injection, local visualized surrounding nerve on ultrasound, negative aspiration for CSF, negative aspiration for heme and no paresthesia on injection  Paresthesia pain: immediately resolved  Heart rate change: no  Slow fractionated injection: yes  patient tolerated the procedure well with no immediate complications  Additional Notes  With 20mL exparel

## 2022-09-09 NOTE — OP NOTE
OPERATIVE REPORT  PATIENT NAME: Kvng Peña    :  1975  MRN: [id number]  Pt Location: [facility] OR ROOM 06    SURGERY DATE: 2022     SURGEON: Prateek Willis MD     ASSISTANT: Amaya Cordero PA-C     NOTE: Amaya Cordero PA-C was present throughout the entire procedure and performed essential assistance with patient prepping, draping, positioning, suture management, wound closure, sterile dressing application and sling application, all under my direct supervision  NOTE: No qualified resident physician was available for assistance    PREOPERATIVE DIAGNOSIS:  Left Shoulder Supraspinatus Tear    POSTOPERATIVE DIAGNOSIS: Same    PROCEDURES: Surgical Arthroscopy Left Shoulder with Rotator Cuff Repair    ANESTHESIA STAFF: Jacqueline Velasco MD     ANESTHESIA TYPE: General LMA with ultrasound guided interscalene block (Exparel)  The interscalene block was provided by the anesthesia staff per my request for postoperative pain control and to decrease the use of postoperative narcotic medication for pain control  COMPLICATIONS: None    FINDINGS: Supraspinatus Tear    SPECIMEN(S):  None    ESTIMATED BLOOD LOSS: 1 mL    INDICATION:  Briefly, the patient is a 41 y o   male with left shoulder pain  MRI scan confirmed a high grade, bursal sided partial thickness supraspinatus tear  The patient elected for arthroscopic treatment  Informed consent was obtained after a thorough discussion of the risks and benefits of the procedure, as well as alternatives to the procedure  OPERATIVE TECHNIQUE:  On the day of surgery, I identified the patients left shoulder and marked it with my initials  The patient was taken to the operating room where anesthesia was induced and 2 grams of IV Cefazolin were given  The patient was examined in the supine position and was found to have full range of motion of the left shoulder with no instability    The patient was then positioned in the [address] [place] [address] position  All bony prominences were padded  The shoulder was prepped and draped in normal sterile fashion  After a time-out for safety, a standard posterior arthroscopic portal was made  Glenohumeral evaluation revealed intact glenohumeral articular cartilage with no loose bodies  There was a near full thickness supraspinatus tear at the anterior edge of the tendon  The infraspinatus and subscapularis were intact, the circumferential glenoid labrum had no tear and the biceps was seen exiting normally  The supraspinatus tendon tear was tagged with a spinal needle for evaluation on the subacromial side  After the intra-articular evaluation was completed, the scope was then placed in the subacromial space through a posterolateral portal where a thorough bursectomy was performed  The bursal side of the supraspinatus had a high-grade partial tear in the location of the articular sided changes and this was consistent with preoperative imaging  A lateral cannula was established and the few remaining fibers were released to convert this to a full-thickness tear to allow for secure repair  After release of the fibers the supraspinatus tendon tear was found to measure 1 0 cm from anterior to posterior and was able to be easily reduced to the tuberosity  The tuberosity was prepared in routine fashion and a single row repair with one 4 75 mm triple loaded [employer] Corkscrewanchor using three simple stiches through the tendon was performed achieving anatomic reduction of the rotator cuff tendon to the tuberosity  The long head of biceps was not indicated for tenodesis given intra-operative appearance  The CA ligament had minimal fraying so it was debrided to a stable border  The area was then irrigated  Scope was withdrawn  Wounds were closed with 4-0 Monocryl and Histoacryl  Sterile dressings and a sling with an abduction pillow was placed   The patient was awoken without complication and returned to the recovery room in good condition  We will see the patient back in the office next week to initiate therapy following accelerated rotator cuff repair rehabilitation protocol  At the end of procedure, the counts were correct       PATIENT DISPOSITION:  Stable to PACU      SIGNATURE: Ananya Love MD  DATE: September 9, 2022  TIME: 8:26 AM

## 2022-09-09 NOTE — ANESTHESIA PREPROCEDURE EVALUATION
Procedure:  SHOULDER ARTHROSCOPIC ROTATOR CUFF REPAIR (Left Shoulder)    Relevant Problems   CARDIO   (+) Benign essential hypertension   (+) Essential hypertriglyceridemia      GI/HEPATIC   (+) Esophageal reflux      MUSCULOSKELETAL   (+) Chronic midline low back pain without sciatica      NEURO/PSYCH   (+) Anxiety   (+) Chronic midline low back pain without sciatica   (+) Depression        Physical Exam    Airway    Mallampati score: II  TM Distance: >3 FB  Neck ROM: full     Dental   No notable dental hx     Cardiovascular      Pulmonary      Other Findings        Anesthesia Plan  ASA Score- 2     Anesthesia Type- general with ASA Monitors  Additional Monitors:   Airway Plan: LMA  Comment: preop interscalene nerve block with exparel  Plan Factors-    Chart reviewed  Patient summary reviewed  Induction- intravenous  Postoperative Plan-     Informed Consent- Anesthetic plan and risks discussed with patient  I personally reviewed this patient with the CRNA  Discussed and agreed on the Anesthesia Plan with the CRNA  Best Huddleston

## 2022-09-09 NOTE — H&P
I identified and marked the patient in the pre-op holding area after confirming the surgical consent  No changes to medical health since the H&P was preformed  The patient's prescription history was queried in the [place] [id number] to ensure compliance with applicable state laws  Assessment  Diagnoses and all orders for this visit:     Tear of left supraspinatus tendon     Discussion and Plan:     · Explained to the patient that his MRI does reveal a full thickness tear of the bursal side of his supraspinatus tendon of the left shoulder  He continues to be symptomatic despite physical therapy/HEP and a cortisone injection performed by Dr Beth Landry  At this time, he is a candidate to have a rotator cuff repair if his symptoms limit his daily activities  He was in agreement with this treatment plan and wished to proceed  · A thorough discussion was performed with the patient regarding the risks and benefit of operative and nonoperative treatment of their rotator cuff tear  Risks discussed include but were not limited to infection, neurovascular injury, recurrent tear, nonhealing of the repair, need for further surgery, need for biceps tenodesis or tenotomy, stiffness, need for prolonged rehabilitation, as well as the risk of anesthesia  After this discussion all questions were answered and informed consent was obtained in the office for arthroscopic rotator cuff repair of the left shoulder  The patient will be scheduled for this procedure accordingly  · Follow up post operatively with Sherin Chinchilla PA-C     Subjective:   Patient ID: Dayne Centeno is a 42 y o  male     The patient presents today for an orthopedic surgery consultation visit at the request of Dr Beth Landry on 6/29/2022 with a chief complaint of left shoulder pain  The pain began a few month(s) ago and is associated with an acute injury   Patient reports that several years ago he was injured after doing monkey bars and then recently while playing volleyball  The patient describes the pain as aching and dull in intensity,  intermittent, occurring with increasing frequency in timing, and localizes the pain to the  left subacromial joint, deltoid  The pain is worse with overhead work, overuse and raising arm over head and relieved by rest, ice, avoiding the painful activities  The pain is not associated with numbness and tingling  The pain is not associated with constitutional symptoms  The patient is awoken at night by the pain  The patient has had prior treatment in the form of physical therapy/HEP and cortisone injection with minimal benefit           The following portions of the patient's history were reviewed and updated as appropriate: allergies, current medications, past family history, past medical history, past social history, past surgical history and problem list      Objective:  /88   Pulse 81   Temp (!) 96 9 °F (36 1 °C)   Resp 18   SpO2 96%           Left Shoulder Exam      Tenderness   The patient is experiencing no tenderness       Range of Motion   External rotation: 60   Left shoulder forward flexion: AROM: 120  Full PROM       Muscle Strength   Abduction: 4/5      Tests   Drop arm: positive     Other   Erythema: absent  Sensation: normal  Pulse: present                  Physical Exam  Constitutional:       General: He is not in acute distress  Appearance: He is well-developed  Eyes:      Conjunctiva/sclera: Conjunctivae normal       Pupils: Pupils are equal, round, and reactive to light  Cardiovascular:      Rate and Rhythm: Normal rate and regular rhythm  Pulses: Normal pulses  Heart sounds: Normal heart sounds  Pulmonary:      Effort: Pulmonary effort is normal       Breath sounds: Normal breath sounds  Abdominal:      General: Bowel sounds are normal       Palpations: Abdomen is soft  Musculoskeletal:      Cervical back: Normal range of motion and neck supple     Skin: General: Skin is warm and dry  Findings: No erythema or rash  Neurological:      Mental Status: He is alert and oriented to person, place, and time  Deep Tendon Reflexes: Reflexes are normal and symmetric  Psychiatric:         Behavior: Behavior normal             I have personally reviewed pertinent films in PACS and my interpretation is as follows      MRI Left Shoulder 6/12/2022: Likely, full thickness tear of the bursal side of the supraspinatus tendon       X Ray Left Shoulder 4/20/2022: No acute osseous abnormalities  Mild AC joint osteoarthritis   No glenohumeral joint osteoarthritis

## 2022-09-12 ENCOUNTER — OFFICE VISIT (OUTPATIENT)
Dept: PHYSICAL THERAPY | Facility: OTHER | Age: 47
End: 2022-09-12
Payer: COMMERCIAL

## 2022-09-12 ENCOUNTER — OFFICE VISIT (OUTPATIENT)
Dept: OBGYN CLINIC | Facility: OTHER | Age: 47
End: 2022-09-12

## 2022-09-12 VITALS
HEART RATE: 91 BPM | WEIGHT: 234 LBS | BODY MASS INDEX: 32.76 KG/M2 | HEIGHT: 71 IN | DIASTOLIC BLOOD PRESSURE: 67 MMHG | SYSTOLIC BLOOD PRESSURE: 126 MMHG

## 2022-09-12 DIAGNOSIS — Z47.89 AFTERCARE FOLLOWING SURGERY OF THE MUSCULOSKELETAL SYSTEM: Primary | ICD-10-CM

## 2022-09-12 DIAGNOSIS — M75.102 TEAR OF LEFT SUPRASPINATUS TENDON: Primary | ICD-10-CM

## 2022-09-12 PROCEDURE — 97164 PT RE-EVAL EST PLAN CARE: CPT | Performed by: PHYSICAL THERAPIST

## 2022-09-12 PROCEDURE — 97140 MANUAL THERAPY 1/> REGIONS: CPT | Performed by: PHYSICAL THERAPIST

## 2022-09-12 PROCEDURE — 99024 POSTOP FOLLOW-UP VISIT: CPT | Performed by: PHYSICIAN ASSISTANT

## 2022-09-12 PROCEDURE — 97110 THERAPEUTIC EXERCISES: CPT | Performed by: PHYSICAL THERAPIST

## 2022-09-12 NOTE — PROGRESS NOTES
Assessment:       1  Aftercare following surgery of the musculoskeletal system          Plan:        Patient is doing well postoperatively  Operative note, images, and accelerated rotator cuff repair rehab protocol were discussed  All questions were addressed to the patient's satisfaction  Patient has an appointment with PT  A return to work note has been placed in his chart  Follow-up will be in 6 weeks to assess patient's progress  Subjective:     Patient ID: Parminder Escalona is a 41 y o  male  Chief Complaint:    HPI    The patient presents to the office for follow-up status post left shoulder arthroscopy with rotator cuff repair on 2022  He reports his pain is controlled and he has regained sensation following anesthetic block  Social History     Occupational History    Occupation: [employer]   Tobacco Use    Smoking status: Former Smoker     Packs/day: 1 00     Years: 20 00     Pack years: 20 00     Start date: 1992     Quit date: 2013     Years since quittin 3    Smokeless tobacco: Never Used   Vaping Use    Vaping Use: Never used   Substance and Sexual Activity    Alcohol use: Not Currently     Alcohol/week: 2 0 - 3 0 standard drinks     Types: 2 - 3 Shots of liquor per week     Comment: 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, 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    Drug use: No    Sexual activity: Not Currently      Review of Systems   Constitutional: Negative  Respiratory: Negative  Cardiovascular: Negative  Gastrointestinal: Negative  Genitourinary: Negative  Musculoskeletal: Positive for myalgias  Negative for arthralgias  Skin: Positive for wound  Neurological: Positive for weakness  Negative for numbness  Hematological: Negative  Psychiatric/Behavioral: Negative  Objective:     Ortho ExamPhysical Exam  HENT:      Head: Atraumatic  Cardiovascular:      Pulses: Normal pulses     Pulmonary:      Effort: Pulmonary effort is normal  Musculoskeletal:      Comments: Left shoulder range of motion not tested  Skin:     General: Skin is warm and dry  Capillary Refill: Capillary refill takes less than 2 seconds  Comments: Surgical incisions dry and clean  Neurological:      Mental Status: He is alert and oriented to person, place, and time  Sensory: No sensory deficit     Psychiatric:         Mood and Affect: Mood normal          Behavior: Behavior normal

## 2022-09-12 NOTE — LETTER
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

## 2022-09-12 NOTE — PROGRESS NOTES
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  Review of the protocol today patient understands the restrtions of no AROM at this time  HEP issued and POC reviewed  Impairments: abnormal coordination, abnormal muscle firing, abnormal or restricted ROM, activity intolerance, impaired physical strength, lacks appropriate home exercise program, pain with function and poor body mechanics    Symptom irritability: moderateUnderstanding of Dx/Px/POC: good   Prognosis: good    Goals  Short Term:  Pt will report decreased levels of pain by at least 2 subjective ratings in 4 weeks  Pt will demonstrate improved ROM by at least 10 degrees in 4 weeks  Pt will demonstrate improved strength by 1/2 grade  Long Term:   Pt will be independent in their HEP in 8 weeks  Pt will be be pain free with IADL's  Pt will demonstrate improved FOTO, > 80         Plan  Plan details: Prognosis above is given PT services 2x/week tapering to 1x/week over the next 3 months and home program adherence    Patient would benefit from: skilled physical therapy  Planned modality interventions: thermotherapy: hydrocollator packs  Planned therapy interventions: activity modification, joint mobilization, manual therapy, motor coordination training, neuromuscular re-education, patient education, self care, therapeutic activities, therapeutic exercise, graded activity and home exercise program  Frequency: 2x week  Treatment plan discussed with: patient        Subjective Evaluation    Pain  At best pain ratin  At worst pain rating: 3    Patient Goals  Patient goals for therapy: decreased pain, independence with ADLs/IADLs, return to sport/leisure activities, increased motion and increased strength          Objective     General Comments:      Shoulder Comments       Prom              Precautions: rcr 9 9 22      Manuals 9 12            Prom PAIN FREE  MJ                                                    Neuro Re-Ed             digi flex              scap squeezes                                                                               Ther Ex             pendlums              Wrist AROM                                                                                            Ther Activity                                       Gait Training                                       Modalities

## 2022-09-19 ENCOUNTER — OFFICE VISIT (OUTPATIENT)
Dept: PHYSICAL THERAPY | Facility: OTHER | Age: 47
End: 2022-09-19
Payer: COMMERCIAL

## 2022-09-19 VITALS
OXYGEN SATURATION: 96 % | SYSTOLIC BLOOD PRESSURE: 150 MMHG | DIASTOLIC BLOOD PRESSURE: 100 MMHG | TEMPERATURE: 97.5 F | HEART RATE: 125 BPM

## 2022-09-19 DIAGNOSIS — M75.102 TEAR OF LEFT SUPRASPINATUS TENDON: Primary | ICD-10-CM

## 2022-09-19 PROCEDURE — 97140 MANUAL THERAPY 1/> REGIONS: CPT | Performed by: PHYSICAL THERAPIST

## 2022-09-19 NOTE — PROGRESS NOTES
Daily Note     Today's date: 2022  Patient name: Ita Draper  : 1975  MRN: [id number]  Referring provider: Cuco Dumont  Dx: No diagnosis found  Subjective: into PT today with elevated HR and sweating he did just have a illness over the weekend (GI)   Only PROM and pendlums performed  He was advised to call and PCP office to discuss  If he develops any other symptoms he will go to the ED  He does not have any abnormal swelling or pain  No chest pain  Objective: See treatment diary below      Assessment: Tolerated treatment well  Patient demonstrated fatigue post treatment      Plan: Continue per plan of care  Precautions: rcr 9 9 22      Manuals 9 12 9 19 22           Prom PAIN FREE  MJ  MJ                                                  Neuro Re-Ed             digi flex   nv            scap squeezes   nv                                                                             Ther Ex             pendlums   Ml  ap each            Wrist AROM   mv                                                                                          Ther Activity                                       Gait Training                                       Modalities

## 2022-09-22 ENCOUNTER — OFFICE VISIT (OUTPATIENT)
Dept: PHYSICAL THERAPY | Facility: OTHER | Age: 47
End: 2022-09-22
Payer: COMMERCIAL

## 2022-09-22 DIAGNOSIS — M75.102 TEAR OF LEFT SUPRASPINATUS TENDON: Primary | ICD-10-CM

## 2022-09-22 PROCEDURE — 97110 THERAPEUTIC EXERCISES: CPT | Performed by: PHYSICAL THERAPIST

## 2022-09-22 PROCEDURE — 97140 MANUAL THERAPY 1/> REGIONS: CPT | Performed by: PHYSICAL THERAPIST

## 2022-09-22 NOTE — PROGRESS NOTES
Daily Note     Today's date: 2022  Patient name: Joceline Huang  : 1975  MRN: [id number]  Referring provider: Marie Rausch  Dx: No diagnosis found  Start Time: 945  Stop Time:   Total time in clinic (min): 30 minutes     Subjective: feeling better today /88   No longer feeling ill we were able to progress exercises  Objective: See treatment diary below      Assessment: Tolerated treatment well  Patient demonstrated fatigue post treatment      Plan: Continue per plan of care  Precautions: rcr 9 9 22      Manuals 9 12 9 19 22 9 22 22          Prom PAIN FREE  MJ  MJ MJ                                                  Neuro Re-Ed             digi flex   nv  30x           scap squeezes   nv  30x                                                                            Ther Ex             pendlums   Ml  ap each  Ml/ap 2min           Wrist AROM   mv  30           Elbow AROM   20                                                                           Ther Activity                                       Gait Training                                       Modalities

## 2022-09-23 ENCOUNTER — TELEPHONE (OUTPATIENT)
Dept: OBGYN CLINIC | Facility: HOSPITAL | Age: 47
End: 2022-09-23

## 2022-09-23 NOTE — TELEPHONE ENCOUNTER
Spoke to patient and informed him that a note has been completed for him  He will access note through his MyChart

## 2022-09-23 NOTE — TELEPHONE ENCOUNTER
Dr Cuate Kyle    Patient would like an updated work note, he needs another week off  He can retrieve note through [employer]  Looking to return to work 10/3  Please see below msg he sent through [email or web address]  CB # [phone]    [facility], I had originally seen the doctor for a post-op and at the time was told I could take off for 6 weeks which was too much I thought and I requested 2 weeks  We are nearing the weekend and I still have a lot of pain to the point I dont feel I could adequately do my job  Im requesting an additional week off to heal a bit more  If this is okay I would need a new doctors note to submit to work  Please let me know asap   Thank you, Nhan Tinsley

## 2022-09-29 ENCOUNTER — OFFICE VISIT (OUTPATIENT)
Dept: PHYSICAL THERAPY | Facility: OTHER | Age: 47
End: 2022-09-29
Payer: COMMERCIAL

## 2022-09-29 DIAGNOSIS — M75.102 TEAR OF LEFT SUPRASPINATUS TENDON: Primary | ICD-10-CM

## 2022-09-29 PROCEDURE — 97112 NEUROMUSCULAR REEDUCATION: CPT | Performed by: PHYSICAL THERAPIST

## 2022-09-29 PROCEDURE — 97140 MANUAL THERAPY 1/> REGIONS: CPT | Performed by: PHYSICAL THERAPIST

## 2022-09-29 PROCEDURE — 97110 THERAPEUTIC EXERCISES: CPT | Performed by: PHYSICAL THERAPIST

## 2022-09-29 NOTE — PROGRESS NOTES
Daily Note     Today's date: 2022  Patient name: Mark Jimenez  : 1975  MRN: [id number]  Referring provider: Klaudia Aguilar  Dx: No diagnosis found  Subjective: minimal pain today  Objective: See treatment diary below      Assessment: Tolerated treatment well  Patient demonstrated fatigue post treatment      Plan: Continue per plan of care  Precautions: rcr 9 9 22      Manuals 9 12 9 19 22 9 29 22          Prom PAIN FREE  MJ  MJ MJ                                                  Neuro Re-Ed             digi flex   nv  Green 2min           scap squeezes   nv  30x           C-S ROM    20 each           aAROM    10 7 22                                                 Ther Ex             pendlums   Ml  ap each  Ml/ap 2min           Wrist AROM   mv  30           Elbow AROM   20                                                                           Ther Activity                                       Gait Training                                       Modalities

## 2022-11-08 ENCOUNTER — EVALUATION (OUTPATIENT)
Dept: PHYSICAL THERAPY | Facility: OTHER | Age: 47
End: 2022-11-08

## 2022-11-08 DIAGNOSIS — M25.511 ACUTE PAIN OF RIGHT SHOULDER: Primary | ICD-10-CM

## 2022-11-08 NOTE — PROGRESS NOTES
PT Re-Evaluation     Today's date: 2022  Patient name: Floreicta Prasad  : 1975  MRN: [id number]  Referring provider: Juwan Morton  Dx:   No diagnosis found  Assessment  Assessment details: Florecita Prasad is a pleasant 41 y o  male who presents with L RCR on 22/ he reported surgery went well patient was  In treatment for the last month  He reports a personal trainingg did do some light strengthening while he was there with bands  This was earlier than indicated by the protocol  He did not yet progress to AROM on his last visit in PT  Today he reported pain at night is not controlled well  Pain with AROM  We discussed a need to regain AROM and PROM prior to progressing on to a focus on strength  Patient  had a good understanding of this  Impairments: abnormal coordination, abnormal muscle firing, abnormal or restricted ROM, activity intolerance, impaired physical strength, lacks appropriate home exercise program, pain with function and poor body mechanics    Symptom irritability: moderateUnderstanding of Dx/Px/POC: good   Prognosis: good    Goals  Short Term:  Pt will report decreased levels of pain by at least 2 subjective ratings in 4 weeks  Pt will demonstrate improved ROM by at least 10 degrees in 4 weeks  Pt will demonstrate improved strength by 1/2 grade  Long Term:   Pt will be independent in their HEP in 8 weeks  Pt will be be pain free with IADL's  Pt will demonstrate improved FOTO, > 80         Plan  Plan details: Prognosis above is given PT services 2x/week tapering to 1x/week over the next 3 months and home program adherence    Patient would benefit from: skilled physical therapy  Planned modality interventions: thermotherapy: hydrocollator packs  Planned therapy interventions: activity modification, joint mobilization, manual therapy, motor coordination training, neuromuscular re-education, patient education, self care, therapeutic activities, therapeutic exercise, graded activity and home exercise program  Frequency: 2x week  Treatment plan discussed with: patient        Subjective Evaluation    Pain  At best pain ratin  At worst pain ratin    Patient Goals  Patient goals for therapy: decreased pain, independence with ADLs/IADLs, return to sport/leisure activities, increased motion and increased strength          Objective     General Comments:      Shoulder Comments       Prom flex 120 ER 45 IR 50 abd 95     AROM FE- 0-95 able to reach top of head no functional IR      No swelling  minmal TTP  stregnth not tested fully at this  Time                Precautions: rcr 9 9 22      Manuals 11 8 22            Prom PAIN FREE  MJ                                                    Neuro Re-Ed             digi flex              scap squeezes              scap punches              S/L ER                                                     Ther Ex             pendlums              Wrist AROM                                                                                            Ther Activity                                       Gait Training                                       Modalities

## 2022-11-11 ENCOUNTER — OFFICE VISIT (OUTPATIENT)
Dept: PHYSICAL THERAPY | Facility: OTHER | Age: 47
End: 2022-11-11

## 2022-11-11 DIAGNOSIS — M25.512 ACUTE PAIN OF LEFT SHOULDER: Primary | ICD-10-CM

## 2022-11-11 NOTE — PROGRESS NOTES
Daily Note     Today's date: 2022  Patient name: Cookie Reed  : 1975  MRN: [id number]  Referring provider: Gloria Ibarra  Dx:   Encounter Diagnosis     ICD-10-CM    1  Acute pain of left shoulder  M25 512        Start Time: 802  Stop Time: 846  Total time in clinic (min): 44 minutes    Subjective: Patient reports he's a little sore today but nothing unexpected  Objective: See treatment diary below      Assessment: Tolerated treatment well  He continues to have pain with PROM around  degrees flexion/scaption  Patient demonstrated fatigue post treatment, exhibited good technique with therapeutic exercises and would benefit from continued PT  Plan: Continue per plan of care        Precautions: rcr 9 9 22      Manuals 11 8 22 11 11 22           Prom PAIN FREE  MJ  GL                                                  Neuro Re-Ed             digi flex              scap squeezes   2x15 2-3sec hold           scap punches   2x10           S/L ER   2x8           ROM ranger  Flexion/Scaption 2x8           Robberies  2x8                        Ther Ex             pendlums              Wrist AROM              pulleys  5'           Table slides  Flexion, scaption 2x12           Bicep Curls  1# 30x                                                  Ther Activity                                       Gait Training                                       Modalities

## 2022-11-14 ENCOUNTER — OFFICE VISIT (OUTPATIENT)
Dept: PHYSICAL THERAPY | Facility: OTHER | Age: 47
End: 2022-11-14

## 2022-11-14 DIAGNOSIS — M25.512 ACUTE PAIN OF LEFT SHOULDER: Primary | ICD-10-CM

## 2022-11-14 NOTE — PROGRESS NOTES
Daily Note     Today's date: 2022  Patient name: Reanna Camargo  : 1975  MRN: [id number]  Referring provider: Fabby Raygoza  Dx:   Encounter Diagnosis     ICD-10-CM    1  Acute pain of left shoulder  M25 512                   Subjective: tolerted progression ewll today  Objective: See treatment diary below      Assessment: Tolerated treatment well  Patient demonstrated fatigue post treatment      Plan: Continue per plan of care        Precautions: rcr 9 9 22      Manuals 11 8 22 11 11 22 11 14           Prom PAIN FREE  MJ  GL MJ                                                 Neuro Re-Ed             digi flex              scap squeezes   2x15 2-3sec hold 15x2           scap punches   2x10 #2 10x2           S/L ER   2x8 10x2           ROM ranger  Flexion/Scaption 2x8 10x2           Robberies  2x8           TB row and LPD   otb 10x2          Ther Ex             pendlums              Wrist AROM              pulleys  5' 4min           Table slides  Flexion, scaption 2x12 10''x10           Bicep Curls  1# 30x #2 30x                                                  Ther Activity                                       Gait Training                                       Modalities

## 2022-11-16 ENCOUNTER — OFFICE VISIT (OUTPATIENT)
Dept: PHYSICAL THERAPY | Facility: OTHER | Age: 47
End: 2022-11-16

## 2022-11-16 DIAGNOSIS — M25.512 ACUTE PAIN OF LEFT SHOULDER: ICD-10-CM

## 2022-11-16 DIAGNOSIS — M25.511 ACUTE PAIN OF RIGHT SHOULDER: Primary | ICD-10-CM

## 2022-11-16 NOTE — PROGRESS NOTES
Daily Note     Today's date: 2022  Patient name: Mercedes Finnegan  : 1975  MRN: [id number]  Referring provider: Shanel Barton  Dx:   Encounter Diagnosis     ICD-10-CM    1  Acute pain of right shoulder  M25 511       2  Acute pain of left shoulder  M25 512                      Subjective: tolerted progression will today  Good form with IR/ER       Objective: See treatment diary below      Assessment: Tolerated treatment well  Patient demonstrated fatigue post treatment      Plan: Continue per plan of care        Precautions: rcr 9 9 22      Manuals 11 8 22 11 11 22 11 14  11 16         Prom PAIN FREE  MJ  GL MJ MJ                                                Neuro Re-Ed             digi flex              scap squeezes   2x15 2-3sec hold 15x2  15x2         scap punches   2x10 #2 10x2  #3 10x2 Scap abcs          S/L ER   2x8 10x2  #2 10x2          ROM ranger  Flexion/Scaption 2x8 10x2  10x2          Robberies  2x8           TB row and LPD IR/ER    otb 10x2 otb 10x2          Ther Ex             pendlums              Wrist AROM              pulleys  5' 4min  4min          Table slides  Flexion, scaption 2x12 10''x10  10''x10          Bicep Curls  1# 30x #2 30x  #3 15x2                                                 Ther Activity                                       Gait Training                                       Modalities

## 2022-11-21 ENCOUNTER — OFFICE VISIT (OUTPATIENT)
Dept: OBGYN CLINIC | Facility: OTHER | Age: 47
End: 2022-11-21

## 2022-11-21 ENCOUNTER — OFFICE VISIT (OUTPATIENT)
Dept: PHYSICAL THERAPY | Facility: OTHER | Age: 47
End: 2022-11-21

## 2022-11-21 VITALS
HEART RATE: 66 BPM | WEIGHT: 234 LBS | BODY MASS INDEX: 32.76 KG/M2 | SYSTOLIC BLOOD PRESSURE: 117 MMHG | DIASTOLIC BLOOD PRESSURE: 86 MMHG | HEIGHT: 71 IN

## 2022-11-21 DIAGNOSIS — M25.512 ACUTE PAIN OF LEFT SHOULDER: ICD-10-CM

## 2022-11-21 DIAGNOSIS — Z47.89 AFTERCARE FOLLOWING SURGERY OF THE MUSCULOSKELETAL SYSTEM: Primary | ICD-10-CM

## 2022-11-21 DIAGNOSIS — M25.511 ACUTE PAIN OF RIGHT SHOULDER: Primary | ICD-10-CM

## 2022-11-21 RX ORDER — LEVETIRACETAM 500 MG/1
TABLET ORAL
COMMUNITY
Start: 2022-10-07 | End: 2022-11-30

## 2022-11-21 NOTE — PROGRESS NOTES
Daily Note     Today's date: 2022  Patient name: Cookie Reed  : 1975  MRN: [id number]  Referring provider: Mihcael Overton  Dx:   Encounter Diagnosis     ICD-10-CM    1  Acute pain of right shoulder  M25 511       2  Acute pain of left shoulder  M25 512                      Subjective: good functional IR some pain with horz  Add  Overall L shoulder is progressing well in the past 2 weeks since he  Was D/C from inpatient care  He is now tolerating light strengthening well  Objective: See treatment diary below      Assessment: Tolerated treatment well  Patient demonstrated fatigue post treatment      Plan: Continue per plan of care        Precautions: rcr 9 9 22      Manuals 11 8 22 11 11 22 11 14  11 16 11 21        Prom PAIN FREE  MJ  GL MJ MJ MJ                                                Neuro Re-Ed                                       scap punches   2x10 #2 10x2  #3 10x2 Scap abcs  #4 [id number]  Scap ABC         S/L ER   2x8 10x2  #2 10x2  #3 10x2         ROM ranger  Flexion/Scaption 2x8 10x2  10x2  10x2         Robberies  2x8           TB row and LPD IR/ER    otb 10x2 otb 10x2  gtb 10x2         Ther Ex             pendlums              Wrist AROM              pulleys  5' 4min  4min  4mi         Table slides  Flexion, scaption 2x12 10''x10  10''x10  10''x10         Bicep Curls  1# 30x #2 30x  #3 15x2  #4 15x2         Post capsule      10''x10                                   Ther Activity                                       Gait Training                                       Modalities

## 2022-11-21 NOTE — PROGRESS NOTES
Assessment:       1  Aftercare following surgery of the musculoskeletal system          Plan:        Patient is doing well postoperatively and making improvement in PT  All questions were addressed to the patient's satisfaction  Patient will continue with PT  Follow-up will be in 2 months to assess patient's progress  He has an appointment next week to see Dr Zakia Tejeda regarding his right shoulder  Subjective:     Patient ID: Ruthann Culver is a 41 y o  male  Chief Complaint:    HPI    Patient presents to the office for follow-up status post left shoulder arthroscopy with rotator cuff repair on 2022  He is making progress with PT and has no new concerns with his left shoulder  He reports his right shoulder is becoming more symptomatic now  Social History     Occupational History   • Occupation: [employer]   Tobacco Use   • Smoking status: Former     Packs/day: 1 00     Years: 20 00     Pack years: 20 00     Types: Cigarettes     Start date: 1992     Quit date: 2013     Years since quittin 5   • Smokeless tobacco: Never   Vaping Use   • Vaping Use: Never used   Substance and Sexual Activity   • Alcohol use: Not Currently     Alcohol/week: 2 0 - 3 0 standard drinks     Types: 2 - 3 Shots of liquor per week     Comment: 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, 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   • Drug use: No   • Sexual activity: Not Currently      Review of Systems   Constitutional: Negative  Respiratory: Negative  Cardiovascular: Negative  Gastrointestinal: Negative  Genitourinary: Negative  Musculoskeletal: Positive for myalgias  Negative for arthralgias  Skin: Negative for wound  Neurological: Positive for weakness  Negative for numbness  Hematological: Negative  Psychiatric/Behavioral: Negative  Objective:     Ortho ExamPhysical Exam  HENT:      Head: Atraumatic  Cardiovascular:      Pulses: Normal pulses     Pulmonary:      Effort: Pulmonary effort is normal    Musculoskeletal:      Comments: Left shoulder active range of motion: Forward flexion 90°, external rotation 75°, internal rotation to SI region  Skin:     General: Skin is warm and dry  Capillary Refill: Capillary refill takes less than 2 seconds  Comments: Surgical incisions dry and clean, healed  Neurological:      Mental Status: He is alert and oriented to person, place, and time  Sensory: No sensory deficit     Psychiatric:         Mood and Affect: Mood normal          Behavior: Behavior normal

## 2022-11-24 DIAGNOSIS — K21.9 GASTROESOPHAGEAL REFLUX DISEASE: ICD-10-CM

## 2022-11-24 DIAGNOSIS — I10 BENIGN ESSENTIAL HYPERTENSION: ICD-10-CM

## 2022-11-24 RX ORDER — OMEPRAZOLE 20 MG/1
20 CAPSULE, DELAYED RELEASE ORAL DAILY
Qty: 90 CAPSULE | Refills: 0 | Status: ON HOLD | OUTPATIENT
Start: 2022-11-24 | End: 2023-02-22

## 2022-11-24 RX ORDER — IRBESARTAN 150 MG/1
150 TABLET ORAL DAILY
Qty: 90 TABLET | Refills: 0 | Status: ON HOLD | OUTPATIENT
Start: 2022-11-24 | End: 2023-02-22

## 2022-11-28 ENCOUNTER — OFFICE VISIT (OUTPATIENT)
Dept: OBGYN CLINIC | Facility: OTHER | Age: 47
End: 2022-11-28

## 2022-11-28 VITALS
WEIGHT: 234 LBS | SYSTOLIC BLOOD PRESSURE: 115 MMHG | DIASTOLIC BLOOD PRESSURE: 83 MMHG | BODY MASS INDEX: 32.76 KG/M2 | HEIGHT: 71 IN | HEART RATE: 91 BPM

## 2022-11-28 DIAGNOSIS — M25.511 ACUTE PAIN OF RIGHT SHOULDER: ICD-10-CM

## 2022-11-28 DIAGNOSIS — M24.811 INTERNAL DERANGEMENT OF RIGHT SHOULDER: Primary | ICD-10-CM

## 2022-11-28 NOTE — PROGRESS NOTES
Assessment  Diagnoses and all orders for this visit:    Internal derangement of right shoulder  -     MRI shoulder right wo contrast; Future    Acute pain of right shoulder      Discussion and Plan:  · MRI ordered for the right shoulder to evaluate for possible rotator cuff pathology  · Patient will follow-up in approximately 2 weeks once MRI is completed to review the results and discuss next steps regarding treatment options    Subjective:  Right Shoulder pain  Patient ID: Luz Maria Joseph is a 41 y o  male      Patient is here today for evaluation of the right shoulder  He has completed PT and notes no improvements with his range of motion and strength  The patient is status post left shoulder arthroscopy with rotator cuff repair on 09/09/2022  Doing well  Making improvements with PT      The following portions of the patient's history were reviewed and updated as appropriate: allergies, current medications, past family history, past medical history, past social history, past surgical history and problem list     Review of Systems   Constitutional: Positive for activity change  Negative for chills, fever and unexpected weight change  HENT: Negative for hearing loss, nosebleeds and sore throat  Eyes: Negative for pain, redness and visual disturbance  Respiratory: Negative for cough, shortness of breath and wheezing  Cardiovascular: Negative for chest pain, palpitations and leg swelling  Gastrointestinal: Negative for abdominal pain, nausea and vomiting  Endocrine: Negative for polyphagia and polyuria  Genitourinary: Negative for dysuria and hematuria  Musculoskeletal: Positive for arthralgias and myalgias  See HPI   Skin: Negative for rash and wound  Neurological: Negative for dizziness, numbness and headaches  Psychiatric/Behavioral: Negative for decreased concentration and suicidal ideas  The patient is not nervous/anxious          Objective:  /83 (BP Location: Left arm, Patient Position: Sitting, Cuff Size: Adult)   Pulse 91   Ht 5' 11" (1 803 m)   Wt 106 kg (234 lb)   BMI 32 64 kg/m²       Right Shoulder Exam     Tenderness   The patient is experiencing no tenderness  Range of Motion   The patient has normal right shoulder ROM  Other   Erythema: absent  Sensation: normal  Pulse: present            Physical Exam  Vitals and nursing note reviewed  Constitutional:       Appearance: He is well-developed  HENT:      Head: Normocephalic and atraumatic  Eyes:      General: No scleral icterus  Extraocular Movements: Extraocular movements intact  Conjunctiva/sclera: Conjunctivae normal    Cardiovascular:      Rate and Rhythm: Normal rate  Pulmonary:      Effort: Pulmonary effort is normal  No respiratory distress  Musculoskeletal:      Cervical back: Normal range of motion and neck supple  Comments: As noted in HPI   Skin:     General: Skin is warm and dry  Neurological:      Mental Status: He is alert and oriented to person, place, and time  Psychiatric:         Behavior: Behavior normal            I have personally reviewed pertinent films in PACS and my interpretation is as follows  X-rays of the right shoulder taken on 04/20/2022 demonstrate mild, glenohumeral joint space narrowing  No fracture or dislocation  No lytic or blastic lesions      Scribe Attestation    I,:  Sierra Peguero am acting as a scribe while in the presence of the attending physician :       I,:  Alessandra Jiang MD personally performed the services described in this documentation    as scribed in my presence :

## 2022-11-28 NOTE — PATIENT INSTRUCTIONS
Magnetic Resonance Imaging   WHAT YOU NEED TO KNOW:   What do I need to know about magnetic resonance imaging (MRI)? An MRI is a test that uses magnetic fields and radio waves to take pictures inside your body  An MRI is used to see blood vessels, tissue, muscles, and bones  It can also show organs, such as your heart, lungs, or liver  An MRI can help your healthcare provider diagnose or treat a medical condition  It does not use radiation  How do I prepare for an MRI? Your healthcare provider will tell you which medicines to take or not take on the day of your MRI  He or she may give you medicine to help you feel calm and relaxed during the MRI  Tell your provider if you know or think you are pregnant  Tell your provider if you have any metal in your body, such as a pacemaker, implant, or aneurism clip  Tell him or her if you have a tattoo or wear a medicine patch  Remove any metal items, such as hair clips, jewelry, glasses, hearing aids, or dentures before you enter the MRI room  What will happen during an MRI? Your healthcare provider will ask you to lie on a table  Contrast liquid may be used to help a body part show up more clearly  The table will be moved into an open space in the middle of the machine  You will need to lie still during the MRI  It is normal to hear knocking, thumping, or clicking noises from the machine  What are the risks of an MRI? An MRI may cause a metal object in your body to move out of place and cause serious injury, or stop working properly  The contrast liquid may cause nausea, a headache, lightheadedness, or pain at the injection site  You could have an allergic reaction to the contrast liquid  CARE AGREEMENT:   You have the right to help plan your care  Learn about your health condition and how it may be treated  Discuss treatment options with your healthcare providers to decide what care you want to receive  You always have the right to refuse treatment  The above information is an  only  It is not intended as medical advice for individual conditions or treatments  Talk to your doctor, nurse or pharmacist before following any medical regimen to see if it is safe and effective for you  © Copyright [employer] 2022 Information is for End User's use only and may not be sold, redistributed or otherwise used for commercial purposes   All illustrations and images included in CareNotes® are the copyrighted property of A D A M , Inc  or [address] [place] [address]

## 2022-11-30 ENCOUNTER — TELEMEDICINE (OUTPATIENT)
Dept: INTERNAL MEDICINE CLINIC | Facility: CLINIC | Age: 47
End: 2022-11-30

## 2022-11-30 DIAGNOSIS — K52.9 ACUTE GASTROENTERITIS: Primary | ICD-10-CM

## 2022-11-30 RX ORDER — ONDANSETRON 4 MG/1
4 TABLET, FILM COATED ORAL EVERY 8 HOURS PRN
Qty: 20 TABLET | Refills: 0 | Status: SHIPPED | OUTPATIENT
Start: 2022-11-30 | End: 2022-12-08

## 2022-11-30 NOTE — PROGRESS NOTES
Virtual Regular Visit    Verification of patient location:    Patient is located in the following state in which I hold an active license [place]      Assessment/Plan:    Problem List Items Addressed This Visit    None  Visit Diagnoses     Acute gastroenteritis    -  Primary    Relevant Medications    ondansetron (ZOFRAN) 4 mg tablet      He may take Imodium PRN  Call with worsening symptoms or if not improving         Reason for visit is   Chief Complaint   Patient presents with   • Vomiting     Started vomiting Tuesday, no other symptoms  - Covid home test   • Diarrhea   • Virtual Regular Visit        Encounter provider Meghann Omer MD    Provider located at [address] [place] [address]      Recent Visits  No visits were found meeting these conditions  Showing recent visits within past 7 days and meeting all other requirements  Today's Visits  Date Type Provider Dept   11/30/22 Telemedicine Meghann Omer MD [address] today's visits and meeting all other requirements  Future Appointments  No visits were found meeting these conditions  Showing future appointments within next 150 days and meeting all other requirements       The patient was identified by name and date of birth  Cookie Reed was informed that this is a telemedicine visit and that the visit is being conducted through the [employer]  He agrees to proceed     My office door was closed  No one else was in the room  He acknowledged consent and understanding of privacy and security of the video platform  The patient has agreed to participate and understands they can discontinue the visit at any time  Patient is aware this is a billable service  Subjective  Cookie Reed is a 41 y o  male with vomiting         HPI   Started feeling ill 2 days ago and yesterday started with nausea vomiting diarrhea  Denies fever abd pain body aches headaches nasal congestion cough  COVID negative  He last vomited this morning around 8am  Denies changes in his diet, medications  Denies antibiotic use recently  He was in rehab for alcohol abuse on 10/10 and discharged after 30 days  He denies having any alcohol since entering rehab  Needs a work note   Past Medical History:   Diagnosis Date   • Anxiety    • COVID-19 ruled out 2/3/2022   • GERD (gastroesophageal reflux disease)    • Hyperlipidemia    • Hypertension    • Tendinitis        Past Surgical History:   Procedure Laterality Date   • KNEE SURGERY Left 1989    cyst removed   • AL COLONOSCOPY FLX DX W/COLLJ SPEC WHEN PFRMD N/A 5/14/2018    Procedure: COLONOSCOPY;  Surgeon: Sheila Ramsay DO;  Location: [facility]; Service: General   • AL SHLDR ARTHROSCOP,SURG,W/ROTAT CUFF REPR Left 9/9/2022    Procedure: SHOULDER ARTHROSCOPIC ROTATOR CUFF REPAIR;  Surgeon: Lucyann Cogan, MD;  Location[facility];  Service: Orthopedics   • SHOULDER ARTHROSCOPY Right     with biceps tenodesis       Current Outpatient Medications   Medication Sig Dispense Refill   • fenofibrate (TRICOR) 145 mg tablet TAKE 1 TABLET BY MOUTH EVERY DAY 90 tablet 1   • irbesartan (AVAPRO) 150 mg tablet TAKE 1 TABLET (150 MG TOTAL) BY MOUTH IN THE MORNING  90 tablet 0   • Multiple Vitamins-Minerals (multivitamin with minerals) tablet Take 1 tablet by mouth daily     • omeprazole (PriLOSEC) 20 mg delayed release capsule TAKE 1 CAPSULE (20 MG TOTAL) BY MOUTH IN THE MORNING  90 capsule 0   • ondansetron (ZOFRAN) 4 mg tablet Take 1 tablet (4 mg total) by mouth every 8 (eight) hours as needed for nausea or vomiting 20 tablet 0   • sildenafil (REVATIO) 20 mg tablet Take 1 tablet (20 mg total) by mouth if needed (erectile dysfunction) 30 tablet 2     No current facility-administered medications for this visit          Allergies   Allergen Reactions   • Neomycin Hives   • Polymyxin B Hives       Review of Systems   Constitutional: Negative for chills and fever    HENT: Negative for congestion  Respiratory: Negative for cough  Gastrointestinal: Positive for diarrhea, nausea and vomiting  Negative for abdominal pain  Video Exam    There were no vitals filed for this visit  Physical Exam  Constitutional:       General: He is not in acute distress  Appearance: He is not ill-appearing, toxic-appearing or diaphoretic  Pulmonary:      Effort: No respiratory distress            I spent 15 minutes directly with the patient during this visit

## 2022-11-30 NOTE — LETTER
November 30, 2022     Patient: Florecita Prasad  YOB: 1975  Date of Visit: 11/30/2022      To Whom it May Concern:    Codi Mace is under my professional care  Brittney Marte was seen in my office on 11/30/2022  Britntey Marte may return to work on 12/1/2022  Please excuse from work 11/28-11/30/2022       If you have any questions or concerns, please don't hesitate to call           Sincerely,          Argelia Fritz MD        CC: No Recipients

## 2022-12-04 ENCOUNTER — APPOINTMENT (OUTPATIENT)
Dept: RADIOLOGY | Facility: HOSPITAL | Age: 47
End: 2022-12-04

## 2022-12-04 ENCOUNTER — APPOINTMENT (EMERGENCY)
Dept: RADIOLOGY | Facility: HOSPITAL | Age: 47
End: 2022-12-04

## 2022-12-04 ENCOUNTER — HOSPITAL ENCOUNTER (EMERGENCY)
Facility: HOSPITAL | Age: 47
End: 2022-12-04
Attending: EMERGENCY MEDICINE

## 2022-12-04 ENCOUNTER — HOSPITAL ENCOUNTER (INPATIENT)
Facility: HOSPITAL | Age: 47
LOS: 4 days | Discharge: HOME | End: 2022-12-08
Attending: EMERGENCY MEDICINE | Admitting: EMERGENCY MEDICINE

## 2022-12-04 VITALS
DIASTOLIC BLOOD PRESSURE: 73 MMHG | RESPIRATION RATE: 22 BRPM | TEMPERATURE: 97.3 F | HEART RATE: 98 BPM | SYSTOLIC BLOOD PRESSURE: 135 MMHG | OXYGEN SATURATION: 97 %

## 2022-12-04 DIAGNOSIS — F10.20 ALCOHOL USE DISORDER, SEVERE, DEPENDENCE (HCC): ICD-10-CM

## 2022-12-04 DIAGNOSIS — F10.939 ALCOHOL WITHDRAWAL (HCC): Primary | ICD-10-CM

## 2022-12-04 DIAGNOSIS — E87.1 HYPONATREMIA: ICD-10-CM

## 2022-12-04 DIAGNOSIS — E83.42 HYPOMAGNESEMIA: ICD-10-CM

## 2022-12-04 DIAGNOSIS — E87.1 HYPONATREMIA: Primary | ICD-10-CM

## 2022-12-04 DIAGNOSIS — F10.10 ALCOHOL ABUSE: ICD-10-CM

## 2022-12-04 PROBLEM — D72.829 LEUKOCYTOSIS: Status: ACTIVE | Noted: 2022-12-04

## 2022-12-04 PROBLEM — E87.29 ALCOHOLIC KETOACIDOSIS: Status: ACTIVE | Noted: 2022-12-04

## 2022-12-04 PROBLEM — R94.31 PROLONGED Q-T INTERVAL ON ECG: Status: ACTIVE | Noted: 2022-12-04

## 2022-12-04 PROBLEM — E66.9 OBESITY (BMI 30-39.9): Status: ACTIVE | Noted: 2022-12-04

## 2022-12-04 LAB
2HR DELTA HS TROPONIN: 1 NG/L
ALBUMIN SERPL BCP-MCNC: 3.9 G/DL (ref 3.5–5)
ALP SERPL-CCNC: 32 U/L (ref 46–116)
ALT SERPL W P-5'-P-CCNC: 70 U/L (ref 12–78)
AMPHETAMINES SERPL QL SCN: NEGATIVE
ANION GAP SERPL CALCULATED.3IONS-SCNC: 14 MMOL/L (ref 5–14)
ANION GAP SERPL CALCULATED.3IONS-SCNC: 19 MMOL/L (ref 4–13)
ANION GAP SERPL CALCULATED.3IONS-SCNC: 21 MMOL/L (ref 4–13)
APAP SERPL-MCNC: <2 UG/ML (ref 10–20)
ARTERIAL PATENCY WRIST A: YES
AST SERPL W P-5'-P-CCNC: 146 U/L (ref 5–45)
ATRIAL RATE: 87 BPM
BACTERIA UR QL AUTO: ABNORMAL /HPF
BARBITURATES UR QL: NEGATIVE
BASE EXCESS BLDA CALC-SCNC: -6.7 MMOL/L
BASOPHILS # BLD AUTO: 0.01 THOUSANDS/ÂΜL (ref 0–0.1)
BASOPHILS NFR BLD AUTO: 0 % (ref 0–1)
BENZODIAZ UR QL: POSITIVE
BILIRUB SERPL-MCNC: 1.32 MG/DL (ref 0.2–1)
BILIRUB UR QL STRIP: NEGATIVE
BUN SERPL-MCNC: 5 MG/DL (ref 5–25)
BUN SERPL-MCNC: 6 MG/DL (ref 5–25)
BUN SERPL-MCNC: 7 MG/DL (ref 5–25)
CALCIUM SERPL-MCNC: 6.8 MG/DL (ref 8.3–10.1)
CALCIUM SERPL-MCNC: 7.3 MG/DL (ref 8.3–10.1)
CALCIUM SERPL-MCNC: 7.5 MG/DL (ref 8.4–10.2)
CARDIAC TROPONIN I PNL SERPL HS: 4 NG/L
CARDIAC TROPONIN I PNL SERPL HS: 5 NG/L
CHLORIDE SERPL-SCNC: 76 MMOL/L (ref 96–108)
CHLORIDE SERPL-SCNC: 77 MMOL/L (ref 96–108)
CHLORIDE SERPL-SCNC: 78 MMOL/L (ref 96–108)
CLARITY UR: CLEAR
CO2 SERPL-SCNC: 14 MMOL/L (ref 21–32)
CO2 SERPL-SCNC: 15 MMOL/L (ref 21–32)
CO2 SERPL-SCNC: 15 MMOL/L (ref 21–32)
COCAINE UR QL: NEGATIVE
COLOR UR: ABNORMAL
CREAT SERPL-MCNC: 0.55 MG/DL (ref 0.6–1.3)
CREAT SERPL-MCNC: 0.59 MG/DL (ref 0.6–1.3)
CREAT SERPL-MCNC: 0.59 MG/DL (ref 0.7–1.5)
EOSINOPHIL # BLD AUTO: 0.01 THOUSAND/ÂΜL (ref 0–0.61)
EOSINOPHIL NFR BLD AUTO: 0 % (ref 0–6)
ERYTHROCYTE [DISTWIDTH] IN BLOOD BY AUTOMATED COUNT: 11.8 % (ref 11.6–15.1)
ETHANOL SERPL-MCNC: 168 MG/DL (ref 0–3)
GFR SERPL CREATININE-BSD FRML MDRD: 122 ML/MIN/1.73SQ M
GFR SERPL CREATININE-BSD FRML MDRD: 122 ML/MIN/1.73SQ M
GFR SERPL CREATININE-BSD FRML MDRD: 125 ML/MIN/1.73SQ M
GLUCOSE SERPL-MCNC: 135 MG/DL (ref 65–140)
GLUCOSE SERPL-MCNC: 87 MG/DL (ref 70–99)
GLUCOSE SERPL-MCNC: 90 MG/DL (ref 65–140)
GLUCOSE UR STRIP-MCNC: NEGATIVE MG/DL
HCO3 BLDA-SCNC: 14.9 MMOL/L (ref 22–28)
HCT VFR BLD AUTO: 33.3 % (ref 36.5–49.3)
HGB BLD-MCNC: 12.5 G/DL (ref 12–17)
HGB UR QL STRIP.AUTO: NEGATIVE
HYALINE CASTS #/AREA URNS LPF: ABNORMAL /LPF
IMM GRANULOCYTES # BLD AUTO: 0.05 THOUSAND/UL (ref 0–0.2)
IMM GRANULOCYTES NFR BLD AUTO: 1 % (ref 0–2)
KETONES UR STRIP-MCNC: ABNORMAL MG/DL
LEUKOCYTE ESTERASE UR QL STRIP: NEGATIVE
LIPASE SERPL-CCNC: 281 U/L (ref 73–393)
LYMPHOCYTES # BLD AUTO: 1.04 THOUSANDS/ÂΜL (ref 0.6–4.47)
LYMPHOCYTES NFR BLD AUTO: 10 % (ref 14–44)
MAGNESIUM SERPL-MCNC: 1.2 MG/DL (ref 1.6–2.6)
MCH RBC QN AUTO: 30.3 PG (ref 26.8–34.3)
MCHC RBC AUTO-ENTMCNC: 37.5 G/DL (ref 31.4–37.4)
MCV RBC AUTO: 81 FL (ref 82–98)
METHADONE UR QL: NEGATIVE
MONOCYTES # BLD AUTO: 0.55 THOUSAND/ÂΜL (ref 0.17–1.22)
MONOCYTES NFR BLD AUTO: 5 % (ref 4–12)
NEUTROPHILS # BLD AUTO: 8.94 THOUSANDS/ÂΜL (ref 1.85–7.62)
NEUTS SEG NFR BLD AUTO: 84 % (ref 43–75)
NITRITE UR QL STRIP: NEGATIVE
NON VENT ROOM AIR: 21 %
NON-SQ EPI CELLS URNS QL MICRO: ABNORMAL /HPF
NRBC BLD AUTO-RTO: 0 /100 WBCS
O2 CT BLDA-SCNC: 17.2 ML/DL (ref 16–23)
OPIATES UR QL SCN: NEGATIVE
OSMOLALITY UR/SERPL-RTO: 266 MMOL/KG (ref 282–298)
OSMOLALITY UR: 797 MMOL/KG
OXYCODONE+OXYMORPHONE UR QL SCN: NEGATIVE
OXYHGB MFR BLDA: 97.1 % (ref 94–97)
P AXIS: 77 DEGREES
PCO2 BLDA: 21.2 MM HG (ref 36–44)
PCP UR QL: NEGATIVE
PH BLDA: 7.47 [PH] (ref 7.35–7.45)
PH UR STRIP.AUTO: 6.5 [PH]
PLATELET # BLD AUTO: 227 THOUSANDS/UL (ref 149–390)
PMV BLD AUTO: 10 FL (ref 8.9–12.7)
PO2 BLDA: 105.6 MM HG (ref 75–129)
POTASSIUM SERPL-SCNC: 3.1 MMOL/L (ref 3.5–5.3)
POTASSIUM SERPL-SCNC: 3.1 MMOL/L (ref 3.5–5.3)
POTASSIUM SERPL-SCNC: 3.4 MMOL/L (ref 3.5–5.3)
PR INTERVAL: 168 MS
PROT SERPL-MCNC: 6.9 G/DL (ref 6.4–8.4)
PROT UR STRIP-MCNC: ABNORMAL MG/DL
QRS AXIS: 48 DEGREES
QRSD INTERVAL: 92 MS
QT INTERVAL: 438 MS
QTC INTERVAL: 527 MS
RBC # BLD AUTO: 4.13 MILLION/UL (ref 3.88–5.62)
RBC #/AREA URNS AUTO: ABNORMAL /HPF
SALICYLATES SERPL-MCNC: <3 MG/DL (ref 3–20)
SODIUM 24H UR-SCNC: 17 MOL/L
SODIUM SERPL-SCNC: 107 MMOL/L (ref 135–147)
SODIUM SERPL-SCNC: 110 MMOL/L (ref 135–147)
SODIUM SERPL-SCNC: 112 MMOL/L (ref 135–147)
SP GR UR STRIP.AUTO: 1.04 (ref 1–1.03)
SPECIMEN SOURCE: ABNORMAL
T WAVE AXIS: 32 DEGREES
THC UR QL: NEGATIVE
TSH SERPL DL<=0.05 MIU/L-ACNC: 1.78 UIU/ML (ref 0.45–4.5)
UROBILINOGEN UR STRIP-ACNC: <2 MG/DL
VENTRICULAR RATE: 87 BPM
WBC # BLD AUTO: 10.6 THOUSAND/UL (ref 4.31–10.16)
WBC #/AREA URNS AUTO: ABNORMAL /HPF

## 2022-12-04 PROCEDURE — HZ2ZZZZ DETOXIFICATION SERVICES FOR SUBSTANCE ABUSE TREATMENT: ICD-10-PCS | Performed by: EMERGENCY MEDICINE

## 2022-12-04 RX ORDER — OLANZAPINE 5 MG/1
5 TABLET, ORALLY DISINTEGRATING ORAL EVERY 6 HOURS PRN
Status: DISCONTINUED | OUTPATIENT
Start: 2022-12-04 | End: 2022-12-04

## 2022-12-04 RX ORDER — MAGNESIUM SULFATE HEPTAHYDRATE 40 MG/ML
2 INJECTION, SOLUTION INTRAVENOUS ONCE
Status: DISCONTINUED | OUTPATIENT
Start: 2022-12-04 | End: 2022-12-04

## 2022-12-04 RX ORDER — DIAZEPAM 5 MG/ML
10 INJECTION, SOLUTION INTRAMUSCULAR; INTRAVENOUS ONCE
Status: COMPLETED | OUTPATIENT
Start: 2022-12-04 | End: 2022-12-04

## 2022-12-04 RX ORDER — MAGNESIUM SULFATE HEPTAHYDRATE 40 MG/ML
2 INJECTION, SOLUTION INTRAVENOUS ONCE
Status: COMPLETED | OUTPATIENT
Start: 2022-12-04 | End: 2022-12-04

## 2022-12-04 RX ORDER — POTASSIUM CHLORIDE 20 MEQ/1
40 TABLET, EXTENDED RELEASE ORAL ONCE
Status: DISCONTINUED | OUTPATIENT
Start: 2022-12-04 | End: 2022-12-04

## 2022-12-04 RX ORDER — DIAZEPAM 5 MG/ML
20 INJECTION, SOLUTION INTRAMUSCULAR; INTRAVENOUS ONCE
Status: COMPLETED | OUTPATIENT
Start: 2022-12-04 | End: 2022-12-04

## 2022-12-04 RX ORDER — POTASSIUM CHLORIDE 14.9 MG/ML
20 INJECTION INTRAVENOUS ONCE
Status: COMPLETED | OUTPATIENT
Start: 2022-12-04 | End: 2022-12-04

## 2022-12-04 RX ORDER — ENOXAPARIN SODIUM 100 MG/ML
40 INJECTION SUBCUTANEOUS DAILY
Status: DISCONTINUED | OUTPATIENT
Start: 2022-12-05 | End: 2022-12-08 | Stop reason: HOSPADM

## 2022-12-04 RX ORDER — PHENOBARBITAL SODIUM 130 MG/ML
130 INJECTION INTRAMUSCULAR ONCE
Status: COMPLETED | OUTPATIENT
Start: 2022-12-04 | End: 2022-12-04

## 2022-12-04 RX ORDER — SODIUM CHLORIDE 9 MG/ML
75 INJECTION, SOLUTION INTRAVENOUS CONTINUOUS
Status: DISCONTINUED | OUTPATIENT
Start: 2022-12-04 | End: 2022-12-04 | Stop reason: HOSPADM

## 2022-12-04 RX ORDER — LORAZEPAM 2 MG/ML
4 INJECTION INTRAMUSCULAR ONCE
Status: DISCONTINUED | OUTPATIENT
Start: 2022-12-04 | End: 2022-12-04

## 2022-12-04 RX ORDER — POTASSIUM CHLORIDE 14.9 MG/ML
20 INJECTION INTRAVENOUS ONCE
Status: COMPLETED | OUTPATIENT
Start: 2022-12-04 | End: 2022-12-05

## 2022-12-04 RX ORDER — POTASSIUM CHLORIDE 20 MEQ/1
40 TABLET, EXTENDED RELEASE ORAL ONCE
Status: DISCONTINUED | OUTPATIENT
Start: 2022-12-04 | End: 2022-12-04 | Stop reason: HOSPADM

## 2022-12-04 RX ORDER — PANTOPRAZOLE SODIUM 40 MG/1
40 TABLET, DELAYED RELEASE ORAL
Status: DISCONTINUED | OUTPATIENT
Start: 2022-12-05 | End: 2022-12-08 | Stop reason: HOSPADM

## 2022-12-04 RX ORDER — POTASSIUM CHLORIDE 20MEQ/15ML
40 LIQUID (ML) ORAL ONCE
Status: COMPLETED | OUTPATIENT
Start: 2022-12-04 | End: 2022-12-04

## 2022-12-04 RX ORDER — HALOPERIDOL 5 MG/ML
5 INJECTION INTRAMUSCULAR EVERY 6 HOURS PRN
Status: DISCONTINUED | OUTPATIENT
Start: 2022-12-04 | End: 2022-12-06

## 2022-12-04 RX ORDER — ONDANSETRON 2 MG/ML
4 INJECTION INTRAMUSCULAR; INTRAVENOUS ONCE
Status: COMPLETED | OUTPATIENT
Start: 2022-12-04 | End: 2022-12-04

## 2022-12-04 RX ORDER — POTASSIUM CHLORIDE 14.9 MG/ML
20 INJECTION INTRAVENOUS ONCE
Status: DISCONTINUED | OUTPATIENT
Start: 2022-12-04 | End: 2022-12-04 | Stop reason: HOSPADM

## 2022-12-04 RX ORDER — SODIUM CHLORIDE 9 MG/ML
75 INJECTION, SOLUTION INTRAVENOUS CONTINUOUS
Status: DISCONTINUED | OUTPATIENT
Start: 2022-12-04 | End: 2022-12-04

## 2022-12-04 RX ORDER — ACETAMINOPHEN 325 MG/1
650 TABLET ORAL EVERY 6 HOURS PRN
Status: DISCONTINUED | OUTPATIENT
Start: 2022-12-04 | End: 2022-12-08 | Stop reason: HOSPADM

## 2022-12-04 RX ORDER — ONDANSETRON 2 MG/ML
4 INJECTION INTRAMUSCULAR; INTRAVENOUS EVERY 6 HOURS PRN
Status: DISCONTINUED | OUTPATIENT
Start: 2022-12-04 | End: 2022-12-04

## 2022-12-04 RX ORDER — FENOFIBRATE 145 MG/1
145 TABLET, COATED ORAL DAILY
Status: DISCONTINUED | OUTPATIENT
Start: 2022-12-05 | End: 2022-12-08 | Stop reason: HOSPADM

## 2022-12-04 RX ORDER — SODIUM CHLORIDE, SODIUM GLUCONATE, SODIUM ACETATE, POTASSIUM CHLORIDE, MAGNESIUM CHLORIDE, SODIUM PHOSPHATE, DIBASIC, AND POTASSIUM PHOSPHATE .53; .5; .37; .037; .03; .012; .00082 G/100ML; G/100ML; G/100ML; G/100ML; G/100ML; G/100ML; G/100ML
1000 INJECTION, SOLUTION INTRAVENOUS ONCE
Status: COMPLETED | OUTPATIENT
Start: 2022-12-04 | End: 2022-12-04

## 2022-12-04 RX ORDER — OLANZAPINE 5 MG/1
5 TABLET, ORALLY DISINTEGRATING ORAL EVERY 6 HOURS PRN
Status: DISCONTINUED | OUTPATIENT
Start: 2022-12-04 | End: 2022-12-06

## 2022-12-04 RX ORDER — SODIUM BICARBONATE 650 MG/1
1300 TABLET ORAL
Status: DISCONTINUED | OUTPATIENT
Start: 2022-12-04 | End: 2022-12-08 | Stop reason: HOSPADM

## 2022-12-04 RX ADMIN — DIAZEPAM 20 MG: 10 INJECTION, SOLUTION INTRAMUSCULAR; INTRAVENOUS at 17:11

## 2022-12-04 RX ADMIN — ONDANSETRON 4 MG: 2 INJECTION INTRAMUSCULAR; INTRAVENOUS at 16:33

## 2022-12-04 RX ADMIN — IOHEXOL 100 ML: 350 INJECTION, SOLUTION INTRAVENOUS at 15:29

## 2022-12-04 RX ADMIN — THIAMINE HYDROCHLORIDE 500 MG: 100 INJECTION, SOLUTION INTRAMUSCULAR; INTRAVENOUS at 22:28

## 2022-12-04 RX ADMIN — MAGNESIUM SULFATE HEPTAHYDRATE 2 G: 2 INJECTION, SOLUTION INTRAVENOUS at 19:42

## 2022-12-04 RX ADMIN — MAGNESIUM SULFATE HEPTAHYDRATE 2 G: 40 INJECTION, SOLUTION INTRAVENOUS at 15:36

## 2022-12-04 RX ADMIN — FOLIC ACID 1 MG: 5 INJECTION, SOLUTION INTRAMUSCULAR; INTRAVENOUS; SUBCUTANEOUS at 15:42

## 2022-12-04 RX ADMIN — POTASSIUM CHLORIDE 20 MEQ: 14.9 INJECTION, SOLUTION INTRAVENOUS at 18:34

## 2022-12-04 RX ADMIN — POTASSIUM CHLORIDE 20 MEQ: 14.9 INJECTION, SOLUTION INTRAVENOUS at 23:25

## 2022-12-04 RX ADMIN — ONDANSETRON 4 MG: 2 INJECTION INTRAMUSCULAR; INTRAVENOUS at 11:50

## 2022-12-04 RX ADMIN — DEXTROSE AND SODIUM CHLORIDE 500 ML: 5; .9 INJECTION, SOLUTION INTRAVENOUS at 14:05

## 2022-12-04 RX ADMIN — Medication 650 MG: at 18:14

## 2022-12-04 RX ADMIN — SODIUM CHLORIDE 500 ML: 0.9 INJECTION, SOLUTION INTRAVENOUS at 19:35

## 2022-12-04 RX ADMIN — THIAMINE HYDROCHLORIDE 100 MG: 100 INJECTION, SOLUTION INTRAMUSCULAR; INTRAVENOUS at 13:07

## 2022-12-04 RX ADMIN — SODIUM CHLORIDE: 4 INJECTION, SOLUTION, CONCENTRATE INTRAVENOUS at 21:25

## 2022-12-04 RX ADMIN — OLANZAPINE 5 MG: 5 TABLET, ORALLY DISINTEGRATING ORAL at 21:49

## 2022-12-04 RX ADMIN — DIAZEPAM 10 MG: 10 INJECTION, SOLUTION INTRAMUSCULAR; INTRAVENOUS at 11:50

## 2022-12-04 RX ADMIN — SODIUM CHLORIDE 75 ML/HR: 0.9 INJECTION, SOLUTION INTRAVENOUS at 18:16

## 2022-12-04 RX ADMIN — SODIUM CHLORIDE, SODIUM GLUCONATE, SODIUM ACETATE, POTASSIUM CHLORIDE AND MAGNESIUM CHLORIDE 1000 ML: 526; 502; 368; 37; 30 INJECTION, SOLUTION INTRAVENOUS at 12:08

## 2022-12-04 RX ADMIN — PHENOBARBITAL SODIUM 130 MG: 130 INJECTION INTRAMUSCULAR at 23:33

## 2022-12-04 RX ADMIN — PHENOBARBITAL SODIUM 130 MG: 130 INJECTION INTRAMUSCULAR at 21:46

## 2022-12-04 RX ADMIN — SODIUM BICARBONATE 1300 MG: 650 TABLET ORAL at 21:47

## 2022-12-04 RX ADMIN — POTASSIUM CHLORIDE 20 MEQ: 14.9 INJECTION, SOLUTION INTRAVENOUS at 14:18

## 2022-12-04 RX ADMIN — POTASSIUM CHLORIDE 40 MEQ: 20 SOLUTION ORAL at 21:24

## 2022-12-04 RX ADMIN — DIAZEPAM 10 MG: 10 INJECTION, SOLUTION INTRAMUSCULAR; INTRAVENOUS at 13:39

## 2022-12-04 RX ADMIN — ACETAMINOPHEN 650 MG: 325 TABLET ORAL at 21:44

## 2022-12-04 RX ADMIN — DIAZEPAM 10 MG: 10 INJECTION, SOLUTION INTRAMUSCULAR; INTRAVENOUS at 15:05

## 2022-12-04 NOTE — ED PROVIDER NOTES
History  Chief Complaint   Patient presents with   • Withdrawal - Drug     Patient states "im an alcoholic and going through withdrawal"  Last drink was Friday     HPI  79-year-old male who presents with complaint of alcohol withdrawal   Patient has history of binge drinking has been to rehab before  Patient started binge drinking on Thanksgiving and has had at least a pint of vodka every day since then  He denies any fever, chest pain, shortness of breath  He endorses tremors, malaise, fever subjective, nausea vomiting, epigastric abdominal pain, diaphoresis  States he stopped drinking on Friday  Prior to Admission Medications   Prescriptions Last Dose Informant Patient Reported? Taking? Multiple Vitamins-Minerals (multivitamin with minerals) tablet   Yes No   Sig: Take 1 tablet by mouth daily   fenofibrate (TRICOR) 145 mg tablet   No No   Sig: TAKE 1 TABLET BY MOUTH EVERY DAY   irbesartan (AVAPRO) 150 mg tablet   No No   Sig: TAKE 1 TABLET (150 MG TOTAL) BY MOUTH IN THE MORNING  omeprazole (PriLOSEC) 20 mg delayed release capsule   No No   Sig: TAKE 1 CAPSULE (20 MG TOTAL) BY MOUTH IN THE MORNING  ondansetron (ZOFRAN) 4 mg tablet   No No   Sig: Take 1 tablet (4 mg total) by mouth every 8 (eight) hours as needed for nausea or vomiting   sildenafil (REVATIO) 20 mg tablet   No No   Sig: Take 1 tablet (20 mg total) by mouth if needed (erectile dysfunction)      Facility-Administered Medications: None       Past Medical History:   Diagnosis Date   • Anxiety    • COVID-19 ruled out 2/3/2022   • GERD (gastroesophageal reflux disease)    • Hyperlipidemia    • Hypertension    • Tendinitis        Past Surgical History:   Procedure Laterality Date   • KNEE SURGERY Left 1989    cyst removed   • WA COLONOSCOPY FLX DX W/COLLJ SPEC WHEN PFRMD N/A 5/14/2018    Procedure: COLONOSCOPY;  Surgeon: Ninfa Horton DO;  Location: [facility];   Service: General   • WA SHLDR ARTHROSCOP,SURG,W/ROTAT CUFF REPR Left 9/9/2022 Procedure: SHOULDER ARTHROSCOPIC ROTATOR CUFF REPAIR;  Surgeon: Max Aiken MD;  Location: [facility];  Service: Orthopedics   • SHOULDER ARTHROSCOPY Right     with biceps tenodesis       Family History   Problem Relation Age of Onset   • Melanoma Father    • Cancer Father    • Melanoma Brother    • Diabetes Maternal Grandfather    • Stroke Paternal Grandmother    • Hypertension Paternal Grandmother    • Coronary artery disease Paternal Grandmother    • Heart disease Paternal Grandmother    • Hypertension Paternal Grandfather    • Coronary artery disease Paternal Grandfather    • Brain cancer Paternal Grandfather    • Heart disease Paternal Grandfather    • Liver cancer Paternal Uncle      I have reviewed and agree with the history as documented  E-Cigarette/Vaping   • E-Cigarette Use Never User      E-Cigarette/Vaping Substances   • Nicotine No    • THC No    • CBD No    • Flavoring No    • Other No    • Unknown No      Social History     Tobacco Use   • Smoking status: Former     Packs/day:  00     Years: 20      Pack years: 20      Types: Cigarettes     Start date: 1992     Quit date: 2013     Years since quittin 5   • Smokeless tobacco: Never   Vaping Use   • Vaping Use: Never used   Substance Use Topics   • Alcohol use: Yes     Alcohol/week: 2 0 - 3 0 standard drinks     Types: 2 - 3 Shots of liquor per week   • Drug use: No        Review of Systems   Constitutional: Positive for chills, diaphoresis, fatigue and fever  HENT: Negative for congestion, ear pain, rhinorrhea and sore throat  Eyes: Negative for pain  Respiratory: Positive for shortness of breath  Negative for apnea, cough, choking, chest tightness, wheezing and stridor  Cardiovascular: Negative for chest pain, palpitations and leg swelling  Gastrointestinal: Positive for abdominal pain, nausea and vomiting  Negative for constipation and diarrhea  Genitourinary: Negative for hematuria  Musculoskeletal: Negative for arthralgias and back pain  Skin: Negative for rash and wound  Neurological: Positive for dizziness and tremors  Psychiatric/Behavioral: Negative for agitation and hallucinations  All other systems reviewed and are negative  Physical Exam  ED Triage Vitals   Temperature Pulse Respirations Blood Pressure SpO2   12/04/22 1107 12/04/22 1107 12/04/22 1107 12/04/22 1107 12/04/22 1107   (!) 97 3 °F (36 3 °C) 84 20 123/97 97 %      Temp src Heart Rate Source Patient Position - Orthostatic VS BP Location FiO2 (%)   -- 12/04/22 1630 12/04/22 1300 12/04/22 1300 --    Monitor Lying Right arm       Pain Score       12/04/22 1107       10 - Worst Possible Pain             Orthostatic Vital Signs  Vitals:    12/04/22 1308 12/04/22 1548 12/04/22 1615 12/04/22 1630   BP: 149/87 151/74  135/73   Pulse: (!) 106 100 101 98   Patient Position - Orthostatic VS:    Lying       Physical Exam  Vitals reviewed  Constitutional:       Appearance: He is well-developed  He is ill-appearing and diaphoretic  HENT:      Head: Normocephalic and atraumatic  Right Ear: External ear normal       Left Ear: External ear normal       Nose: Nose normal  No congestion or rhinorrhea  Mouth/Throat:      Mouth: Mucous membranes are moist       Pharynx: No oropharyngeal exudate or posterior oropharyngeal erythema  Eyes:      General:         Right eye: No discharge  Left eye: No discharge  Extraocular Movements: Extraocular movements intact  Conjunctiva/sclera: Conjunctivae normal       Pupils: Pupils are equal, round, and reactive to light  Cardiovascular:      Rate and Rhythm: Regular rhythm  Tachycardia present  Pulses: Normal pulses  Heart sounds: Normal heart sounds  Pulmonary:      Breath sounds: Normal breath sounds  No wheezing or rales  Abdominal:      Palpations: Abdomen is soft  Tenderness: There is abdominal tenderness     Musculoskeletal: General: No tenderness  Normal range of motion  Cervical back: Normal range of motion and neck supple  No rigidity  Skin:     General: Skin is warm  Findings: No erythema or rash  Neurological:      General: No focal deficit present  Mental Status: He is alert and oriented to person, place, and time  Cranial Nerves: No cranial nerve deficit  Sensory: No sensory deficit  Motor: No weakness        Coordination: Coordination normal    Psychiatric:         Mood and Affect: Mood normal          ED Medications  Medications   thiamine (VITAMIN B1) 100 mg in sodium chloride 0 9 % 50 mL IVPB (0 mg Intravenous Stopped 88/4/25 4621)   folic acid 1 mg in sodium chloride 0 9 % 50 mL IVPB (0 mg Intravenous Stopped 12/4/22 1606)   multi-electrolyte (ISOLYTE-S PH 7 4) bolus 1,000 mL (0 mL Intravenous Stopped 12/4/22 1307)   ondansetron (ZOFRAN) injection 4 mg (4 mg Intravenous Given 12/4/22 1150)   diazepam (VALIUM) injection 10 mg (10 mg Intravenous Given 12/4/22 1150)   potassium chloride 20 mEq IVPB (premix) (0 mEq Intravenous Stopped 12/4/22 1540)   diazepam (VALIUM) injection 10 mg (10 mg Intravenous Given 12/4/22 1339)   dextrose 5 % and sodium chloride 0 9 % bolus 500 mL (0 mL Intravenous Stopped 12/4/22 1711)   magnesium sulfate 2 g/50 mL IVPB (premix) 2 g (2 g Intravenous New Bag 12/4/22 1536)   diazepam (VALIUM) injection 10 mg (10 mg Intravenous Given 12/4/22 1505)   iohexol (OMNIPAQUE) 350 MG/ML injection (SINGLE-DOSE) 100 mL (100 mL Intravenous Given 12/4/22 1529)   ondansetron (ZOFRAN) injection 4 mg (4 mg Intravenous Given 12/4/22 1633)   diazepam (VALIUM) injection 20 mg (20 mg Intravenous Given 12/4/22 1711)       Diagnostic Studies  Results Reviewed     Procedure Component Value Units Date/Time    Osmolality, urine [[id number]]  (Normal) Collected: 12/04/22 1548    Lab Status: Final result Specimen: Urine, Other Updated: 12/04/22 1905     Osmolality, Ur 797 mmol/KG     HS Troponin I 2hr [[id number]]  (Normal) Collected: 12/04/22 1531    Lab Status: Final result Specimen: Blood from Arm, Left Updated: 12/04/22 1624     hs TnI 2hr 5 ng/L      Delta 2hr hsTnI 1 ng/L     Basic metabolic panel [[id number]]  (Abnormal) Collected: 12/04/22 1531    Lab Status: Final result Specimen: Blood from Arm, Left Updated: 12/04/22 1622     Sodium 110 mmol/L      Potassium 3 1 mmol/L      Chloride 76 mmol/L      CO2 15 mmol/L      ANION GAP 19 mmol/L      BUN 6 mg/dL      Creatinine 0 59 mg/dL      Glucose 135 mg/dL      Calcium 6 8 mg/dL      eGFR 122 ml/min/1 73sq m     Narrative:      [place] guidelines for Chronic Kidney Disease (CKD):   •  Stage 1 with normal or high GFR (GFR > 90 mL/min/1 73 square meters)  •  Stage 2 Mild CKD (GFR = 60-89 mL/min/1 73 square meters)  •  Stage 3A Moderate CKD (GFR = 45-59 mL/min/1 73 square meters)  •  Stage 3B Moderate CKD (GFR = 30-44 mL/min/1 73 square meters)  •  Stage 4 Severe CKD (GFR = 15-29 mL/min/1 73 square meters)  •  Stage 5 End Stage CKD (GFR <15 mL/min/1 73 square meters)  Note: GFR calculation is accurate only with a steady state creatinine    Urine Microscopic [[id number]]  (Abnormal) Collected: 12/04/22 1534    Lab Status: Final result Specimen: Urine, Clean Catch Updated: 12/04/22 1621     RBC, UA 1-2 /hpf      WBC, UA 1-2 /hpf      Epithelial Cells Occasional /hpf      Bacteria, UA Occasional /hpf      Hyaline Casts, UA 0-3 /lpf     Rapid drug screen, urine [[id number]]  (Abnormal) Collected: 12/04/22 1531    Lab Status: Final result Specimen: Urine, Clean Catch Updated: 12/04/22 1617     Amph/Meth UR Negative     Barbiturate Ur Negative     Benzodiazepine Urine Positive     Cocaine Urine Negative     Methadone Urine Negative     Opiate Urine Negative     PCP Ur Negative     THC Urine Negative     Oxycodone Urine Negative    Narrative:      Presumptive report   If requested, specimen will be sent to reference lab for confirmation  FOR MEDICAL PURPOSES ONLY  IF CONFIRMATION NEEDED PLEASE CONTACT THE LAB WITHIN 5 DAYS  Drug Screen Cutoff Levels:  AMPHETAMINE/METHAMPHETAMINES  1000 ng/mL  BARBITURATES     200 ng/mL  BENZODIAZEPINES     200 ng/mL  COCAINE      300 ng/mL  METHADONE      300 ng/mL  OPIATES      300 ng/mL  PHENCYCLIDINE     25 ng/mL  THC       50 ng/mL  OXYCODONE      100 ng/mL    Sodium, urine, random [[id number]] Collected: 12/04/22 1548    Lab Status: Final result Specimen: Urine, Other Updated: 12/04/22 1610     Sodium, Ur 17    UA w Reflex to Microscopic w Reflex to Culture [[id number]]  (Abnormal) Collected: 12/04/22 1534    Lab Status: Final result Specimen: Urine, Clean Catch Updated: 12/04/22 1600     Color, UA Light Orange     Clarity, UA Clear     Specific Gravity, UA 1 038     pH, UA 6 5     Leukocytes, UA Negative     Nitrite, UA Negative     Protein, UA 30 (1+) mg/dl      Glucose, UA Negative mg/dl      Ketones, UA 40 (2+) mg/dl      Urobilinogen, UA <2 0 mg/dl      Bilirubin, UA Negative     Occult Blood, UA Negative    Osmolality-"If this is regarding a toxic alcohol, please STOP and consult medical  for further guidance " [[id number]]  (Abnormal) Collected: 12/04/22 1229    Lab Status: Final result Specimen: Blood from Arm, Right Updated: 12/04/22 1431     Osmolality Serum 161 mmol/KG     Salicylate level [[id number]]  (Abnormal) Resulted: 12/04/22 1429    Lab Status: Final result Specimen: Blood Updated: 90/09/43 6812     Salicylate Lvl <3 mg/dL     Acetaminophen level-If concentration is detectable, please discuss with medical  on call   [[id number]]  (Abnormal) Resulted: 12/04/22 1429    Lab Status: Final result Specimen: Blood Updated: 12/04/22 1429     Acetaminophen Level <2 ug/mL     Ethanol [[id number]]  (Abnormal) Resulted: 12/04/22 1426    Lab Status: Final result Specimen: Blood Updated: 12/04/22 1426     Ethanol Lvl 168 mg/dL     Blood gas, arterial [[id number]] (Abnormal) Collected: 12/04/22 1346    Lab Status: Final result Specimen: Blood, Arterial from Radial, Right Updated: 12/04/22 1416     pH, Arterial 7 466     pCO2, Arterial 21 2 mm Hg      pO2, Arterial 105 6 mm Hg      HCO3, Arterial 14 9 mmol/L      Base Excess, Arterial -6 7 mmol/L      O2 Content, Arterial 17 2 mL/dL      O2 HGB,Arterial  97 1 %      SOURCE Radial, Right     LIAM TEST Yes     ROOM AIR FIO2 21 %     Magnesium [[id number]]  (Abnormal) Collected: 12/04/22 1229    Lab Status: Final result Specimen: Blood from Arm, Right Updated: 12/04/22 1414     Magnesium 1 2 mg/dL     TSH [[id number]]  (Normal) Collected: 12/04/22 1229    Lab Status: Final result Specimen: Blood from Arm, Right Updated: 12/04/22 1356     TSH 3RD GENERATON 1 780 uIU/mL     Narrative:      Patients undergoing fluorescein dye angiography may retain small amounts of fluorescein in the body for 48-72 hours post procedure  Samples containing fluorescein can produce falsely depressed TSH values  If the patient had this procedure,a specimen should be resubmitted post fluorescein clearance        CMP [[id number]]  (Abnormal) Collected: 12/04/22 1229    Lab Status: Final result Specimen: Blood from Arm, Right Updated: 12/04/22 1324     Sodium 112 mmol/L      Potassium 3 1 mmol/L      Chloride 77 mmol/L      CO2 14 mmol/L      ANION GAP 21 mmol/L      BUN 7 mg/dL      Creatinine 0 55 mg/dL      Glucose 90 mg/dL      Calcium 7 3 mg/dL       U/L      ALT 70 U/L      Alkaline Phosphatase 32 U/L      Total Protein 6 9 g/dL      Albumin 3 9 g/dL      Total Bilirubin 1 32 mg/dL      eGFR 125 ml/min/1 73sq m     Narrative:      [place] guidelines for Chronic Kidney Disease (CKD):   •  Stage 1 with normal or high GFR (GFR > 90 mL/min/1 73 square meters)  •  Stage 2 Mild CKD (GFR = 60-89 mL/min/1 73 square meters)  •  Stage 3A Moderate CKD (GFR = 45-59 mL/min/1 73 square meters)  •  Stage 3B Moderate CKD (GFR = 30-44 mL/min/1 73 square meters)  •  Stage 4 Severe CKD (GFR = 15-29 mL/min/1 73 square meters)  •  Stage 5 End Stage CKD (GFR <15 mL/min/1 73 square meters)  Note: GFR calculation is accurate only with a steady state creatinine    Lipase [[id number]]  (Normal) Collected: 12/04/22 1229    Lab Status: Final result Specimen: Blood from Arm, Right Updated: 12/04/22 1313     Lipase 281 u/L     HS Troponin 0hr (reflex protocol) [[id number]]  (Normal) Collected: 12/04/22 1229    Lab Status: Final result Specimen: Blood from Arm, Right Updated: 12/04/22 1311     hs TnI 0hr 4 ng/L     CBC and differential [[id number]]  (Abnormal) Collected: 12/04/22 1229    Lab Status: Final result Specimen: Blood from Arm, Right Updated: 12/04/22 1236     WBC 10 60 Thousand/uL      RBC 4 13 Million/uL      Hemoglobin 12 5 g/dL      Hematocrit 33 3 %      MCV 81 fL      MCH 30 3 pg      MCHC 37 5 g/dL      RDW 11 8 %      MPV 10 0 fL      Platelets 644 Thousands/uL      nRBC 0 /100 WBCs      Neutrophils Relative 84 %      Immat GRANS % 1 %      Lymphocytes Relative 10 %      Monocytes Relative 5 %      Eosinophils Relative 0 %      Basophils Relative 0 %      Neutrophils Absolute 8 94 Thousands/µL      Immature Grans Absolute 0 05 Thousand/uL      Lymphocytes Absolute 1 04 Thousands/µL      Monocytes Absolute 0 55 Thousand/µL      Eosinophils Absolute 0 01 Thousand/µL      Basophils Absolute 0 01 Thousands/µL                  CT head without contrast   Final Result by Carlos Frank DO (12/04 1546)      No acute intracranial abnormality  Mild mucosal thickening in the right maxillary sinus  Workstation performed: [id number]         CT abdomen pelvis with contrast   Final Result by Francis Muñoz MD (12/04 1603)      Severe fatty liver changes and moderate hepatomegaly  Dilated, fluid-filled distal esophagus, likely due to gastroesophageal reflux          Workstation performed: [id number] Procedures  ECG 12 Lead Documentation Only    Date/Time: 12/6/2022 5:42 PM  Performed by: Danielito Gibbs DO  Authorized by: Danielito Gibbs DO     Indications / Diagnosis:  Alcohol withdrawal  ECG reviewed by me, the ED Provider: yes    Patient location:  ED  Interpretation:     Interpretation: normal    Rate:     ECG rate:  124    ECG rate assessment: tachycardic    Rhythm:     Rhythm: sinus rhythm    Ectopy:     Ectopy: none    QRS:     QRS axis:  Normal  Conduction:     Conduction: normal    ST segments:     ST segments:  Normal  T waves:     T waves: normal            ED Course  ED Course as of 12/06/22 1748   Sun Dec 04, 2022   1605 CT abdomen pelvis with contrast                             SBIRT 22yo+    Flowsheet Row Most Recent Value   SBIRT (23 yo +)    In order to provide better care to our patients, we are screening all of our patients for alcohol and drug use  Would it be okay to ask you these screening questions? Yes Filed at: 12/04/2022 1311   Initial Alcohol Screen: US AUDIT-C     1  How often do you have a drink containing alcohol? 6 Filed at: 12/04/2022 1311   2  How many drinks containing alcohol do you have on a typical day you are drinking? 6 Filed at: 12/04/2022 1311   3a  Male UNDER 65: How often do you have five or more drinks on one occasion? 6 Filed at: 12/04/2022 1311   3b  FEMALE Any Age, or MALE 65+: How often do you have 4 or more drinks on one occassion? 0 Filed at: 12/04/2022 1311   Audit-C Score 18 Filed at: 12/04/2022 1311   Full Alcohol Screen: US AUDIT    4  How often during the last year have you found that you were not able to stop drinking once you had started? 4 Filed at: 12/04/2022 1311   5  How often during past year have you failed to do what was normally expected of you because of drinking? 2 Filed at: 12/04/2022 1311   6  How often in past year have you needed a first drink in the morning to get yourself going after a heavy drinking session?   2 Filed at: 12/04/2022 1311   7  How often in past year have you had feeling of guilt or remorse after drinking? 4 Filed at: 12/04/2022 1311   8  How often in past year have you been unable to remember what happened night before because you had been drinking? 2 Filed at: 12/04/2022 1311   9  Have you or someone else been injured as a result of your drinking? 0 Filed at: 12/04/2022 1311   10  Has a relative, friend, doctor or other health worker been concerned about your drinking and suggested you cut down? 4 Filed at: 12/04/2022 1311   AUDIT Total Score 36 Filed at: 12/04/2022 1311   TRISTA: How many times in the past year have you    Used an illegal drug or used a prescription medication for non-medical reasons? Never Filed at: 12/04/2022 1311                MDM  Number of Diagnoses or Management Options  Alcohol abuse  Alcohol withdrawal ([facility] [address] )  Hypomagnesemia  Hyponatremia  Diagnosis management comments: 19-year-old male who presents with complaint of alcohol withdrawal     All withdrawal/hyponatremia/alcoholic ketoacidosis    [facility] consulted and will accept the patient to detox  Is in color ketoacidosis is given p o  juice and crackers to increase glucose  Patient is given fluid bolus then ordered 75 mL an hour infusion of normal saline as per nephrology for hyponatremia  Double doses of Valium given for withdrawal symptoms and Zofran was provided for nausea vomiting  CT head and CT abdomen pelvis are negative for acute abnormality  Patient is transferred to [address] [place] [address]           Amount and/or Complexity of Data Reviewed  Clinical lab tests: ordered and reviewed  Tests in the radiology section of CPT®: reviewed and ordered  Obtain history from someone other than the patient: yes  Review and summarize past medical records: yes  Discuss the patient with other providers: yes  Independent visualization of images, tracings, or specimens: yes    Risk of Complications, Morbidity, and/or Mortality  Presenting problems: high  Diagnostic procedures: high  Management options: high        Disposition  Final diagnoses:   Alcohol withdrawal ([facility] [address] )   Hyponatremia   Hypomagnesemia   Alcohol abuse     Time reflects when diagnosis was documented in both MDM as applicable and the Disposition within this note     Time User Action Codes Description Comment    12/4/2022  1:40 PM Neo Andrew Add [F10 939] Alcohol withdrawal ([facility] [address] )     12/4/2022  4:06 PM Neo Andrew Add [E87 1] Hyponatremia     12/4/2022  4:06 PM Neo Andrew Add [E83 42] Hypomagnesemia     12/4/2022  4:06 PM Neo Andrew Add [F10 10] Alcohol abuse       ED Disposition     ED Disposition   Transfer to Another Facility-In Network    Condition   --    Date/Time   Sun Dec 4, 2022  4:06 PM    Comment   Mark Jimenez should be transferred out to [facility]             MD Documentation    Flowsheet Row Most Recent Value   Patient Condition Other (Include comment)_________________________________________  Jasmyne Yakelin, alcohol withdrawal]   Reason for Transfer Other (Include comment)____________________  [toxicology]   Benefits of Transfer Specialized equipment and/or services available at the receiving facility (Include comment)________________________   Risks of Transfer Potential for delay in receiving treatment, Loss of IV, Increased discomfort during transfer   Accepting Physician Rossy Delatorre Name, [facility]   Provider Certification General risk, such as traffic hazards, adverse weather conditions, rough terrain or turbulence, possible failure of equipment (including vehicle or aircraft), or consequences of actions of persons outside the control of the transport personnel      RN Documentation    [address] Fanny Powell Name, [address] [place] [address]      Follow-up Information    None         Discharge Medication List as of 12/4/2022  5:37 PM      CONTINUE these medications which have NOT CHANGED    Details   fenofibrate (TRICOR) 145 mg tablet TAKE 1 TABLET BY MOUTH EVERY DAY, Normal      irbesartan (AVAPRO) 150 mg tablet TAKE 1 TABLET (150 MG TOTAL) BY MOUTH IN THE MORNING , Starting Thu 11/24/2022, Until Wed 2/22/2023, Normal      Multiple Vitamins-Minerals (multivitamin with minerals) tablet Take 1 tablet by mouth daily, Historical Med      omeprazole (PriLOSEC) 20 mg delayed release capsule TAKE 1 CAPSULE (20 MG TOTAL) BY MOUTH IN THE MORNING , Starting Thu 11/24/2022, Until Wed 2/22/2023, Normal      ondansetron (ZOFRAN) 4 mg tablet Take 1 tablet (4 mg total) by mouth every 8 (eight) hours as needed for nausea or vomiting, Starting Wed 11/30/2022, Normal      sildenafil (REVATIO) 20 mg tablet Take 1 tablet (20 mg total) by mouth if needed (erectile dysfunction), Starting u 6/9/2022, Print           No discharge procedures on file  PDMP Review       Value Time User    PDMP Reviewed  Yes 12/4/2022 10:50  Kalina Delatorre PA-C           ED Provider  Attending physically available and evaluated Lul Foster  MUKUND managed the patient along with the ED Attending      Electronically Signed by         Meka Hinkle DO  12/06/22 8465

## 2022-12-04 NOTE — ED NOTES
[facility]  P/U 2100 with [facility]  Nurse report: [phone]     Melissa Cheatham RN  12/04/22 1719

## 2022-12-04 NOTE — EMTALA/ACUTE CARE TRANSFER
[address] [facility] [address]  Dept: [phone]      DTFPHV TRANSFER CONSENT    NAME Boris Gee                                         1975                              MRN [id number]    I have been informed of my rights regarding examination, treatment, and transfer   by Dr Lisha Squires,*    Benefits: Specialized equipment and/or services available at the receiving facility (Include comment)________________________    Risks: Potential for delay in receiving treatment, Loss of IV, Increased discomfort during transfer      Consent for Transfer:  I acknowledge that my medical condition has been evaluated and explained to me by the emergency department physician or other qualified medical person and/or my attending physician, who has recommended that I be transferred to the service of  Accepting Physician: Magy Valero at [address]  Name, [facility] : [facility]  The above potential benefits of such transfer, the potential risks associated with such transfer, and the probable risks of not being transferred have been explained to me, and I fully understand them  The doctor has explained that, in my case, the benefits of transfer outweigh the risks  I agree to be transferred  I authorize the performance of emergency medical procedures and treatments upon me in both transit and upon arrival at the receiving facility  Additionally, I authorize the release of any and all medical records to the receiving facility and request they be transported with me, if possible  I understand that the safest mode of transportation during a medical emergency is an ambulance and that the Hospital advocates the use of this mode of transport   Risks of traveling to the receiving facility by car, including absence of medical control, life sustaining equipment, such as oxygen, and medical personnel has been explained to me and I fully understand them  (NATI CORRECT BOX BELOW)  [  ]  I consent to the stated transfer and to be transported by ambulance/helicopter  [  ]  I consent to the stated transfer, but refuse transportation by ambulance and accept full responsibility for my transportation by car  I understand the risks of non-ambulance transfers and I exonerate the Hospital and its staff from any deterioration in my condition that results from this refusal     X___________________________________________    DATE  22  TIME________  Signature of patient or legally responsible individual signing on patient behalf           RELATIONSHIP TO PATIENT_________________________          Provider Certification    NAME Aria Churchill                                         1975                              MRN [id number]    A medical screening exam was performed on the above named patient  Based on the examination:    Condition Necessitating Transfer The primary encounter [facility]  Diagnoses of Hyponatremia, Hypomagnesemia, and Alcohol abuse were also pertinent to this visit  Patient Condition: Other (Include comment)_________________________________________ (detox, alcohol withdrawal)    Reason for Transfer: Other (Include comment)____________________ (toxicology)    Transfer Requirements: Arnal[address]   · Space available and qualified personnel available for treatment as acknowledged by    · Agreed to accept transfer and to provide appropriate medical treatment as acknowledged by       Coleen  · Appropriate medical records of the examination and treatment of the patient are provided at the time of transfer   [address], [address] _______  · Transfer will be performed by qualified personnel from    and appropriate transfer equipment as required, including the use of necessary and appropriate life support measures      Provider Certification: I have examined the patient and explained the following risks and benefits of being transferred/refusing transfer to the patient/family:  General risk, such as traffic hazards, adverse weather conditions, rough terrain or turbulence, possible failure of equipment (including vehicle or aircraft), or consequences of actions of persons outside the control of the transport personnel      Based on these reasonable risks and benefits to the patient and/or the unborn child(jac), and based upon the information available at the time of the patient’s examination, I certify that the medical benefits reasonably to be expected from the provision of appropriate medical treatments at another medical facility outweigh the increasing risks, if any, to the individual’s medical condition, and in the case of labor to the unborn child, from effecting the transfer      X____________________________________________ DATE 12/04/22        TIME_______      ORIGINAL - SEND TO MEDICAL RECORDS   COPY - SEND WITH PATIENT DURING TRANSFER

## 2022-12-04 NOTE — LETTER
[address] [id number]  [phone]  Dept: [phone]    December 8, 2022     Patient: Mounika Burgess   YOB: 1975   Date of Visit: 12/4/2022       To Whom it May Concern:    Latasha Dunbar is under my professional care  He was seen in the hospital from 12/4/2022   to 12/08/22  He may return to work on 12/12/2022 without limitations  If you have any questions or concerns, please don't hesitate to call           Sincerely,

## 2022-12-04 NOTE — ASSESSMENT & PLAN NOTE
· H/o chronic daily alcohol consumption, denies h/o withdrawal seizures  · Last drink: 12/3/22   · Ethanol lvl: 168   · Received a total of 50 mg of Valium PTA during ED course   · Follow SEWS protocol with symptom-triggered phenobarbital for medically-assisted alcohol withdrawal  · Current symptoms include anxiety and agitation/impulsiveness   · SEWS score upon admission 13   ·  Administer 650 mg phenobarbital + valium 10 mg IV   · Continue to monitor vitals, symptoms  · Continue cardiopulmonary monitoring

## 2022-12-04 NOTE — ED ATTENDING ATTESTATION
12/4/2022  IRosalva MD, saw and evaluated the patient  I have discussed the patient with the resident/non-physician practitioner and agree with the resident's/non-physician practitioner's findings, Plan of Care, and MDM as documented in the resident's/non-physician practitioner's note, except where noted  All available labs and Radiology studies were reviewed  I was present for key portions of any procedure(s) performed by the resident/non-physician practitioner and I was immediately available to provide assistance  At this point I agree with the current assessment done in the Emergency Department    I have conducted an independent evaluation of this patient a history and physical is as follows:  Alcoholic     Binge drinks    Started on thanksgiving   Then stopped 2 days ago   Now states he has withdraw symptoms   Nausea  Sweats    No fever     No seizure no hallucinations  Exam the patient is somewhat tremulous he is awake alert oriented HEENT exam unremarkable lungs clear heart regular abdomen soft nontender extremities normal neurologic exam nonfocal  Impression alcohol withdrawal  IV Valium will offer transfer to [facility] inpatient for withdrawal  ED Course   Patient noted to have severe hyponatremia  She will be transferred to the inpatient toxicology service for continued treatment of alcohol withdrawal hyponatremia metabolic acidosis    Critical Care Time  Procedures  The patient presented with a condition in which there was a high probability of imminent or life-threatening deterioration, and critical care services (excluding separately billable procedures and total teaching time ) total 30 minutes

## 2022-12-04 NOTE — ASSESSMENT & PLAN NOTE
· Drinks 1 pint of vodka daily  Denies H/o withdrawal seizure  Withdrawal management as above  Initiate IVFs  Initiate high dose thiamine along with folic acid supplementation, and MVI  Consult case management for assistance with rehab resources - pt interested in outpatient services at this time

## 2022-12-05 ENCOUNTER — APPOINTMENT (OUTPATIENT)
Dept: ULTRASOUND IMAGING | Facility: HOSPITAL | Age: 47
End: 2022-12-05

## 2022-12-05 PROBLEM — D72.829 LEUKOCYTOSIS: Status: RESOLVED | Noted: 2022-12-04 | Resolved: 2022-12-05

## 2022-12-05 LAB
ALBUMIN SERPL BCP-MCNC: 3.8 G/DL (ref 3.5–5)
ALP SERPL-CCNC: 36 U/L (ref 43–122)
ALT SERPL W P-5'-P-CCNC: 59 U/L
AMPHETAMINES SERPL QL SCN: NEGATIVE
ANION GAP SERPL CALCULATED.3IONS-SCNC: 3 MMOL/L (ref 5–14)
ANION GAP SERPL CALCULATED.3IONS-SCNC: 5 MMOL/L (ref 5–14)
ANION GAP SERPL CALCULATED.3IONS-SCNC: 6 MMOL/L (ref 5–14)
ANION GAP SERPL CALCULATED.3IONS-SCNC: 6 MMOL/L (ref 5–14)
ANION GAP SERPL CALCULATED.3IONS-SCNC: 8 MMOL/L (ref 5–14)
ANION GAP SERPL CALCULATED.3IONS-SCNC: 8 MMOL/L (ref 5–14)
AST SERPL W P-5'-P-CCNC: 143 U/L (ref 17–59)
ATRIAL RATE: 124 BPM
ATRIAL RATE: 124 BPM
ATRIAL RATE: 89 BPM
ATRIAL RATE: 90 BPM
ATRIAL RATE: 97 BPM
BARBITURATES UR QL: NEGATIVE
BASE EX.OXY STD BLDV CALC-SCNC: 95.4 % (ref 60–80)
BASE EXCESS BLDV CALC-SCNC: -1.4 MMOL/L
BENZODIAZ UR QL: POSITIVE
BILIRUB DIRECT SERPL-MCNC: 0.47 MG/DL (ref 0–0.2)
BILIRUB SERPL-MCNC: 2.37 MG/DL (ref 0.2–1)
BUN SERPL-MCNC: 3 MG/DL (ref 5–25)
BUN SERPL-MCNC: 4 MG/DL (ref 5–25)
BUN SERPL-MCNC: 4 MG/DL (ref 5–25)
BUN SERPL-MCNC: 6 MG/DL (ref 5–25)
CALCIUM SERPL-MCNC: 7.7 MG/DL (ref 8.4–10.2)
CALCIUM SERPL-MCNC: 7.8 MG/DL (ref 8.4–10.2)
CALCIUM SERPL-MCNC: 7.9 MG/DL (ref 8.4–10.2)
CALCIUM SERPL-MCNC: 7.9 MG/DL (ref 8.4–10.2)
CALCIUM SERPL-MCNC: 8 MG/DL (ref 8.4–10.2)
CALCIUM SERPL-MCNC: 8.2 MG/DL (ref 8.4–10.2)
CHLORIDE SERPL-SCNC: 85 MMOL/L (ref 96–108)
CHLORIDE SERPL-SCNC: 87 MMOL/L (ref 96–108)
CHLORIDE SERPL-SCNC: 90 MMOL/L (ref 96–108)
CHLORIDE SERPL-SCNC: 92 MMOL/L (ref 96–108)
CHLORIDE SERPL-SCNC: 93 MMOL/L (ref 96–108)
CHLORIDE SERPL-SCNC: 96 MMOL/L (ref 96–108)
CHLORIDE UR-SCNC: 31 MMOL/L
CHOLEST SERPL-MCNC: 169 MG/DL
CO2 SERPL-SCNC: 19 MMOL/L (ref 21–32)
CO2 SERPL-SCNC: 20 MMOL/L (ref 21–32)
CO2 SERPL-SCNC: 21 MMOL/L (ref 21–32)
CO2 SERPL-SCNC: 21 MMOL/L (ref 21–32)
CO2 SERPL-SCNC: 22 MMOL/L (ref 21–32)
CO2 SERPL-SCNC: 25 MMOL/L (ref 21–32)
COCAINE UR QL: NEGATIVE
CREAT SERPL-MCNC: 0.62 MG/DL (ref 0.7–1.5)
CREAT SERPL-MCNC: 0.64 MG/DL (ref 0.7–1.5)
CREAT SERPL-MCNC: 0.65 MG/DL (ref 0.7–1.5)
CREAT SERPL-MCNC: 0.67 MG/DL (ref 0.7–1.5)
CREAT SERPL-MCNC: 0.68 MG/DL (ref 0.7–1.5)
CREAT SERPL-MCNC: 0.8 MG/DL (ref 0.7–1.5)
ERYTHROCYTE [DISTWIDTH] IN BLOOD BY AUTOMATED COUNT: 12.3 % (ref 11.6–15.1)
EST. AVERAGE GLUCOSE BLD GHB EST-MCNC: 108 MG/DL
FLUAV RNA RESP QL NAA+PROBE: NEGATIVE
FLUBV RNA RESP QL NAA+PROBE: NEGATIVE
GFR SERPL CREATININE-BSD FRML MDRD: 107 ML/MIN/1.73SQ M
GFR SERPL CREATININE-BSD FRML MDRD: 115 ML/MIN/1.73SQ M
GFR SERPL CREATININE-BSD FRML MDRD: 116 ML/MIN/1.73SQ M
GFR SERPL CREATININE-BSD FRML MDRD: 117 ML/MIN/1.73SQ M
GFR SERPL CREATININE-BSD FRML MDRD: 118 ML/MIN/1.73SQ M
GFR SERPL CREATININE-BSD FRML MDRD: 119 ML/MIN/1.73SQ M
GLUCOSE SERPL-MCNC: 100 MG/DL (ref 70–99)
GLUCOSE SERPL-MCNC: 114 MG/DL (ref 70–99)
GLUCOSE SERPL-MCNC: 119 MG/DL (ref 70–99)
GLUCOSE SERPL-MCNC: 122 MG/DL (ref 70–99)
GLUCOSE SERPL-MCNC: 128 MG/DL (ref 70–99)
GLUCOSE SERPL-MCNC: 97 MG/DL (ref 70–99)
HBA1C MFR BLD: 5.4 %
HCO3 BLDV-SCNC: 21.5 MMOL/L (ref 24–30)
HCT VFR BLD AUTO: 34.7 % (ref 36.5–49.3)
HDLC SERPL-MCNC: 39 MG/DL
HGB BLD-MCNC: 12.4 G/DL (ref 12–17)
LACTATE SERPL-SCNC: 1 MMOL/L (ref 0.5–2)
LDLC SERPL DIRECT ASSAY-MCNC: 69.89 MG/DL (ref 0–100)
MAGNESIUM SERPL-MCNC: 2 MG/DL (ref 1.6–2.3)
MAGNESIUM SERPL-MCNC: 2 MG/DL (ref 1.6–2.3)
MCH RBC QN AUTO: 30 PG (ref 26.8–34.3)
MCHC RBC AUTO-ENTMCNC: 35.7 G/DL (ref 31.4–37.4)
MCV RBC AUTO: 84 FL (ref 82–98)
METHADONE UR QL: NEGATIVE
O2 CT BLDV-SCNC: 17.6 ML/DL
OPIATES UR QL SCN: NEGATIVE
OSMOLALITY UR: 150 MMOL/KG
OXYCODONE+OXYMORPHONE UR QL SCN: NEGATIVE
P AXIS: 18 DEGREES
P AXIS: 18 DEGREES
P AXIS: 25 DEGREES
P AXIS: 27 DEGREES
P AXIS: 28 DEGREES
PCO2 BLDV: 30.9 MM HG (ref 42–50)
PCP UR QL: NEGATIVE
PH BLDV: 7.46 [PH] (ref 7.3–7.4)
PLATELET # BLD AUTO: 134 THOUSANDS/UL (ref 149–390)
PMV BLD AUTO: 9.9 FL (ref 8.9–12.7)
PO2 BLDV: 109.5 MM HG (ref 35–45)
POTASSIUM SERPL-SCNC: 3.1 MMOL/L (ref 3.5–5.3)
POTASSIUM SERPL-SCNC: 3.4 MMOL/L (ref 3.5–5.3)
POTASSIUM SERPL-SCNC: 3.5 MMOL/L (ref 3.5–5.3)
POTASSIUM SERPL-SCNC: 3.6 MMOL/L (ref 3.5–5.3)
POTASSIUM SERPL-SCNC: 3.7 MMOL/L (ref 3.5–5.3)
POTASSIUM SERPL-SCNC: 5.8 MMOL/L (ref 3.5–5.3)
PR INTERVAL: 146 MS
PR INTERVAL: 150 MS
PR INTERVAL: 158 MS
PR INTERVAL: 160 MS
PR INTERVAL: 160 MS
PROT SERPL-MCNC: 6.5 G/DL (ref 6.4–8.4)
QRS AXIS: -1 DEGREES
QRS AXIS: 0 DEGREES
QRS AXIS: 10 DEGREES
QRS AXIS: 12 DEGREES
QRS AXIS: 7 DEGREES
QRSD INTERVAL: 102 MS
QRSD INTERVAL: 88 MS
QRSD INTERVAL: 88 MS
QRSD INTERVAL: 96 MS
QRSD INTERVAL: 98 MS
QT INTERVAL: 320 MS
QT INTERVAL: 330 MS
QT INTERVAL: 402 MS
QT INTERVAL: 414 MS
QT INTERVAL: 434 MS
QTC INTERVAL: 459 MS
QTC INTERVAL: 474 MS
QTC INTERVAL: 506 MS
QTC INTERVAL: 510 MS
QTC INTERVAL: 528 MS
RBC # BLD AUTO: 4.14 MILLION/UL (ref 3.88–5.62)
RSV RNA RESP QL NAA+PROBE: NEGATIVE
SARS-COV-2 RNA RESP QL NAA+PROBE: NEGATIVE
SODIUM 24H UR-SCNC: 25 MOL/L
SODIUM SERPL-SCNC: 113 MMOL/L (ref 135–147)
SODIUM SERPL-SCNC: 115 MMOL/L (ref 135–147)
SODIUM SERPL-SCNC: 117 MMOL/L (ref 135–147)
SODIUM SERPL-SCNC: 118 MMOL/L (ref 135–147)
SODIUM SERPL-SCNC: 120 MMOL/L (ref 135–147)
SODIUM SERPL-SCNC: 124 MMOL/L (ref 135–147)
T WAVE AXIS: 0 DEGREES
T WAVE AXIS: 0 DEGREES
T WAVE AXIS: 1 DEGREES
T WAVE AXIS: 19 DEGREES
T WAVE AXIS: 20 DEGREES
THC UR QL: NEGATIVE
TRIGL SERPL-MCNC: 486 MG/DL
URATE SERPL-MCNC: 3.4 MG/DL (ref 3.5–8.5)
VENTRICULAR RATE: 124 BPM
VENTRICULAR RATE: 124 BPM
VENTRICULAR RATE: 89 BPM
VENTRICULAR RATE: 90 BPM
VENTRICULAR RATE: 97 BPM
WBC # BLD AUTO: 6.6 THOUSAND/UL (ref 4.31–10.16)

## 2022-12-05 RX ORDER — PHENOBARBITAL SODIUM 130 MG/ML
260 INJECTION INTRAMUSCULAR ONCE
Status: COMPLETED | OUTPATIENT
Start: 2022-12-05 | End: 2022-12-05

## 2022-12-05 RX ORDER — POTASSIUM CHLORIDE 14.9 MG/ML
20 INJECTION INTRAVENOUS ONCE
Status: COMPLETED | OUTPATIENT
Start: 2022-12-05 | End: 2022-12-05

## 2022-12-05 RX ORDER — LIDOCAINE HYDROCHLORIDE 20 MG/ML
15 SOLUTION OROPHARYNGEAL 4 TIMES DAILY PRN
Status: DISCONTINUED | OUTPATIENT
Start: 2022-12-05 | End: 2022-12-08 | Stop reason: HOSPADM

## 2022-12-05 RX ORDER — PHENOBARBITAL 64.8 MG/1
64.8 TABLET ORAL ONCE
Status: COMPLETED | OUTPATIENT
Start: 2022-12-05 | End: 2022-12-05

## 2022-12-05 RX ORDER — PHENOBARBITAL 64.8 MG/1
64.8 TABLET ORAL ONCE
Status: DISCONTINUED | OUTPATIENT
Start: 2022-12-05 | End: 2022-12-05

## 2022-12-05 RX ORDER — SODIUM POLYSTYRENE SULFONATE 4.1 MEQ/G
15 POWDER, FOR SUSPENSION ORAL; RECTAL ONCE
Status: DISCONTINUED | OUTPATIENT
Start: 2022-12-05 | End: 2022-12-05

## 2022-12-05 RX ORDER — MAGNESIUM HYDROXIDE/ALUMINUM HYDROXICE/SIMETHICONE 120; 1200; 1200 MG/30ML; MG/30ML; MG/30ML
30 SUSPENSION ORAL EVERY 4 HOURS PRN
Status: DISCONTINUED | OUTPATIENT
Start: 2022-12-05 | End: 2022-12-08 | Stop reason: HOSPADM

## 2022-12-05 RX ORDER — SODIUM CHLORIDE 1000 MG
2 TABLET, SOLUBLE MISCELLANEOUS
Status: DISCONTINUED | OUTPATIENT
Start: 2022-12-05 | End: 2022-12-05

## 2022-12-05 RX ORDER — MAGNESIUM SULFATE HEPTAHYDRATE 40 MG/ML
2 INJECTION, SOLUTION INTRAVENOUS ONCE
Status: COMPLETED | OUTPATIENT
Start: 2022-12-05 | End: 2022-12-05

## 2022-12-05 RX ORDER — DEXTROSE MONOHYDRATE 50 MG/ML
50 INJECTION, SOLUTION INTRAVENOUS CONTINUOUS
Status: DISCONTINUED | OUTPATIENT
Start: 2022-12-05 | End: 2022-12-06

## 2022-12-05 RX ORDER — PHENOBARBITAL SODIUM 65 MG/ML
65 INJECTION INTRAMUSCULAR ONCE
Status: COMPLETED | OUTPATIENT
Start: 2022-12-05 | End: 2022-12-05

## 2022-12-05 RX ORDER — DIAZEPAM 5 MG/ML
10 INJECTION, SOLUTION INTRAMUSCULAR; INTRAVENOUS ONCE
Status: COMPLETED | OUTPATIENT
Start: 2022-12-05 | End: 2022-12-05

## 2022-12-05 RX ORDER — PHENOBARBITAL SODIUM 130 MG/ML
130 INJECTION INTRAMUSCULAR ONCE
Status: COMPLETED | OUTPATIENT
Start: 2022-12-05 | End: 2022-12-05

## 2022-12-05 RX ADMIN — THIAMINE HYDROCHLORIDE 500 MG: 100 INJECTION, SOLUTION INTRAMUSCULAR; INTRAVENOUS at 13:12

## 2022-12-05 RX ADMIN — ENOXAPARIN SODIUM 40 MG: 100 INJECTION SUBCUTANEOUS at 08:09

## 2022-12-05 RX ADMIN — DEXTROSE 250 ML: 5 SOLUTION INTRAVENOUS at 10:06

## 2022-12-05 RX ADMIN — SODIUM BICARBONATE 1300 MG: 650 TABLET ORAL at 17:56

## 2022-12-05 RX ADMIN — PHENOBARBITAL SODIUM 130 MG: 130 INJECTION INTRAMUSCULAR at 05:45

## 2022-12-05 RX ADMIN — PANTOPRAZOLE SODIUM 40 MG: 40 TABLET, DELAYED RELEASE ORAL at 05:05

## 2022-12-05 RX ADMIN — ALUMINUM HYDROXIDE, MAGNESIUM HYDROXIDE, AND SIMETHICONE 30 ML: 200; 200; 20 SUSPENSION ORAL at 14:15

## 2022-12-05 RX ADMIN — MAGNESIUM SULFATE HEPTAHYDRATE 2 G: 2 INJECTION, SOLUTION INTRAVENOUS at 05:25

## 2022-12-05 RX ADMIN — POTASSIUM CHLORIDE 20 MEQ: 14.9 INJECTION, SOLUTION INTRAVENOUS at 09:22

## 2022-12-05 RX ADMIN — THIAMINE HYDROCHLORIDE 500 MG: 100 INJECTION, SOLUTION INTRAMUSCULAR; INTRAVENOUS at 21:21

## 2022-12-05 RX ADMIN — PHENOBARBITAL SODIUM 260 MG: 130 INJECTION INTRAMUSCULAR at 19:15

## 2022-12-05 RX ADMIN — FENOFIBRATE 145 MG: 145 TABLET, COATED ORAL at 08:09

## 2022-12-05 RX ADMIN — SODIUM BICARBONATE 1300 MG: 650 TABLET ORAL at 08:38

## 2022-12-05 RX ADMIN — DEXTROSE 250 ML: 5 SOLUTION INTRAVENOUS at 23:19

## 2022-12-05 RX ADMIN — THIAMINE HYDROCHLORIDE 500 MG: 100 INJECTION, SOLUTION INTRAMUSCULAR; INTRAVENOUS at 05:18

## 2022-12-05 RX ADMIN — PHENOBARBITAL 64.8 MG: 64.8 TABLET ORAL at 16:34

## 2022-12-05 RX ADMIN — PHENOBARBITAL 64.8 MG: 64.8 TABLET ORAL at 09:09

## 2022-12-05 RX ADMIN — SODIUM CHLORIDE 2 G: 1 TABLET ORAL at 07:34

## 2022-12-05 RX ADMIN — ACETAMINOPHEN 650 MG: 325 TABLET ORAL at 12:08

## 2022-12-05 RX ADMIN — PHENOBARBITAL SODIUM 260 MG: 130 INJECTION INTRAMUSCULAR at 18:13

## 2022-12-05 RX ADMIN — LIDOCAINE HYDROCHLORIDE 15 ML: 20 SOLUTION ORAL; TOPICAL at 17:56

## 2022-12-05 RX ADMIN — FOLIC ACID 1 MG: 5 INJECTION, SOLUTION INTRAMUSCULAR; INTRAVENOUS; SUBCUTANEOUS at 08:09

## 2022-12-05 RX ADMIN — PHENOBARBITAL 64.8 MG: 64.8 TABLET ORAL at 08:09

## 2022-12-05 RX ADMIN — SODIUM BICARBONATE 1300 MG: 650 TABLET ORAL at 13:09

## 2022-12-05 RX ADMIN — PHENOBARBITAL SODIUM 260 MG: 130 INJECTION INTRAMUSCULAR at 12:45

## 2022-12-05 RX ADMIN — ACETAMINOPHEN 650 MG: 325 TABLET ORAL at 05:44

## 2022-12-05 RX ADMIN — DIAZEPAM 10 MG: 5 INJECTION INTRAMUSCULAR; INTRAVENOUS at 19:15

## 2022-12-05 RX ADMIN — PHENOBARBITAL SODIUM 65 MG: 65 INJECTION INTRAMUSCULAR; INTRAVENOUS at 13:42

## 2022-12-05 NOTE — ASSESSMENT & PLAN NOTE
· EKG in the ED revealed QTc 527 ms  · Repeat EKG at 2322: QTc still prolonged at 510 ms  · Magnesium 4 g total administered  · Avoid QT prolonging agents  · Routine EKG monitoring - will repeat in AM

## 2022-12-05 NOTE — PROGRESS NOTES
PROGRESS NOTE  DEPARTMENT OF MEDICAL TOXICOLOGY  LEVEL 4 MEDICAL DETOX UNIT  Marybel Alvarado 41 y o  male MRN: [id number]  Unit/Bed#: 5T [facility] 518-01 Encounter: [id number]      Reason for Admission/Principal Problem: Alcohol withdrawal with complication, multiple electrolyte abnormalities, alcoholic ketoacidosis    Rounding Provider: Taina Al MD  Attending Provider: Taina Al MD   12/4/2022  5:37 PM         * Alcohol withdrawal syndrome with complication, with unspecified complication [facility])  Assessment & Plan  · Has thus far received 40 mg diazepam and 1170 mg phenobarbital for treatment of alcohol withdrawal  · Continue [facility] protocol for treatment of alcohol withdrawal with phenobarbital  · Continuous cardiopulmonary monitoring  · Fall and seizure precautions    Hyponatremia  Assessment & Plan  · Sodium 112 -> 110 -> 107 -> 113 -> 115 -> 117  · Pt is A&Ox4 with no focal neurologic deficits, no LE edema on exam  · Nephrology consulted and continuing to follow closely, appreciate and will continue to follow recommendations  · Continue q4h BMP  · Stepdown 2 level of care  · Continue monitoring closely    Prolonged Q-T interval on ECG  Assessment & Plan  ·  ms -> 510 ms yesterday  · Magnesium corrected and continuing to replete potassium  · Expect continued improvement with these measures  · Continue cardiac monitoring  · Avoid QTc prolonging agents    Hypomagnesemia  Assessment & Plan  · Improved, normal this morning  · Continue to monitor and replete as needed    Alcoholic ketoacidosis  Assessment & Plan  · Overall improving, and started taking PO this morning  · Anticipate continued improvement with PO intake and thiamine  · Will continue to monitor    Hypokalemia  Assessment & Plan  · Continue to monitor and replete as needed  · BMP Q4H per nephrology as above    Alcohol use disorder, severe, dependence ([facility] [address] )  Assessment & Plan  Pt with a h/o chronic heavy alcohol use  Drinks 1 fifth of vodka daily  Denies H/o withdrawal seizures  Withdrawal management as above  CMP revealed mild transaminitis and severe hepatic steatosis/hepatomegaly visualized on CT A/P, likely 2/2 alcoholic liver disease  Initiate high dose thiamine along with folic acid supplementation, and MVI  Continue monitoring labs- CBC, CMP, Mag  Consult case management for assistance with rehab resources    Obesity (BMI 30-39  9)  Assessment & Plan  Body mass index is 32 08 kg/m²    · Continue fenofibrate  · Hgb A1c was normal  · Recommend OP f/u with PCP    Hyperlipidemia  Assessment & Plan  · Continue home medication    Internal derangement of right shoulder  Assessment & Plan  · Pt currently following with orthopedics for acute R shoulder pain   · Has not improved with PT  · MRI R shoulder scheduled for 12/7/22 with OP follow up approximately in 2 weeks once completed  · He currently reports that his post-op R shoulder pain is worse than his L shoulder, denies N/T, UE weakness as above  · Supportive care  · Recommend continued OP ortho follow-up    Tear of left supraspinatus tendon  Assessment & Plan  · Pt has a h/o L supraspinatus tendon tear, status post-op L shoulder arthroscopy and rotator cuff repair 9/9/22  · He notes some continued L shoulder pain after surgery, denies any acute worsening, N/T, or upper extremity weakness  · Per chart review, patient is making good progress with OP PT  · Supportive care, pain control  · Continue OP f/u with PT and ortho as scheduled    Esophageal reflux  Assessment & Plan  · Pt with a h/o GERD  · Home medications include omeprazole 20 mg daily  · Currently c/o abdominal pain/nausea  · CT abd/pelvis: "Dilated, fluid-filled distal esophagus, likely due to gastroesophageal reflux "  · Continue home PPI - will utilize formulary alternative Protonix 40 mg daily while inpt given worsening CT findings    Benign essential hypertension  Assessment & Plan  · Patient with a history of HTN  · Home medication: irbesartan 150 mg daily  · BP hypertensive upon arrival to the unit in the setting of acute alcohol withdrawal and chronic HTN  · Will hold ARB while pt undergoes tx for acute alcohol withdrawal and significant hyponatremia  · Plan to resume anti-hypertensive therapy if BP remains high after resolution of acute withdrawal and hyponatremia improves  · Continue monitoring BP    Leukocytosis-resolved as of 12/5/2022  Assessment & Plan  · Resolved        VTE Pharmacologic Prophylaxis:   Pharmacologic: Enoxaparin (Lovenox)  Mechanical VTE Prophylaxis in Place: yes    Code Status: Level 1 - Full Code    Patient Centered Rounds: I have performed bedside rounds with nursing staff today  Discussions with Specialists or Other Care Team Provider: Nephrology     Education and Discussions with Family / Patient: Discussed with patient    Time Spent for Care: 45 minutes  More than 50% of total time spent on counseling and coordination of care as described above  Minutes of critical care time: 35 minutes  -Critical care time was exclusive of separately billable procedures and teaching time    -Critical care was necessary to treat or prevent imminent or life-threatening deterioration of the following condition: withdrawal, CNS failure/compromise and metabolic crisis  -Critical care time was spent personally by me on the following activities as well as the above as per the course and rest of chart: obtaining history from patient/surrogate, review of old charts, development of a treatment plan, discussions with referring provider(s) and/or consultants, examination of the patient, performing treatments and interventions, evaluation of patient's response to the treatment, re-evaluation of the patient's condition, ordering/interpreting laboratory studies, ordering/interpreting of radiographic studies        Current Length of Stay: 1 day(s)    Current Patient Status: Inpatient     Certification Statement: The patient will continue to require additional inpatient hospital stay due to Alcohol withdrawal with complication, hyponatremia Discharge Plan: Case management consulted for assistance with disposition when patient medically stable          Subjective:   Patient feels he is doing better from withdrawal perspective today with improve nausea, now tolerating p o  Has a little bit of tremulousness but also improved  Also has improved anxiety  Objective:     Clinical Opiate Withdrawal Scale  Pulse: 102    SEWS Total Score: 0 (2022 10:12 AM)        Last 24 Hours Medication List:   Current Facility-Administered Medications   Medication Dose Route Frequency Provider Last Rate   • acetaminophen  650 mg Oral Q6H PRN oJse L Stein PA-C     • acetaminophen  650 mg Oral Q6H PRN ArthZULY Angel-C     • enoxaparin  40 mg Subcutaneous Daily Jose L Stein PA-C     • fenofibrate  145 mg Oral Daily ZULY Carr-C     • folic acid IVPB  1 mg Intravenous Daily Jose L Stein PA-C Stopped (22 0840)   • haloperidol lactate  5 mg Intravenous Q6H PRN Jeanette Coles PA-C     • OLANZapine  5 mg Oral Q6H PRN Jeanette Coles PA-C     • pantoprazole  40 mg Oral Early Morning Jeanetteroma Coles PA-C     • sodium bicarbonate  1,300 mg Oral TID after meals Unknown MD Marcial     • thiamine  500 mg Intravenous [facility] RANDELL KwonC 500 mg (22 0518)         Vitals:   Temp (24hrs), Av 3 °F (37 4 °C), Min:98 3 °F (36 8 °C), Max:100 9 °F (38 3 °C)    Temp:  [98 3 °F (36 8 °C)-100 9 °F (38 3 °C)] 99 4 °F (37 4 °C)  HR:  [] 102  Resp:  [10-24] 16  BP: (135-182)/(70-95) 145/93  SpO2:  [92 %-100 %] 97 %  Body mass index is 32 08 kg/m²  Input and Output Summary (last 24 hours):     Intake/Output Summary (Last 24 hours) at 2022 1234  Last data filed at 2022 1201  Gross per 24 hour   Intake 1198 33 ml   Output 2675 ml   Net -1476 67 ml       Physical Exam: Physical Exam  Vitals reviewed  Constitutional:       General: He is not in acute distress  HENT:      Head: Normocephalic  Mouth/Throat:      Mouth: Mucous membranes are moist    Eyes:      Conjunctiva/sclera: Conjunctivae normal       Pupils: Pupils are equal, round, and reactive to light  Cardiovascular:      Rate and Rhythm: Normal rate and regular rhythm  Heart sounds: No murmur heard  No friction rub  No gallop  Pulmonary:      Effort: Pulmonary effort is normal  No respiratory distress  Breath sounds: Normal breath sounds  Abdominal:      General: There is no distension  Palpations: Abdomen is soft  Tenderness: There is no abdominal tenderness  Musculoskeletal:         General: No swelling  Cervical back: Neck supple  Right lower leg: No edema  Left lower leg: No edema  Skin:     General: Skin is warm and dry  Neurological:      General: No focal deficit present  Mental Status: He is alert and oriented to person, place, and time        Comments: Mildly tremulous         Additional Data:     Labs:   Results from last 7 days   Lab Units 12/05/22  0444 12/04/22  1229   WBC Thousand/uL 6 60 10 60*   HEMOGLOBIN g/dL 12 4 12 5   HEMATOCRIT % 34 7* 33 3*   PLATELETS Thousands/uL 134* 227   NEUTROS PCT %  --  84*   LYMPHS PCT %  --  10*   MONOS PCT %  --  5   EOS PCT %  --  0      Results from last 7 days   Lab Units 12/05/22  0753 12/05/22  0444   SODIUM mmol/L 117* 115*   POTASSIUM mmol/L 3 1* 3 5   CHLORIDE mmol/L 90* 87*   CO2 mmol/L 19* 22   BUN mg/dL 3* 3*   CREATININE mg/dL 0 62* 0 68*   ANION GAP mmol/L 8 6   CALCIUM mg/dL 7 7* 8 0*   ALBUMIN g/dL  --  3 8   TOTAL BILIRUBIN mg/dL  --  2 37*   ALK PHOS U/L  --  36*   ALT U/L  --  59*   AST U/L  --  143*   GLUCOSE RANDOM mg/dL 119* 100*                Results from last 7 days   Lab Units 12/05/22  0444   HEMOGLOBIN A1C % 5 4      Results from last 7 days   Lab Units 12/04/22  2340   LACTIC ACID mmol/L 1 0          * I Have Reviewed All Lab Data Listed Above  * Additional Pertinent Lab Tests Reviewed: Ronald 66 Admission Reviewed      Imaging Studies: I have personally reviewed pertinent reports  Recent Cultures (last 7 days): Today, Patient Was Seen By: Mihai Thomas MD    ** Please Note: Dictation voice to text software may have been used in the creation of this document   **

## 2022-12-05 NOTE — ASSESSMENT & PLAN NOTE
· Pt currently following with orthopedics for acute R shoulder pain   · Has not improved with PT  · MRI R shoulder scheduled for 12/7/22 with OP follow up approximately in 2 weeks once completed  · He currently reports that his post-op R shoulder pain is worse than his L shoulder, denies N/T, UE weakness as above  · Supportive care  · Recommend continued OP ortho follow-up

## 2022-12-05 NOTE — ASSESSMENT & PLAN NOTE
· Mag 1 2 on admission  · Repleted with IV magnesium sulfate 2 g in the ED  · Given pt's QTc >500 on EKG, will administer additional 2 g IV magnesium  · Recheck Mag level later tonight  · Continue monitoring and replete electrolytes as indicated

## 2022-12-05 NOTE — PROGRESS NOTES
12/05/22 0859   Referral Data   Referral Source Other (Comment)   Referral Name [facility]   Referral Reason Drug/Alcohol [address]   Readmission Root Cause   30 Day Readmission No   Patient Information   Mental Status Alert   Primary Caregiver Self   Support System Immediate family;Friends   Legal Information   Legal Issues Denies   Activities of Daily Living Prior to Admission   Functional Status Independent   Assistive Device No device needed   Living Arrangement Lives alone   Ambulation Independent   Access to Firearms   Access to Firearms No   Income Information   Income Source Employed   [employer] to [employer]: Drives Self       Worker completed assessment  Worker explained role of case management  Worker confirmed name, address and phone number  Pt calm and cooperative throughout assessment  Pt reports he was sober for 6 months and relapsed about a week ago  Pt reports he has been drinking 1 bottle of vodka daily  Pt reports first age of use 17  Pt reports binge drinking on and off since the age of 27  Pt reports longest period of time sober is 1 year  Pt reports blackouts  Pt denies any street drugs  Pt reports being in inpatient rehab twice within the last year, last was a few months ago at [facility]  Pt reports doing an outpatient program but reports he does not like it  Pt at this time declining inpatient and outpatient rehab  Pt reports he plans to go back to [facility]  Pt reports substance abuse issues on his father's side  AUDIT: 18  PAWSS: 1  Alcohol: 168  UDS: benzos    Pt is currently single, has no children  Pt lives alone with his cat at [address], [address]  Pt is currently employed at [employer]  Pt's highest level of education is HS diploma  Pt denies any legal issues  Pt denies any inpatient psychiatric hospitalizations    Pt denies any outpatient psychiatric services  Pt reports previous suicide attempt many years ago  Pt denies any family hx of mental health  Pt denies any abuse  Pt reports his grandparents  and his uncle  of liver cancer  Pt reports medical issues of HTN, PCP is [facility]  Pt reports having health insurance of Kathleen 10, signed GEORGIE  Pt signed GEORGIE for his mother-Renee, worker left message for pt's mother at [phone]  Relapse prevention plan was completed  Pt was able to identify his warning signs as well as coping skills  Pt reports his mother is supportive and does have friends through [place]  Clinical impression, pt was calm and cooperative  Pt answered all questions appropriately  Pt was able to provide current alcohol use and previous binging pattern  Pt does appear to struggle with loneliness and often results in relapse  Worker did educate pt on the importance of utilizing supports and  community to assist him prior to relapsing, pt verbalized understanding  Pt at this time declining inpatient and outpatient rehab, would like to return to [facility]  Pt is in the pre-contemplation stage of change

## 2022-12-05 NOTE — ASSESSMENT & PLAN NOTE
· Hx left supraspinatus tendon tear status post-op L shoulder arthroscopy and rotator cuff repair 9/9/22  · No acute complications  · Outpatient PT  · Supportive care, pain control  · Outpatient PT and orthopedics follow up as scheduled

## 2022-12-05 NOTE — ASSESSMENT & PLAN NOTE
· Pt with a h/o GERD  · Home medications include omeprazole 20 mg daily  · Currently c/o abdominal pain/nausea  · CT abd/pelvis: "Dilated, fluid-filled distal esophagus, likely due to gastroesophageal reflux "  · Continue home PPI - will utilize formulary alternative Protonix 40 mg daily while inpt given worsening CT findings

## 2022-12-05 NOTE — ASSESSMENT & PLAN NOTE
· Patient with a history of HTN  · Home medications *  · BP hypertensive upon arrival to the unit in the setting of acute alcohol withdrawal and chronic HTN  · Will *  · Plan to resume anti-hypertensive therapy if BP remains high after resolution of acute withdrawal  · Continue monitoring BP

## 2022-12-05 NOTE — H&P
[facility]  H&P- Lorri Bishop 1975, 41 y o  male MRN: [id number]  Unit/Bed#: 5T DETOX 895-60 Encounter: [id number]  Primary Care Provider: Heather Sage MD   Date and time admitted to hospital: 12/4/2022  5:37 PM  HISTORY & [facility]  LEVEL 4 MEDICAL DETOX UNIT  Lorri Bishop 41 y o  male MRN: [id number]  Unit/Bed#: 5T DETOX 518-01 Encounter: [id number]      Reason for Admission/Principal Problem: Ethanol withdrawal, Ethanol use disorder  Admitting Provider: Margo Pizano PA-C  Attending Provider: Matt Glass DO   12/4/2022  5:37 PM        * Alcohol withdrawal syndrome with complication, with unspecified complication ([facility] )  Assessment & Plan  · H/o chronic daily alcohol consumption, denies h/o withdrawal seizures  · Last drink: 12/3/22   · Ethanol lvl: 168   · Received a total of 50 mg of Valium PTA during ED course   · Follow SEWS protocol with symptom-triggered phenobarbital for medically-assisted alcohol withdrawal  · Current symptoms include anxiety, tremor, diaphoresis, nausea, hyperthermia, tachycardia, and agitation/impulsiveness   · Administered 650 mg phenobarbital + 130 mg x2 doses = 910 mg total phenobarbital as of 2333   · Continue to monitor vitals, symptoms and administer phenobarbital as indicated  · Continue cardiopulmonary monitoring     Alcohol use disorder, severe, dependence ([facility] )  Assessment & Plan  Pt with a h/o chronic heavy alcohol use  Drinks 1 fifth of vodka daily  Denies H/o withdrawal seizures  Withdrawal management as above  CMP revealed mild transaminitis and severe hepatic steatosis/hepatomegaly visualized on CT A/P, likely 2/2 alcoholic liver disease  Initiate IVFs  Initiate high dose thiamine along with folic acid supplementation, and MVI  Continue monitoring labs- CBC, CMP, Mag  Consult case management for assistance with rehab resources    Hyponatremia  Assessment & Plan  · Na down-trending 112 -> 107 on admission  · Worsened 2/2 boluses/IV hydration given in the ED  · Pt has a h/o hypovolemic hyponatremia from prior admission in February 2022  · Pt is A&Ox4 with no focal neurologic deficits, no LE edema on exam  · +Abdominal distension and SOB/increased work of breathing - imaging ordered to assess for fluid overload  · CXR read pending  · RUQ U/S ordered for tomorrow to assess for ascites after excessive IV hydration  · Nephrology consulted, appreciate recommendations  ·  mL bolus followed by 1 8% NS  · BMP Q4H, Goal sodium 116 by midnight  · Will continue to follow  · Stepdown 2 level of care with hypertonic saline  · Continue closely monitoring labs, mental status    Hypokalemia  Assessment & Plan  · K 3 4 on admission  · Improved from K 3 1 in the ED after repletion with 20 mEq IV  · Continue repletion with KCl 40 mEq PO + 40 mEq IV  · BMP Q4H per nephrology as above  · Continue monitoring and replete electrolytes as indicated    Alcoholic ketoacidosis  Assessment & Plan  · CMP on admission revealed anion gap 21, CO2 14  · Likely 2/2 AKA and/or starvation ketosis  · Received 500 cc bolus of D5NS prior to arrival on medical detox   Will hold on D5NS due to hyponatremia   · Nephrology following - added sodium bicarb PO for acidosis  · Will also check lactic acid level  · Repeat VBG in AM  · Continue monitoring serial BMPs  · Encourage alcohol cessation    Hypomagnesemia  Assessment & Plan  · Mag 1 2 on admission  · Repleted with IV magnesium sulfate 2 g in the ED  · Given pt's QTc >500 on EKG, will administer additional 2 g IV magnesium  · Recheck Mag level later tonight  · Continue monitoring and replete electrolytes as indicated    Prolonged Q-T interval on ECG  Assessment & Plan  · EKG in the ED revealed QTc 527 ms  · Repeat EKG at 2322: QTc still prolonged at 510 ms  · Magnesium 4 g total administered  · Avoid QT prolonging agents  · Routine EKG monitoring - will repeat in AM    Leukocytosis  Assessment & Plan  · WBC 10 60 on admission  · Differential revealed Neutrophils elevated at 84%, ANC 8 94  · Likely leukemoid reaction 2/2 acute alcohol withdrawal  · No s/sx of active infection  · Pt did have a mildly elevated temp at 100 6F and mild tachycardia, but these are likely 2/2 acute withdrawal  · Continue monitoring CBC, VS    Benign essential hypertension  Assessment & Plan  · Patient with a history of HTN  · Home medication: irbesartan 150 mg daily  · BP hypertensive upon arrival to the unit in the setting of acute alcohol withdrawal and chronic HTN  · Will hold ARB while pt undergoes tx for acute alcohol withdrawal and significant hyponatremia  · Plan to resume anti-hypertensive therapy if BP remains high after resolution of acute withdrawal and hyponatremia improves  · Continue monitoring BP    Hyperlipidemia  Assessment & Plan  Lab Results   Component Value Date    CHOLESTEROL 183 06/15/2022     Lab Results   Component Value Date    HDL 33 (L) 06/15/2022     Lab Results   Component Value Date    TRIG 219 (H) 06/15/2022    LDLCALC 106 (H) 06/15/2022     · Patient with a h/o HLD  · On fenofibrate 145 mg daily as home medication  · Repeat lipid panel in AM  · Continue fenofibrate    Esophageal reflux  Assessment & Plan  · Pt with a h/o GERD  · Home medications include omeprazole 20 mg daily  · Currently c/o abdominal pain/nausea  · CT abd/pelvis: "Dilated, fluid-filled distal esophagus, likely due to gastroesophageal reflux "  · Continue home PPI - will utilize formulary alternative Protonix 40 mg daily while inpt given worsening CT findings    Obesity (BMI 30-39  9)  Assessment & Plan  Body mass index is 32 08 kg/m²    · Repeat lipid panel with pt's h/o mixed hyperlipidemia on fenofibrate  · Screen for DM2 with Hgb A1c  · Recommend OP f/u with PCP    Tear of left supraspinatus tendon  Assessment & Plan  · Pt has a h/o L supraspinatus tendon tear, status post-op L shoulder arthroscopy and rotator cuff repair 9/9/22  · He notes some continued L shoulder pain after surgery, denies any acute worsening, N/T, or upper extremity weakness  · Per chart review, patient is making good progress with OP PT  · Supportive care, pain control  · Continue OP f/u with PT and ortho as scheduled    Internal derangement of right shoulder  Assessment & Plan  · Pt currently following with orthopedics for acute R shoulder pain   · Has not improved with PT  · MRI R shoulder scheduled for 12/7/22 with OP follow up approximately in 2 weeks once completed  · He currently reports that his post-op R shoulder pain is worse than his L shoulder, denies N/T, UE weakness as above  · Supportive care  · Recommend continued OP ortho follow-up             VTE Prophylaxis: Enoxaparin (Lovenox)  / sequential compression device   Code Status: full code      Anticipated Length of Stay:  Patient will be admitted on an Inpatient basis with an anticipated length of stay of  >2  midnights  Justification for Hospital Stay: alcohol withdrawal, alcohol use disorder, hyponatremia, hypokalemia    For any questions or concerns, please Tiger Text the advanced practitioner in the role of [facility]-DETOX-AP On Call      This patient qualifies for Level IV medically managed intensive inpatient services under the criteria set by the American Society of Addiction Medicine, including dimensions 1-3  The patient is in withdrawal (or is intoxicated with high risk of withdrawal), with severe and unstable medical and/or psychiatric (dual diagnosis) problems, requiring requires 24-hour medical and nursing care and the full resources of a [address] patient to medical detox unit and continue supportive care and stabilization of acute ethanol withdrawal per medical toxicology/detox treatment pathway   Monitor ethanol withdrawal severity via the Severity of Ethanol Withdrawal Scale (SEWS) Q4 hours and then hourly if/when SEWS > 6  Treat withdrawal per pathway and reassess Q30-60 minutes  Mild SEWS Score 1-6  Administer medications* (IV or PO; PO preferred):  • If initial SEWS score: diazepam 10mg PO/IV x 1 AND phenobarbital 65 mg PO/IV x 1  • If repeat SEWS score 1-6: phenobarbital 65 mg PO/IV q1 hour x 5 doses maximum   Reassessment:   • SEWS q1 hour after each dose until SEWS 0 x 2 hours  • VS q1 hours (until SEWS 0, then q4 hours)  • Notify provider for bedside evaluation if 5-dose maximum is reached, RASS of -3 to -5, or SEWS score escalates to moderate or severe  Moderate SEWS Score 7-12  Administer medications* (IV):  • If initial SEWS score: diazepam 10mg IV x 1 AND phenobarbital 260 mg IV x 1  • If repeat SEWS score 7-12 or score escalated from mild: phenobarbital 130 mg IV q30 minutes x 5 doses maximum   Reassessment:  • SEWS q30 minutes after each dose until SEWS < 7 (then hourly until SEWS 0 x 2 hours)  • VS q30 minutes until SEWS < 7 (then hourly until SEWS 0, then q4 hours)  • Notify provider for bedside evaluation if 5-dose maximum is reached, RASS of -3 to -5, or SEWS score escalates to severe  Severe SEWS Score ? 13  Administer medications* (IV):  • If initial SEWS score: Diazepam 10 mg IV x 1 AND phenobarbital 650 mg IV piggyback x 1 over 15-30 minutes  • If repeat SEWS score ? 13 or score escalated from mild or moderate: phenobarbital 130 mg IV q30 minutes x 5 doses maximum   Reassessment:  • SEWS q30 minutes after each dose until SEWS < 7 (then hourly until SEWS 0 x 2 hours)   • VS q30 minutes until SEWS < 7 (then hourly until SEWS 0, then q4 hours)  • Notify provider for bedside evaluation if 5-dose maximum is reached or RASS of -3 to -5   *Hold medications and notify provider if CNS depression, respirations < 10/min, or RASS of -3 to -5           Medications to be administered adjunctively if more than 2 grams of phenobarbital is needed for stabilization of withdrawal; require attending approval    • Dexmedetomidine infusion 0 1-1mcg/kg/hr IV infusion, titratable to reduced agitation (Goal: RASS -2)  • Ketamine   o Acute agitated delirium: 1-2 mg/kg IV or 4-5 mg/kg IM  o Refractory withdrawal: 0 1-1mg/kg/hr IV infusion, titratable to reduced agitation (Goal: RASS -2)    Further evaluation, screening and treatment:  Evaluate complete metabolic panel, transaminases, INR, and lipase  Assess hepatic ultrasound for any sign of alcoholic liver disease or cirrhosis, and ultimately refer for further hepatic evaluation and care as/if indicated  Additional medications for ethanol associated malnutrition: Thiamine 100 mg IV daily, increase to 500 mg TID for signs/symptoms of Wernicke's Encephalopathy or Wernicke Korsakoff Syndrome   Folic acid 1 mg IV daily   Multivitamin PO daily      Will offer first monthly injection of Naltrexone 380 mg IM, once patient is stabilized, as it has been shown to assist in decreasing cravings for ethanol  Evaluate and treat for coexisting substance use, such as opioids and nicotine  Discuss risk factors for infectious disease, such as history of intravenous drug abuse, and offer hepatitis and HIV screening if indicated  Case management consultation to assist with coordination of subsequent treatment after discharge  Hx and PE limited by: acuity of patient's condition    HPI: Maggie Valenzuela is a 41y o  year old male with a PMH of AUD, HTN, HLD, GERD who presents with alcohol withdrawal seeking detoxification  Patient [facility] ED requesting alcohol detox and was subsequently transferred to the [facility] medical detox unit for medical management of alcohol withdrawal  Pt reports drinking 1 fifth of vodka daily since Thanksgiving after maintaining 6 months of sobriety prior to that   He reports that his last drink was sometime on Saturday; he is unsure of exactly what time as he was drinking all throughout the day  Serum alcohol was 168 in the ED  He reports current alcohol withdrawal sx of anxiety, nausea, diaphoresis, and tremors  He reports that he was having visual hallucinations of seeing squiggles on the wall earlier, but these have since resolved  Patient denies history of withdrawal seizures  Reports a past AUD treatment history consisting of inpatient and outpatient rehab  He has a history of prior naltrexone/Vivitrol therapy but had an adverse effect of significant arthralgias so this was discontinued  Uncertain of discharge plans at this time  Preferred alcoholic beverage(s): vodka  Quantity and frequency of alcohol intake: 1 fifth daily  Use of any ethanol substitutes (toxic alcohols): no  Date/Time of last alcohol intake: pt reports Saturday, "all throughout the day"  Current signs and symptoms of ethanol withdrawal: anxiety, tremor, diaphoresis, tachycardia, nausea, and hyperthermia    SEWS Total Score: 10 (12/4/2022 11:29 PM)          Ethanol Withdrawal History  Previous ethanol withdrawal? yes  Prior inpatient treatment for ethanol withdrawal? yes  Prior outpatient treatment for ethanol withdrawal? yes  History of seizures with prior ethanol withdrawal? no  Prior treatment with naltrexone (Vivitrol)? yes  Current treatment with naltrexone (Vivitrol)? no  Other current treatment for ethanol use disorder?  no  Co-existing substance use? no    Review of PDMP: yes, 1 prescription filled 9/9/22 for oxycodone 5 mg tabs (13 tabs/4 days) after orthopedic surgery which has since been discontinued    Social History     Substance and Sexual Activity   Alcohol Use Yes   • Alcohol/week: 2 0 - 3 0 standard drinks   • Types: 2 - 3 Shots of liquor per week     Social History     Substance and Sexual Activity   Drug Use No     Social History     Tobacco Use   Smoking Status Former   • Packs/day: 1 00   • Years: 20 00   • Pack years: 20 00   • Types: Cigarettes   • Start date: 5/9/1992   • Quit date: 5/9/2013   • Years since quittin 5   Smokeless Tobacco Never       Review of Systems   Unable to perform ROS: Acuity of condition   Constitutional: Positive for diaphoresis  Eyes: Negative for visual disturbance  Respiratory: Positive for shortness of breath  Negative for cough  Cardiovascular: Negative for chest pain  Gastrointestinal: Positive for abdominal pain (describes pain as "nausea") and nausea  Negative for vomiting  Musculoskeletal: Positive for arthralgias (c/o L shoulder pain status post-op)  Neurological: Positive for tremors  Negative for dizziness, seizures, weakness, light-headedness, numbness and headaches  Psychiatric/Behavioral: Negative for dysphoric mood, hallucinations and suicidal ideas  The patient is nervous/anxious  Historical Information   Past Medical History:   Diagnosis Date   • Anxiety    • COVID-19 ruled out 2/3/2022   • GERD (gastroesophageal reflux disease)    • Hyperlipidemia    • Hypertension    • Tendinitis      Past Surgical History:   Procedure Laterality Date   • KNEE SURGERY Left     cyst removed   • RI COLONOSCOPY FLX DX W/COLLJ SPEC WHEN PFRMD N/A 2018    Procedure: COLONOSCOPY;  Surgeon: Isabel Soto DO;  Location: [facility];   Service: General   • RI SHLDR ARTHROSCOP,SURG,W/ROTAT CUFF REPR Left 2022    Procedure: SHOULDER ARTHROSCOPIC ROTATOR CUFF REPAIR;  Surgeon: Ne Gil MD;  Location[facility];  Service: Orthopedics   • SHOULDER ARTHROSCOPY Right     with biceps tenodesis     Family History   Problem Relation Age of Onset   • Melanoma Father    • Cancer Father    • Melanoma Brother    • Diabetes Maternal Grandfather    • Stroke Paternal Grandmother    • Hypertension Paternal Grandmother    • Coronary artery disease Paternal Grandmother    • Heart disease Paternal Grandmother    • Hypertension Paternal Grandfather    • Coronary artery disease Paternal Grandfather    • Brain cancer Paternal Grandfather    • Heart disease Paternal Grandfather    • Liver cancer Paternal Uncle      Social History   Marital Status: Single   Patient Pre-hospital Living Situation: lives alone in private residence  Patient Pre-hospital Level of Mobility: independent  Patient Pre-hospital Diet Restrictions: none    Allergies   Allergen Reactions   • Neomycin Hives   • Polymyxin B Hives       Prior to Admission medications    Medication Sig Start Date End Date Taking?  Authorizing Provider   omeprazole (PriLOSEC) 20 mg delayed release capsule TAKE 1 CAPSULE (20 MG TOTAL) BY MOUTH IN THE MORNING  11/24/22 2/22/23 Yes Madie Cody MD   fenofibrate (TRICOR) 145 mg tablet TAKE 1 TABLET BY MOUTH EVERY DAY 7/14/22   Linda Vogel DO   irbesartan (AVAPRO) 150 mg tablet TAKE 1 TABLET (150 MG TOTAL) BY MOUTH IN THE MORNING  11/24/22 2/22/23  Madie Cody MD   Multiple Vitamins-Minerals (multivitamin with minerals) tablet Take 1 tablet by mouth daily    Historical Provider, MD   ondansetron (ZOFRAN) 4 mg tablet Take 1 tablet (4 mg total) by mouth every 8 (eight) hours as needed for nausea or vomiting 11/30/22   Madie Cody MD   sildenafil (REVATIO) 20 mg tablet Take 1 tablet (20 mg total) by mouth if needed (erectile dysfunction) 6/9/22   VIKTORIYA Toro       Current Facility-Administered Medications   Medication Dose Route Frequency   • acetaminophen (TYLENOL) tablet 650 mg  650 mg Oral Q6H PRN   • acetaminophen (TYLENOL) tablet 650 mg  650 mg Oral Q6H PRN   • enoxaparin (LOVENOX) subcutaneous injection 40 mg  40 mg Subcutaneous Daily   • fenofibrate (TRICOR) tablet 145 mg  145 mg Oral Daily   • folic acid 1 mg in sodium chloride 0 9 % 50 mL IVPB  1 mg Intravenous Daily   • haloperidol lactate (HALDOL) injection 5 mg  5 mg Intravenous Q6H PRN   • OLANZapine (ZyPREXA ZYDIS) dispersible tablet 5 mg  5 mg Oral Q6H PRN   • pantoprazole (PROTONIX) EC tablet 40 mg  40 mg Oral Early Morning   • potassium chloride 20 mEq IVPB (premix)  20 mEq Intravenous Once   • sodium bicarbonate tablet 1,300 mg  1,300 mg Oral TID after meals   • sodium chloride 23 4% (HYPERTONIC) 308 mEq in sterile water 1,000 mL infusion   Intravenous Continuous   • thiamine (VITAMIN B1) 500 mg in sodium chloride 0 9 % 50 mL IVPB  500 mg Intravenous Q8H [address]       Continuous Infusions:IV infusion builder, , Last Rate: 25 mL/hr at 12/04/22 2125             Objective       Intake/Output Summary (Last 24 hours) at 12/5/2022 0103  Last data filed at 12/4/2022 2328  Gross per 24 hour   Intake --   Output 725 ml   Net -725 ml       Invasive Devices:   Peripheral IV 12/04/22 Left;Ventral (anterior) Forearm (Active)   Site Assessment WDL 12/04/22 1357   Dressing Type Transparent 12/04/22 1357   Line Status Blood return noted 12/04/22 1357   Dressing Status Clean;Dry; Intact 12/04/22 1357       Peripheral IV 12/04/22 Dorsal (posterior); Right Hand (Active)       Vitals   Vitals:    12/04/22 1841 12/04/22 1921 12/04/22 2126 12/04/22 2328   BP: 140/86 141/76 158/84 (!) 172/87   TempSrc: Temporal Temporal Temporal Temporal   Pulse: 88 101 102 (!) 114   Resp: 16 16 20 20   Patient Position - Orthostatic VS: Lying Sitting Sitting Sitting   Temp: 98 9 °F (37 2 °C) 99 5 °F (37 5 °C) (!) 100 6 °F (38 1 °C) 99 5 °F (37 5 °C)       Physical Exam  Vitals reviewed  Constitutional:       Appearance: He is obese  He is ill-appearing and diaphoretic (mild, few beads of sweat on forehead)  Comments: Appears fatigued and uncomfortable 2/2 alcohol withdrawal sx  HENT:      Right Ear: External ear normal       Left Ear: External ear normal       Nose: Nose normal       Mouth/Throat:      Mouth: Mucous membranes are moist       Comments: No significant tongue fasciculations  Eyes:      Extraocular Movements: Extraocular movements intact  Right eye: No nystagmus  Left eye: No nystagmus  Conjunctiva/sclera: Conjunctivae normal       Pupils: Pupils are equal, round, and reactive to light     Cardiovascular:      Rate and Rhythm: Normal rate and regular rhythm  Heart sounds: Normal heart sounds  No murmur heard  No friction rub  No gallop  Comments: NSR/sinus tachycardia in the 90s-100s on telemetry  Pulmonary:      Effort: Pulmonary effort is normal       Breath sounds: Normal breath sounds  No wheezing, rhonchi or rales  Comments: Increased work of breathing present with conversational mild conversational dyspnea, pursed lip breathing - improved on reassessment  Maintaining SpO2 >95% on room air  Abdominal:      General: Bowel sounds are normal  There is distension  Palpations: Abdomen is soft  Tenderness: There is no abdominal tenderness  There is no guarding or rebound  Musculoskeletal:         General: No swelling  Normal range of motion  Cervical back: Neck supple  Right lower leg: No edema  Left lower leg: No edema  Skin:     General: Skin is warm  Findings: No rash  Neurological:      General: No focal deficit present  Mental Status: He is alert and oriented to person, place, and time  GCS: GCS eye subscore is 4  GCS verbal subscore is 5  GCS motor subscore is 6  Cranial Nerves: No dysarthria or facial asymmetry  Motor: Tremor (BUE intention tremor) present  No weakness  Coordination: Finger-Nose-Finger Test abnormal (mild-to-moderate ataxia)  Comments: A&Ox4 to person, place, time, and situation  GCS 15    Psychiatric:         Attention and Perception: Attention normal  He does not perceive auditory or visual hallucinations  Mood and Affect: Mood is anxious  Mood is not depressed  Speech: Speech normal          Behavior: Behavior is cooperative  Thought Content: Thought content does not include homicidal or suicidal ideation  Thought content does not include homicidal or suicidal plan  Data:    EKG, Pathology, and Other Studies: I have personally reviewed pertinent reports      EKG:  Normal sinus rhythm at 87 b p m, no acute ST segment/T wave changes, normal axis and VT/QRS intervals, prolonged QTC at 527 ms  Repeat EKG at 2322: Normal sinus rhythm at 97 B p m , artifact present in V2, no acute CIRILO/STD, inverted T waves in lead III, normal axis and VT/QRS intervals, prolonged QTC interval at 510 ms  Compared to prior EKG from today at 1125, new TWI in lead III is present and QTC prolongation has slightly improved      Lab Results:  CBC ETOH     Lab Results   Component Value Date    WBC 10 60 (H) 12/04/2022    WBC 0-5 12/14/2017    WBC 6 3 12/14/2017    RBC 4 13 12/04/2022    RBC 4 21 12/14/2017    HGB 12 5 12/04/2022    HGB 14 4 12/14/2017    HCT 33 3 (L) 12/04/2022    HCT 42 0 12/14/2017    MCV 81 (L) 12/04/2022    MCV 99 8 12/14/2017    MCH 30 3 12/04/2022    MCH 34 2 (H) 12/14/2017    MCHC 37 5 (H) 12/04/2022    MCHC 34 3 12/14/2017    RDW 11 8 12/04/2022    RDW 13 1 12/14/2017     12/04/2022     12/14/2017    MPV 10 0 12/04/2022    MPV 11 4 12/14/2017      Lab Results   Component Value Date    LACTICACID 1 0 12/04/2022      CMP UA         Component Value Date/Time     12/14/2017 0000    K 3 7 12/04/2022 2340    K 4 2 06/25/2021 0742    CL 85 (L) 12/04/2022 2340     06/25/2021 0742    CO2 20 (L) 12/04/2022 2340    CO2 24 06/25/2021 0742    BUN 4 (L) 12/04/2022 2340    BUN 17 06/25/2021 0742    CREATININE 0 64 (L) 12/04/2022 2340    CREATININE 0 80 12/14/2017 0000         Component Value Date/Time    CALCIUM 7 9 (L) 12/04/2022 2340    CALCIUM 9 1 06/25/2021 0742    ALKPHOS 32 (L) 12/04/2022 1229    ALKPHOS 15 (L) 06/25/2021 0742     (H) 12/04/2022 1229    AST 24 06/25/2021 0742    ALT 70 12/04/2022 1229    ALT 38 06/25/2021 0742    BILITOT 0 9 12/14/2017 0000      Lab Results   Component Value Date    CLARITYU Clear 12/04/2022    COLORU Light Orange 12/04/2022    COLORU DARK YELLOW 12/14/2017    SPECGRAV 1 038 (H) 12/04/2022    SPECGRAV 1 025 12/14/2017    PHUR 6 5 12/04/2022 PHUR 6 0 12/14/2017    GLUCOSEU Negative 12/04/2022    KETONESU 40 (2+) (A) 12/04/2022    KETONESU NEGATIVE 12/14/2017    BLOODU Negative 12/04/2022    PROTEIN UA 30 (1+) (A) 12/04/2022    NITRITE Negative 12/04/2022    NITRITE NEGATIVE 12/14/2017    BILIRUBINUR Negative 12/04/2022    BILIRUBINUR NEGATIVE 12/14/2017    UROBILINOGEN <2 0 12/04/2022    UROBILINOGEN 1 0 02/03/2022    LEUKOCYTESUR Negative 12/04/2022    LEUKOCYTESUR NEGATIVE 12/14/2017    WBCUA 1-2 12/04/2022    RBCUA 1-2 12/04/2022    HYALINE 0-3 (A) 12/04/2022    HYALINE 0-5 (A) 12/14/2017    BACTERIA Occasional 12/04/2022    BACTERIA NONE SEEN 12/14/2017    EPIS Occasional 12/04/2022        Liver Function Test: ASA     Lab Results   Component Value Date    TBILI 1 32 (H) 12/04/2022    TBILI 0 4 06/25/2021    ALKPHOS 32 (L) 12/04/2022    ALKPHOS 15 (L) 06/25/2021     (H) 12/04/2022    AST 24 06/25/2021    ALT 70 12/04/2022    ALT 38 06/25/2021    TP 6 9 12/04/2022    TP 6 5 06/25/2021    ALB 3 9 12/04/2022    ALB 4 3 12/14/2017      Lab Results   Component Value Date    SALICYLATE <3 (L) 34/28/8964      Troponin APAP     No results found for: TROPONINI   Lab Results   Component Value Date    ACTMNPHEN <2 (L) 12/04/2022      VBG HCG     No results found for: PHVEN, DKA7TES, PO2VEN, QMI4SFS, BEVEN, N8QGNYSHQ, Q2HYJDZ   No results found for: HCGQUANT   ABG Urine Drug Screen     Lab Results   Component Value Date/Time    PHART 7 466 (H) 12/04/2022 01:46 PM    GCY2IWW 21 2 (LL) 12/04/2022 01:46 PM    PO2ART 105 6 12/04/2022 01:46 PM    WMC4IYP 14 9 (L) 12/04/2022 01:46 PM    BEART -6 7 12/04/2022 01:46 PM    L1KDQKMKS 17 2 12/04/2022 01:46 PM    O2HGB 97 1 (H) 12/04/2022 01:46 PM    LIAM Yes 12/04/2022 01:46 PM      Lab Results   Component Value Date    AMPMETHUR Negative 12/04/2022    BARBTUR Negative 12/04/2022    BDZUR Positive (A) 12/04/2022    COCAINEUR Negative 12/04/2022    METHADONEUR Negative 12/04/2022    OPIATEUR Negative 12/04/2022 PCPUR Negative 12/04/2022    THCUR Negative 12/04/2022    OXYCODONEUR Negative 12/04/2022      Lactate INR     Lab Results   Component Value Date    LACTICACID 1 0 12/04/2022      No results found for: INR   PTT Protime     No results found for: PTT     No results found for: PROTIME           Imaging Studies: I have personally reviewed pertinent reports  Counseling / Coordination of Care  Total floor / unit time spent today 75 minutes  Greater than 50% of total time was spent with the patient and / or family counseling and / or coordination of care  Minutes of critical care time 50  -Critical care time was exclusive of separately billable procedures and teaching time    -Critical care was necessary to treat or prevent imminent or life-threatening deterioration of the following condition: CNS failure/compromise, toxidrome (ethanol withdrawal),  toxidrome, withdrawal, CNS failure/compromise, metabolic crisis and significant electrolyte derangements, including hyponatremia, hypokalemia, and hypomagnesemia  -Critical care time was spent personally by me on the following activities as well as the above as per the course and rest of chart: obtaining history from patient/surrogate, development of a treatment plan, discussions with referring provider(s), evaluation of patient's response to the treatment, examination of the patient, performing treatments and interventions, re-evaluation of the patient's condition, review of old charts, ordering/interpreting laboratory studies, ordering/interpreting of radiographic studies  ** Please Note: This note has been constructed using a voice recognition system   **

## 2022-12-05 NOTE — ASSESSMENT & PLAN NOTE
· Na down-trending 112 -> 107 on admission  · Worsened 2/2 boluses/IV hydration given in the ED  · Pt has a h/o hypovolemic hyponatremia from prior admission in February 2022  · Pt is A&Ox4 with no focal neurologic deficits, euvolemic on exam  · Nephrology consulted, appreciate recommendations  ·  mL bolus followed by 1 8% NS  · BMP Q4H, Goal sodium 116 by midnight  · Will continue to follow  · Stepdown 2 level of care with hypertonic saline  · Continue closely monitoring labs, mental status

## 2022-12-05 NOTE — ASSESSMENT & PLAN NOTE
Lab Results   Component Value Date    CHOLESTEROL 183 06/15/2022     Lab Results   Component Value Date    HDL 33 (L) 06/15/2022     Lab Results   Component Value Date    TRIG 219 (H) 06/15/2022    LDLCALC 106 (H) 06/15/2022     · Patient with a h/o HLD  · On fenofibrate 145 mg daily as home medication  · Repeat lipid panel in AM  · Continue fenofibrate

## 2022-12-05 NOTE — ASSESSMENT & PLAN NOTE
· Pt has a h/o L supraspinatus tendon tear, status post-op L shoulder arthroscopy and rotator cuff repair 9/9/22  · He notes some continued L shoulder pain after surgery, denies any acute worsening, N/T, or upper extremity weakness  · Per chart review, patient is making good progress with OP PT  · Supportive care, pain control  · Continue OP f/u with PT and ortho as scheduled

## 2022-12-05 NOTE — ASSESSMENT & PLAN NOTE
· Patient with a history of HTN  · Home medication: irbesartan 150 mg daily  · BP hypertensive upon arrival to the unit in the setting of acute alcohol withdrawal and chronic HTN  · Will hold ARB while pt undergoes tx for acute alcohol withdrawal and significant hyponatremia  · Plan to resume anti-hypertensive therapy if BP remains high after resolution of acute withdrawal and hyponatremia improves  · Continue monitoring BP

## 2022-12-05 NOTE — ASSESSMENT & PLAN NOTE
Recent Labs     12/06/22  1600 12/06/22  1954 12/07/22  0031 12/07/22  0913   K 3 7 4 0 3 6 3 5     · Continue to monitor and replace as needed  · BMPs q8h

## 2022-12-05 NOTE — ASSESSMENT & PLAN NOTE
· WBC 10 60 on admission  · Differential revealed Neutrophils elevated at 84%, ANC 8 94  · Likely leukemoid reaction 2/2 acute alcohol withdrawal  · No s/sx of active infection  · Pt did have a mildly elevated temp at 100 6F and mild tachycardia, but these are likely 2/2 acute withdrawal  · Continue monitoring CBC, VS

## 2022-12-05 NOTE — QUICK NOTE
The patient's repeat serum sodium at noon is at goal at 118  Goal serum sodium by tomorrow afternoon 124 to 126  Patient does have hyperkalemia as latest blood work shows potassium 5 8  Would avoid further aggressive potassium repletion  Will provide 1 dose Kayexalate  Will recommend repeat BMP to monitor potassium later today  To continue Q 4 hour monitoring of serum sodium

## 2022-12-05 NOTE — ASSESSMENT & PLAN NOTE
Body mass index is 32 08 kg/m²    · Continue fenofibrate  · Hgb A1c was normal  · Outpatient PCP follow up

## 2022-12-05 NOTE — PROCEDURES
EKG: normal sinus rhythm at 89 bpm, no acute CIRILO/STD, stable TWI in lead III, normal axis and VT/QRS intervals, prolonged QTc interval at 528 ms  Compared to previous tracing 12/4/22 at 2322, QTc prolongation has worsened  Will continue to monitor

## 2022-12-05 NOTE — ASSESSMENT & PLAN NOTE
Withdrawal management as above  Continue vitamin supplementation including high dose thiamine  Case management consulted for disposition planning

## 2022-12-05 NOTE — ASSESSMENT & PLAN NOTE
· CMP on admission revealed anion gap 21, CO2 14, Cl   · Likely 2/2 AKA and/or starvation ketosis  · Received 500 cc bolus of D5NS prior to arrival on medical detox   Will hold on D5NS due to hyponatremia   · Continue monitoring CMP for closure of anion gap and adjust IVFs accordingly  · Encourage alcohol cessation

## 2022-12-05 NOTE — ASSESSMENT & PLAN NOTE
· CMP on admission revealed anion gap 21, CO2 14  · Likely 2/2 AKA and/or starvation ketosis  · Received 500 cc bolus of D5NS prior to arrival on medical detox   Will hold on D5NS due to hyponatremia   · Nephrology following - added sodium bicarb PO for acidosis  · Will also check lactic acid level  · Repeat VBG in AM  · Continue monitoring serial BMPs  · Encourage alcohol cessation

## 2022-12-05 NOTE — ASSESSMENT & PLAN NOTE
Recent Labs     12/05/22  2202 12/06/22  0545 12/06/22  1138 12/06/22  1600 12/06/22  1954 12/07/22  0031 12/07/22  0913   SODIUM 124* 126* 128* 128* 129* 130* 128*       · Nephrology following, appreciate co-management  · Fluid restriction 1 2L  · Continue to monitor BMPs q 8h

## 2022-12-05 NOTE — ASSESSMENT & PLAN NOTE
· Improved to 474 ms on 12/5/2022  · Continue to monitor and optimize electrolytes   · Expect continued improvement with these measures  · Continue cardiac monitoring  · Avoid QTc prolonging agents

## 2022-12-05 NOTE — ASSESSMENT & PLAN NOTE
· K 3 4 on admission  · Improved from K 3 1 in the ED after repletion with 20 mEq IV  · Continue repletion with KCl 40 mEq PO + 40 mEq IV  · BMP Q4H per nephrology as above  · Continue monitoring and replete electrolytes as indicated

## 2022-12-05 NOTE — UTILIZATION REVIEW
Initial Clinical Review    Pt initially presented to [address] [place] [address] ED  Pt was transferred by EMS to [address] [place] [address] for its Level IV medically managed intensive inpatient detox unit, not available at [address] [place] [address]  Admission: Date/Time/Statement:   Admission Orders (From admission, onward)     Ordered        12/04/22 1752  Inpatient Admission  Once                      Orders Placed This Encounter   Procedures   • Inpatient Admission     Standing Status:   Standing     Number of Occurrences:   1     Order Specific Question:   Level of Care     Answer:   Level 2 Stepdown / HOT [14]     Order Specific Question:   Estimated length of stay     Answer:   More than 2 Midnights     Order Specific Question:   Certification     Answer:   I certify that inpatient services are medically necessary for this patient for a duration of greater than two midnights  See H&P and MD Progress Notes for additional information about the patient's course of treatment  Initial Presentation: 41 y o  male who initially presented to [address] [place] [address], was then transferred by EMS to [address] [place] [address] as a direct admission to medical detox  Inpatient admission for evaluation and treatment of alcohol withdrawal syndrome  PMHx: AUD, anxiety, GERD, HLD, HTN  Presented w/ request for detox from alcohol  Serum ETOH: 168  Received 50 mg Valium in ED  Reports a fifth of vodka daily, last drink on 12/3  Has prior inpatient & outpatient treatment for withdrawal  Reports no hx of withdrawal seizures  On exam, anxiety, tremors, diaphoresis, tachycardic, hyperthermia, abdominal distention  Endorses nausea  SEWS 16  Na 112 (107 on presentation)   Plan: SEWS monitoring w/ phenobarbital management, high-dose IV thiamine/folic acid supplement, IVF, start hypertonic saline, telemetry, continuous pulse ox, continue home meds except anti-HTN, trend labs q4h, replete electrolytes as needed  Date: 12/05/22       Day 2: Pt reports improved nausea, now able to tolerate PO intake  On exam, tremors but decreased from prior  SEWS 3  Na 117  Plan: continue SEWS monitoring w/ phenobarbital management, thiamine/folic acid supplement, telemetry, continuous pulse ox, continue home meds, trend labs, replete electrolytes as needed  Nephrology consult: goal Na 118 by noon, q4h BMP today, 1 2L fluid restriction, sodium bicarb PO TID  Wt Readings from Last 1 Encounters:   12/04/22 104 kg (230 lb)     Additional Vital Signs:   Date/Time Temp Pulse Resp BP MAP (mmHg) SpO2 O2 Device   12/05/22 1334 99 1 °F (37 3 °C) 122 Abnormal  16 145/103 Abnormal  -- 93 % None (Room air)   12/05/22 1213 99 4 °F (37 4 °C) 102 16 145/93 -- 97 % None (Room air)   12/05/22 1055 98 3 °F (36 8 °C) 93 10 Abnormal   143/95 -- 100 % None (Room air)   12/05/22 1011 98 6 °F (37 °C) 90 16 138/70 -- 96 % None (Room air)   12/05/22 0900 98 5 °F (36 9 °C) 115 Abnormal  16 145/83 -- 92 % None (Room air)   12/05/22 0539 100 9 °F (38 3 °C) Abnormal  99 18 145/92 -- 96 % None (Room air)   12/05/22 0400 99 7 °F (37 6 °C) 97 16 150/79 -- 97 % None (Room air)   12/04/22 2328 99 5 °F (37 5 °C) 114 Abnormal  20 172/87 Abnormal  -- 92 % None (Room air)   12/04/22 2126 100 6 °F (38 1 °C) Abnormal  102 20 158/84 -- 96 % None (Room air)   12/04/22 1921 99 5 °F (37 5 °C) 101 16 141/76 97 97 % None (Room air)   12/04/22 1841 98 9 °F (37 2 °C) 88 16 140/86 -- 98 % None (Room air)   12/04/22 1751 98 9 °F (37 2 °C) 93 22 182/87 Abnormal  -- -- --     Row Name 12/05/22 1335 12/05/22 1012 12/05/22 0900 12/05/22 0750 12/05/22 0541   Severity of Ethanol Withdrawal Scale (SEWS)   ANXIETY: Do you feel that something bad is about to happen to you right now? 0  -NM 0  -NM 3  -NM 0  -NM 3  -TO   NAUSEA and DRY HEAVES or VOMITING? 0  -NM 0  -NM 0  -NM 0  -NM 0  -TO   SWEATING: (includes moist palms, sweating now)?  Score 0 or 2 0  -NM 0  -NM 0  -NM 0  -NM 0  -TO   TREMOR: with arms extended eyes closed? 0  -NM 0  -NM 0  -NM 0  -NM 2  -TO   AGITATION: Fidgety, restless, pacing? 0  -NM 0  -NM 0  -NM 0  -NM 0  -TO   DISORIENTATION: 0  -NM 0  -NM 0  -NM 0  -NM 0  -TO   HALLUCINATIONS: 0  -NM 0  -NM 0  -NM 0  -NM 0  -TO   VITAL SIGNS: ANY (Pulse >401, Diastolic BP >55, Temp >70 1) 3  -NM 0  -NM 3  -NM 3  -NM 3  -TO   SEWS Total Score 3  -NM 0  -NM 6  -NM 3  -NM 8  -TO   Thomson Agitation Sedation Scale (RASS)   Thomson Agitation Sedation Scale (RASS) 0  -NM 0  -NM 0  -NM 0  -NM 0  -TO   Row Name 12/05/22 0400 12/05/22 0100 12/04/22 2329 12/04/22 2131 12/04/22 1842   Severity of Ethanol Withdrawal Scale (SEWS)   ANXIETY: Do you feel that something bad is about to happen to you right now? 0  -TO 0  -TO 3  -TO 3  -TO 0  -NM   NAUSEA and DRY HEAVES or VOMITING? 0  -TO 0  -TO 0  -TO 3  -TO 0  -NM   SWEATING: (includes moist palms, sweating now)? Score 0 or 2 0  -TO 0  -TO 2  -TO 0  -TO 2  -NM   TREMOR: with arms extended eyes closed? 0  -TO 0  -TO 2  -TO 0  -TO 2  -NM   AGITATION: Fidgety, restless, pacing? 0  -TO 0  -TO 0  -TO 0  -TO 0  -NM   DISORIENTATION: 0  -TO 0  -TO 0  -TO 0  -TO 0  -NM   HALLUCINATIONS: 0  -TO 0  -TO 0  -TO 0  -TO 0  -NM   VITAL SIGNS: ANY (Pulse >596, Diastolic BP >03, Temp >89 4) 0  -TO 0  -TO 3  -TO 3  -TO 0  -NM   SEWS Total Score 0  -TO 0  -TO 10  -TO 9  -TO 4  -NM   Thomson Agitation Sedation Scale (RASS)   Thomson Agitation Sedation Scale (RASS) 0  -TO 0  -TO 0  -TO 0  -TO 0  -NM   Row Name 12/04/22 1800       Severity of Ethanol Withdrawal Scale (SEWS)   ANXIETY: Do you feel that something bad is about to happen to you right now? 3  -NM       NAUSEA and DRY HEAVES or VOMITING? 3  -NM       SWEATING: (includes moist palms, sweating now)? Score 0 or 2 2  -NM       TREMOR: with arms extended eyes closed? 2  -NM       AGITATION: Fidgety, restless, pacing?  3  -NM       DISORIENTATION: 0  -NM       HALLUCINATIONS: 0  -NM       VITAL SIGNS: ANY (Pulse >836, Diastolic BP >04, Temp >80 9) 3  -NM       SEWS Total Score 16  -NM           Pertinent Labs/Diagnostic Test Results:   US right upper quadrant   Final Result by Eze Raman DO (12/05 0945)      Enlarged fatty liver  No acute findings or ascites detected  Workstation performed: [id number]         XR chest portable   Final Result by Alexa Mancini MD (12/05 3500)   Low lung volumes      No acute cardiopulmonary disease                    Workstation performed: [id number]               Results from last 7 days   Lab Units 12/05/22  0444 12/04/22  1229   WBC Thousand/uL 6 60 10 60*   HEMOGLOBIN g/dL 12 4 12 5   HEMATOCRIT % 34 7* 33 3*   PLATELETS Thousands/uL 134* 227   NEUTROS ABS Thousands/µL  --  8 94*         Results from last 7 days   Lab Units 12/05/22  1207 12/05/22  0753 12/05/22  0444 12/04/22  2340 12/04/22  1848 12/04/22  1531 12/04/22  1229   SODIUM mmol/L 118* 117* 115* 113* 107* 110* 112*   POTASSIUM mmol/L 5 8* 3 1* 3 5 3 7 3 4* 3 1* 3 1*   CHLORIDE mmol/L 92* 90* 87* 85* 78* 76* 77*   CO2 mmol/L 21 19* 22 20* 15* 15* 14*   ANION GAP mmol/L 5 8 6 8 14 19* 21*   BUN mg/dL 3* 3* 3* 4* 5 6 7   CREATININE mg/dL 0 67* 0 62* 0 68* 0 64* 0 59* 0 59* 0 55*   EGFR ml/min/1 73sq m 116 119 115 118 122 122 125   CALCIUM mg/dL 7 8* 7 7* 8 0* 7 9* 7 5* 6 8* 7 3*   MAGNESIUM mg/dL  --   --  2 0 2 0  --   --  1 2*     Results from last 7 days   Lab Units 12/05/22  0444 12/04/22  1229   AST U/L 143* 146*   ALT U/L 59* 70   ALK PHOS U/L 36* 32*   TOTAL PROTEIN g/dL 6 5 6 9   ALBUMIN g/dL 3 8 3 9   TOTAL BILIRUBIN mg/dL 2 37* 1 32*   BILIRUBIN DIRECT mg/dL 0 47*  --          Results from last 7 days   Lab Units 12/05/22  1207 12/05/22  0753 12/05/22  0444 12/04/22  2340 12/04/22  1848 12/04/22  1531 12/04/22  1229   GLUCOSE RANDOM mg/dL 122* 119* 100* 97 87 135 90     Results from last 7 days   Lab Units 12/04/22  1229   OSMOLALITY, SERUM mmol/* Results from last 7 days   Lab Units 12/05/22  0444   HEMOGLOBIN A1C % 5 4   EAG mg/dl 108     Results from last 7 days   Lab Units 12/04/22  1346   PH ART  7 466*   PCO2 ART mm Hg 21 2*   PO2 ART mm Hg 105 6   HCO3 ART mmol/L 14 9*   BASE EXC ART mmol/L -6 7   O2 CONTENT ART mL/dL 17 2   O2 HGB, ARTERIAL % 97 1*   ABG SOURCE  Radial, Right     Results from last 7 days   Lab Units 12/05/22  0444   PH KRYSTEN  7 460*   PCO2 KRYSTEN mm Hg 30 9*   PO2 KRYSTEN mm Hg 109 5*   HCO3 KRYSTEN mmol/L 21 5*   BASE EXC KRYSTEN mmol/L -1 4   O2 CONTENT KRYSTEN ml/dL 17 6   O2 HGB, VENOUS % 95 4*             Results from last 7 days   Lab Units 12/04/22  1531 12/04/22  1229   HS TNI 0HR ng/L  --  4   HS TNI 2HR ng/L 5  --    HSTNI D2 ng/L 1  --              Results from last 7 days   Lab Units 12/04/22  1229   TSH 3RD GENERATON uIU/mL 1 780         Results from last 7 days   Lab Units 12/04/22  2340   LACTIC ACID mmol/L 1 0           Results from last 7 days   Lab Units 12/04/22  1229   LIPASE u/L 281       Results from last 7 days   Lab Units 12/05/22  0733 12/04/22  1548 12/04/22  1229   OSMOLALITY, SERUM mmol/KG  --   --  266*   OSMO UR mmol/* 797  --      Results from last 7 days   Lab Units 12/05/22  0733 12/04/22  1548 12/04/22  1534   CLARITY UA   --   --  Clear   COLOR UA   --   --  Light Orange   SPEC GRAV UA   --   --  1 038*   PH UA   --   --  6 5   GLUCOSE UA mg/dl  --   --  Negative   KETONES UA mg/dl  --   --  40 (2+)*   BLOOD UA   --   --  Negative   PROTEIN UA mg/dl  --   --  30 (1+)*   NITRITE UA   --   --  Negative   BILIRUBIN UA   --   --  Negative   UROBILINOGEN UA (BE) mg/dl  --   --  <2 0   LEUKOCYTES UA   --   --  Negative   WBC UA /hpf  --   --  1-2   RBC UA /hpf  --   --  1-2   BACTERIA UA /hpf  --   --  Occasional   EPITHELIAL CELLS WET PREP /hpf  --   --  Occasional   SODIUM UR  25 17  --      Results from last 7 days   Lab Units 12/04/22  1541   AMPH/METH  Negative   BARBITURATE UR  Negative   BENZODIAZEPINE UR Positive*   COCAINE UR  Negative   METHADONE URINE  Negative   OPIATE UR  Negative   PCP UR  Negative   THC UR  Negative     Results from last 7 days   Lab Units 12/04/22  1429 12/04/22  1426   ETHANOL LVL mg/dL  --  168*   ACETAMINOPHEN LVL ug/mL <2*  --    SALICYLATE LVL mg/dL <3*  --        Past Medical History:   Diagnosis Date   • Anxiety    • COVID-19 ruled out 2/3/2022   • GERD (gastroesophageal reflux disease)    • Hyperlipidemia    • Hypertension    • Tendinitis      Present on Admission:  • Esophageal reflux  • Hypokalemia  • Hyponatremia  • Alcoholic ketoacidosis  • Benign essential hypertension  • Hyperlipidemia  • Obesity (BMI 30-39  9)  • Internal derangement of right shoulder  • Hypomagnesemia  • Prolonged Q-T interval on ECG  • Tear of left supraspinatus tendon  • (Resolved) Leukocytosis      [facility]: [facility] [F10 939]  Age/Sex: 41 y o  male  Admission Orders:  Regular Diet  SCDs  1200 mL fluid restriction  Continual observation  Fall & Seizure Precautions  SEWS monitoring  Telemetry & Continuous Pulse Ox  BMP q4h      Scheduled Medications:  enoxaparin, 40 mg, Subcutaneous, Daily  fenofibrate, 145 mg, Oral, Daily  folic acid IVPB, 1 mg, Intravenous, Daily  pantoprazole, 40 mg, Oral, Early Morning  sodium bicarbonate, 1,300 mg, Oral, TID after meals  sodium polystyrene, 15 g, Oral, Once  thiamine, 500 mg, Intravenous, Q8H [facility]    Continuous IV Infusions:    sodium chloride 0 9 %, 75 mL/hr, Intravenous, Continuous; Start: 12/04/22 1800 End: 12/04/22 1928  sodium chloride 23 4 % (HYPERTONIC) 308 mEq in sterile water, 25 mL/hr, Intravenous, Continuous; Start: 12/04/22 2030 End: 12/05/22 0713    PRN Meds:  acetaminophen, 650 mg, Oral, Q6H PRN; 12/4 x1, 12/5 x2  dextrose 5 %, 250 mL bolus, Intravenous; 12/5 x1  haloperidol lactate, 5 mg, Intravenous, Q6H PRN  magnesium sulfate, 2 g, Intravenous; 12/4 x1, 12/5 x1  OLANZapine, 5 mg, Oral, Q6H PRN; 12/4 x1  phenobarbital, 650 mg, Intravenous; 12/4 x1  phenobarbital, 65 mg, Intravenous; 12/5 x1  phenobarbital, 130 mg, Intravenous; 12/4 x2, 12/5 x1  phenobarbital, 260 mg, Intravenous; 12/5 x1  phenobarbital, 64 8 mg, Oral; 12/5 x2  potassium chloride, 20 mEq, Intravenous; 12/4 x2, 12/5 x1  potassium chloride, 40 mEq, Oral; 12/4 x1  sodium chloride 0 9 %, 500 mL Bolus, Intravenous, 12/4 x1        IP CONSULT TO CASE MANAGEMENT  IP CONSULT TO NEPHROLOGY    Network Utilization Review Department  ATTENTION: Please call with any questions or concerns to [phone] and carefully listen to the prompts so that you are directed to the right person  All voicemails are confidential   Kayla Bee all requests for admission clinical reviews, approved or denied determinations and any other requests to dedicated fax number below belonging to the campus where the patient is receiving treatment   List of dedicated fax numbers for the Facilities:  [address] DENIALS (Administrative/Medical Necessity) [phone]   [address] (Maternity/NICU/Pediatrics) [phone]   919 Letitia Durham [phone]   [facility] 77 [phone]   [address] [place] [address] [address] [phone]   1557 [facility] Elan Lee [facility] 134 [address] [facility] [phone] No

## 2022-12-05 NOTE — ASSESSMENT & PLAN NOTE
· Followed by orthopedics outpatient for right shoulder pain  · MRI will need to be rescheduled  · Supportive care  · Outpatient follow up

## 2022-12-05 NOTE — CONSULTS
Consultation - Nephrology   Mercedes Finnegan 41 y o  male MRN: [id number]  Unit/Bed#: 5T [facility] 518-01 Encounter: [id number]    ASSESSMENT and PLAN:  1  Hyponatremia, acute - admit sNa 112 down to 107, improved to 117 s/p 1 8% saline  -d/c IVF  -goal sNa 118 by noon, continue q4hr BMP for today  -continue 1 2L/day oral fluid restriction  -suspect beer potomania/low solute intake contributing  Nausea can also be an osmotic stimulus for ADH secretion and subsequent hypernatremia  Please control nausea  -recent urine Na low at 17  F/u repeat urine studies, f/u uric acid  -had ordered salt tabs but these have been discontinued for now  -2g salt tab given this am, possibly the reason for higher sNa at 117 on last check    2  Hypokalemia - receiving IV repletion, in setting of alcoholism/poor solute intake and recent IVF  Monitor K level  Mag level normal     3  Difficulty with urination - check bladder scan    4  Non gapped acidosis - continue oral sodium bicarbonate 1300mg TID for now, monitor BMP    SUMMARY OF RECOMMENDATIONS:  Continue to monitor q4hr BMP today  Will provide 250ml D5 bolus now to slow down rate of correction  Goal sNa by this afternoon (noon BMP) 118  Off hypertonic fluids  Continue oral fluid restriction for now  HISTORY OF PRESENT ILLNESS:  Requesting Physician: Alicia Landeros MD  Reason for Consult: hyponatremia    Mercedes Finnegan is a 41y o  year old male who was admitted to [address] [place] [address] after presenting for alcohol detox  A renal consultation is requested today for assistance in the management of hyponatremia  The patient presents for alcoholism in detox process seen  He states that he was having fevers, chills, nausea and vomiting prior to admission  He states that he vomited 3 times  He does admit to decreased oral intake of food  He still has ongoing nausea  He has also felt shaky  He does have a bit of dizziness now    He denies diarrhea, constipation, chest pain, shortness of breath, leg edema  He states that he has been having difficulty pushing on his urine  He does take an occasional Aleve for pain but states that has been a while since he took one  He has a history of hypertension but is unaware of what medication he takes  He denies any recent medication changes  PAST MEDICAL HISTORY:  Past Medical History:   Diagnosis Date   • Anxiety    • COVID-19 ruled out 2/3/2022   • GERD (gastroesophageal reflux disease)    • Hyperlipidemia    • Hypertension    • Tendinitis        PAST SURGICAL HISTORY:  Past Surgical History:   Procedure Laterality Date   • KNEE SURGERY Left     cyst removed   • CT COLONOSCOPY FLX DX W/COLLJ SPEC WHEN PFRMD N/A 2018    Procedure: COLONOSCOPY;  Surgeon: Fish Rebolledo DO;  Location: [facility];   Service: General   • CT SHLDR ARTHROSCOP,SURG,W/ROTAT CUFF REPR Left 2022    Procedure: SHOULDER ARTHROSCOPIC ROTATOR CUFF REPAIR;  Surgeon: Lisha Clark MD;  Location[facility];  Service: Orthopedics   • SHOULDER ARTHROSCOPY Right     with biceps tenodesis       ALLERGIES:  Allergies   Allergen Reactions   • Neomycin Hives   • Polymyxin B Hives       SOCIAL HISTORY:  Social History     Substance and Sexual Activity   Alcohol Use Yes   • Alcohol/week: 2 0 - 3 0 standard drinks   • Types: 2 - 3 Shots of liquor per week     Social History     Substance and Sexual Activity   Drug Use No     Social History     Tobacco Use   Smoking Status Former   • Packs/day: 1 00   • Years: 20 00   • Pack years: 20 00   • Types: Cigarettes   • Start date: 1992   • Quit date: 2013   • Years since quittin 5   Smokeless Tobacco Never       FAMILY HISTORY:  Family History   Problem Relation Age of Onset   • Melanoma Father    • Cancer Father    • Melanoma Brother    • Diabetes Maternal Grandfather    • Stroke Paternal Grandmother    • Hypertension Paternal Grandmother    • Coronary artery disease Paternal Grandmother    • Heart disease Paternal Grandmother    • Hypertension Paternal Grandfather    • Coronary artery disease Paternal Grandfather    • Brain cancer Paternal Grandfather    • Heart disease Paternal Grandfather    • Liver cancer Paternal Uncle        MEDICATIONS:    Current Facility-Administered Medications:   •  acetaminophen (TYLENOL) tablet 650 mg, 650 mg, Oral, Q6H PRN, Mahamed Escalera PA-C, 650 mg at 12/05/22 0544  •  acetaminophen (TYLENOL) tablet 650 mg, 650 mg, Oral, Q6H PRN, Aurora Garrett PA-C  •  dextrose 5 % bolus 250 mL, 250 mL, Intravenous, Once, Sharon Myles DO  •  enoxaparin (LOVENOX) subcutaneous injection 40 mg, 40 mg, Subcutaneous, Daily, Aurora Garrett PA-C, 40 mg at 12/05/22 0436  •  fenofibrate (TRICOR) tablet 145 mg, 145 mg, Oral, Daily, Jeanette Simmons PA-C, 145 mg at 78/13/17 6600  •  folic acid 1 mg in sodium chloride 0 9 % 50 mL IVPB, 1 mg, Intravenous, Daily, Aurora Garrett PA-C, Stopped at 12/05/22 0840  •  haloperidol lactate (HALDOL) injection 5 mg, 5 mg, Intravenous, Q6H PRN, Jeanette Simmons PA-C  •  OLANZapine (ZyPREXA ZYDIS) dispersible tablet 5 mg, 5 mg, Oral, Q6H PRN, Jeanette Simmons PA-C, 5 mg at 12/04/22 2149  •  pantoprazole (PROTONIX) EC tablet 40 mg, 40 mg, Oral, Early Morning, Jeanette Simmons PA-C, 40 mg at 12/05/22 0505  •  potassium chloride 20 mEq IVPB (premix), 20 mEq, Intravenous, Once, Aurora Garrett PA-C, Last Rate: 50 mL/hr at 12/05/22 4245, 20 mEq at 12/05/22 1995  •  sodium bicarbonate tablet 1,300 mg, 1,300 mg, Oral, TID after meals, Frank Khan MD, 1,300 mg at 12/05/22 0802  •  sodium chloride tablet 2 g, 2 g, Oral, TID With Meals, Sharon Myles DO, 2 g at 12/05/22 0734  •  thiamine (VITAMIN B1) 500 mg in sodium chloride 0 9 % 50 mL IVPB, 500 mg, Intravenous, Q8H [facility], Aurora Garrett PA-C, Last Rate: 100 mL/hr at 12/05/22 0518, 500 mg at 12/05/22 0518    REVIEW OF SYSTEMS:  More than 10 systems were reviewed  No other pertinent positive findings other than those mentioned in HPI  PHYSICAL EXAM:  Current Weight: Weight - Scale: 104 kg (230 lb)  First Weight: Weight - Scale: 104 kg (230 lb)  Vitals:    12/04/22 2328 12/05/22 0400 12/05/22 0539 12/05/22 0900   BP: (!) 172/87 150/79 145/92 145/83   BP Location: Left arm Left arm Left arm Left arm   Pulse: (!) 114 97 99 (!) 115   Resp: 20 16 18 16   Temp: 99 5 °F (37 5 °C) 99 7 °F (37 6 °C) (!) 100 9 °F (38 3 °C) 98 5 °F (36 9 °C)   TempSrc: Temporal Temporal Temporal Temporal   SpO2: 92% 97% 96% 92%   Weight:       Height:           Intake/Output Summary (Last 24 hours) at 12/5/2022 0940  Last data filed at 12/5/2022 0849  Gross per 24 hour   Intake 948 33 ml   Output 2075 ml   Net -1126 67 ml     Physical Exam  Vitals reviewed  Constitutional:       General: He is not in acute distress  Appearance: Normal appearance  He is well-developed and well-nourished  He is not diaphoretic  Comments: Sitting up in chair   HENT:      Head: Normocephalic and atraumatic  Nose: Nose normal       Mouth/Throat:      Mouth: Mucous membranes are dry  Pharynx: No oropharyngeal exudate  Eyes:      General: No scleral icterus  Right eye: No discharge  Left eye: No discharge  Neck:      Thyroid: No thyromegaly  Cardiovascular:      Rate and Rhythm: Regular rhythm  Tachycardia present  Heart sounds: Normal heart sounds  Pulmonary:      Effort: Pulmonary effort is normal       Breath sounds: Normal breath sounds  No wheezing or rales  Abdominal:      General: Bowel sounds are normal  There is no distension  Palpations: Abdomen is soft  Tenderness: There is no abdominal tenderness  Musculoskeletal:         General: No swelling or edema  Normal range of motion  Cervical back: Neck supple  Lymphadenopathy:      Cervical: No cervical adenopathy  Skin:     General: Skin is warm and dry        Coloration: Skin is not jaundiced  Findings: No rash  Neurological:      Mental Status: He is alert  Comments: Awake and alert   Psychiatric:         Mood and Affect: Mood and affect normal       Comments: Flat affect, slow to respond to questions         Invasive Devices:      Lab Results:   Results from last 7 days   Lab Units 12/05/22  0753 12/05/22  0444 12/04/22  2340 12/04/22  1531 12/04/22  1229   WBC Thousand/uL  --  6 60  --   --  10 60*   HEMOGLOBIN g/dL  --  12 4  --   --  12 5   HEMATOCRIT %  --  34 7*  --   --  33 3*   PLATELETS Thousands/uL  --  134*  --   --  227   POTASSIUM mmol/L 3 1* 3 5 3 7   < > 3 1*   CHLORIDE mmol/L 90* 87* 85*   < > 77*   CO2 mmol/L 19* 22 20*   < > 14*   BUN mg/dL 3* 3* 4*   < > 7   CREATININE mg/dL 0 62* 0 68* 0 64*   < > 0 55*   CALCIUM mg/dL 7 7* 8 0* 7 9*   < > 7 3*   MAGNESIUM mg/dL  --  2 0 2 0  --  1 2*   ALK PHOS U/L  --  36*  --   --  32*   ALT U/L  --  59*  --   --  70   AST U/L  --  143*  --   --  146*    < > = values in this interval not displayed

## 2022-12-05 NOTE — ASSESSMENT & PLAN NOTE
· Improving today  · No evidence of delirium  · SEWS protocol with symptom-triggered phenobarbital followed for medical management of alcohol withdrawal   · Received 2144 2 mg total phenobarbital; last dose 12/6/2022 1028  · Appears stabilized overall- no tremors   · Continue to monitor off SEWS to ensure complete resolution of withdrawal

## 2022-12-05 NOTE — ASSESSMENT & PLAN NOTE
· Body mass index is 32 08 kg/m²    · Repeat lipid panel with pt's h/o mixed hyperlipidemia on fenofibrate  · Screen for DM2 with Hgb A1c  · Recommend OP f/u with PCP

## 2022-12-05 NOTE — DISCHARGE INSTR - OTHER ORDERS
Drug and Alcohol Resources in [facility]    If you have health insurance, including medical assistance, there should be a phone number on your insurance card that you can call to find out how to access services  The card may say, “For [address] or “For Drug and Alcohol Services” or “For Substance Abuse Services” call the number provided  [place] [address] Alcohol Division  [facility], Yann, Kylah Shoshana Olguin  [phone]  A  is available Monday through Friday from 8:00 am to 5:00 pm to provide you with assistance on accessing services for substance abuse  If you do not have health insurance and are in financial need, this office may also help you get the funding for the services that are necessary  [facility] provides funding to support three Recovery Centers in [facility]  These centers offer a safe, sober environment to those in recovery  A variety of programming including 12-Step Meetings, Eliana Drake, Life Skills Workshops, etc  is offered at each location  [address]  [phone]  www  cleanslatebangor  org [address]  Dylan Spannr, [address] [facility]  [phone]  jfyjlz-mn-zyrj  [address] [facility], [address] [facility]  [phone]   [address] [place] [address]  [phone]  www  [facility], [address] [place] [address]  [phone]  [facility] 77  Confidential free help, from public health agencies, to find substance use treatment and information  [phone]    Link for Zoom Codes for Virtual 12 step Meetings  Reynaldo melara uk  aspx  Alcoholics Anonymous  [place]  [phone]    http://www ford com/  Narcotics Anonymous  [phone]  https://[employer]/

## 2022-12-05 NOTE — TREATMENT PLAN
Chart reviewed  Full consult will be done tomorrow  Admitted with Na 112--> received plasmalyte bolus and D5NS bolus followed by NS at 75 ml/hr drip  Sodium dropped to 107 meq/L   -s osmolality 266, ur sodium 17 and urine osmolality 797  - history of hyponatremia in feb 2022 due to volume depletion     Plan:   - work up suggestive of volume depletion - ordered  ml bolus  - switch to 1 8 % saline as not responding to NS     -BMP q 4 hrly  Goal sodium by midnight around 116 meq/lts  -started on oral sodium bicarbonate for acidosis   Check lactic acid , has urine ketones- possibly starvation ketosis

## 2022-12-05 NOTE — ASSESSMENT & PLAN NOTE
· H/o chronic daily alcohol consumption, denies h/o withdrawal seizures  · Last drink: 12/3/22   · Ethanol lvl: 168   · Received a total of 50 mg of Valium PTA during ED course   · Follow SEWS protocol with symptom-triggered phenobarbital for medically-assisted alcohol withdrawal  · Current symptoms include anxiety, tremor, diaphoresis, nausea, hyperthermia, tachycardia, and agitation/impulsiveness   · Administered 650 mg phenobarbital + 130 mg x2 doses = 910 mg total phenobarbital as of 2333   · Continue to monitor vitals, symptoms and administer phenobarbital as indicated  · Continue cardiopulmonary monitoring

## 2022-12-05 NOTE — ASSESSMENT & PLAN NOTE
· Home antihypertensive irbesartan 150 mg daily held in setting of alcohol withdrawal management complicated by hyponatremia  · Most recent /86  · Continue to monitor blood pressure  · Will restart when clinically improved and appropriate

## 2022-12-05 NOTE — ASSESSMENT & PLAN NOTE
Pt with a h/o chronic heavy alcohol use  Drinks 1 fifth of vodka daily  Denies H/o withdrawal seizures  Withdrawal management as above  Initiate IVFs  Initiate high dose thiamine along with folic acid supplementation, and MVI  Consult case management for assistance with rehab resources

## 2022-12-05 NOTE — UTILIZATION REVIEW
NOTIFICATION OF INPATIENT ADMISSION   AUTHORIZATION REQUEST   SERVICING FACILITY:   [address] [place] [address]  [address] , [facility], [address] [place] [address]  Tax ID: [id number]  NPI: [id number] ATTENDING PROVIDER:  Attending Name and NPI#: Alicia Landeros Md [[id number]]  Address: [address] , [facility], [address] [place] [address]  Phone: [phone]     ADMISSION INFORMATION:  Place of Service: Inpatient [address] [facility]  [address]  Place of Service Code: 21  Inpatient Admission Date/Time: 12/4/22  5:37 PM  Discharge Date/Time: No discharge date for patient encounter  Admitting Diagnosis Code/Description:  Alcohol withdrawal ([facility] [address] ) [F10 939]     UTILIZATION REVIEW CONTACT:  Yelena Talamantes Utilization   Network Utilization Review Department  Phone: [phone]  Fax [phone]  Email: [email or web address]  Contact for approvals/pending authorizations, clinical reviews, and discharge  PHYSICIAN ADVISORY SERVICES:  Medical Necessity Denial & Ckhj-cu-Rqvm Review  Phone: [phone]  Fax: [phone]  Email: [email or web address]

## 2022-12-05 NOTE — PLAN OF CARE
Problem: SUBSTANCE USE/ABUSE  Goal: By discharge, will develop insight into their chemical dependency and sustain motivation to continue in recovery  Description: INTERVENTIONS:  - Attends all daily group sessions and scheduled AA groups  - Actively practices coping skills through participation in the therapeutic community and adherence to program rules  - Reviews and completes assignments from individual treatment plan  - Assist patient development of understanding of their personal cycle of addiction and relapse triggers  Outcome: Progressing  Goal: By discharge, patient will have ongoing treatment plan addressing chemical dependency  Description: INTERVENTIONS:  - Assist patient with resources and/or appointments for ongoing recovery based living  Outcome: Progressing     Problem: METABOLIC, FLUID AND ELECTROLYTES - ADULT  Goal: Electrolytes maintained within normal limits  Description: INTERVENTIONS:  - Monitor labs and assess patient for signs and symptoms of electrolyte imbalances  - Administer electrolyte replacement as ordered  - Monitor response to electrolyte replacements, including repeat lab results as appropriate  - Instruct patient on fluid and nutrition as appropriate  Outcome: Progressing  Goal: Fluid balance maintained  Description: INTERVENTIONS:  - Monitor labs   - Monitor I/O and WT  - Instruct patient on fluid and nutrition as appropriate  - Assess for signs & symptoms of volume excess or deficit  Outcome: Progressing  Goal: Glucose maintained within target range  Description: INTERVENTIONS:  - Monitor Blood Glucose as ordered  - Assess for signs and symptoms of hyperglycemia and hypoglycemia  - Administer ordered medications to maintain glucose within target range  - Assess nutritional intake and initiate nutrition service referral as needed  Outcome: Progressing

## 2022-12-06 PROBLEM — E83.42 HYPOMAGNESEMIA: Status: RESOLVED | Noted: 2022-12-04 | Resolved: 2022-12-06

## 2022-12-06 PROBLEM — J02.9 SORE THROAT: Status: ACTIVE | Noted: 2022-12-06

## 2022-12-06 PROBLEM — E87.29 ALCOHOLIC KETOACIDOSIS: Status: RESOLVED | Noted: 2022-12-04 | Resolved: 2022-12-06

## 2022-12-06 PROBLEM — R94.31 PROLONGED Q-T INTERVAL ON ECG: Status: RESOLVED | Noted: 2022-12-04 | Resolved: 2022-12-06

## 2022-12-06 PROBLEM — D69.6 THROMBOCYTOPENIA (HCC): Status: ACTIVE | Noted: 2022-12-06

## 2022-12-06 LAB
ALBUMIN SERPL BCP-MCNC: 3.8 G/DL (ref 3.5–5)
ALP SERPL-CCNC: 39 U/L (ref 43–122)
ALT SERPL W P-5'-P-CCNC: 53 U/L
ANION GAP SERPL CALCULATED.3IONS-SCNC: 3 MMOL/L (ref 5–14)
ANION GAP SERPL CALCULATED.3IONS-SCNC: 7 MMOL/L (ref 5–14)
ANION GAP SERPL CALCULATED.3IONS-SCNC: 7 MMOL/L (ref 5–14)
ANION GAP SERPL CALCULATED.3IONS-SCNC: 8 MMOL/L (ref 5–14)
AST SERPL W P-5'-P-CCNC: 81 U/L (ref 17–59)
BASE EX.OXY STD BLDV CALC-SCNC: 96.6 % (ref 60–80)
BASE EXCESS BLDV CALC-SCNC: 0.2 MMOL/L
BASOPHILS # BLD AUTO: 0.03 THOUSANDS/ÂΜL (ref 0–0.1)
BASOPHILS NFR BLD AUTO: 0 % (ref 0–1)
BILIRUB SERPL-MCNC: 1.02 MG/DL (ref 0.2–1)
BUN SERPL-MCNC: 5 MG/DL (ref 5–25)
BUN SERPL-MCNC: 6 MG/DL (ref 5–25)
BUN SERPL-MCNC: 7 MG/DL (ref 5–25)
BUN SERPL-MCNC: 8 MG/DL (ref 5–25)
CALCIUM SERPL-MCNC: 7.9 MG/DL (ref 8.4–10.2)
CALCIUM SERPL-MCNC: 8.2 MG/DL (ref 8.4–10.2)
CALCIUM SERPL-MCNC: 8.4 MG/DL (ref 8.4–10.2)
CALCIUM SERPL-MCNC: 8.6 MG/DL (ref 8.4–10.2)
CHLORIDE SERPL-SCNC: 101 MMOL/L (ref 96–108)
CHLORIDE SERPL-SCNC: 98 MMOL/L (ref 96–108)
CHLORIDE SERPL-SCNC: 98 MMOL/L (ref 96–108)
CHLORIDE SERPL-SCNC: 99 MMOL/L (ref 96–108)
CO2 SERPL-SCNC: 20 MMOL/L (ref 21–32)
CO2 SERPL-SCNC: 23 MMOL/L (ref 21–32)
CO2 SERPL-SCNC: 23 MMOL/L (ref 21–32)
CO2 SERPL-SCNC: 24 MMOL/L (ref 21–32)
CREAT SERPL-MCNC: 0.71 MG/DL (ref 0.7–1.5)
CREAT SERPL-MCNC: 0.75 MG/DL (ref 0.7–1.5)
CREAT SERPL-MCNC: 0.76 MG/DL (ref 0.7–1.5)
CREAT SERPL-MCNC: 0.82 MG/DL (ref 0.7–1.5)
EOSINOPHIL # BLD AUTO: 0.05 THOUSAND/ÂΜL (ref 0–0.61)
EOSINOPHIL NFR BLD AUTO: 1 % (ref 0–6)
ERYTHROCYTE [DISTWIDTH] IN BLOOD BY AUTOMATED COUNT: 12.5 % (ref 11.6–15.1)
GFR SERPL CREATININE-BSD FRML MDRD: 106 ML/MIN/1.73SQ M
GFR SERPL CREATININE-BSD FRML MDRD: 110 ML/MIN/1.73SQ M
GFR SERPL CREATININE-BSD FRML MDRD: 110 ML/MIN/1.73SQ M
GFR SERPL CREATININE-BSD FRML MDRD: 113 ML/MIN/1.73SQ M
GLUCOSE SERPL-MCNC: 102 MG/DL (ref 70–99)
GLUCOSE SERPL-MCNC: 109 MG/DL (ref 70–99)
GLUCOSE SERPL-MCNC: 114 MG/DL (ref 70–99)
GLUCOSE SERPL-MCNC: 115 MG/DL (ref 70–99)
HCO3 BLDV-SCNC: 23.5 MMOL/L (ref 24–30)
HCT VFR BLD AUTO: 33 % (ref 36.5–49.3)
HGB BLD-MCNC: 11.9 G/DL (ref 12–17)
IMM GRANULOCYTES # BLD AUTO: 0.02 THOUSAND/UL (ref 0–0.2)
IMM GRANULOCYTES NFR BLD AUTO: 0 % (ref 0–2)
LYMPHOCYTES # BLD AUTO: 0.98 THOUSANDS/ÂΜL (ref 0.6–4.47)
LYMPHOCYTES NFR BLD AUTO: 12 % (ref 14–44)
MAGNESIUM SERPL-MCNC: 1.9 MG/DL (ref 1.6–2.3)
MCH RBC QN AUTO: 30.6 PG (ref 26.8–34.3)
MCHC RBC AUTO-ENTMCNC: 36.1 G/DL (ref 31.4–37.4)
MCV RBC AUTO: 85 FL (ref 82–98)
MONOCYTES # BLD AUTO: 0.57 THOUSAND/ÂΜL (ref 0.17–1.22)
MONOCYTES NFR BLD AUTO: 7 % (ref 4–12)
NEUTROPHILS # BLD AUTO: 6.4 THOUSANDS/ÂΜL (ref 1.85–7.62)
NEUTS SEG NFR BLD AUTO: 80 % (ref 43–75)
NRBC BLD AUTO-RTO: 0 /100 WBCS
O2 CT BLDV-SCNC: 17.5 ML/DL
PCO2 BLDV: 33.7 MM HG (ref 42–50)
PH BLDV: 7.46 [PH] (ref 7.3–7.4)
PLATELET # BLD AUTO: 111 THOUSANDS/UL (ref 149–390)
PMV BLD AUTO: 11.3 FL (ref 8.9–12.7)
PO2 BLDV: 146.3 MM HG (ref 35–45)
POTASSIUM SERPL-SCNC: 3.3 MMOL/L (ref 3.5–5.3)
POTASSIUM SERPL-SCNC: 3.6 MMOL/L (ref 3.5–5.3)
POTASSIUM SERPL-SCNC: 3.7 MMOL/L (ref 3.5–5.3)
POTASSIUM SERPL-SCNC: 4 MMOL/L (ref 3.5–5.3)
PROT SERPL-MCNC: 6.6 G/DL (ref 6.4–8.4)
RBC # BLD AUTO: 3.89 MILLION/UL (ref 3.88–5.62)
SODIUM SERPL-SCNC: 126 MMOL/L (ref 135–147)
SODIUM SERPL-SCNC: 128 MMOL/L (ref 135–147)
SODIUM SERPL-SCNC: 128 MMOL/L (ref 135–147)
SODIUM SERPL-SCNC: 129 MMOL/L (ref 135–147)
WBC # BLD AUTO: 8.05 THOUSAND/UL (ref 4.31–10.16)

## 2022-12-06 RX ORDER — PHENOBARBITAL 32.4 MG/1
64.8 TABLET ORAL ONCE
Status: COMPLETED | OUTPATIENT
Start: 2022-12-06 | End: 2022-12-06

## 2022-12-06 RX ORDER — LOPERAMIDE HYDROCHLORIDE 2 MG/1
4 CAPSULE ORAL 4 TIMES DAILY PRN
Status: DISCONTINUED | OUTPATIENT
Start: 2022-12-06 | End: 2022-12-08 | Stop reason: HOSPADM

## 2022-12-06 RX ORDER — POTASSIUM CHLORIDE 14.9 MG/ML
20 INJECTION INTRAVENOUS ONCE
Status: COMPLETED | OUTPATIENT
Start: 2022-12-06 | End: 2022-12-06

## 2022-12-06 RX ADMIN — POTASSIUM CHLORIDE 20 MEQ: 14.9 INJECTION, SOLUTION INTRAVENOUS at 09:51

## 2022-12-06 RX ADMIN — FENOFIBRATE 145 MG: 145 TABLET, COATED ORAL at 09:51

## 2022-12-06 RX ADMIN — LIDOCAINE HYDROCHLORIDE 15 ML: 20 SOLUTION ORAL; TOPICAL at 05:04

## 2022-12-06 RX ADMIN — SODIUM BICARBONATE 1300 MG: 650 TABLET ORAL at 12:50

## 2022-12-06 RX ADMIN — DEXTROSE 50 ML/HR: 5 SOLUTION INTRAVENOUS at 01:01

## 2022-12-06 RX ADMIN — THIAMINE HYDROCHLORIDE 500 MG: 100 INJECTION, SOLUTION INTRAMUSCULAR; INTRAVENOUS at 22:11

## 2022-12-06 RX ADMIN — SODIUM BICARBONATE 1300 MG: 650 TABLET ORAL at 17:03

## 2022-12-06 RX ADMIN — PANTOPRAZOLE SODIUM 40 MG: 40 TABLET, DELAYED RELEASE ORAL at 05:03

## 2022-12-06 RX ADMIN — FOLIC ACID 1 MG: 5 INJECTION, SOLUTION INTRAMUSCULAR; INTRAVENOUS; SUBCUTANEOUS at 08:01

## 2022-12-06 RX ADMIN — THIAMINE HYDROCHLORIDE 500 MG: 100 INJECTION, SOLUTION INTRAMUSCULAR; INTRAVENOUS at 05:03

## 2022-12-06 RX ADMIN — PHENOBARBITAL 64.8 MG: 32.4 TABLET ORAL at 10:28

## 2022-12-06 RX ADMIN — THIAMINE HYDROCHLORIDE 500 MG: 100 INJECTION, SOLUTION INTRAMUSCULAR; INTRAVENOUS at 13:04

## 2022-12-06 RX ADMIN — SODIUM BICARBONATE 1300 MG: 650 TABLET ORAL at 09:51

## 2022-12-06 RX ADMIN — LOPERAMIDE HYDROCHLORIDE 4 MG: 2 CAPSULE ORAL at 19:53

## 2022-12-06 NOTE — PROCEDURES
EKG 12/05/22 1912  Read 1917    Rate- 124 bpm  NY interval- 150 ms  QRS duration- 88 ms  QT/QTC- 330/474 ms     Interpretation- Sinus tachycardia, minimal voltage criteria for LVH

## 2022-12-06 NOTE — QUICK NOTE
Discussed 12 p m  sodium of 128 with nephrology; recommend removal of fluid restriction and recheck BMP in 4 hours  No further need for D5W at this time  Will continue to monitor urine output  Once sodium is above 130, can monitor BMP every 8 hours

## 2022-12-06 NOTE — PLAN OF CARE
Problem: Prexisting or High Potential for Compromised Skin Integrity  Goal: Skin integrity is maintained or improved  Description: INTERVENTIONS:  - Identify patients at risk for skin breakdown  - Assess and monitor skin integrity  - Assess and monitor nutrition and hydration status  - Monitor labs   - Assess for incontinence   - Turn and reposition patient  - Assist with mobility/ambulation  - Relieve pressure over bony prominences  - Avoid friction and shearing  - Provide appropriate hygiene as needed including keeping skin clean and dry  - Evaluate need for skin moisturizer/barrier cream  - Collaborate with interdisciplinary team   - Patient/family teaching  - Consider wound care consult   Outcome: Progressing     Problem: Potential for Falls  Goal: Patient will remain free of falls  Description: INTERVENTIONS:  - Educate patient/family on patient safety including physical limitations  - Instruct patient to call for assistance with activity   - Consult OT/PT to assist with strengthening/mobility   - Keep Call bell within reach  - Keep bed low and locked with side rails adjusted as appropriate  - Keep care items and personal belongings within reach  - Initiate and maintain comfort rounds  - Make Fall Risk Sign visible to staff  - Offer Toileting every 2 Hours, in advance of need  - Initiate/Maintain bed alar- Apply yellow socks and bracelet for high fall risk patients  - Consider moving patient to room near nurses station  Outcome: Progressing     Problem: SUBSTANCE USE/ABUSE  Goal: By discharge, will develop insight into their chemical dependency and sustain motivation to continue in recovery  Description: INTERVENTIONS:  - Assist patient development of understanding of their personal cycle of addiction and relapse triggers  Outcome: Progressing  Goal: By discharge, patient will have ongoing treatment plan addressing chemical dependency  Description: INTERVENTIONS:  - Assist patient with resources and/or appointments for ongoing recovery based living  Outcome: Progressing     Problem: METABOLIC, FLUID AND ELECTROLYTES - ADULT  Goal: Electrolytes maintained within normal limits  Description: INTERVENTIONS:  - Monitor labs and assess patient for signs and symptoms of electrolyte imbalances  - Administer electrolyte replacement as ordered  - Monitor response to electrolyte replacements, including repeat lab results as appropriate  - Instruct patient on fluid and nutrition as appropriate  Outcome: Progressing  Goal: Fluid balance maintained  Description: INTERVENTIONS:  - Monitor labs   - Monitor I/O and WT  - Instruct patient on fluid and nutrition as appropriate  - Assess for signs & symptoms of volume excess or deficit  Outcome: Progressing  Goal: Glucose maintained within target range  Description: INTERVENTIONS:  - Monitor Blood Glucose as ordered  - Assess for signs and symptoms of hyperglycemia and hypoglycemia  - Administer ordered medications to maintain glucose within target range  - Assess nutritional intake and initiate nutrition service referral as needed  Outcome: Progressing     Problem: MOBILITY - ADULT  Goal: Maintain or return to baseline ADL function  Description: INTERVENTIONS:  -  Assess patient's ability to carry out ADLs; assess patient's baseline for ADL function and identify physical deficits which impact ability to perform ADLs (bathing, care of mouth/teeth, toileting, grooming, dressing, etc )  - Assess/evaluate cause of self-care deficits   - Assess range of motion  - Assess patient's mobility; develop plan if impaired  - Assess patient's need for assistive devices and provide as appropriate  - Encourage maximum independence but intervene and supervise when necessary  - Involve family in performance of ADLs  - Assess for home care needs following discharge   - Consider OT consult to assist with ADL evaluation and planning for discharge  - Provide patient education as appropriate  Outcome: Progressing  Goal: Maintains/Returns to pre admission functional level  Description: INTERVENTIONS:  - Perform BMAT or MOVE assessment daily    - Set and communicate daily mobility goal to care team and patient/family/caregiver  - Collaborate with rehabilitation services on mobility goals if consulted  - Perform Range of Motion 10 times a day  - Reposition patient every 2 hours    - Dangle patient 5 times a day  - Stand patient 5 times a day  - Ambulate patient 5 times a day  - Out of bed to chair 3 times a day   - Out of bed for meals 3 times a day  - Out of bed for toileting  - Record patient progress and toleration of activity level   Outcome: Progressing

## 2022-12-06 NOTE — TREATMENT PLAN
Renal on call note    Na rising rapidly  UOP recorded is 1 8 L  Give D5W bolus over one hour followed by D5W for 4 hours 50 cc/hr then BMP    Confirming true UOP with Primary team  Messaging

## 2022-12-06 NOTE — NURSING NOTE
Patient asleep at this time  Heart rate, respiratory rate and SPO2 are within normal limits  SEWS score deferred at this time

## 2022-12-06 NOTE — PROGRESS NOTES
Progress Note - Nephrology   Mercedes Semen 41 y o  male MRN: [id number]  Unit/Bed#: 5T DETOX 518-01 Encounter: [id number]      Assessment / Plan:  1  Hyponatremia, acute - admit sNa 112 down to 107, improved to 117 s/p 1 8% saline  -sNa continued to rise rapidly s/p fluid restriction alone  -goal sNa 126 by noon, continue q4hr BMP in light of recent overcorrection  -change1  2L/day oral fluid restriction to 2L/day  -providing 250ml D5W bolus in light of overcorrection  Goal sNa by noon 126   -suspect beer potomania/low solute intake contributing  Nausea can also be an osmotic stimulus for ADH secretion and subsequent hypernatremia  Please control nausea  -low urine osm noted, suggestive of psychogenic polydipsia  Borderline Siva noted  -will avoid adding salt tabs in light of overcorrection     2  Hypokalemia ? D t IVF vs poor oral intake - receiving IV repletion, in setting of alcoholism/poor solute intake and recent IVF  Monitor K level  Mag level 1 9       3  Non gapped acidosis - continue oral sodium bicarbonate 1300mg TID for now, monitor BMP  Serum bicarb had normalized overnight and dropped again s/p D5W  Subjective:   Denies chest pain, shortness of breath but complains of sore, painful throat  Has a cough  No other complaints  Objective:     Vitals: Blood pressure 118/75, pulse 93, temperature 98 2 °F (36 8 °C), temperature source Temporal, resp  rate 14, height 5' 11" (1 803 m), weight 104 kg (230 lb), SpO2 96 %  ,Body mass index is 32 08 kg/m²  Temp (24hrs), Av 1 °F (37 3 °C), Min:98 °F (36 7 °C), Max:100 6 °F (38 1 °C)      Weight (last 2 days)     Date/Time Weight    22 1500 --    Comment rows:    OBSERV: sleeping at 22 1500    22 1055 --    Comment rows:    OBSERV: sleeping at 22 1055    22 1751 104 (230)            Intake/Output Summary (Last 24 hours) at 2022 0942  Last data filed at 2022 0646  Gross per 24 hour   Intake 1920 ml Output 1600 ml   Net 320 ml     I/O last 24 hours: In: 2698 3 [P O :1550; I V :448 3; IV Piggyback:700]  Out: 1800 [Urine:1800]        Physical Exam:   Physical Exam  Vitals reviewed  Constitutional:       General: He is not in acute distress  Appearance: Normal appearance  He is well-developed and well-nourished  He is not diaphoretic  HENT:      Head: Normocephalic and atraumatic  Nose: Nose normal       Mouth/Throat:      Mouth: Mucous membranes are dry  Pharynx: No oropharyngeal exudate  Eyes:      General: No scleral icterus  Right eye: No discharge  Left eye: No discharge  Neck:      Thyroid: No thyromegaly  Cardiovascular:      Rate and Rhythm: Normal rate and regular rhythm  Heart sounds: Normal heart sounds  Pulmonary:      Effort: Pulmonary effort is normal       Breath sounds: Normal breath sounds  No wheezing or rales  Abdominal:      General: Bowel sounds are normal  There is no distension  Palpations: Abdomen is soft  Tenderness: There is no abdominal tenderness  Musculoskeletal:         General: No swelling or edema  Normal range of motion  Cervical back: Neck supple  Lymphadenopathy:      Cervical: No cervical adenopathy  Skin:     General: Skin is warm and dry  Findings: No rash  Neurological:      Mental Status: He is alert  Comments: Awake, slow to respond to questions at times   Psychiatric:         Mood and Affect: Mood and affect and mood normal          Behavior: Behavior normal          Invasive Devices     Peripheral Intravenous Line  Duration           Peripheral IV 12/06/22 Dorsal (posterior); Left Forearm <1 day                Medications:    Scheduled Meds:  Current Facility-Administered Medications   Medication Dose Route Frequency Provider Last Rate   • acetaminophen  650 mg Oral Q6H PRN Kizzy Gallegos PA-C     • acetaminophen  650 mg Oral Q6H PRN Kizzy Gallegos PA-C     • aluminum-magnesium hydroxide-simethicone  30 mL Oral Q4H PRN Maria Isabel Ruiz PA-C     • enoxaparin  40 mg Subcutaneous Daily Nakita Tucker     • fenofibrate  145 mg Oral Daily Jeanette Pack PA-C     • folic acid IVPB  1 mg Intravenous Daily Maria Isabel Riuz PA-C 1 mg (12/06/22 0801)   • haloperidol lactate  5 mg Intravenous Q6H PRN Jeanette Pack PA-C     • Lidocaine Viscous HCl  15 mL Swish & Swallow 4x Daily PRN Maria Isabel Ruiz PA-C     • loperamide  4 mg Oral 4x Daily PRN Maida Castro PA-C     • OLANZapine  5 mg Oral Q6H PRN Jeanette Pack PA-C     • pantoprazole  40 mg Oral Early Morning Jeanette Pack PA-C     • sodium bicarbonate  1,300 mg Oral TID after meals Veronica Mccloud MD     • thiamine  500 mg Intravenous [facility] [place] Stopped (12/06/22 9804)       PRN Meds: •  acetaminophen  •  acetaminophen  •  aluminum-magnesium hydroxide-simethicone  •  haloperidol lactate  •  Lidocaine Viscous HCl  •  loperamide  •  OLANZapine    Continuous Infusions:         LAB RESULTS:      Results from last 7 days   Lab Units 12/06/22  0545 12/05/22  2202 12/05/22  1553 12/05/22  1207 12/05/22  0753 12/05/22  0444 12/04/22  2340 12/04/22  1531 12/04/22  1229   WBC Thousand/uL 8 05  --   --   --   --  6 60  --   --  10 60*   HEMOGLOBIN g/dL 11 9*  --   --   --   --  12 4  --   --  12 5   HEMATOCRIT % 33 0*  --   --   --   --  34 7*  --   --  33 3*   PLATELETS Thousands/uL 111*  --   --   --   --  134*  --   --  227   NEUTROS PCT % 80*  --   --   --   --   --   --   --  84*   LYMPHS PCT % 12*  --   --   --   --   --   --   --  10*   MONOS PCT % 7  --   --   --   --   --   --   --  5   EOS PCT % 1  --   --   --   --   --   --   --  0   POTASSIUM mmol/L 3 3* 3 6 3 4* 5 8* 3 1* 3 5 3 7   < > 3 1*   CHLORIDE mmol/L 98 96 93* 92* 90* 87* 85*   < > 77*   CO2 mmol/L 20* 25 21 21 19* 22 20*   < > 14*   BUN mg/dL 5 6 4* 3* 3* 3* 4*   < > 7 CREATININE mg/dL 0 75 0 80 0 65* 0 67* 0 62* 0 68* 0 64*   < > 0 55*   CALCIUM mg/dL 8 2* 8 2* 7 9* 7 8* 7 7* 8 0* 7 9*   < > 7 3*   ALK PHOS U/L 39*  --   --   --   --  36*  --   --  32*   ALT U/L 53*  --   --   --   --  59*  --   --  70   AST U/L 81*  --   --   --   --  143*  --   --  146*   MAGNESIUM mg/dL 1 9  --   --   --   --  2 0 2 0  --  1 2*    < > = values in this interval not displayed  CUTURES:  No results found for: Sara Black              Portions of the record may have been created with voice recognition software  Occasional wrong word or "sound a like" substitutions may have occurred due to the inherent limitations of voice recognition software  Read the chart carefully and recognize, using context, where substitutions have occurred  If you have any questions, please contact the dictating provider

## 2022-12-06 NOTE — ASSESSMENT & PLAN NOTE
· Likely secondary to chronic bone marrow suppression due to alcohol use  · No acute bleeding  · Continue to monitor

## 2022-12-06 NOTE — ASSESSMENT & PLAN NOTE
· Patient endorses sore throat since 12/5/2022, improved today  · Influenza/COVID negative  · Patient refused strep testing despite recommendations  · Continue supportive measures  · Encourage alcohol cessation and continue PPI

## 2022-12-06 NOTE — PROGRESS NOTES
Pt refusing strep swab, stating he believes the irritation he is experiencing in his throat is due to, "vodka and vomit " Pt reporting to RN that discomfort has significantly improved  Will continue to monitor

## 2022-12-06 NOTE — PROGRESS NOTES
PROGRESS NOTE  DEPARTMENT OF MEDICAL TOXICOLOGY  LEVEL 4 MEDICAL DETOX UNIT  Dayne Centeno 41 y o  male MRN: [id number]  Unit/Bed#: 5T DETOX 518-01 Encounter: [id number]      Reason for Admission/Principal Problem: Alcohol withdrawal with complication, hyponatremia    Rounding Provider: Chelita Kan MD  Attending Provider: Rashi Odom MD   12/4/2022  5:37 PM     * Alcohol withdrawal syndrome with complication, with unspecified complication ([facility] 75 )  Assessment & Plan  · Improving today; tachycardia improved  · No evidence of delirium  · Continue SEWS protocol with symptom-triggered, as needed phenobarbital  · Continue fall and seizure precautions with virtual observation for safety    Alcohol use disorder, severe, dependence ([facility] 75 )  Assessment & Plan  Withdrawal management as above  Continue vitamin supplementation including high dose thiamine  Case management consulted for disposition planning    Hyponatremia  Assessment & Plan  Recent Labs     12/04/22  2340 12/05/22  0444 12/05/22  0753 12/05/22  1207 12/05/22  1553 12/05/22  2202 12/06/22  0545   SODIUM 113* 115* 117* 118* 120* 124* 126*       · Nephrology following, appreciate co-management  · Concern for rapid correction this morning; liberalized fluid restriction to 2L  · Continue q4h BMP  · Stepdown 2 level of care  · Monitor closely    Hypokalemia  Assessment & Plan  Recent Labs     12/05/22  1207 12/05/22  1553 12/05/22  2202 12/06/22  0545   K 5 8* 3 4* 3 6 3 3*     · Continue to monitor and replace as needed  · BMP Q4H per nephrology as above    Esophageal reflux  Assessment & Plan  · Continue home PPI    Sore throat  Assessment & Plan  · Patient endorses sore throat since yesterday  · Influenza/COVID negative  · Will send strep testing  · Continue supportive measures    Obesity (BMI 30-39  9)  Assessment & Plan  Body mass index is 32 08 kg/m²    · Continue fenofibrate  · Hgb A1c was normal  · Outpatient PCP follow up    Benign essential hypertension  Assessment & Plan  · Home antihypertensive irbesartan 150 mg daily held in setting of alcohol withdrawal management complicated by hyponatremia  · Continue to monitor blood pressure  · Will restart when clinically improved and appropriate    Thrombocytopenia (HCC)  Assessment & Plan  · Likely secondary to chronic bone marrow suppression due to alcohol use  · No bleeding  · Continue to monitor    Hyperlipidemia  Assessment & Plan  · Continue home medication    Tear of left supraspinatus tendon  Assessment & Plan  · Hx left supraspinatus tendon tear status post-op L shoulder arthroscopy and rotator cuff repair 9/9/22  · No acute complications  · Outpatient PT  · Supportive care, pain control  · Outpatient PT and orthopedics follow up as scheduled    Internal derangement of right shoulder  Assessment & Plan  · Followed by orthopedics outpatient for right shoulder pain  · MRI will need to be rescheduled  · Supportive care  · Outpatient follow up    Leukocytosis-resolved as of 12/5/2022  Assessment & Plan  · Resolved    Prolonged Q-T interval on ECG-resolved as of 12/6/2022  Assessment & Plan  ·  ms -> 510 ms yesterday  · Magnesium corrected and continuing to replete potassium  · Expect continued improvement with these measures  · Continue cardiac monitoring  · Avoid QTc prolonging agents    Hypomagnesemia-resolved as of 12/6/2022  Assessment & Plan  · Improved, normal this morning  · Continue to monitor and replete as needed    Alcoholic ketoacidosis-resolved as of 12/6/2022  Assessment & Plan  · Overall improving, and started taking PO this morning  · Anticipate continued improvement with PO intake and thiamine  · Will continue to monitor      VTE Pharmacologic Prophylaxis:   Pharmacologic: Enoxaparin (Lovenox)  Mechanical VTE Prophylaxis in Place: yes    Code Status: Level 1 - Full Code    Patient Centered Rounds: I have performed bedside rounds with nursing staff today      Discussions with Specialists or Other Care Team Provider: Nephrology, case management     Education and Discussions with Family / Patient: Patient    Time Spent for Care: 45 minutes  More than 50% of total time spent on counseling and coordination of care as described above  Minutes of critical care time 28  -Critical care time was exclusive of separately billable procedures and teaching time    -Critical care was necessary to treat or prevent imminent or life-threatening deterioration of the following condition: withdrawal, CNS failure/compromise, metabolic crisis and endocrine crisis  -Critical care time was spent personally by me on the following activities as well as the above as per the course and rest of chart: obtaining history from patient/surrogate, review of old charts, development of a treatment plan, discussions with referring provider(s) and/or consultants, examination of the patient, performing treatments and interventions, evaluation of patient's response to the treatment, re-evaluation of the patient's condition, ordering/interpreting laboratory studies, ordering/interpreting of radiographic studies  Current Length of Stay: 2 day(s)    Current Patient Status: Inpatient     Certification Statement: The patient will continue to require additional inpatient hospital stay due to alcohol withdrawal, hyponatremia Discharge Plan: case management consulted for disposition planning        Subjective:   Patient endorses sore throat "right behind my [place]"  Denies other associated symptoms  Feels tired      Objective:     Clinical Opiate Withdrawal Scale  Pulse: 93    SEWS Total Score: 6 (12/6/2022 10:00 AM)        Last 24 Hours Medication List:   Current Facility-Administered Medications   Medication Dose Route Frequency Provider Last Rate   • acetaminophen  650 mg Oral Q6H PRN Amaya Landry PA-C     • acetaminophen  650 mg Oral Q6H PRN Amaya Landry PA-C     • aluminum-magnesium hydroxide-simethicone  30 mL Oral Q4H PRN Elizabeth Jaime PA-C     • enoxaparin  40 mg Subcutaneous Daily Nakita Faulkner     • fenofibrate  145 mg Oral Daily Nakita Castro     • folic acid IVPB  1 mg Intravenous Daily Elizabeth Jaime PA-C 1 mg (22 0801)   • Lidocaine Viscous HCl  15 mL Swish & Swallow 4x Daily PRN Elizabeth Jaime PA-C     • loperamide  4 mg Oral 4x Daily PRN Deysi Lomeli PA-C     • pantoprazole  40 mg Oral Early Morning Jeanette Carmona PA-C     • potassium chloride  20 mEq Intravenous Once Jayda Gather, DO 20 mEq (22 0951)   • sodium bicarbonate  1,300 mg Oral TID after meals Dhara Serrano MD     • thiamine  500 mg Intravenous [facility] Jose Overton PA-C Stopped (22 9986)         Vitals:   Temp (24hrs), Av °F (37 2 °C), Min:98 °F (36 7 °C), Max:100 6 °F (38 1 °C)    Temp:  [98 °F (36 7 °C)-100 6 °F (38 1 °C)] 98 2 °F (36 8 °C)  HR:  [] 93  Resp:  [10-18] 14  BP: (113-156)/() 117/81  SpO2:  [93 %-100 %] 97 %  Body mass index is 32 08 kg/m²  Input and Output Summary (last 24 hours): Intake/Output Summary (Last 24 hours) at 2022 1028  Last data filed at 2022 0646  Gross per 24 hour   Intake 1920 ml   Output 1600 ml   Net 320 ml       Physical Exam:   Physical Exam  Vitals and nursing note reviewed  Constitutional:       General: He is not in acute distress  Appearance: He is obese  He is not diaphoretic  Comments: Sleeping but arouses to voice   HENT:      Head: Normocephalic and atraumatic  Nose: Nose normal       Mouth/Throat:      Mouth: Mucous membranes are moist       Pharynx: Posterior oropharyngeal erythema (diffuse posterior, no asymmetry) present  No oropharyngeal exudate  Eyes:      General: No scleral icterus  Right eye: No discharge  Left eye: No discharge  Extraocular Movements: Extraocular movements intact        Conjunctiva/sclera: Conjunctivae normal       Pupils: Pupils are equal, round, and reactive to light  Comments: No nystagmus   Cardiovascular:      Rate and Rhythm: Normal rate and regular rhythm  Pulmonary:      Effort: Pulmonary effort is normal  No respiratory distress  Breath sounds: No wheezing, rhonchi or rales  Abdominal:      General: There is distension  Palpations: Abdomen is soft  Tenderness: There is no abdominal tenderness  There is no guarding or rebound  Musculoskeletal:         General: No swelling, tenderness or deformity  Cervical back: Neck supple  Skin:     General: Skin is warm and dry  Findings: No rash  Neurological:      General: No focal deficit present  Mental Status: He is oriented to person, place, and time  Comments: Mild dysmetria on finger to nose bilaterally, no intention tremor or tongue fasciculations   Psychiatric:         Mood and Affect: Mood normal          Behavior: Behavior normal          Additional Data:     Labs:   Results from last 7 days   Lab Units 12/06/22  0545   WBC Thousand/uL 8 05   HEMOGLOBIN g/dL 11 9*   HEMATOCRIT % 33 0*   PLATELETS Thousands/uL 111*   NEUTROS PCT % 80*   LYMPHS PCT % 12*   MONOS PCT % 7   EOS PCT % 1      Results from last 7 days   Lab Units 12/06/22  0545   SODIUM mmol/L 126*   POTASSIUM mmol/L 3 3*   CHLORIDE mmol/L 98   CO2 mmol/L 20*   BUN mg/dL 5   CREATININE mg/dL 0 75   ANION GAP mmol/L 8   CALCIUM mg/dL 8 2*   ALBUMIN g/dL 3 8   TOTAL BILIRUBIN mg/dL 1 02*   ALK PHOS U/L 39*   ALT U/L 53*   AST U/L 81*   GLUCOSE RANDOM mg/dL 109*                Results from last 7 days   Lab Units 12/05/22  0444   HEMOGLOBIN A1C % 5 4      Results from last 7 days   Lab Units 12/04/22  2340   LACTIC ACID mmol/L 1 0          * I Have Reviewed All Lab Data Listed Above  * Additional Pertinent Lab Tests Reviewed: All Labs Within Last 24 Hours Reviewed      Imaging Studies: I have personally reviewed pertinent reports  Recent Cultures (last 7 days): Today, Patient Was Seen By: Regla Luna MD    ** Please Note: Dictation voice to text software may have been used in the creation of this document   **

## 2022-12-06 NOTE — CASE MANAGEMENT
Worker received phone call from pt's mother Medina Gamble regarding update on pt, worker provided her with update

## 2022-12-07 PROBLEM — R79.89 ELEVATED LFTS: Status: ACTIVE | Noted: 2022-12-07

## 2022-12-07 PROBLEM — R26.2 AMBULATORY DYSFUNCTION: Status: ACTIVE | Noted: 2022-12-07

## 2022-12-07 LAB
ANION GAP SERPL CALCULATED.3IONS-SCNC: 5 MMOL/L (ref 5–14)
ANION GAP SERPL CALCULATED.3IONS-SCNC: 8 MMOL/L (ref 5–14)
ANION GAP SERPL CALCULATED.3IONS-SCNC: 9 MMOL/L (ref 5–14)
BUN SERPL-MCNC: 7 MG/DL (ref 5–25)
CALCIUM SERPL-MCNC: 8.4 MG/DL (ref 8.4–10.2)
CALCIUM SERPL-MCNC: 8.5 MG/DL (ref 8.4–10.2)
CALCIUM SERPL-MCNC: 9.2 MG/DL (ref 8.4–10.2)
CHLORIDE SERPL-SCNC: 100 MMOL/L (ref 96–108)
CHLORIDE SERPL-SCNC: 100 MMOL/L (ref 96–108)
CHLORIDE SERPL-SCNC: 99 MMOL/L (ref 96–108)
CO2 SERPL-SCNC: 22 MMOL/L (ref 21–32)
CO2 SERPL-SCNC: 23 MMOL/L (ref 21–32)
CO2 SERPL-SCNC: 24 MMOL/L (ref 21–32)
CREAT SERPL-MCNC: 0.64 MG/DL (ref 0.7–1.5)
CREAT SERPL-MCNC: 0.64 MG/DL (ref 0.7–1.5)
CREAT SERPL-MCNC: 0.67 MG/DL (ref 0.7–1.5)
GFR SERPL CREATININE-BSD FRML MDRD: 116 ML/MIN/1.73SQ M
GFR SERPL CREATININE-BSD FRML MDRD: 118 ML/MIN/1.73SQ M
GFR SERPL CREATININE-BSD FRML MDRD: 118 ML/MIN/1.73SQ M
GLUCOSE SERPL-MCNC: 106 MG/DL (ref 70–99)
GLUCOSE SERPL-MCNC: 133 MG/DL (ref 70–99)
GLUCOSE SERPL-MCNC: 134 MG/DL (ref 70–99)
POTASSIUM SERPL-SCNC: 3.5 MMOL/L (ref 3.5–5.3)
POTASSIUM SERPL-SCNC: 3.6 MMOL/L (ref 3.5–5.3)
POTASSIUM SERPL-SCNC: 4 MMOL/L (ref 3.5–5.3)
SODIUM SERPL-SCNC: 128 MMOL/L (ref 135–147)
SODIUM SERPL-SCNC: 130 MMOL/L (ref 135–147)
SODIUM SERPL-SCNC: 132 MMOL/L (ref 135–147)

## 2022-12-07 RX ORDER — DIPHENHYDRAMINE HCL 25 MG
25 TABLET ORAL EVERY 6 HOURS PRN
Status: DISCONTINUED | OUTPATIENT
Start: 2022-12-07 | End: 2022-12-08 | Stop reason: HOSPADM

## 2022-12-07 RX ORDER — LANOLIN ALCOHOL/MO/W.PET/CERES
CREAM (GRAM) TOPICAL 3 TIMES DAILY PRN
Status: DISCONTINUED | OUTPATIENT
Start: 2022-12-07 | End: 2022-12-08 | Stop reason: HOSPADM

## 2022-12-07 RX ADMIN — THIAMINE HYDROCHLORIDE 500 MG: 100 INJECTION, SOLUTION INTRAMUSCULAR; INTRAVENOUS at 05:56

## 2022-12-07 RX ADMIN — DIPHENHYDRAMINE HCL 25 MG: 25 TABLET ORAL at 21:11

## 2022-12-07 RX ADMIN — PANTOPRAZOLE SODIUM 40 MG: 40 TABLET, DELAYED RELEASE ORAL at 05:56

## 2022-12-07 RX ADMIN — THIAMINE HYDROCHLORIDE 500 MG: 100 INJECTION, SOLUTION INTRAMUSCULAR; INTRAVENOUS at 15:10

## 2022-12-07 RX ADMIN — SODIUM BICARBONATE 1300 MG: 650 TABLET ORAL at 08:50

## 2022-12-07 RX ADMIN — FENOFIBRATE 145 MG: 145 TABLET, COATED ORAL at 08:50

## 2022-12-07 RX ADMIN — SODIUM BICARBONATE 1300 MG: 650 TABLET ORAL at 18:13

## 2022-12-07 RX ADMIN — FOLIC ACID 1 MG: 5 INJECTION, SOLUTION INTRAMUSCULAR; INTRAVENOUS; SUBCUTANEOUS at 08:51

## 2022-12-07 RX ADMIN — SODIUM BICARBONATE 1300 MG: 650 TABLET ORAL at 12:05

## 2022-12-07 RX ADMIN — ENOXAPARIN SODIUM 40 MG: 100 INJECTION SUBCUTANEOUS at 09:11

## 2022-12-07 RX ADMIN — THIAMINE HYDROCHLORIDE 500 MG: 100 INJECTION, SOLUTION INTRAMUSCULAR; INTRAVENOUS at 21:12

## 2022-12-07 NOTE — PROGRESS NOTES
[facility]  Progress Note - Marybel Alvarado 1975, 41 y o  male MRN: [id number]  Unit/Bed#: 5T DETOX 789-78 Encounter: [id number]  Primary Care Provider: Linda Urbina MD   Date and time admitted to hospital: 12/4/2022  5:37 PM    [facility], LEVEL 4  Department of Medical Toxicology  Reason for Admission/Principal Problem: alcohol withdrawal   Rounding Provider: Bryan Reyes PA-C, Kiara CÁRDENAS*     * Alcohol withdrawal syndrome with complication, with unspecified complication ([facility] [address] )  Assessment & Plan  · Improving today  · No evidence of delirium  · SEWS protocol with symptom-triggered phenobarbital followed for medical management of alcohol withdrawal   · Received 2144 2 mg total phenobarbital; last dose 12/6/2022 1028  · Appears stabilized overall- no tremors   · Continue to monitor off SEWS to ensure complete resolution of withdrawal    Hyponatremia  Assessment & Plan  Recent Labs     12/05/22  2202 12/06/22  0545 12/06/22  1138 12/06/22  1600 12/06/22  1954 12/07/22  0031 12/07/22  0913   SODIUM 124* 126* 128* 128* 129* 130* 128*       · Nephrology following, appreciate co-management  · Fluid restriction 1 2L  · Continue to monitor BMPs q 8h     Ambulatory dysfunction  Assessment & Plan  · Unsteady gait   · Continue high-dose thiamine   · Virtual 1:1 for safety  · Fall precautions  · PT consulted, appreciate recommendations    Elevated LFTs  Assessment & Plan  Recent Labs     12/05/22  0444 12/06/22  0545   * 81*   ALT 59* 53*   ALKPHOS 36* 39*     · LFTs elevated on admission, down-trending  · In setting of chronic alcohol use  · No acute RUQ pain  · RUQ U/S 12/5/2022: Enlarged fatty liver   No ascites   · Continue to trend LFTs  · Encourage alcohol cessation     Thrombocytopenia (HCC)  Assessment & Plan  · Likely secondary to chronic bone marrow suppression due to alcohol use  · No acute bleeding  · Continue to monitor    Sore throat  Assessment & Plan  · Patient endorses sore throat since 12/5/2022, improved today  · Influenza/COVID negative  · Patient refused strep testing despite recommendations  · Continue supportive measures  · Encourage alcohol cessation and continue PPI    Obesity (BMI 30-39  9)  Assessment & Plan  Body mass index is 32 08 kg/m²    · Continue fenofibrate  · Hgb A1c was normal  · Outpatient PCP follow up    Hyperlipidemia  Assessment & Plan  · Continue home medication    Alcohol use disorder, severe, dependence ([address] )  Assessment & Plan  Withdrawal management as above  Continue vitamin supplementation including high dose thiamine  Case management consulted for disposition planning    Internal derangement of right shoulder  Assessment & Plan  · Followed by orthopedics outpatient for right shoulder pain  · MRI will need to be rescheduled  · Supportive care  · Outpatient follow up    Tear of left supraspinatus tendon  Assessment & Plan  · Hx left supraspinatus tendon tear status post-op L shoulder arthroscopy and rotator cuff repair 9/9/22  · No acute complications  · Outpatient PT  · Supportive care, pain control  · Outpatient PT and orthopedics follow up as scheduled    Hypokalemia  Assessment & Plan  Recent Labs     12/06/22  1600 12/06/22  1954 12/07/22  0031 12/07/22  0913   K 3 7 4 0 3 6 3 5     · Continue to monitor and replace as needed  · BMPs q8h     Esophageal reflux  Assessment & Plan  · Continue home PPI    Benign essential hypertension  Assessment & Plan  · Home antihypertensive irbesartan 150 mg daily held in setting of alcohol withdrawal management complicated by hyponatremia  · Most recent /86  · Continue to monitor blood pressure  · Will restart when clinically improved and appropriate    Leukocytosis-resolved as of 12/5/2022  Assessment & Plan  · Resolved    Prolonged Q-T interval on ECG-resolved as of 12/6/2022  Assessment & Plan  · Improved to 474 ms on 12/5/2022  · Continue to monitor and optimize electrolytes   · Expect continued improvement with these measures  · Continue cardiac monitoring  · Avoid QTc prolonging agents    Hypomagnesemia-resolved as of 12/6/2022  Assessment & Plan  · Improved  · Continue to monitor and replete as needed    Alcoholic ketoacidosis-resolved as of 12/6/2022  Assessment & Plan  · Resolved  · Encourage adequate PO intake and alcohol cessation       VTE Pharmacologic Prophylaxis:   Pharmacologic: Enoxaparin (Lovenox)  Mechanical VTE Prophylaxis in Place: yes    Code Status: Level 1 - Full Code    Patient Centered Rounds: I have performed bedside rounds with nursing staff today  Discussions with Specialists or Other Care Team Provider: Dr Macarena Schumacher, [address], PT    Education and Discussions with Family / Patient: Discussed current plan with patient, answered all questions to best of my ability  Time Spent for Care: 45 minutes  More than 50% of total time spent on counseling and coordination of care as described above  Current Length of Stay: 3 day(s)    Current Patient Status: Inpatient     Certification Statement: The patient will continue to require additional inpatient hospital stay due to ongoing monitoring of sodium, pending PT consult Discharge Plan: home once medically stable- anticipate next 24-48 hrs       Total Critical Care time spent 34 minutes excluding procedures, teaching and family updates  Subjective:   Patient seen and examined bedside this morning  Notes sore throat is somewhat improved, attributes to chronic alcohol use, denies cough  Also notes he continues to feel weak with ambulation  Currently denies headaches, lightheadedness/dizziness, coughing/sneezing/congestion/rhinorrhea, chest pain, SOB/dyspnea, abdominal pain, N/V/C, dysuria/hematuria, hallucinations        Objective:       SEWS Total Score: 0 (12/6/2022 11:30 AM)        Last 24 Hours Medication List:   Current Facility-Administered Medications   Medication Dose Route Frequency Provider Last Rate   • acetaminophen  650 mg Oral Q6H PRN Lilliam Pompa PA-C     • acetaminophen  650 mg Oral Q6H PRN Lilliam Pompa PA-C     • aluminum-magnesium hydroxide-simethicone  30 mL Oral Q4H PRN Lilliam Pompa PA-C     • enoxaparin  40 mg Subcutaneous Daily Lilliam Pompa PA-C     • fenofibrate  145 mg Oral Daily Jeanette Capellan PA-C     • folic acid IVPB  1 mg Intravenous Daily Lilliam Pompa PA-C 1 mg (22 9416)   • Lidocaine Viscous HCl  15 mL Swish & Swallow 4x Daily PRN Lilliam Pompa PA-C     • loperamide  4 mg Oral 4x Daily PRN Lynn Mclean PA-C     • pantoprazole  40 mg Oral Early Morning Jeanette Capellan PA-C     • sodium bicarbonate  1,300 mg Oral TID after meals Esvin Acevedo MD     • thiamine  500 mg Intravenous [facility] Lilliam Pompa PA-C 500 mg (22 1510)         Vitals:   Temp (24hrs), Av 1 °F (36 7 °C), Min:97 8 °F (36 6 °C), Max:98 4 °F (36 9 °C)    Temp:  [97 6 °F (36 4 °C)-98 4 °F (36 9 °C)] 97 8 °F (36 6 °C)  HR:  [] 95  Resp:  [16-18] 18  BP: (116-148)/(73-92) 122/86  SpO2:  [96 %-99 %] 98 %  Body mass index is 32 08 kg/m²  Input and Output Summary (last 24 hours): Intake/Output Summary (Last 24 hours) at 2022 1608  Last data filed at 2022 1401  Gross per 24 hour   Intake 1740 ml   Output 3375 ml   Net -1635 ml       Physical Exam:   Physical Exam  Vitals and nursing note reviewed  Constitutional:       General: He is not in acute distress  Appearance: He is well-developed  He is obese  He is not ill-appearing or diaphoretic  HENT:      Head: Normocephalic and atraumatic  Eyes:      General: No scleral icterus  Extraocular Movements: Extraocular movements intact  Right eye: No nystagmus  Left eye: No nystagmus  Conjunctiva/sclera: Conjunctivae normal       Pupils: Pupils are equal, round, and reactive to light     Cardiovascular:      Rate and Rhythm: Normal rate and regular rhythm  Pulses: Normal pulses  Dorsalis pedis pulses are 2+ on the right side and 2+ on the left side  Posterior tibial pulses are 2+ on the right side and 2+ on the left side  Heart sounds: Normal heart sounds  No murmur heard  No friction rub  No gallop  Pulmonary:      Effort: Pulmonary effort is normal  No respiratory distress  Breath sounds: Normal breath sounds  No wheezing, rhonchi or rales  Abdominal:      General: Abdomen is flat  Bowel sounds are normal  There is no distension  Palpations: Abdomen is soft  Tenderness: There is no abdominal tenderness  There is no guarding  Musculoskeletal:         General: No swelling  Normal range of motion  Cervical back: Normal range of motion  Right lower leg: No edema  Left lower leg: No edema  Skin:     General: Skin is warm and dry  Capillary Refill: Capillary refill takes less than 2 seconds  Neurological:      General: No focal deficit present  Mental Status: He is alert and oriented to person, place, and time  Mental status is at baseline  Motor: No tremor  Coordination: Finger-Nose-Finger Test normal       Gait: Gait abnormal (unsteady )  Psychiatric:         Attention and Perception: Attention normal          Mood and Affect: Mood is anxious  Speech: Speech normal          Behavior: Behavior is cooperative           Additional Data:     Labs: keep all most recent labs as listed on admission templates   Results from last 7 days   Lab Units 12/06/22  0545   WBC Thousand/uL 8 05   HEMOGLOBIN g/dL 11 9*   HEMATOCRIT % 33 0*   PLATELETS Thousands/uL 111*   NEUTROS PCT % 80*   LYMPHS PCT % 12*   MONOS PCT % 7   EOS PCT % 1      Results from last 7 days   Lab Units 12/07/22  0913 12/06/22  1138 12/06/22  0545   SODIUM mmol/L 128*   < > 126*   POTASSIUM mmol/L 3 5   < > 3 3*   CHLORIDE mmol/L 99   < > 98   CO2 mmol/L 24   < > 20*   BUN mg/dL 7   < > 5   CREATININE mg/dL 0 67*   < > 0 75   ANION GAP mmol/L 5   < > 8   CALCIUM mg/dL 8 4   < > 8 2*   ALBUMIN g/dL  --   --  3 8   TOTAL BILIRUBIN mg/dL  --   --  1 02*   ALK PHOS U/L  --   --  39*   ALT U/L  --   --  53*   AST U/L  --   --  81*   GLUCOSE RANDOM mg/dL 106*   < > 109*    < > = values in this interval not displayed  Results from last 7 days   Lab Units 12/05/22  0444   HEMOGLOBIN A1C % 5 4      Results from last 7 days   Lab Units 12/04/22  2340   LACTIC ACID mmol/L 1 0          * I Have Reviewed All Lab Data Listed Above  * Additional Pertinent Lab Tests Reviewed: All Labs Within Last 24 Hours Reviewed      Imaging Studies: I have personally reviewed pertinent reports  Recent Cultures (last 7 days): Today, Patient Was Seen By: Jeison Aquino PA-C    ** Please Note: Dictation voice to text software may have been used in the creation of this document   **

## 2022-12-07 NOTE — ASSESSMENT & PLAN NOTE
Withdrawal management as above  High-dose thiamine regimen complete  Continue daily vitamin supplementation  Case management consulted for disposition planning- provided with referral for [facility]

## 2022-12-07 NOTE — ASSESSMENT & PLAN NOTE
· Home antihypertensive irbesartan 150 mg daily held in setting of alcohol withdrawal management complicated by hyponatremia  · Most recent /83  · Continue to monitor blood pressure  · Resume home regimen on discharge

## 2022-12-07 NOTE — ASSESSMENT & PLAN NOTE
· Unsteady gait   · Continue high-dose thiamine   · Virtual 1:1 for safety  · Fall precautions  · PT consulted, appreciate recommendations

## 2022-12-07 NOTE — PLAN OF CARE
Problem: Prexisting or High Potential for Compromised Skin Integrity  Goal: Skin integrity is maintained or improved  Description: INTERVENTIONS:  - Identify patients at risk for skin breakdown  - Assess and monitor skin integrity  - Assess and monitor nutrition and hydration status  - Monitor labs   - Assess for incontinence   - Turn and reposition patient  - Assist with mobility/ambulation  - Relieve pressure over bony prominences  - Avoid friction and shearing  - Provide appropriate hygiene as needed including keeping skin clean and dry  - Evaluate need for skin moisturizer/barrier cream  - Collaborate with interdisciplinary team   - Patient/family teaching  - Consider wound care consult   Outcome: Progressing     Problem: Potential for Falls  Goal: Patient will remain free of falls  Description: INTERVENTIONS:  - Educate patient/family on patient safety including physical limitations  - Instruct patient to call for assistance with activity   - Consult OT/PT to assist with strengthening/mobility   - Keep Call bell within reach  - Keep bed low and locked with side rails adjusted as appropriate  - Keep care items and personal belongings within reach  - Initiate and maintain comfort rounds  - Make Fall Risk Sign visible to staff  - Offer Toileting every 2 Hours, in advance of need  - Initiate/Maintain alarm  - Obtain necessary fall risk management equipment  - Apply yellow socks and bracelet for high fall risk patients  - Consider moving patient to room near nurses station  Outcome: Progressing     Problem: SUBSTANCE USE/ABUSE  Goal: By discharge, will develop insight into their chemical dependency and sustain motivation to continue in recovery  Description: INTERVENTIONS:  - Attends all daily group sessions and scheduled AA groups  - Actively practices coping skills through participation in the therapeutic community and adherence to program rules  - Reviews and completes assignments from individual treatment plan  - Assist patient development of understanding of their personal cycle of addiction and relapse triggers  Outcome: Progressing  Goal: By discharge, patient will have ongoing treatment plan addressing chemical dependency  Description: INTERVENTIONS:  - Assist patient with resources and/or appointments for ongoing recovery based living  Outcome: Progressing     Problem: METABOLIC, FLUID AND ELECTROLYTES - ADULT  Goal: Electrolytes maintained within normal limits  Description: INTERVENTIONS:  - Monitor labs and assess patient for signs and symptoms of electrolyte imbalances  - Administer electrolyte replacement as ordered  - Monitor response to electrolyte replacements, including repeat lab results as appropriate  - Instruct patient on fluid and nutrition as appropriate  Outcome: Progressing  Goal: Fluid balance maintained  Description: INTERVENTIONS:  - Monitor labs   - Monitor I/O and WT  - Instruct patient on fluid and nutrition as appropriate  - Assess for signs & symptoms of volume excess or deficit  Outcome: Progressing  Goal: Glucose maintained within target range  Description: INTERVENTIONS:  - Monitor Blood Glucose as ordered  - Assess for signs and symptoms of hyperglycemia and hypoglycemia  - Administer ordered medications to maintain glucose within target range  - Assess nutritional intake and initiate nutrition service referral as needed  Outcome: Progressing     Problem: MOBILITY - ADULT  Goal: Maintain or return to baseline ADL function  Description: INTERVENTIONS:  -  Assess patient's ability to carry out ADLs; assess patient's baseline for ADL function and identify physical deficits which impact ability to perform ADLs (bathing, care of mouth/teeth, toileting, grooming, dressing, etc )  - Assess/evaluate cause of self-care deficits   - Assess range of motion  - Assess patient's mobility; develop plan if impaired  - Assess patient's need for assistive devices and provide as appropriate  - Encourage maximum independence but intervene and supervise when necessary  - Involve family in performance of ADLs  - Assess for home care needs following discharge   - Consider OT consult to assist with ADL evaluation and planning for discharge  - Provide patient education as appropriate  Outcome: Progressing  Goal: Maintains/Returns to pre admission functional level  Description: INTERVENTIONS:  - Perform BMAT or MOVE assessment daily    - Set and communicate daily mobility goal to care team and patient/family/caregiver     - Collaborate with rehabilitation services on mobility goals if consulted  - Out of bed for toileting  - Record patient progress and toleration of activity level   Outcome: Progressing

## 2022-12-07 NOTE — ASSESSMENT & PLAN NOTE
Recent Labs     12/06/22  1138 12/06/22  1600 12/06/22  1954 12/07/22  0031 12/07/22  0913 12/07/22  1639 12/08/22  0634   SODIUM 128* 128* 129* 130* 128* 132* 133*   · sodium appears stable this AM  · Nephrology following, appreciate co-management  · Patient cleared for discharge from nephrology standpoint  · Continue Fluid restriction 1 2L  · Continue to monitor BMPs 2x weekly  · Recommend outpatient nephrology f/u

## 2022-12-07 NOTE — ASSESSMENT & PLAN NOTE
Recent Labs     12/06/22  0545 12/08/22  0634   AST 81* 40   ALT 53* 43   ALKPHOS 39* 39*     · LFTs elevated on admission, down-trending, improved and stable this AM   · In setting of chronic alcohol use  · No acute RUQ pain  · RUQ U/S 12/5/2022: Enlarged fatty liver   No ascites   · Encourage alcohol cessation

## 2022-12-07 NOTE — QUICK NOTE
sNa continues to improve  Now up to 130 this morning  Continue to monitor sNa daily until sNa normalizes  Nephrology will follow peripherally for now  Patient is improving without further intervention

## 2022-12-07 NOTE — ASSESSMENT & PLAN NOTE
· Gait improved this AM  · High dose thiamine regimen completed- continue daily supplementation  · PT consulted, appreciate recommendations- cleared for discharge home with outpatient PT

## 2022-12-07 NOTE — ASSESSMENT & PLAN NOTE
· SEWS protocol with symptom-triggered phenobarbital followed for medical management of alcohol withdrawal   · Received 2144 2 mg total phenobarbital; last dose 12/6/2022 1028  · Appears stabilized overall- no tremors, A&Ox4  · Acute withdrawal resolved

## 2022-12-07 NOTE — ASSESSMENT & PLAN NOTE
· Likely secondary to chronic bone marrow suppression due to alcohol use  · No acute bleeding  · Stable this AM  · Encourage alcohol cessation  · Routine outpatient monitoring

## 2022-12-07 NOTE — CASE MANAGEMENT
Worker spoke with pt regarding [facility] program, pt expressed interest but did not wish to make decision at this time due to feeling "foggy"  Worker will follow-up with pt tomorrow regarding such

## 2022-12-07 NOTE — ASSESSMENT & PLAN NOTE
Recent Labs     12/07/22  0031 12/07/22  0913 12/07/22  1639 12/08/22  0634   K 3 6 3 5 4 0 3 7     · Improved and stable this AM   · Encourage adequate PO intake   · Routine outpatient monitoring

## 2022-12-08 ENCOUNTER — TELEPHONE (OUTPATIENT)
Dept: OTHER | Facility: OTHER | Age: 47
End: 2022-12-08

## 2022-12-08 ENCOUNTER — TRANSITIONAL CARE MANAGEMENT (OUTPATIENT)
Dept: INTERNAL MEDICINE CLINIC | Facility: CLINIC | Age: 47
End: 2022-12-08

## 2022-12-08 VITALS
TEMPERATURE: 97.6 F | DIASTOLIC BLOOD PRESSURE: 83 MMHG | BODY MASS INDEX: 32.2 KG/M2 | WEIGHT: 230 LBS | HEART RATE: 85 BPM | RESPIRATION RATE: 16 BRPM | HEIGHT: 71 IN | OXYGEN SATURATION: 95 % | SYSTOLIC BLOOD PRESSURE: 145 MMHG

## 2022-12-08 PROBLEM — R79.89 ELEVATED LFTS: Status: RESOLVED | Noted: 2022-12-07 | Resolved: 2022-12-08

## 2022-12-08 PROBLEM — F10.939 ALCOHOL WITHDRAWAL SYNDROME WITH COMPLICATION, WITH UNSPECIFIED COMPLICATION (HCC): Status: RESOLVED | Noted: 2022-12-04 | Resolved: 2022-12-08

## 2022-12-08 PROBLEM — E87.6 HYPOKALEMIA: Status: RESOLVED | Noted: 2022-02-03 | Resolved: 2022-12-08

## 2022-12-08 LAB
ALBUMIN SERPL BCP-MCNC: 3.6 G/DL (ref 3.5–5)
ALP SERPL-CCNC: 39 U/L (ref 43–122)
ALT SERPL W P-5'-P-CCNC: 43 U/L
ANION GAP SERPL CALCULATED.3IONS-SCNC: 7 MMOL/L (ref 5–14)
AST SERPL W P-5'-P-CCNC: 40 U/L (ref 17–59)
BILIRUB DIRECT SERPL-MCNC: 0.28 MG/DL (ref 0–0.2)
BILIRUB SERPL-MCNC: 0.43 MG/DL (ref 0.2–1)
BUN SERPL-MCNC: 7 MG/DL (ref 5–25)
CALCIUM SERPL-MCNC: 9 MG/DL (ref 8.4–10.2)
CHLORIDE SERPL-SCNC: 102 MMOL/L (ref 96–108)
CO2 SERPL-SCNC: 24 MMOL/L (ref 21–32)
CREAT SERPL-MCNC: 0.68 MG/DL (ref 0.7–1.5)
ERYTHROCYTE [DISTWIDTH] IN BLOOD BY AUTOMATED COUNT: 13.3 % (ref 11.6–15.1)
GFR SERPL CREATININE-BSD FRML MDRD: 115 ML/MIN/1.73SQ M
GLUCOSE SERPL-MCNC: 105 MG/DL (ref 70–99)
HCT VFR BLD AUTO: 34 % (ref 36.5–49.3)
HGB BLD-MCNC: 11.6 G/DL (ref 12–17)
MAGNESIUM SERPL-MCNC: 1.6 MG/DL (ref 1.6–2.3)
MCH RBC QN AUTO: 31.4 PG (ref 26.8–34.3)
MCHC RBC AUTO-ENTMCNC: 34.1 G/DL (ref 31.4–37.4)
MCV RBC AUTO: 92 FL (ref 82–98)
PLATELET # BLD AUTO: 149 THOUSANDS/UL (ref 149–390)
PMV BLD AUTO: 10.5 FL (ref 8.9–12.7)
POTASSIUM SERPL-SCNC: 3.7 MMOL/L (ref 3.5–5.3)
PROT SERPL-MCNC: 6.6 G/DL (ref 6.4–8.4)
RBC # BLD AUTO: 3.69 MILLION/UL (ref 3.88–5.62)
SODIUM SERPL-SCNC: 133 MMOL/L (ref 135–147)
WBC # BLD AUTO: 6.19 THOUSAND/UL (ref 4.31–10.16)

## 2022-12-08 RX ORDER — FOLIC ACID 1 MG/1
1 TABLET ORAL DAILY
Refills: 0
Start: 2022-12-09

## 2022-12-08 RX ORDER — LANOLIN ALCOHOL/MO/W.PET/CERES
100 CREAM (GRAM) TOPICAL DAILY
Refills: 0
Start: 2022-12-09 | End: 2022-12-13

## 2022-12-08 RX ORDER — LANOLIN ALCOHOL/MO/W.PET/CERES
100 CREAM (GRAM) TOPICAL DAILY
Status: DISCONTINUED | OUTPATIENT
Start: 2022-12-09 | End: 2022-12-08 | Stop reason: HOSPADM

## 2022-12-08 RX ORDER — FOLIC ACID 1 MG/1
1 TABLET ORAL DAILY
Status: DISCONTINUED | OUTPATIENT
Start: 2022-12-09 | End: 2022-12-08 | Stop reason: HOSPADM

## 2022-12-08 RX ADMIN — FENOFIBRATE 145 MG: 145 TABLET, COATED ORAL at 08:52

## 2022-12-08 RX ADMIN — SODIUM BICARBONATE 1300 MG: 650 TABLET ORAL at 08:52

## 2022-12-08 RX ADMIN — PANTOPRAZOLE SODIUM 40 MG: 40 TABLET, DELAYED RELEASE ORAL at 06:31

## 2022-12-08 RX ADMIN — SODIUM BICARBONATE 1300 MG: 650 TABLET ORAL at 11:57

## 2022-12-08 RX ADMIN — ENOXAPARIN SODIUM 40 MG: 100 INJECTION SUBCUTANEOUS at 08:52

## 2022-12-08 RX ADMIN — FOLIC ACID 1 MG: 5 INJECTION, SOLUTION INTRAMUSCULAR; INTRAVENOUS; SUBCUTANEOUS at 08:53

## 2022-12-08 RX ADMIN — THIAMINE HYDROCHLORIDE 500 MG: 100 INJECTION, SOLUTION INTRAMUSCULAR; INTRAVENOUS at 06:31

## 2022-12-08 NOTE — CASE MANAGEMENT
Case Management Discharge Planning Note    Patient name Leann [facility]  Location 5T DETOX 518/5T DETOX 51* MRN [id number]  : 1975 Date 2022       Current Admission Date: 2022  Current Admission Diagnosis:Esophageal reflux   Patient Active Problem List    Diagnosis Date Noted   • Ambulatory dysfunction 2022   • Sore throat 2022   • Thrombocytopenia ([facility] [address] ) 2022   • Alcohol use disorder, severe, dependence ([facility] [address] ) 2022   • Obesity (BMI 30-39 9) 2022   • Hyperlipidemia    • Internal derangement of right shoulder 2022   • Aftercare following surgery of the musculoskeletal system 2022   • Tear of left supraspinatus tendon 2022   • Erectile dysfunction 2022   • Acute pain of both shoulders 2022   • Hyponatremia 2022   • Chronic midline low back pain without sciatica 2019   • Incomplete tear of left rotator cuff 2018   • Urinary urgency 2018   • Alcoholism ([facility] [address] ) 2016   • Anxiety 2013   • Benign essential hypertension 10/02/2012   • Essential hypertriglyceridemia 2012   • Esophageal reflux 2012   • Depression 2012      LOS (days): 4  Geometric Mean LOS (GMLOS) (days):   Days to GMLOS:     OBJECTIVE:  Risk of Unplanned Readmission Score: 11 31         Current admission status: Inpatient   Preferred Pharmacy:   [facility]/pharmacy #[id number]  [facility] [address]  Phone: [phone] Fax: [phone]    Primary Care Provider: Monse Nichols MD    Primary Insurance: BLUE CROSS  Secondary Insurance:     DISCHARGE DETAILS: Pt is being discharged today, escorted home by his mother Anthony Ware  Pt agreeable to referral to [facility], signed GEORGIE  Pt has an appointment at [facility] on  at 3:00pm   Pt has a PCP appointment for  at 2:00pm   Pt's medications were sent to his pharmacy       Discharge planning discussed with[de-identified] Patient  Freedom of Choice: Yes Contacts  Patient Contacts: [facility]  Relationship to Patient[de-identified] Treatment Provider  Contact Method: Phone  Phone Number: [phone]  Reason/Outcome: Continuity of Care, Referral, Discharge Planning              Other Referral/Resources/Interventions Provided:  Referrals Provided[de-identified] Other (Specify) (outpatient MAT referral)

## 2022-12-08 NOTE — QUICK NOTE
Serum Na has improved at an acceptable rate of correction with 1 2L/day fluid restriction alone-would continue this and monitor BMP twice weekly  Sodium bicarbonate was initiated for acidosis  As serum bicarbonate normal, may d/c sodium bicarbonate  Monitor BMP twice weekly  Nephrology will sign off  Call/text with questions

## 2022-12-08 NOTE — PLAN OF CARE
Problem: Prexisting or High Potential for Compromised Skin Integrity  Goal: Skin integrity is maintained or improved  Description: INTERVENTIONS:  - Identify patients at risk for skin breakdown  - Assess and monitor skin integrity  - Assess and monitor nutrition and hydration status  - Monitor labs   - Assess for incontinence   - Turn and reposition patient  - Assist with mobility/ambulation  - Relieve pressure over bony prominences  - Avoid friction and shearing  - Provide appropriate hygiene as needed including keeping skin clean and dry  - Evaluate need for skin moisturizer/barrier cream  - Collaborate with interdisciplinary team   - Patient/family teaching  - Consider wound care consult   Outcome: Progressing     Problem: Potential for Falls  Goal: Patient will remain free of falls  Description: INTERVENTIONS:  - Educate patient/family on patient safety including physical limitations  - Instruct patient to call for assistance with activity   - Consult OT/PT to assist with strengthening/mobility   - Keep Call bell within reach  - Keep bed low and locked with side rails adjusted as appropriate  - Keep care items and personal belongings within reach  - Initiate and maintain comfort rounds  - Make Fall Risk Sign visible to staff  - Apply yellow socks and bracelet for high fall risk patients  - Consider moving patient to room near nurses station  Outcome: Progressing     Problem: SUBSTANCE USE/ABUSE  Goal: By discharge, will develop insight into their chemical dependency and sustain motivation to continue in recovery  Description: INTERVENTIONS:  - Attends all daily group sessions and scheduled AA groups  - Actively practices coping skills through participation in the therapeutic community and adherence to program rules  - Reviews and completes assignments from individual treatment plan  - Assist patient development of understanding of their personal cycle of addiction and relapse triggers  Outcome: Progressing  Goal: By discharge, patient will have ongoing treatment plan addressing chemical dependency  Description: INTERVENTIONS:  - Assist patient with resources and/or appointments for ongoing recovery based living  Outcome: Progressing     Problem: METABOLIC, FLUID AND ELECTROLYTES - ADULT  Goal: Electrolytes maintained within normal limits  Description: INTERVENTIONS:  - Monitor labs and assess patient for signs and symptoms of electrolyte imbalances  - Administer electrolyte replacement as ordered  - Monitor response to electrolyte replacements, including repeat lab results as appropriate  - Instruct patient on fluid and nutrition as appropriate  Outcome: Progressing  Goal: Fluid balance maintained  Description: INTERVENTIONS:  - Monitor labs   - Monitor I/O and WT  - Instruct patient on fluid and nutrition as appropriate  - Assess for signs & symptoms of volume excess or deficit  Outcome: Progressing  Goal: Glucose maintained within target range  Description: INTERVENTIONS:  - Monitor Blood Glucose as ordered  - Assess for signs and symptoms of hyperglycemia and hypoglycemia  - Administer ordered medications to maintain glucose within target range  - Assess nutritional intake and initiate nutrition service referral as needed  Outcome: Progressing     Problem: MOBILITY - ADULT  Goal: Maintain or return to baseline ADL function  Description: INTERVENTIONS:  -  Assess patient's ability to carry out ADLs; assess patient's baseline for ADL function and identify physical deficits which impact ability to perform ADLs (bathing, care of mouth/teeth, toileting, grooming, dressing, etc )  - Assess/evaluate cause of self-care deficits   - Assess range of motion  - Assess patient's mobility; develop plan if impaired  - Assess patient's need for assistive devices and provide as appropriate  - Encourage maximum independence but intervene and supervise when necessary  - Involve family in performance of ADLs  - Assess for home care needs following discharge   - Consider OT consult to assist with ADL evaluation and planning for discharge  - Provide patient education as appropriate  Outcome: Progressing  Goal: Maintains/Returns to pre admission functional level  Description: INTERVENTIONS:  - Perform BMAT or MOVE assessment daily    - Set and communicate daily mobility goal to care team and patient/family/caregiver     - Collaborate with rehabilitation services on mobility goals if consulted  - Out of bed for toileting  - Record patient progress and toleration of activity level   Outcome: Progressing

## 2022-12-08 NOTE — PHYSICAL THERAPY NOTE
Physical Therapy Evaluation    Patient's Name: Leesa Hinds    Admitting Diagnosis  Alcohol withdrawal ([facility] ) [F10 [id number]]    Problem List  Patient Active Problem List   Diagnosis    Alcoholism ([facility] )    Benign essential hypertension    Anxiety    Esophageal reflux    Essential hypertriglyceridemia    Incomplete tear of left rotator cuff    Urinary urgency    Depression    Chronic midline low back pain without sciatica    Hyponatremia    Hypokalemia    Acute pain of both shoulders    Erectile dysfunction    Tear of left supraspinatus tendon    Aftercare following surgery of the musculoskeletal system    Internal derangement of right shoulder    Alcohol withdrawal syndrome with complication, with unspecified complication ([facility] )    Alcohol use disorder, severe, dependence ([facility] )    Hyperlipidemia    Obesity (BMI 30-39  9)    Sore throat    Thrombocytopenia (HCC)    Ambulatory dysfunction    Elevated LFTs       Past Medical History  Past Medical History:   Diagnosis Date    Anxiety     COVID-19 ruled out 2/3/2022    GERD (gastroesophageal reflux disease)     Hyperlipidemia     Hypertension     Tendinitis        Past Surgical History  Past Surgical History:   Procedure Laterality Date    KNEE SURGERY Left 1989    cyst removed    AK COLONOSCOPY FLX DX W/COLLJ SPEC WHEN PFRMD N/A 5/14/2018    Procedure: COLONOSCOPY;  Surgeon: Radha Sandoval DO;  Location: [facility]; Service: General    AK SHLDR ARTHROSCOP,SURG,W/ROTAT CUFF REPR Left 9/9/2022    Procedure: SHOULDER ARTHROSCOPIC ROTATOR CUFF REPAIR;  Surgeon: Robert Black MD;  [facility]: [facility];  Service: Orthopedics    SHOULDER ARTHROSCOPY Right     with biceps tenodesis       Recent Imaging  US right upper quadrant   Final Result by Joel Patel DO (12/05 3765)      Enlarged fatty liver  No acute findings or ascites detected        Workstation performed: [id number]         XR chest portable   Final Result by Debo Glez MD (12/05 0358)   Low lung volumes      No acute cardiopulmonary disease  Workstation performed: [id number]             Recent Vital Signs  Vitals:    12/07/22 0842 12/07/22 1139 12/07/22 1714 12/07/22 1950   BP: 122/86 150/86 125/74 127/82   BP Location: Left arm Left arm Left arm Left arm   Pulse: 95 (!) 115 101 87   Resp: 18 18 18 18   Temp: 97 8 °F (36 6 °C) 97 9 °F (36 6 °C) 97 9 °F (36 6 °C) 98 °F (36 7 °C)   TempSrc: Oral Temporal Temporal Temporal   SpO2: 98% 99% 97% 96%   Weight:       Height:            12/07/22 1500   PT Last Visit   PT Visit Date 12/07/22   Note Type   Note type Evaluation   Pain Assessment   Pain Assessment Tool 0-10   Pain Score No Pain   Restrictions/Precautions   Weight Bearing Precautions Per Order No   Other Precautions Telemetry;Multiple lines   Home Living   Type of Home Apartment   Home Layout Stairs to enter with rails   Prior Function   Level of [place] Independent with ADLs; Independent with functional mobility; Independent with IADLS   Lives With Alone   Falls in the last 6 months 1 to 4  (while intoxicated)   General   Family/Caregiver Present No   Cognition   Overall Cognitive Status WFL   Arousal/Participation Alert   Orientation Level Oriented X4   Memory Within functional limits   Following Commands Follows all commands and directions without difficulty   RLE Assessment   RLE Assessment WFL   LLE Assessment   LLE Assessment WFL   Coordination   Movements are Fluid and Coordinated 0   Coordination and Movement Description unsteadiness with gait and decreased LE coordination   Sensation WFL   Light Touch   RLE Light Touch Grossly intact   LLE Light Touch Grossly intact   Bed Mobility   Supine to Sit 5  Supervision   Additional items Increased time required   Sit to Supine 5  Supervision   Additional items Increased time required   Transfers   Sit to Stand 5  Supervision   Additional items Increased time required   Stand to Sit 5  Supervision   Additional items Increased time required   Ambulation/Elevation   Gait pattern Step through pattern;Decreased toe off;Decreased heel strike; Short stride;Decreased foot clearance   Gait Assistance 5  Supervision   Additional items Verbal cues   Assistive Device None   Distance 200ft   Balance   Static Sitting Fair +   Dynamic Sitting Fair +   Static Standing Fair   Dynamic Standing Fair -   Ambulatory Fair -   Endurance Deficit   Endurance Deficit Yes   Endurance Deficit Description reduced from baseline:  with ambulation   Activity Tolerance   Activity Tolerance Patient tolerated treatment well   Medical Staff Made Aware spoke to CM   Nurse Made Aware spoke to RN   Assessment   Prognosis Good   Problem List Decreased strength;Decreased endurance; Impaired balance;Decreased mobility; Decreased coordination;Obesity   Barriers to Discharge Inaccessible home environment;Decreased caregiver support   Goals   Patient Goals to get balance back   STG Expiration Date 12/17/22   Short Term Goal #1 see eval note   Plan   Treatment/Interventions ADL retraining;Functional transfer training;LE strengthening/ROM; Elevations; Therapeutic exercise; Endurance training;Patient/family training;Equipment eval/education; Bed mobility;Gait training;Spoke to nursing;Spoke to case management;OT   PT Frequency 2-3x/wk   Recommendation   PT Discharge Recommendation Home with outpatient rehabilitation  (for evaluation of shoulder pain)   AM-PAC Basic Mobility Inpatient   Turning in Bed Without Bedrails 4   Lying on Back to Sitting on Edge of Flat Bed 4   Moving Bed to Chair 3   Standing Up From Chair 3   Walk in Room 3   Climb 3-5 Stairs 3   Basic Mobility Inpatient Raw Score 20   Basic Mobility Standardized Score 43 99   Highest Level Of Mobility   JH-HLM Goal 6: Walk 10 steps or more   JH-HLM Achieved 7: Walk 25 feet or more   End of Consult   Patient Position at End of Consult Bedside chair         ASSESSMENT Maggie Valenzuela is a 41 y o  male admitted to [facility] on 12/4/2022 for Alcohol withdrawal syndrome with complication, with unspecified complication ([address] )  Pt  has a past medical history of Anxiety, COVID-19 ruled out (2/3/2022), GERD (gastroesophageal reflux disease), Hyperlipidemia, Hypertension, and Tendinitis    PT was consulted and pt was seen on 12/7/2022 for mobility assessment and d/c planning  Pt presents supine in bed alert and agreeable to therapy  Impairments limiting pt at this time include impaired balance, decreased endurance, decreased coordination, new onset of impairment of functional mobility, decreased activity tolerance, and decreased strength  Pt is currently functioning at a supervision assistance x1 level for bed mobility, supervision assistance x1 level for transfers, supervision assistance x1 level for ambulation with no assistive device  The patient's AM-PAC Basic Mobility Inpatient Short Form Raw Score is 20  A Raw score of greater than 16 suggests the patient may benefit from discharge to home  Please also refer to the recommendation of the Physical Therapist for safe discharge planning  Goals                                                                                                                                    1) Bed mobility skills with independent to facilitate safe return to previous living environment 2) Functional transfers with independent to facilitate safe return to previous living environment  3) Ambulation with least restrictive AD independent without LOB and stable vitals for safe ambulation home/ community distances  4) Stair training up/down flight 12 step/s with appropriate rail/s  and independent for safe access to previous living environment  5) Improve balance grades to fair + to reduce risk of falls  6)Improve LE strength grades by 1 to increase independence w/ transfers and gait    7) PT for ongoing pt and family education; DME needs and D/C planning to promote highest level of function in least restrictive environment  Recommendations                                                                                                              Pt will benefit from continued skilled IP PT to address the above mentioned impairments in order to maximize recovery and increase functional independence when completing mobility and ADLs  See flow sheet for goals and POC       DME: None    Discharge Disposition:  Home with Outpatient Physical Therapy for evaluation of shoulder pain      Shaan Caballero PT, DPT

## 2022-12-08 NOTE — UTILIZATION REVIEW
Initial Clinical Review    Pt initially presented to [address] [place] [address] ED  Pt was transferred by EMS to [address] [place] [address] for its Level IV medically managed intensive inpatient detox unit, not available at [address] [place] [address]  Admission: Date/Time/Statement:   Admission Orders (From admission, onward)     Ordered        12/04/22 1752  Inpatient Admission  Once                      Orders Placed This Encounter   Procedures   • Inpatient Admission     Standing Status:   Standing     Number of Occurrences:   1     Order Specific Question:   Level of Care     Answer:   Level 2 Stepdown / HOT [14]     Order Specific Question:   Estimated length of stay     Answer:   More than 2 Midnights     Order Specific Question:   Certification     Answer:   I certify that inpatient services are medically necessary for this patient for a duration of greater than two midnights  See H&P and MD Progress Notes for additional information about the patient's course of treatment  Initial Presentation: 41 y o  male who initially presented to [address] [place] [address], was then transferred by EMS to [address] [place] [address] as a direct admission to medical detox  Inpatient admission for evaluation and treatment of alcohol withdrawal syndrome  PMHx: AUD, anxiety, GERD, HLD, HTN  Presented w/ request for detox from alcohol  Serum ETOH: 168  Received 50 mg Valium in ED  Reports a fifth of vodka daily, last drink on 12/3  Has prior inpatient & outpatient treatment for withdrawal  Reports no hx of withdrawal seizures  On exam, anxiety, tremors, diaphoresis, tachycardic, hyperthermia, abdominal distention  Endorses nausea  SEWS 16  Na 112 (107 on presentation)   Plan: SEWS monitoring w/ phenobarbital management, high-dose IV thiamine/folic acid supplement, IVF, start hypertonic saline, telemetry, continuous pulse ox, continue home meds except anti-HTN, trend labs q4h, replete electrolytes as needed  Date: 12/05/22       Day 2: Pt reports improved nausea, now able to tolerate PO intake  On exam, tremors but decreased from prior  SEWS 3  Na 117  Plan: continue SEWS monitoring w/ phenobarbital management, thiamine/folic acid supplement, telemetry, continuous pulse ox, continue home meds, trend labs, replete electrolytes as needed  Nephrology consult: goal Na 118 by noon, q4h BMP today, 1 2L fluid restriction, sodium bicarb PO TID  Wt Readings from Last 1 Encounters:   12/04/22 104 kg (230 lb)     Additional Vital Signs:   Date/Time Temp Pulse Resp BP MAP (mmHg) SpO2 O2 Device   12/05/22 1334 99 1 °F (37 3 °C) 122 Abnormal  16 145/103 Abnormal  -- 93 % None (Room air)   12/05/22 1213 99 4 °F (37 4 °C) 102 16 145/93 -- 97 % None (Room air)   12/05/22 1055 98 3 °F (36 8 °C) 93 10 Abnormal   143/95 -- 100 % None (Room air)   12/05/22 1011 98 6 °F (37 °C) 90 16 138/70 -- 96 % None (Room air)   12/05/22 0900 98 5 °F (36 9 °C) 115 Abnormal  16 145/83 -- 92 % None (Room air)   12/05/22 0539 100 9 °F (38 3 °C) Abnormal  99 18 145/92 -- 96 % None (Room air)   12/05/22 0400 99 7 °F (37 6 °C) 97 16 150/79 -- 97 % None (Room air)   12/04/22 2328 99 5 °F (37 5 °C) 114 Abnormal  20 172/87 Abnormal  -- 92 % None (Room air)   12/04/22 2126 100 6 °F (38 1 °C) Abnormal  102 20 158/84 -- 96 % None (Room air)   12/04/22 1921 99 5 °F (37 5 °C) 101 16 141/76 97 97 % None (Room air)   12/04/22 1841 98 9 °F (37 2 °C) 88 16 140/86 -- 98 % None (Room air)   12/04/22 1751 98 9 °F (37 2 °C) 93 22 182/87 Abnormal  -- -- --     Row Name 12/05/22 1335 12/05/22 1012 12/05/22 0900 12/05/22 0750 12/05/22 0541   Severity of Ethanol Withdrawal Scale (SEWS)   ANXIETY: Do you feel that something bad is about to happen to you right now? 0  -NM 0  -NM 3  -NM 0  -NM 3  -TO   NAUSEA and DRY HEAVES or VOMITING? 0  -NM 0  -NM 0  -NM 0  -NM 0  -TO   SWEATING: (includes moist palms, sweating now)?  Score 0 or 2 0  -NM 0  -NM 0  -NM 0  -NM 0  -TO   TREMOR: with arms extended eyes closed? 0  -NM 0  -NM 0  -NM 0  -NM 2  -TO   AGITATION: Fidgety, restless, pacing? 0  -NM 0  -NM 0  -NM 0  -NM 0  -TO   DISORIENTATION: 0  -NM 0  -NM 0  -NM 0  -NM 0  -TO   HALLUCINATIONS: 0  -NM 0  -NM 0  -NM 0  -NM 0  -TO   VITAL SIGNS: ANY (Pulse >867, Diastolic BP >49, Temp >01 5) 3  -NM 0  -NM 3  -NM 3  -NM 3  -TO   SEWS Total Score 3  -NM 0  -NM 6  -NM 3  -NM 8  -TO   Thomson Agitation Sedation Scale (RASS)   Thomson Agitation Sedation Scale (RASS) 0  -NM 0  -NM 0  -NM 0  -NM 0  -TO   Row Name 12/05/22 0400 12/05/22 0100 12/04/22 2329 12/04/22 2131 12/04/22 1842   Severity of Ethanol Withdrawal Scale (SEWS)   ANXIETY: Do you feel that something bad is about to happen to you right now? 0  -TO 0  -TO 3  -TO 3  -TO 0  -NM   NAUSEA and DRY HEAVES or VOMITING? 0  -TO 0  -TO 0  -TO 3  -TO 0  -NM   SWEATING: (includes moist palms, sweating now)? Score 0 or 2 0  -TO 0  -TO 2  -TO 0  -TO 2  -NM   TREMOR: with arms extended eyes closed? 0  -TO 0  -TO 2  -TO 0  -TO 2  -NM   AGITATION: Fidgety, restless, pacing? 0  -TO 0  -TO 0  -TO 0  -TO 0  -NM   DISORIENTATION: 0  -TO 0  -TO 0  -TO 0  -TO 0  -NM   HALLUCINATIONS: 0  -TO 0  -TO 0  -TO 0  -TO 0  -NM   VITAL SIGNS: ANY (Pulse >202, Diastolic BP >88, Temp >68 4) 0  -TO 0  -TO 3  -TO 3  -TO 0  -NM   SEWS Total Score 0  -TO 0  -TO 10  -TO 9  -TO 4  -NM   Thomson Agitation Sedation Scale (RASS)   Thomson Agitation Sedation Scale (RASS) 0  -TO 0  -TO 0  -TO 0  -TO 0  -NM   Row Name 12/04/22 1800       Severity of Ethanol Withdrawal Scale (SEWS)   ANXIETY: Do you feel that something bad is about to happen to you right now? 3  -NM       NAUSEA and DRY HEAVES or VOMITING? 3  -NM       SWEATING: (includes moist palms, sweating now)? Score 0 or 2 2  -NM       TREMOR: with arms extended eyes closed? 2  -NM       AGITATION: Fidgety, restless, pacing?  3  -NM       DISORIENTATION: 0  -NM       HALLUCINATIONS: 0  -NM       VITAL SIGNS: ANY (Pulse >549, Diastolic BP >06, Temp >95 6) 3  -NM       SEWS Total Score 16  -NM           Pertinent Labs/Diagnostic Test Results:   US right upper quadrant   Final Result by Army Sicard, DO (12/05 0945)      Enlarged fatty liver  No acute findings or ascites detected  Workstation performed: [id number]         XR chest portable   Final Result by Edison Melo MD (12/05 5314)   Low lung volumes      No acute cardiopulmonary disease  Workstation performed: [id number]           Results from last 7 days   Lab Units 12/05/22  1723   SARS-COV-2  Negative     Results from last 7 days   Lab Units 12/08/22  0911 12/06/22  0545 12/05/22  0444 12/04/22  1229   WBC Thousand/uL 6 19 8 05 6 60 10 60*   HEMOGLOBIN g/dL 11 6* 11 9* 12 4 12 5   HEMATOCRIT % 34 0* 33 0* 34 7* 33 3*   PLATELETS Thousands/uL 149 111* 134* 227   NEUTROS ABS Thousands/µL  --  6 40  --  8 94*         Results from last 7 days   Lab Units 12/08/22  0634 12/07/22  1639 12/07/22  0913 12/07/22  0031 12/06/22  1954 12/06/22  1600 12/06/22  1138 12/06/22  0545 12/05/22  0753 12/05/22  0444 12/04/22  2340 12/04/22  1531 12/04/22  1229   SODIUM mmol/L 133* 132* 128* 130* 129* 128* 128* 126*   < > 115* 113*   < > 112*   POTASSIUM mmol/L 3 7 4 0 3 5 3 6 4 0 3 7 3 6 3 3*   < > 3 5 3 7   < > 3 1*   CHLORIDE mmol/L 102 100 99 100 99 101 98 98   < > 87* 85*   < > 77*   CO2 mmol/L 24 23 24 22 23 24 23 20*   < > 22 20*   < > 14*   ANION GAP mmol/L 7 9 5 8 7 3* 7 8   < > 6 8   < > 21*   BUN mg/dL 7 7 7 7 8 7 6 5   < > 3* 4*   < > 7   CREATININE mg/dL 0 68* 0 64* 0 67* 0 64* 0 76 0 71 0 82 0 75   < > 0 68* 0 64*   < > 0 55*   EGFR ml/min/1 73sq m 115 118 116 118 110 113 106 110   < > 115 118   < > 125   CALCIUM mg/dL 9 0 9 2 8 4 8 5 8 6 7 9* 8 4 8 2*   < > 8 0* 7 9*   < > 7 3*   MAGNESIUM mg/dL 1 6  --   --   --   --   --   --  1 9  --  2 0 2 0  --  1 2*    < > = values in this interval not displayed  Results from last 7 days   Lab Units 12/08/22  0634 12/06/22  0545 12/05/22  0444 12/04/22  1229   AST U/L 40 81* 143* 146*   ALT U/L 43 53* 59* 70   ALK PHOS U/L 39* 39* 36* 32*   TOTAL PROTEIN g/dL 6 6 6 6 6 5 6 9   ALBUMIN g/dL 3 6 3 8 3 8 3 9   TOTAL BILIRUBIN mg/dL 0 43 1 02* 2 37* 1 32*   BILIRUBIN DIRECT mg/dL 0 28*  --  0 47*  --          Results from last 7 days   Lab Units 12/08/22  0634 12/07/22  1639 12/07/22  0913 12/07/22  0031 12/06/22  1954 12/06/22  1600 12/06/22  1138 12/06/22  0545 12/05/22  2202 12/05/22  1553 12/05/22  1207 12/05/22  0753   GLUCOSE RANDOM mg/dL 105* 134* 106* 133* 102* 115* 114* 109* 114* 128* 122* 119*     Results from last 7 days   Lab Units 12/04/22  1229   OSMOLALITY, SERUM mmol/*     Results from last 7 days   Lab Units 12/05/22  0444   HEMOGLOBIN A1C % 5 4   EAG mg/dl 108     Results from last 7 days   Lab Units 12/04/22  1346   PH ART  7 466*   PCO2 ART mm Hg 21 2*   PO2 ART mm Hg 105 6   HCO3 ART mmol/L 14 9*   BASE EXC ART mmol/L -6 7   O2 CONTENT ART mL/dL 17 2   O2 HGB, ARTERIAL % 97 1*   ABG SOURCE  Radial, Right     Results from last 7 days   Lab Units 12/06/22  0545 12/05/22  0444   PH KRYSTEN  7 461* 7 460*   PCO2 KRYSTEN mm Hg 33 7* 30 9*   PO2 KRYSTEN mm Hg 146 3* 109 5*   HCO3 KRYSTEN mmol/L 23 5* 21 5*   BASE EXC KRYSTEN mmol/L 0 2 -1 4   O2 CONTENT KRYSTEN ml/dL 17 5 17 6   O2 HGB, VENOUS % 96 6* 95 4*             Results from last 7 days   Lab Units 12/04/22  1531 12/04/22  1229   HS TNI 0HR ng/L  --  4   HS TNI 2HR ng/L 5  --    HSTNI D2 ng/L 1  --              Results from last 7 days   Lab Units 12/04/22  1229   TSH 3RD GENERATON uIU/mL 1 780         Results from last 7 days   Lab Units 12/04/22  2340   LACTIC ACID mmol/L 1 0           Results from last 7 days   Lab Units 12/04/22  1229   LIPASE u/L 281       Results from last 7 days   Lab Units 12/05/22  0733 12/04/22  1548 12/04/22  1229   OSMOLALITY, SERUM mmol/KG  --   --  266*   OSMO UR mmol/* 797  -- Results from last 7 days   Lab Units 12/05/22  0733 12/04/22  1548 12/04/22  1534   CLARITY UA   --   --  Clear   COLOR UA   --   --  Light Orange   SPEC GRAV UA   --   --  1 038*   PH UA   --   --  6 5   GLUCOSE UA mg/dl  --   --  Negative   KETONES UA mg/dl  --   --  40 (2+)*   BLOOD UA   --   --  Negative   PROTEIN UA mg/dl  --   --  30 (1+)*   NITRITE UA   --   --  Negative   BILIRUBIN UA   --   --  Negative   UROBILINOGEN UA (BE) mg/dl  --   --  <2 0   LEUKOCYTES UA   --   --  Negative   WBC UA /hpf  --   --  1-2   RBC UA /hpf  --   --  1-2   BACTERIA UA /hpf  --   --  Occasional   EPITHELIAL CELLS WET PREP /hpf  --   --  Occasional   SODIUM UR  25 17  --      Results from last 7 days   Lab Units 12/04/22  1548   AMPH/METH  Negative   BARBITURATE UR  Negative   BENZODIAZEPINE UR  Positive*   COCAINE UR  Negative   METHADONE URINE  Negative   OPIATE UR  Negative   PCP UR  Negative   THC UR  Negative     Results from last 7 days   Lab Units 12/04/22  1429 12/04/22  1426   ETHANOL LVL mg/dL  --  168*   ACETAMINOPHEN LVL ug/mL <2*  --    SALICYLATE LVL mg/dL <3*  --        Past Medical History:   Diagnosis Date   • Anxiety    • COVID-19 ruled out 2/3/2022   • GERD (gastroesophageal reflux disease)    • Hyperlipidemia    • Hypertension    • Tendinitis      Present on Admission:  • Esophageal reflux  • (Resolved) Hypokalemia  • Hyponatremia  • (Resolved) Alcoholic ketoacidosis  • Benign essential hypertension  • Hyperlipidemia  • Obesity (BMI 30-39  9)  • Internal derangement of right shoulder  • (Resolved) Hypomagnesemia  • (Resolved) Prolonged Q-T interval on ECG  • Tear of left supraspinatus tendon  • (Resolved) Leukocytosis      Admitting Diagnosis: Alcohol withdrawal ([facility] 75 ) [F10 009]  Age/Sex: 41 y o  male  Admission Orders:  Regular Diet  SCDs  1200 mL fluid restriction  Continual observation  Fall & Seizure Precautions  SEWS monitoring  Telemetry & Continuous Pulse Ox  BMP q4h      Scheduled Medications:  enoxaparin, 40 mg, Subcutaneous, Daily  fenofibrate, 145 mg, Oral, Daily  [START ON 19/8/1901] folic acid, 1 mg, Oral, Daily  pantoprazole, 40 mg, Oral, Early Morning  sodium bicarbonate, 1,300 mg, Oral, TID after meals  [START ON 12/9/2022] thiamine, 100 mg, Oral, Daily    Continuous IV Infusions:    sodium chloride 0 9 %, 75 mL/hr, Intravenous, Continuous; Start: 12/04/22 1800 End: 12/04/22 1928  sodium chloride 23 4 % (HYPERTONIC) 308 mEq in sterile water, 25 mL/hr, Intravenous, Continuous; Start: 12/04/22 2030 End: 12/05/22 0713    PRN Meds:  acetaminophen, 650 mg, Oral, Q6H PRN; 12/4 x1, 12/5 x2  dextrose 5 %, 250 mL bolus, Intravenous; 12/5 x1  haloperidol lactate, 5 mg, Intravenous, Q6H PRN  magnesium sulfate, 2 g, Intravenous; 12/4 x1, 12/5 x1  OLANZapine, 5 mg, Oral, Q6H PRN; 12/4 x1  phenobarbital, 650 mg, Intravenous; 12/4 x1  phenobarbital, 65 mg, Intravenous; 12/5 x1  phenobarbital, 130 mg, Intravenous; 12/4 x2, 12/5 x1  phenobarbital, 260 mg, Intravenous; 12/5 x1  phenobarbital, 64 8 mg, Oral; 12/5 x2  potassium chloride, 20 mEq, Intravenous; 12/4 x2, 12/5 x1  potassium chloride, 40 mEq, Oral; 12/4 x1  sodium chloride 0 9 %, 500 mL Bolus, Intravenous, 12/4 x1        IP CONSULT TO CASE MANAGEMENT  IP CONSULT TO NEPHROLOGY    Network Utilization Review Department  ATTENTION: Please call with any questions or concerns to [phone] and carefully listen to the prompts so that you are directed to the right person  All voicemails are confidential   Candia Siemens all requests for admission clinical reviews, approved or denied determinations and any other requests to dedicated fax number below belonging to the campus where the patient is receiving treatment   List of dedicated fax numbers for the Facilities:  [address] [place] [address] DENIALS (Administrative/Medical Necessity) [phone]   [address] [place] [address] (Maternity/NICU/Pediatrics) [phone]   [address] [place] [address] [address] [place] [phone]   [place] [address] [phone]   [address] [place] [address] [facility] 310 [facility] 134 815 [facility] [phone]

## 2022-12-08 NOTE — DISCHARGE SUMMARY
[facility]  Discharge- Bladimir Pott 1975, 41 y o  male MRN: [id number]  Unit/Bed#: 5T DETOX 710-19 Encounter: [id number]  Primary Care Provider: Madie Cody MD   Date and time admitted to hospital: 12/4/2022  5:37 PM    [address] [facility] 4  Department of Medical Toxicology  Reason for Admission/Principal Problem: alcohol withdrawal, hyponatremia   Admitting provider: Erick Anderson PA-C  [facility]   12/4/2022  5:37 PM       Discharging Physician / Practitioner: Erick Anderson PA-C  PCP: Madie Cody MD  Admission Date:   Admission Orders (From admission, onward)     Ordered        12/04/22 1752  Inpatient Admission  Once            12/04/22 1752  Inpatient Admission  Once                      Discharge Date: 12/08/22    Medical Problems     Resolved Problems  Date Reviewed: 11/30/2022          Resolved    Hypokalemia 12/8/2022     Resolved by  Karen Turcios PA-C    * (Principal) Alcohol withdrawal syndrome with complication, with unspecified complication ([facility] ) 62/7/9196     Resolved by  Karen Turcios PA-C    Alcoholic ketoacidosis 27/9/2191     Resolved by  Georgiana Fields MD    Hypomagnesemia 12/6/2022     Resolved by  Georgiana Fields MD    Prolonged Q-T interval on ECG 12/6/2022     Resolved by  Georgiana Fields MD    Leukocytosis 12/5/2022     Resolved by  Nhan Kelley MD    Elevated LFTs 12/8/2022     Resolved by  Karen Turcios PA-C          * Alcohol withdrawal syndrome with complication, with unspecified complication (HCC)-resolved as of 12/8/2022  Assessment & Plan  · SEWS protocol with symptom-triggered phenobarbital followed for medical management of alcohol withdrawal   · Received 2144 2 mg total phenobarbital; last dose 12/6/2022 1028  · Appears stabilized overall- no tremors, A&Ox4  · Acute withdrawal resolved     Hyponatremia  Assessment & Plan  Recent Labs     12/06/22  1138 12/06/22  1600 12/06/22  1954 12/07/22  0031 12/07/22  0913 12/07/22  1639 12/08/22  0634   SODIUM 128* 128* 129* 130* 128* 132* 133*   · sodium appears stable this AM  · Nephrology following, appreciate co-management  · Patient cleared for discharge from nephrology standpoint  · Continue Fluid restriction 1 2L  · Continue to monitor BMPs 2x weekly  · Recommend outpatient nephrology f/u    Ambulatory dysfunction  Assessment & Plan  · Gait improved this AM  · High dose thiamine regimen completed- continue daily supplementation  · PT consulted, appreciate recommendations- cleared for discharge home with outpatient PT    Thrombocytopenia ([facility] )  Assessment & Plan  · Likely secondary to chronic bone marrow suppression due to alcohol use  · No acute bleeding  · Stable this AM  · Encourage alcohol cessation  · Routine outpatient monitoring    Sore throat  Assessment & Plan  · Patient endorses sore throat since 12/5/2022, improved today  · Influenza/COVID negative  · Patient refused strep testing despite recommendations  · Continue supportive measures  · Encourage alcohol cessation and continue PPI    Obesity (BMI 30-39  9)  Assessment & Plan  Body mass index is 32 08 kg/m²    · Continue fenofibrate  · Hgb A1c was normal  · Outpatient PCP follow up    Hyperlipidemia  Assessment & Plan  · Continue home medication    Alcohol use disorder, severe, dependence ([facility] )  Assessment & Plan  Withdrawal management as above  High-dose thiamine regimen complete  Continue daily vitamin supplementation  Case management consulted for disposition planning- provided with referral for [facility]    Internal derangement of right shoulder  Assessment & Plan  · Followed by orthopedics outpatient for right shoulder pain  · MRI will need to be rescheduled  · Supportive care  · Outpatient follow up    Tear of left supraspinatus tendon  Assessment & Plan  · Hx left supraspinatus tendon tear status post-op L shoulder arthroscopy and rotator cuff repair 9/9/22  · No acute complications  · Outpatient PT  · Supportive care, pain control  · Outpatient PT and orthopedics follow up as scheduled    Esophageal reflux  Assessment & Plan  · Continue home PPI    Benign essential hypertension  Assessment & Plan  · Home antihypertensive irbesartan 150 mg daily held in setting of alcohol withdrawal management complicated by hyponatremia  · Most recent /83  · Continue to monitor blood pressure  · Resume home regimen on discharge     Elevated LFTs-resolved as of 12/8/2022  Assessment & Plan  Recent Labs     12/06/22  0545 12/08/22  0634   AST 81* 40   ALT 53* 43   ALKPHOS 39* 39*     · LFTs elevated on admission, down-trending, improved and stable this AM   · In setting of chronic alcohol use  · No acute RUQ pain  · RUQ U/S 12/5/2022: Enlarged fatty liver   No ascites   · Encourage alcohol cessation     Leukocytosis-resolved as of 12/5/2022  Assessment & Plan  · Resolved    Prolonged Q-T interval on ECG-resolved as of 12/6/2022  Assessment & Plan  · Improved to 474 ms on 12/5/2022  · Was monitored on telemetry   · Avoid QTc-prolonging agents    Hypomagnesemia-resolved as of 12/6/2022  Assessment & Plan  · Improved  · Continue to monitor and replete as needed    Alcoholic ketoacidosis-resolved as of 12/6/2022  Assessment & Plan  · Resolved  · Encourage adequate PO intake and alcohol cessation     Hypokalemia-resolved as of 12/8/2022  Assessment & Plan  Recent Labs     12/07/22  0031 12/07/22  0913 12/07/22  1639 12/08/22  0634   K 3 6 3 5 4 0 3 7     · Improved and stable this AM   · Encourage adequate PO intake   · Routine outpatient monitoring       Consultations During Hospital Stay:  · Case management   · Nephrology  · PT    Procedures Performed:   · RUQ U/S  · CXR    Significant Findings / Test Results:   · Hyponatremia- stable   · QT prolongation- resolved   · hypomag and hypoK+ : resolved   · AKA- resolved   · Elevated LFTs- resolved   · Thrombocytopenia- improved, stable  · RUQ U/S 12/5/2022: Enlarged fatty liver  No acute findings or ascites detected  · CXR 12/4/2022: No acute cardiopulmonary disease  Low lung volumes    Incidental Findings:   · None      Test Results Pending at Discharge (will require follow up):   · none     Outpatient Tests Requested:  · BMP: 2x weekly  · Outpatient f/u nephrology     Complications:  None     Reason for Admission: alcohol withdrawal, hyponatremia     Hospital Course:     Mary Greenwood is a 41 y o  male patient PHM AUD, HTN, GERD, obesity who originally presented to the hospital on 12/4/2022 due to alcohol withdrawal  Patient originally presented to [facility] ED for evaluation of alcohol withdrawal   Patient subsequently admitted to [facility] detox unit for medically-assisted alcohol withdrawal  SEWS protocol with symptom-triggered phenobarbital was followed during admission  In total, received 2144 2 mg total phenobarbital; last dose 12/6/2022 1028  Also received high-dose thiamine regimen during admission  PT was consulted during admission, cleared patient for discharge with outpatient follow-up  Patient was noted to have significant hyponatremia in ED with initial sodium of 110  Nephrology was consulted and followed patient closely during admission  Patient was briefly placed on hypertonic saline overnight 12/4-12/5, was then discontinued  Patient was also placed on fluid restriction  BMPs were trended closely  No acute focal neurological deficits noted on exam  Was also noted to have QT prolongation during admission (527 ms on admission)  Electrolytes were monitored closely during admission and supplemented as indicated  QTc monitored with serial EKGs, stable at 474 ms on 12/5/2022  Patient discharged on fluid restriction 1 2 L and BMPs 2x weekly, referral placed for outpatient nephrology follow up  Please see above list of diagnoses and related plan for additional information       Condition at Discharge: good Discharge Day Visit / Exam:     Subjective:  Patient seen and examined bedside this morning  Reports that he is feeling much better this morning  Reports he still feels some unsteadiness with ambulation, but overall improved  Currently denies headaches, lightheadedness/dizziness, coughing/sneezing/congestion/rhinorrhea, chest pain, SOB/dyspnea, abdominal pain, N/V/C, dysuria/hematuria, hallucinations  Declines naltrexone  Vitals: Blood Pressure: 145/83 (12/08/22 0739)  Pulse: 85 (12/08/22 0739)  Temperature: 97 6 °F (36 4 °C) (12/08/22 0739)  Temp Source: Temporal (12/08/22 0739)  Respirations: 16 (12/08/22 0739)  Height: 5' 11" (180 3 cm) (12/04/22 1751)  Weight - Scale: 104 kg (230 lb) (12/04/22 1751)  SpO2: 95 % (12/08/22 0739)  Exam:   Physical Exam  Vitals and nursing note reviewed  Constitutional:       General: He is not in acute distress  Appearance: Normal appearance  He is well-developed and normal weight  He is not ill-appearing or diaphoretic  HENT:      Head: Normocephalic and atraumatic  Mouth/Throat:      Pharynx: No oropharyngeal exudate  Eyes:      General: No scleral icterus  Extraocular Movements: Extraocular movements intact  Right eye: No nystagmus  Left eye: No nystagmus  Conjunctiva/sclera: Conjunctivae normal       Pupils: Pupils are equal, round, and reactive to light  Cardiovascular:      Rate and Rhythm: Normal rate and regular rhythm  Pulses: Normal pulses  Dorsalis pedis pulses are 2+ on the right side and 2+ on the left side  Posterior tibial pulses are 2+ on the right side and 2+ on the left side  Heart sounds: Normal heart sounds  No murmur heard  No friction rub  No gallop  Pulmonary:      Effort: Pulmonary effort is normal  No respiratory distress  Breath sounds: Normal breath sounds  No wheezing, rhonchi or rales  Abdominal:      General: Abdomen is flat   Bowel sounds are normal  There is no distension  Palpations: Abdomen is soft  Tenderness: There is no abdominal tenderness  There is no guarding  Musculoskeletal:         General: No swelling  Normal range of motion  Cervical back: Normal range of motion  Right lower leg: No edema  Left lower leg: No edema  Skin:     General: Skin is warm and dry  Neurological:      General: No focal deficit present  Mental Status: He is alert and oriented to person, place, and time  Mental status is at baseline  Motor: No tremor  Gait: Gait abnormal (mildly unstable, improved overall)  Psychiatric:         Attention and Perception: Attention normal          Mood and Affect: Mood normal          Speech: Speech normal          Behavior: Behavior is cooperative  Discussion with Family: I personally did not discuss patient with family at this time  Discussed current plan with patient, answered all questions to best of my ability  Discharge instructions/Information to patient and family:   See after visit summary for information provided to patient and family  Provisions for Follow-Up Care:  See after visit summary for information related to follow-up care and any pertinent home health orders  Disposition:     Home    For Discharges to [facility] SNF:   · Not Applicable to this Patient - Not Applicable to this Patient    Planned Readmission: none     Discharge Statement:  I spent 57 minutes discharging the patient  This time was spent on the day of discharge  I had direct contact with the patient on the day of discharge  Greater than 50% of the total time was spent examining patient, answering all patient questions, arranging and discussing plan of care with patient as well as directly providing post-discharge instructions  Additional time then spent on discharge activities      Discharge Medications:  See after visit summary for reconciled discharge medications provided to patient and family        ** Please Note: This note has been constructed using a voice recognition system **

## 2022-12-08 NOTE — PLAN OF CARE
Problem: PHYSICAL THERAPY ADULT  Goal: Performs mobility at highest level of function for planned discharge setting  See evaluation for individualized goals  Description: Treatment/Interventions: ADL retraining, Functional transfer training, LE strengthening/ROM, Elevations, Therapeutic exercise, Endurance training, Patient/family training, Equipment eval/education, Bed mobility, Gait training, Spoke to nursing, Spoke to case management, OT          See flowsheet documentation for full assessment, interventions and recommendations  Note: Prognosis: Good  Problem List: Decreased strength, Decreased endurance, Impaired balance, Decreased mobility, Decreased coordination, Obesity     Barriers to Discharge: Inaccessible home environment, Decreased caregiver support     PT Discharge Recommendation: Home with outpatient rehabilitation (for evaluation of shoulder pain)    See flowsheet documentation for full assessment

## 2022-12-08 NOTE — CASE MANAGEMENT
Worker contacted [facility] to schedule SO [facility] appointment, they have no upcoming appointments till January, message was sent directly to office to  regarding follow-up appointment

## 2022-12-09 ENCOUNTER — DOCUMENTATION (OUTPATIENT)
Dept: NEPHROLOGY | Facility: CLINIC | Age: 47
End: 2022-12-09

## 2022-12-13 ENCOUNTER — TELEMEDICINE (OUTPATIENT)
Dept: INTERNAL MEDICINE CLINIC | Facility: CLINIC | Age: 47
End: 2022-12-13

## 2022-12-13 DIAGNOSIS — F10.20 ALCOHOLISM (HCC): ICD-10-CM

## 2022-12-13 DIAGNOSIS — N52.9 ERECTILE DYSFUNCTION, UNSPECIFIED ERECTILE DYSFUNCTION TYPE: ICD-10-CM

## 2022-12-13 DIAGNOSIS — E87.1 HYPONATREMIA: ICD-10-CM

## 2022-12-13 DIAGNOSIS — F10.20 ALCOHOL USE DISORDER, SEVERE, DEPENDENCE (HCC): Primary | ICD-10-CM

## 2022-12-13 PROBLEM — D69.6 THROMBOCYTOPENIA (HCC): Status: RESOLVED | Noted: 2022-12-06 | Resolved: 2022-12-13

## 2022-12-13 PROBLEM — M25.511 ACUTE PAIN OF BOTH SHOULDERS: Status: RESOLVED | Noted: 2022-04-20 | Resolved: 2022-12-13

## 2022-12-13 PROBLEM — J02.9 SORE THROAT: Status: RESOLVED | Noted: 2022-12-06 | Resolved: 2022-12-13

## 2022-12-13 PROBLEM — R39.15 URINARY URGENCY: Status: RESOLVED | Noted: 2018-02-27 | Resolved: 2022-12-13

## 2022-12-13 PROBLEM — Z47.89 AFTERCARE FOLLOWING SURGERY OF THE MUSCULOSKELETAL SYSTEM: Status: RESOLVED | Noted: 2022-11-21 | Resolved: 2022-12-13

## 2022-12-13 PROBLEM — M25.512 ACUTE PAIN OF BOTH SHOULDERS: Status: RESOLVED | Noted: 2022-04-20 | Resolved: 2022-12-13

## 2022-12-13 RX ORDER — VITAMIN B COMPLEX
1 CAPSULE ORAL DAILY
Start: 2022-12-13

## 2022-12-13 RX ORDER — SILDENAFIL 100 MG/1
100 TABLET, FILM COATED ORAL DAILY PRN
Qty: 10 TABLET | Refills: 0 | Status: SHIPPED | OUTPATIENT
Start: 2022-12-13

## 2022-12-13 NOTE — PROGRESS NOTES
Virtual TCM Visit:    Verification of patient location:    Patient is located in the following state in which I hold an active license [place]    Assessment/Plan:   BMP to be repeated tomorrow  Strongly advised to see a counselor  Continue AA     Problem List Items Addressed This Visit        Other    Alcoholism ([facility] )    Relevant Medications    b complex vitamins capsule    Erectile dysfunction    Relevant Medications    sildenafil (VIAGRA) 100 mg tablet    Hyponatremia    Alcohol use disorder, severe, dependence ([facility] ) - Primary    Relevant Medications    b complex vitamins capsule        Reason for visit is TCM    Encounter provider Donna Silva MD     Provider located at [address] [place] [id number]    Recent Visits  No visits were found meeting these conditions  Showing recent visits within past 7 days and meeting all other requirements  Today's Visits  Date Type Provider Dept   12/13/22 Telemedicine Donna Silva MD [facility] today's visits and meeting all other requirements  Future Appointments  No visits were found meeting these conditions  Showing future appointments within next 150 days and meeting all other requirements     It was my intent to perform this visit via video technology but the patient was not able to do a video connection so the visit was completed via audio telephone only  After connecting through [employer], the patient was identified by name and date of birth  Mercedes Finnegan was informed that this is a telemedicine visit and that the visit is being conducted through Telephone  My office door was closed  No one else was in the room  He acknowledged consent and understanding of privacy and security of the video platform  The patient has agreed to participate and understands they can discontinue the visit at any time  Patient is aware this is a billable service      Transitional Care Management Review:  Marybel Alvarado is a 41 y o  male here for TCM follow up  During the TCM phone call patient stated:    TCM Call     Date and time call was made  12/8/2022  2:48 PM    Hospital care reviewed  Records not available    [address]    Date of Admission  12/04/22    Date of discharge  12/08/22    Diagnosis  Alcohol withdrawal    Disposition  Home    Were the patients medications reviewed and updated  No      TCM Call     Should patient be enrolled in anticoag monitoring? No    Scheduled for follow up? Yes    I have advised the patient to call PCP with any new or worsening symptoms  Klaudia Longoria - /MA        Subjective:     Patient ID: Marybel Alvarado is a 41 y o  male  He was in rehab for alcohol abuse on 10/10  He called about nausea vomiting and diarrhea on 11/30 and denied alcohol use  He admits he was drinking after Thanksgiving then stopped and started feeling sick  He felt worse and worse so was taken to the ER where Na was 112, dropped to 107  He was discharged on 12/8  He denies having any alcohol since discharge  He is going to see a counselor  Continues to go to [place]  Denies tremors, fever, nausea, vomiting  C/o ED    Review of Systems   Constitutional: Negative for chills and fever  Gastrointestinal: Positive for nausea  Negative for vomiting  Genitourinary: Negative for difficulty urinating  Neurological: Negative for tremors  Psychiatric/Behavioral: The patient is nervous/anxious  Objective: There were no vitals filed for this visit  Physical Exam    Medications have been reviewed by provider in current encounter    I spent 30 minutes with the patient during this visit  Linda Urbina MD      VIRTUAL VISIT [facility] verbally agrees to participate in [facility]   Pt is aware that [facility] could be limited without vital signs or the ability to perform a full hands-on physical Aisha Shields understands he or the provider may request at any time to terminate the video visit and request the patient to seek care or treatment in person

## 2022-12-13 NOTE — UTILIZATION REVIEW
NOTIFICATION OF ADMISSION DISCHARGE   This is a Notification of Discharge from [address]  Please be advised that this patient has been discharge from our facility  Below you will find the admission and discharge date and time including the patient’s disposition  UTILIZATION REVIEW CONTACT:  Nevaeh Pierson MA  Utilization   Network Utilization Review Department  Phone: [phone] x carefully listen to the prompts  All voicemails are confidential   Email: [email or web address]     ADMISSION INFORMATION  PRESENTATION DATE: 12/4/2022  5:37 PM  OBERVATION ADMISSION DATE:   INPATIENT ADMISSION DATE: 12/4/22  5:37 PM   DISCHARGE DATE: 12/8/2022  3:48 PM   DISPOSITION:Home/Self Care    IMPORTANT INFORMATION:  Send all requests for admission clinical reviews, approved or denied determinations and any other requests to dedicated fax number below belonging to the campus where the patient is receiving treatment   List of dedicated fax numbers:  [address] DENIALS (Administrative/Medical Necessity) [phone]   [address] (Maternity/NICU/Pediatrics) [phone]   [facility] [phone]   [facility] 87 [phone]   [address] [phone]   220 [facility] [phone] 90 [facility] [phone]   [address] [place], [place] [address]ivan\[facility]\"" 119 [phone]   [facility]  [phone]   405 [facility] [phone]   412 [facility] 850 [facility] [phone]

## 2022-12-19 ENCOUNTER — APPOINTMENT (OUTPATIENT)
Dept: LAB | Facility: CLINIC | Age: 47
End: 2022-12-19

## 2022-12-19 ENCOUNTER — HOSPITAL ENCOUNTER (OUTPATIENT)
Dept: RADIOLOGY | Facility: HOSPITAL | Age: 47
Discharge: HOME | End: 2022-12-19
Attending: ORTHOPAEDIC SURGERY

## 2022-12-19 DIAGNOSIS — E87.1 HYPONATREMIA: ICD-10-CM

## 2022-12-19 DIAGNOSIS — M24.811 INTERNAL DERANGEMENT OF RIGHT SHOULDER: ICD-10-CM

## 2022-12-19 LAB
ANION GAP SERPL CALCULATED.3IONS-SCNC: 7 MMOL/L (ref 4–13)
BUN SERPL-MCNC: 16 MG/DL (ref 5–25)
CALCIUM SERPL-MCNC: 9 MG/DL (ref 8.3–10.1)
CHLORIDE SERPL-SCNC: 110 MMOL/L (ref 96–108)
CO2 SERPL-SCNC: 22 MMOL/L (ref 21–32)
CREAT SERPL-MCNC: 0.87 MG/DL (ref 0.6–1.3)
GFR SERPL CREATININE-BSD FRML MDRD: 104 ML/MIN/1.73SQ M
GLUCOSE P FAST SERPL-MCNC: 110 MG/DL (ref 65–99)
POTASSIUM SERPL-SCNC: 3.9 MMOL/L (ref 3.5–5.3)
SODIUM SERPL-SCNC: 139 MMOL/L (ref 135–147)

## 2022-12-21 ENCOUNTER — TELEPHONE (OUTPATIENT)
Dept: PSYCHIATRY | Facility: CLINIC | Age: 47
End: 2022-12-21

## 2022-12-21 ENCOUNTER — TELEPHONE (OUTPATIENT)
Dept: NEPHROLOGY | Facility: CLINIC | Age: 47
End: 2022-12-21

## 2022-12-22 ENCOUNTER — TELEPHONE (OUTPATIENT)
Dept: OTHER | Facility: OTHER | Age: 47
End: 2022-12-22

## 2022-12-22 NOTE — TELEPHONE ENCOUNTER
Patient sent a message via [employer] about FMLA paperwork and has not yet heard back  If the ins co does not receive paperwork by 12/28 his claim will not get paid  Please call him today

## 2022-12-23 NOTE — TELEPHONE ENCOUNTER
Mailing letter to patient to schedule a hospital follow up  This is the third attempt  Referral will be closed

## 2022-12-29 ENCOUNTER — APPOINTMENT (OUTPATIENT)
Dept: PHYSICAL THERAPY | Facility: OTHER | Age: 47
End: 2022-12-29

## 2023-01-03 ENCOUNTER — OFFICE VISIT (OUTPATIENT)
Dept: PHYSICAL THERAPY | Facility: OTHER | Age: 48
End: 2023-01-03

## 2023-01-03 DIAGNOSIS — M25.512 ACUTE PAIN OF LEFT SHOULDER: Primary | ICD-10-CM

## 2023-01-03 DIAGNOSIS — G89.29 CHRONIC RIGHT SHOULDER PAIN: ICD-10-CM

## 2023-01-03 DIAGNOSIS — M25.511 CHRONIC RIGHT SHOULDER PAIN: ICD-10-CM

## 2023-01-03 NOTE — PROGRESS NOTES
PT Re-Evaluation     Today's date: 1/3/2023  Patient name: Lul Foster  : 1975  MRN: [id number]  Referring provider: Donna Chin  Dx: No diagnosis found  Assessment  Assessment details: Lul Foster is a pleasant 41 y o  male who presents with RCR 22 he had several other obligations  Unable to attend PT frequently last seen in november  He notes that in the last 2 weeks he is waking with a new shoulder pain  He does have syptoms consistent with impingement with AROM he continues to have weekenness  Painful arc reduced with scapular assistance  He is limited with many IADL's but this is expected secondary to the limited time he had for PT at home and in the clinic  Being more consistent with be helpful at this time since he continues to have pain and limitations  [employer] understands this  We will incorporate the R shoulder in OT as well  He has follow up on imaging this Thursday  HEP issued and POC reviewed  Impairments: abnormal coordination, abnormal muscle firing, abnormal or restricted ROM, activity intolerance, impaired physical strength, lacks appropriate home exercise program, pain with function and poor body mechanics    Symptom irritability: moderateUnderstanding of Dx/Px/POC: good   Prognosis: good    Goals  Short Term:  Pt will report decreased levels of pain by at least 2 subjective ratings in 4 weeks  Pt will demonstrate improved ROM by at least 10 degrees in 4 weeks  Pt will demonstrate improved strength by 1/2 grade  Long Term:   Pt will be independent in their HEP in 8 weeks  Pt will be be pain free with IADL's  Pt will demonstrate improved FOTO, > 80         Plan  Plan details: Prognosis above is given PT services 2x/week tapering to 1x/week over the next 3 months and home program adherence    Patient would benefit from: skilled physical therapy  Planned modality interventions: thermotherapy: hydrocollator packs  Planned therapy interventions: activity modification, joint mobilization, manual therapy, motor coordination training, neuromuscular re-education, patient education, self care, therapeutic activities, therapeutic exercise, graded activity and home exercise program  Frequency: 2x week  Treatment plan discussed with: patient        Subjective Evaluation    Pain  At best pain ratin  At worst pain ratin    Patient Goals  Patient goals for therapy: decreased pain, independence with ADLs/IADLs, return to sport/leisure activities, increased motion and increased strength          Objective     General Comments:      Shoulder Comments   PROM  IR 60 ER 60     AROM 80 pain free flex  60 abd  Improved with scapular assitiance test 120     Strength not fully tested 3/5  Poor GH rythym                       Manuals 11 8 22 11 11 22 11 14  11 16 11 21 1 3 22       Prom PAIN FREE  MJ  GL MJ MJ MJ  MJ                                              Neuro Re-Ed             pron TI       10x2        Wall slide with ER       10x2        scap punches   2x10 #2 10x2  #3 10x2 Scap abcs  #4 [id number]  Scap ABC  #4 10x2        S/L ER   2x8 10x2  #2 10x2  #3 10x2  #3 10x2        ROM ranger  Flexion/Scaption 2x8 10x2  10x2  10x2  1`0x2        Robberies  2x8           TB row and LPD IR/ER    otb 10x2 otb 10x2  gtb 10x2  gtb 10x3        Ther Ex             pendlums              Wrist AROM              pulleys  5' 4min  4min  4mi  4min        Table slides  Flexion, scaption 2x12 10''x10  10''x10  10''x10  10''x10        Bicep Curls  1# 30x #2 30x  #3 15x2  #4 15x2         Post capsule      10''x10  10''x10                                  Ther Activity                                       Gait Training                                       Modalities

## 2023-01-05 ENCOUNTER — OFFICE VISIT (OUTPATIENT)
Dept: OBGYN CLINIC | Facility: OTHER | Age: 48
End: 2023-01-05

## 2023-01-05 VITALS
HEART RATE: 80 BPM | BODY MASS INDEX: 32.2 KG/M2 | SYSTOLIC BLOOD PRESSURE: 117 MMHG | DIASTOLIC BLOOD PRESSURE: 80 MMHG | WEIGHT: 230 LBS | HEIGHT: 71 IN

## 2023-01-05 DIAGNOSIS — M75.101 TEAR OF RIGHT SUPRASPINATUS TENDON: Primary | ICD-10-CM

## 2023-01-05 DIAGNOSIS — M75.102 TEAR OF LEFT SUPRASPINATUS TENDON: ICD-10-CM

## 2023-01-05 RX ORDER — BUPIVACAINE HYDROCHLORIDE 2.5 MG/ML
2 INJECTION, SOLUTION INFILTRATION; PERINEURAL
Status: COMPLETED | OUTPATIENT
Start: 2023-01-05 | End: 2023-01-05

## 2023-01-05 RX ORDER — BETAMETHASONE SODIUM PHOSPHATE AND BETAMETHASONE ACETATE 3; 3 MG/ML; MG/ML
6 INJECTION, SUSPENSION INTRA-ARTICULAR; INTRALESIONAL; INTRAMUSCULAR; SOFT TISSUE
Status: COMPLETED | OUTPATIENT
Start: 2023-01-05 | End: 2023-01-05

## 2023-01-05 RX ADMIN — BUPIVACAINE HYDROCHLORIDE 2 ML: 2.5 INJECTION, SOLUTION INFILTRATION; PERINEURAL at 08:56

## 2023-01-05 RX ADMIN — BETAMETHASONE SODIUM PHOSPHATE AND BETAMETHASONE ACETATE 6 MG: 3; 3 INJECTION, SUSPENSION INTRA-ARTICULAR; INTRALESIONAL; INTRAMUSCULAR; SOFT TISSUE at 08:56

## 2023-01-05 NOTE — PROGRESS NOTES
Assessment  Diagnoses and all orders for this visit:    Tear of right supraspinatus tendon    Left shoulder pain following rotator cuff repair      Discussion and Plan:    The patient has an MRI and examination consistent with a near full thickness right shoulder RTC tear  I have discussed with the patient the pathophysiology of this diagnosis and reviewed how the examination correlates with this diagnosis  Surgical vs conservative treatment options were discussed at length and after discussing these treatment options, the patient elected for a RIGHT CS injection today as the left shoulder is still bothering him  Recommend patient continue PT for the bilateral shoulders  If LEFT shoulder symptoms persist we may need to consider further imaging  Interestingly the patient was recovering well from his left rotator cuff repair and was functioning almost normally until about 2 weeks ago when he states he woke up and started having pain in his left shoulder  This is concerning to me but hopefully will improve with formal physical therapy, if it does not improve then further imaging to evaluate the integrity of the repair of his left shoulder would be indicated      Subjective:   Patient ID: Leann Valenzuela is a 41 y o  male      HPI  Patient presents today to discuss the findings of his right shoulder MRI  Patient initially injured bilateral shoulders during a race  He underwent left shoulder arthroscopy with rotator cuff repair on 09/09/2022  Patient had physical therapy and CS injection in the RIGHT shoulder without benefit so MRI was ordered  Patient reports he has had increased left shoulder  He denies any new injury       The following portions of the patient's history were reviewed and updated as appropriate: allergies, current medications, past family history, past medical history, past social history, past surgical history and problem list     Review of Systems   Constitutional: Negative for chills and fever  HENT: Negative for drooling and hearing loss  Eyes: Negative for visual disturbance  Respiratory: Negative for cough and shortness of breath  Cardiovascular: Negative for chest pain  Gastrointestinal: Negative for abdominal pain  Skin: Negative for rash  Psychiatric/Behavioral: Negative for agitation  Objective:  /80 (BP Location: Left arm, Patient Position: Sitting, Cuff Size: Adult)   Pulse 80   Ht 5' 11" (1 803 m)   Wt 104 kg (230 lb)   BMI 32 08 kg/m²       Right Shoulder Exam     Tenderness   The patient is experiencing no tenderness  Range of Motion   The patient has normal right shoulder ROM  Muscle Strength   Abduction: 3/5   External rotation: 4/5     Tests   Kimball test: positive  Impingement: positive    Other   Erythema: absent  Sensation: normal  Pulse: present      Left Shoulder Exam     Tenderness   The patient is experiencing no tenderness  Range of Motion   The patient has normal left shoulder ROM  Patient does have pain and weakness with left shoulder abduction testing that was not present last visit    Physical Exam  Vitals reviewed  Constitutional:       Appearance: He is well-developed  HENT:      Head: Normocephalic  Eyes:      Pupils: Pupils are equal, round, and reactive to light  Pulmonary:      Effort: Pulmonary effort is normal    Abdominal:      General: Abdomen is flat  There is no distension  Skin:     General: Skin is warm and dry  Large joint arthrocentesis: R subacromial bursa  Universal Protocol:  Consent: Verbal consent obtained    Consent given by: patient  Patient understanding: patient states understanding of the procedure being performed  Site marked: the operative site was marked  Patient identity confirmed: verbally with patient    Supporting Documentation  Indications: pain   Procedure Details  Location: shoulder - R subacromial bursa  Needle size: 22 G  Ultrasound guidance: no  Approach: lateral  Medications administered: 2 mL bupivacaine 0 25 %; 6 mg betamethasone acetate-betamethasone sodium phosphate 6 (3-3) mg/mL    Patient tolerance: patient tolerated the procedure well with no immediate complications  Dressing:  Sterile dressing applied          I have personally reviewed pertinent films in PACS and my interpretation is as follows    Right shoulder MRI demonstrates high grade near full thickness supraspinatus tear      Scribe Attestation    I,:  Syed Horton am acting as a scribe while in the presence of the attending physician :       I,:  Crystal Javier MD personally performed the services described in this documentation    as scribed in my presence :

## 2023-01-11 ENCOUNTER — EVALUATION (OUTPATIENT)
Dept: PHYSICAL THERAPY | Facility: OTHER | Age: 48
End: 2023-01-11

## 2023-01-11 DIAGNOSIS — G89.29 CHRONIC RIGHT SHOULDER PAIN: Primary | ICD-10-CM

## 2023-01-11 DIAGNOSIS — M25.512 ACUTE PAIN OF LEFT SHOULDER: ICD-10-CM

## 2023-01-11 DIAGNOSIS — M25.511 CHRONIC RIGHT SHOULDER PAIN: Primary | ICD-10-CM

## 2023-01-11 NOTE — PROGRESS NOTES
Daily Note     Today's date: 2023  Patient name: Leann Valenzuela  : 1975  MRN: [id number]  Referring provider: Chantel Camacho  Dx: No diagnosis found  Subjective: responded well to LASER and jt mobilizations  Objective: See treatment diary below      Assessment: Tolerated treatment well  Patient demonstrated fatigue post treatment      Plan: Continue per plan of care            Manuals 11 8 22 11 11 22 11 14  11 16 11 21 1 3 22 1 11 22       Prom PAIN FREE  MJ  GL MJ MJ MJ  MJ MJ                                                 Neuro Re-Ed              pron TI       10x2  [id number]         Wall slide with ER       10x2  10x2        scap punches   2x10 #2 10x2  #3 10x2 Scap abcs  #4 [id number]  Scap ABC  #4 10x2  #4 10x2        S/L ER   2x8 10x2  #2 10x2  #3 10x2  #3 10x2  #3 10x2        ROM ranger  Flexion/Scaption 2x8 10x2  10x2  10x2  1`0x2  1`0x2        Robberies  2x8            Ball into wall       20 each        TB row and LPD IR/ER    otb 10x2 otb 10x2  gtb 10x2  gtb 10x3  Btb 10x3        Ther Ex              pendlums               Wrist AROM               pulleys  5' 4min  4min  4mi  4min  4min        Table slides  Flexion, scaption 2x12 10''x10  10''x10  10''x10  10''x10  10''x10        Bicep Curls  1# 30x #2 30x  #3 15x2  #4 15x2          Post capsule      10''x10  10''x10  10''x10        t-s Ext chair                             Ther Activity                                          Gait Training                                          Modalities

## 2023-01-13 ENCOUNTER — TELEPHONE (OUTPATIENT)
Dept: PSYCHIATRY | Facility: CLINIC | Age: 48
End: 2023-01-13

## 2023-01-13 DIAGNOSIS — E78.1 ESSENTIAL HYPERTRIGLYCERIDEMIA: ICD-10-CM

## 2023-01-13 RX ORDER — FENOFIBRATE 145 MG/1
TABLET, COATED ORAL
Qty: 90 TABLET | Refills: 1 | Status: SHIPPED | OUTPATIENT
Start: 2023-01-13

## 2023-01-13 NOTE — TELEPHONE ENCOUNTER
Received referral from when patient was in the Detox unit  Called to see if he would like to establish care - he refused our services

## 2023-01-18 ENCOUNTER — OFFICE VISIT (OUTPATIENT)
Dept: PHYSICAL THERAPY | Facility: OTHER | Age: 48
End: 2023-01-18

## 2023-01-18 DIAGNOSIS — M25.511 CHRONIC RIGHT SHOULDER PAIN: ICD-10-CM

## 2023-01-18 DIAGNOSIS — M25.512 ACUTE PAIN OF LEFT SHOULDER: Primary | ICD-10-CM

## 2023-01-18 DIAGNOSIS — G89.29 CHRONIC RIGHT SHOULDER PAIN: ICD-10-CM

## 2023-01-18 NOTE — PROGRESS NOTES
Daily Note     Today's date: 2023  Patient name: Terese Kumar  : 1975  MRN: [id number]  Referring provider: Abdullahi Lei  Dx:   Encounter Diagnosis     ICD-10-CM    1  Acute pain of left shoulder  M25 512       2  Chronic right shoulder pain  M25 511     G89 29                      Subjective: pain stiffness at its worst still am he does respond well to the manua hterapy and the exerecises are tolerated well once he is loose  Objective: See treatment diary below      Assessment: Tolerated treatment well  Patient demonstrated fatigue post treatment      Plan: Continue per plan of care            Manuals 11 8 22 11 11 22 11 14  11 16 11 21 1 3 22 1 18 22       Prom PAIN FREE  MJ  GL MJ MJ MJ  MJ MJ       laser       MJ 3min L tessa                                    Neuro Re-Ed              pron TI       10x2  [id number]         Wall slide with ER       10x2  10x2        scap punches   2x10 #2 10x2  #3 10x2 Scap abcs  #4 [id number]  Scap ABC  #4 10x2  #4 10x2        S/L ER   2x8 10x2  #2 10x2  #3 10x2  #3 10x2  #3 10x2        ROM ranger  Flexion/Scaption 2x8 10x2  10x2  10x2  1`0x2  1`0x2        Robberies  2x8            Ball into wall       20 each red        TB row and LPD IR/ER    otb 10x2 otb 10x2  gtb 10x2  gtb 10x3  gtb  10x3        Ther Ex              pendlums               Wrist AROM               pulleys  5' 4min  4min  4mi  4min  4min        Table slides  Flexion, scaption 2x12 10''x10  10''x10  10''x10  10''x10  10''x10        Bicep Curls  1# 30x #2 30x  #3 15x2  #4 15x2          Post capsule      10''x10  10''x10  10''x10        t-s Ext chair                             Ther Activity                                          Gait Training                                          Modalities

## 2023-01-25 ENCOUNTER — TELEMEDICINE (OUTPATIENT)
Dept: INTERNAL MEDICINE CLINIC | Facility: CLINIC | Age: 48
End: 2023-01-25

## 2023-01-25 DIAGNOSIS — R19.8 GASTROINTESTINAL SYMPTOMS: Primary | ICD-10-CM

## 2023-01-25 NOTE — LETTER
January 25, 2023     Patient: Donato Penn  YOB: 1975  Date of Visit: 1/25/2023      To Whom it May Concern:    Tamar Debo is under my professional care  Joanna Marin was seen in my office on 1/25/2023  Joanna Browningstone may return to work on 1/26/23  excused 1/25/23  If you have any questions or concerns, please don't hesitate to call           Sincerely,          VIKTORIYA Villegas        CC: No Recipients

## 2023-01-25 NOTE — PROGRESS NOTES
Virtual Regular Visit    Verification of patient location:    Patient is located in the following state in which I hold an active license [place]      Assessment/Plan:    Problem List Items Addressed This Visit    None  Visit Diagnoses     Gastrointestinal symptoms    -  Primary      The case discussed with patient using patient centered shared decision making  The patient was counseled regarding instructions for management,-- risk factor reductions,-- prognosis,-- impressions,-- risks and benefits of treatment options,-- importance of compliance with treatment  I have reviewed the instructions with the patient, answering all questions to his satisfaction  GI symptoms of unclear etiology  TeleVisit limited by patient's limited participation, provision of info  Not able to examine patient person  He endorses that he is not drinking ETOH  Best case scenario is gastroenteritis r/t viral etiology or contaminated food sources  Advised ED if symptoms progress for r/o hyponatremia-he agrees to same  Work note in chart         Reason for visit is   Chief Complaint   Patient presents with   • Nausea     Started this morning; denies fever   • Virtual Regular Visit        Encounter provider VIKTORIYA Tobar    Provider located at [address] [place] [id number]      Recent Visits  No visits were found meeting these conditions  Showing recent visits within past 7 days and meeting all other requirements  Today's Visits  Date Type Provider Dept   01/25/23 Telemedicine Abbey Tobar today's visits and meeting all other requirements  Future Appointments  No visits were found meeting these conditions  Showing future appointments within next 150 days and meeting all other requirements       The patient was identified by name and date of birth   Terese Kumar was informed that this is a telemedicine visit and that the visit is being conducted through the [employer] platform  He agrees to proceed     My office door was closed  No one else was in the room  He acknowledged consent and understanding of privacy and security of the video platform  The patient has agreed to participate and understands they can discontinue the visit at any time  Patient is aware this is a billable service  Subjective  Keegan Kay is a 41 y o  male who presents with new onset GI upset that started this am   Patient very vague when eliciting HPI  "I have illness  I don't know what else to say"   When I pressed, he admitted to having diarrhea, nausea, abd cramping  Woke up with symptoms  No known sick contacts  He has pasta for dinner last night from [employer]  He denies fever, chills, vomiting, disorientation, dizziness, balance problem, cough, congestion, sore throat  Chart review reveals ETOH dependance history  Was admitted in 12/4/22 for critical hyponatremia and etoh withdrawal  Went to rehab  He denies relapse of etoh use  Last sodium was 139 in 12/2022          HPI     Past Medical History:   Diagnosis Date   • Acute pain of both shoulders 4/20/2022   • Aftercare following surgery of the musculoskeletal system 11/21/2022   • Anxiety    • COVID-19 ruled out 2/3/2022   • GERD (gastroesophageal reflux disease)    • Hyperlipidemia    • Hypertension    • Sore throat 12/6/2022   • Tendinitis    • Thrombocytopenia ([address] ) 12/6/2022   • Urinary urgency 2/27/2018    Overview:  Last Assessment & Plan:  Obtain US       Past Surgical History:   Procedure Laterality Date   • KNEE SURGERY Left 1989    cyst removed   • CO COLONOSCOPY FLX DX W/COLLJ SPEC WHEN PFRMD N/A 5/14/2018    Procedure: COLONOSCOPY;  Surgeon: Lisha Rao DO;  Location: [facility];   Service: General   • CO SURGICAL ARTHROSCOPY SHOULDER W/ROTATOR CUFF RPR Left 9/9/2022    Procedure: SHOULDER ARTHROSCOPIC ROTATOR CUFF REPAIR;  Surgeon: Robson Lopes MD;  Location: [facility];  Service: Orthopedics   • SHOULDER ARTHROSCOPY Right     with biceps tenodesis       Current Outpatient Medications   Medication Sig Dispense Refill   • b complex vitamins capsule Take 1 capsule by mouth daily     • fenofibrate (TRICOR) 145 mg tablet TAKE 1 TABLET BY MOUTH EVERY DAY 90 tablet 1   • folic acid (FOLVITE) 1 mg tablet Take 1 tablet (1 mg total) by mouth daily Do not start before December 9, 2022   0   • irbesartan (AVAPRO) 150 mg tablet TAKE 1 TABLET (150 MG TOTAL) BY MOUTH IN THE MORNING  90 tablet 0   • Multiple Vitamins-Minerals (multivitamin with minerals) tablet Take 1 tablet by mouth daily     • omeprazole (PriLOSEC) 20 mg delayed release capsule TAKE 1 CAPSULE (20 MG TOTAL) BY MOUTH IN THE MORNING  90 capsule 0   • sildenafil (VIAGRA) 100 mg tablet Take 1 tablet (100 mg total) by mouth daily as needed for erectile dysfunction 10 tablet 0     No current facility-administered medications for this visit  Allergies   Allergen Reactions   • Neomycin Hives   • Polymyxin B Hives       Review of Systems   Constitutional: Negative for chills and fever  HENT: Negative for congestion, sinus pain and sore throat  Respiratory: Negative for cough and shortness of breath  Cardiovascular: Negative for chest pain  Gastrointestinal: Positive for abdominal pain, diarrhea and nausea  Genitourinary: Negative for difficulty urinating and dysuria  Neurological: Negative for dizziness, tremors, weakness and headaches  Psychiatric/Behavioral: Negative for confusion  Video Exam    There were no vitals filed for this visit  Physical Exam  Constitutional:       General: He is not in acute distress  Appearance: Normal appearance  He is not ill-appearing  Pulmonary:      Effort: No respiratory distress  Neurological:      Mental Status: He is alert            I spent 10 minutes directly with the patient during this visit

## 2023-01-26 ENCOUNTER — TELEPHONE (OUTPATIENT)
Dept: OTHER | Facility: OTHER | Age: 48
End: 2023-01-26

## 2023-01-26 DIAGNOSIS — R11.0 NAUSEA: Primary | ICD-10-CM

## 2023-01-26 DIAGNOSIS — F10.20 ALCOHOL USE DISORDER, SEVERE, DEPENDENCE (HCC): ICD-10-CM

## 2023-01-26 DIAGNOSIS — R10.84 GENERALIZED ABDOMINAL PAIN: ICD-10-CM

## 2023-01-26 NOTE — TELEPHONE ENCOUNTER
Patient of Esteban Denise is still having symptoms reported yesterday and would like to speak with a clinical staff member

## 2023-01-26 NOTE — LETTER
January 26, 2023     Patient: Zaina Adkins  YOB: 1975  Date of Visit: 1/25/23    To Whom it May Concern:    Tiana Rivers is under my professional care  Tung Burrows was seen in my office on 1/25/23  Tung Burrows  Is excused 1/25-1/27/23  If you have any questions or concerns, please don't hesitate to call           Sincerely,          Benito SADLER      CC: No Recipients

## 2023-01-26 NOTE — TELEPHONE ENCOUNTER
Spoke with pt who states he is still having nausea  His symptoms are not any worse than yesterday, but he is concerned and wondering what to do  Pt is also asking if you are able to write him a note excusing him from work yesterday, today, and tomorrow

## 2023-01-30 NOTE — TELEPHONE ENCOUNTER
Spoke with pt  He states he has been drinking bone broth to keep his sodium levels up  He did not go to the ER because his insurance recently changed and he cannot afford it  He is asking if you are able to write him an updated work excuse from 1/23 - 1/31

## 2023-01-31 ENCOUNTER — OFFICE VISIT (OUTPATIENT)
Dept: INTERNAL MEDICINE CLINIC | Facility: CLINIC | Age: 48
End: 2023-01-31

## 2023-01-31 VITALS
RESPIRATION RATE: 16 BRPM | OXYGEN SATURATION: 98 % | SYSTOLIC BLOOD PRESSURE: 132 MMHG | BODY MASS INDEX: 32.03 KG/M2 | WEIGHT: 228.8 LBS | HEART RATE: 98 BPM | DIASTOLIC BLOOD PRESSURE: 72 MMHG | HEIGHT: 71 IN

## 2023-01-31 DIAGNOSIS — R11.2 NAUSEA AND VOMITING, UNSPECIFIED VOMITING TYPE: Primary | ICD-10-CM

## 2023-01-31 DIAGNOSIS — K52.9 GASTROENTERITIS: ICD-10-CM

## 2023-01-31 DIAGNOSIS — F10.11 HISTORY OF ALCOHOL ABUSE: ICD-10-CM

## 2023-01-31 NOTE — LETTER
January 31, 2023     Patient: Laine Carter  YOB: 1975  Date of Visit: 1/31/2023      To Whom it May Concern:    Molly Mello is under my professional care  Armiryam Snider was seen in my office on 1/31/2023  Yvon Snider may return to work on 1/31/23  Excused 1/23/23-1/30/23  If you have any questions or concerns, please don't hesitate to call           Sincerely,          VIKTORIYA Grewal        CC: No Recipients

## 2023-01-31 NOTE — PROGRESS NOTES
Assessment/Plan:    1  Nausea and vomiting, unspecified vomiting type    2  Gastroenteritis    3  History of alcohol abuse        The case discussed with patient using patient centered shared decision making  The patient was counseled regarding instructions for management,-- risk factor reductions,-- prognosis,-- impressions,-- risks and benefits of treatment options,-- importance of compliance with treatment  I have reviewed the instructions with the patient, answering all questions to his satisfaction  Gastroenteritis resolving  Pt maintains that he has not relapsed (ETOH)  Return to work note provided  He agrees to lab draw when time warrants  Call with relapse if symptoms    There are no Patient Instructions on file for this visit  Return if symptoms worsen or fail to improve  Subjective:      Patient ID: Genie Oneill is a 41 y o  male  Chief Complaint   Patient presents with   • Follow-up     Patient states that he is no longer nauseous but feels "foggy"  He thinks this is sleep related  Patient presents for recheck  Was seen by me last week for suspected gastroenteritis  N/v/d persisted through [facility] improving yesterday  Labs ordered but not drawn  He was actively repleting his electrolytes  Denies dizziness, disorientation, decrease urine, balance problem, tremor  He feels better and plans to return to work today    Pt has h/o alcohol abuse, severe hyponatremia with recent admission for intoxication, rehab  He maintains that he has not had any alcohol since that admission  He is attending [facility] meetings, has a sponsor          The following portions of the patient's history were reviewed and updated as appropriate: allergies, current medications, past family history, past medical history, past social history, past surgical history and problem list     Review of Systems   Constitutional: Positive for fatigue  Negative for fever and unexpected weight change     HENT: Negative for trouble swallowing  Eyes: Negative for visual disturbance  Respiratory: Negative  Cardiovascular: Negative  Gastrointestinal: Negative  Musculoskeletal: Negative for gait problem  Neurological: Negative for dizziness, tremors, syncope, weakness and headaches  Psychiatric/Behavioral: Negative for agitation and confusion  Current Outpatient Medications   Medication Sig Dispense Refill   • b complex vitamins capsule Take 1 capsule by mouth daily     • fenofibrate (TRICOR) 145 mg tablet TAKE 1 TABLET BY MOUTH EVERY DAY 90 tablet 1   • folic acid (FOLVITE) 1 mg tablet Take 1 tablet (1 mg total) by mouth daily Do not start before December 9, 2022   0   • irbesartan (AVAPRO) 150 mg tablet TAKE 1 TABLET (150 MG TOTAL) BY MOUTH IN THE MORNING  90 tablet 0   • Multiple Vitamins-Minerals (multivitamin with minerals) tablet Take 1 tablet by mouth daily     • omeprazole (PriLOSEC) 20 mg delayed release capsule TAKE 1 CAPSULE (20 MG TOTAL) BY MOUTH IN THE MORNING  90 capsule 0   • sildenafil (VIAGRA) 100 mg tablet Take 1 tablet (100 mg total) by mouth daily as needed for erectile dysfunction 10 tablet 0     No current facility-administered medications for this visit  Objective:    /72   Pulse 98   Resp 16   Ht 5' 11" (1 803 m)   Wt 104 kg (228 lb 12 8 oz)   SpO2 98%   BMI 31 91 kg/m²        Physical Exam  Vitals and nursing note reviewed  Constitutional:       General: He is not in acute distress  Appearance: Normal appearance  He is not ill-appearing  Cardiovascular:      Rate and Rhythm: Normal rate and regular rhythm  Pulses: Normal pulses  Pulmonary:      Effort: Pulmonary effort is normal       Breath sounds: Normal breath sounds  Abdominal:      Tenderness: There is no abdominal tenderness  Lymphadenopathy:      Cervical: No cervical adenopathy  Skin:     Coloration: Skin is not pale  Neurological:      General: No focal deficit present        Mental Status: He is alert  Motor: No tremor  Coordination: Coordination is intact     Psychiatric:         Mood and Affect: Mood normal                 Christiano Pacheco

## 2023-02-01 DIAGNOSIS — F10.20 ALCOHOL USE DISORDER, SEVERE, DEPENDENCE (HCC): Primary | ICD-10-CM

## 2023-02-09 ENCOUNTER — OFFICE VISIT (OUTPATIENT)
Dept: PHYSICAL THERAPY | Facility: OTHER | Age: 48
End: 2023-02-09

## 2023-02-09 DIAGNOSIS — M25.511 CHRONIC RIGHT SHOULDER PAIN: Primary | ICD-10-CM

## 2023-02-09 DIAGNOSIS — M25.512 ACUTE PAIN OF LEFT SHOULDER: ICD-10-CM

## 2023-02-09 DIAGNOSIS — G89.29 CHRONIC RIGHT SHOULDER PAIN: Primary | ICD-10-CM

## 2023-02-09 NOTE — PROGRESS NOTES
Daily Note     Today's date: 2023  Patient name: Van Chavis  : 1975  MRN: [id number]  Referring provider: Almaz Weeks  Dx:   No diagnosis found  Subjective: doing better since last visit  Prom imporved     Objective: See treatment diary below      Assessment: Tolerated treatment well  Patient demonstrated fatigue post treatment      Plan: Continue per plan of care  Manuals 11 8 22 11 11 22 11 14  11 16 11 21 1 3 22 1 18 22 2 9 23      Prom PAIN FREE B/L shoulders L shoulder is recent post op    MJ  GL MJ MJ MJ  MJ MJ MJl 3min       laser       MJ 3min L shoudler  Mj                                   Neuro Re-Ed              pron TI       10x2  [id number]   10      Wall slide with ER       10x2  10x2  10x2      scap punches   2x10 #2 10x2  #3 10x2 Scap abcs  #4 [id number]  Scap ABC  #4 10x2  #4 10x2  #4 10x2      S/L ER   2x8 10x2  #2 10x2  #3 10x2  #3 10x2  #3 10x2  #3 10x3      ROM ranger  Flexion/Scaption 2x8 10x2  10x2  10x2  1`0x2  1`0x2        Robberies  2x8            Ball into wall       20 each red  Red 20x      TB row and LPD IR/ER    otb 10x2 otb 10x2  gtb 10x2  gtb 10x3  gtb  10x3  gtb 10x3       Ther Ex              pendlums               Wrist AROM               pulleys  5' 4min  4min  4mi  4min  4min  4min      Table slides  Flexion, scaption 2x12 10''x10  10''x10  10''x10  10''x10  10''x10  10''x10      Bicep Curls  1# 30x #2 30x  #3 15x2  #4 15x2          Post capsule      10''x10  10''x10  10''x10  10''x10      t-s Ext chair                             Ther Activity                                          Gait Training                                          Modalities

## 2023-02-15 ENCOUNTER — DOCUMENTATION (OUTPATIENT)
Dept: PSYCHIATRY | Facility: CLINIC | Age: 48
End: 2023-02-15

## 2023-02-15 NOTE — PROGRESS NOTES
Note Type: Case Management Note                  Date of Service: 02/13/2023  Service:  [facility]    Note: Case Management Note  Anca uCnningham 41 y o  male [id number]  Attending  Substance Use History     Social History     Substance and Sexual Activity   Alcohol Use Yes   • Alcohol/week: 2 0 - 3 0 standard drinks   • Types: 2 - 3 Shots of liquor per week        Social History     Substance and Sexual Activity   Drug Use No       Encounter Type:   Patient Face-to-Face    Start Time 0820  End Time 0848    Recovery needs addressed at this meeting:  Basic  Needs, Physical Health and Recovery Resources    Note  D: Patient presented for in-person, scheduled visit with this CM  This is to be patient's initial visit with this CM  Please utilize this documentation as intake for the [facility] office  Patient and this CM successfully completed BPS during this initial session  Patient was referred by PCP, but was also IP with [facility] detox in December, 2022 for Alcohol abuse  Patient's alcohol hx began at age 17, but patient feels drinking became an issue in the mid 25s when patient began drinking 3-4x per week  Patient would consume both beer and liquor until patient was "blacked out "   Patient did attend detox, and rehab with Jeaneth Choi where patient became sober 02/22/2022  Patient did relapse around Thanksgiving and would drink a pint of vodka daily  Patient's last consumption was stated to be 11/11/2022  Patient is interested in antabuse as patient has had side effects including joint pain from Vivitrol  Patient currently lives with mother who is patient's biggest support but also seems to be enabling to patient's alcoholism  Patient does work full time  Patient attends AA 5x/wk     A: Patient presented to this session with a normal mood, normal affect     Patient and this CM did successfully complete symptom checklist in which patient did not express and symptoms and denied being a danger to self or others  Patient does not feel to be a victim of trauma and/or family trauma  P: Upon completion of BPS, this CM recommends the following:    Patient to establish care with medical toxicology to consider possible MAT for patient    Patient is actively engaged with AA and community resources in which patient does not feel the need for a CRS or CM services     Patient did not request to be seen by Psychiatry or Psychotherapy at this time,     Referrals made  Please see above request    Next appointment date and time  No future appts scheduled with this CM

## 2023-02-16 ENCOUNTER — OFFICE VISIT (OUTPATIENT)
Dept: OTHER | Age: 48
End: 2023-02-16

## 2023-02-16 VITALS
SYSTOLIC BLOOD PRESSURE: 100 MMHG | HEIGHT: 71 IN | BODY MASS INDEX: 32.76 KG/M2 | HEART RATE: 80 BPM | DIASTOLIC BLOOD PRESSURE: 68 MMHG | WEIGHT: 234 LBS

## 2023-02-16 DIAGNOSIS — F10.20 ALCOHOL USE DISORDER, SEVERE, DEPENDENCE (HCC): Primary | ICD-10-CM

## 2023-02-16 RX ORDER — NALTREXONE HYDROCHLORIDE 50 MG/1
50 TABLET, FILM COATED ORAL DAILY
Qty: 30 TABLET | Refills: 0 | Status: SHIPPED | OUTPATIENT
Start: 2023-02-16 | End: 2023-03-18

## 2023-02-16 NOTE — PROGRESS NOTES
[facility]     History and Physical  Mariam Griffith 41 y o  male MRN: [id number]   @ Encounter: [id number]       2  Alcohol use disorder, severe, dependence ([facility] [address] )  Assessment & Plan:  · I thoroughly discussed medication for AUD options including naltrexone, acamprosate, and disulfiram  I answered all of his questions  · He experienced significant muscle/joint aches with naltrexone the first time he received it  I explained to him that some patients do indeed develop that  However, they typically resolve with continued use of the medication  He was not aware of that fact  After further discussion and shared decision making, we decided to retry naltrexone at 25mg daily PO and see how that goes  He will call the office within the next week or 2 to check in  If he develops aches, we will decide at that time whether or not he wants to continue to see if they resolve or stop and try either acamprosate or disulfiram    · I reviewed PDMP and labs  There is no opioid use and recent transaminases are unremarkable  Orders:  -     naltrexone (REVIA) 50 mg tablet; Take 1 tablet (50 mg total) by mouth daily      In addition to the above recommendations, the patient will also be referred to the [facility] Certified Addiction Counselors for additional evaluation and psychotherapy  We will also engage our Certified  for patient support  HPI: Mariam Griffith is a 41y o  year old male who presents with AUD  The patient was first exposed to alcohol at age 17  From then until around age 14, the use was sporadic  By age [age], he was drinking on weekends often to the point of blackout  During college, his alcohol use increased to daily  He left college after 3 semesters  He then started working night shifts 6 days per week  At that time, he drank only on Saturdays, but would drink to blackout those days  At age 26, he started working [employer]   From then until age 30, he drank occasionally in the evenings and every weekend  The weekend use was heavy  At age 30, he suffered a relationship break up  His alcohol use increased at that point to the point where he was drinking up to a fifth of vodka per day  He entered [facility] in 2022  He received Vivitrol prior to discharge there  He did not continue the Vivitrol because of muscle and joint aches  He maintained sobriety until Thanksgi at which point he suffered recurrence of use  He was admitted to our unit at [address] [place] [address] in December  He states he has had no alcohol use since discharge  However, he does describe occasional cravings  He presents today to discuss medication options, specifically disulfiram as he has a friend who has used that successfully  Alcohol History   Preferred alcoholic beverage(s): vodka   Quantity and frequency of alcohol intake: fifth per day at his most   Use of any ethanol substitutes (toxic alcohols): no   Date/Time of last alcohol intake: 2022   Current signs and symptoms of ethanol withdrawal:none     Alcohol Withdrawal History   Previous ethanol withdrawal? yes   Prior inpatient treatment for ethanol withdrawal? yes   Prior outpatient treatment for ethanol withdrawal? no   History of seizures with prior ethanol withdrawal? no     AUD MAT History   Currently on naltrexone? no   Prior treatment with naltrexone? yes   History of other medications for AUD? no     Review of PDMP:     PDMP Review       Value Time User    PDMP Reviewed  Yes 2023  8:35 AM Kell Kumar, DO             Social History     Tobacco Use   Smoking Status Former   • Packs/day: 1 00   • Years: 20 00   • Pack years: 20 00   • Types: Cigarettes   • Start date: 1992   • Quit date: 2013   • Years since quittin 7   Smokeless Tobacco Never        Review of Systems   Constitutional: Negative for chills and fever  HENT: Negative for ear pain and sore throat      Eyes: Negative for pain and visual disturbance  Respiratory: Negative for cough and shortness of breath  Cardiovascular: Negative for chest pain and palpitations  Gastrointestinal: Negative for abdominal pain and vomiting  Genitourinary: Negative for dysuria and hematuria  Musculoskeletal: Negative for arthralgias and back pain  Skin: Negative for color change and rash  Neurological: Negative for seizures and syncope  All other systems reviewed and are negative  ROS     Historical Information      Past Medical History:   Diagnosis Date   • Acute pain of both shoulders 4/20/2022   • Aftercare following surgery of the musculoskeletal system 11/21/2022   • Anxiety    • COVID-19 ruled out 2/3/2022   • GERD (gastroesophageal reflux disease)    • Hyperlipidemia    • Hypertension    • Sore throat 12/6/2022   • Tendinitis    • Thrombocytopenia ([address] ) 12/6/2022   • Urinary urgency 2/27/2018    Overview:  Last Assessment & Plan:  Obtain US        Past Surgical History:   Procedure Laterality Date   • KNEE SURGERY Left 1989    cyst removed   • NH COLONOSCOPY FLX DX W/COLLJ SPEC WHEN PFRMD N/A 5/14/2018    Procedure: COLONOSCOPY;  Surgeon: Caryle Marshal, DO;  Location: [facility];   Service: General   • NH SURGICAL ARTHROSCOPY SHOULDER W/ROTATOR CUFF RPR Left 9/9/2022    Procedure: SHOULDER ARTHROSCOPIC ROTATOR CUFF REPAIR;  Surgeon: Benito Estrada MD;  Location[facility];  Service: Orthopedics   • SHOULDER ARTHROSCOPY Right     with biceps tenodesis        Family History   Problem Relation Age of Onset   • Melanoma Father    • Cancer Father    • Melanoma Brother    • Diabetes Maternal Grandfather    • Stroke Paternal Grandmother    • Hypertension Paternal Grandmother    • Coronary artery disease Paternal Grandmother    • Heart disease Paternal Grandmother    • Hypertension Paternal Grandfather    • Coronary artery disease Paternal Grandfather    • Brain cancer Paternal Grandfather    • Heart disease Paternal Grandfather    • Liver cancer Paternal Uncle         Social History      Marital Status: Single    Occupation:  at [employer]    Patient Pre-hospital Living Situation: stable housing     Communicable diseases: deferred  Has regular health maintenance via PCP                   Allergies   Allergen Reactions   • Neomycin Hives   • Polymyxin B Hives        Prior to Admission medications    Medication Sig Start Date End Date Taking? Authorizing Provider   b complex vitamins capsule Take 1 capsule by mouth daily 12/13/22  Yes Geri Pierre MD   fenofibrate (TRICOR) 145 mg tablet TAKE 1 TABLET BY MOUTH EVERY DAY 1/13/23  Yes Orestes Ford,    folic acid (FOLVITE) 1 mg tablet Take 1 tablet (1 mg total) by mouth daily Do not start before December 9, 2022 12/9/22  Yes Sofia Turcios PA-C   irbesartan (AVAPRO) 150 mg tablet TAKE 1 TABLET (150 MG TOTAL) BY MOUTH IN THE MORNING  11/24/22 2/22/23 Yes Geri Pierre MD   Multiple Vitamins-Minerals (multivitamin with minerals) tablet Take 1 tablet by mouth daily   Yes Historical Provider, MD   naltrexone (REVIA) 50 mg tablet Take 1 tablet (50 mg total) by mouth daily 2/16/23 3/18/23 Yes Tu Rojas DO   omeprazole (PriLOSEC) 20 mg delayed release capsule TAKE 1 CAPSULE (20 MG TOTAL) BY MOUTH IN THE MORNING  11/24/22 2/22/23 Yes Geri Pierre MD   sildenafil (VIAGRA) 100 mg tablet Take 1 tablet (100 mg total) by mouth daily as needed for erectile dysfunction 12/13/22  Yes Geri Pierre MD       naltrexone     Objective      Vitals    Vitals:    02/16/23 0905   BP: 100/68   Pulse: 80       Physical Exam  Vitals reviewed  Constitutional:       General: He is not in acute distress  Appearance: Normal appearance  HENT:      Mouth/Throat:      Mouth: Mucous membranes are moist    Eyes:      Extraocular Movements: Extraocular movements intact  Cardiovascular:      Rate and Rhythm: Normal rate     Pulmonary:      Effort: Pulmonary effort is normal    Musculoskeletal:         General: Normal range of motion  Skin:     General: Skin is warm  Neurological:      General: No focal deficit present  Mental Status: He is alert and oriented to person, place, and time  Coordination: Coordination normal    Psychiatric:         Mood and Affect: Mood normal          Behavior: Behavior normal             COWS Score:          Data:     Lab Results:  Results from last 6 Months   Lab Units 12/08/22  0911   WBC Thousand/uL 6 19   HEMOGLOBIN g/dL 11 6*   HEMATOCRIT % 34 0*   PLATELETS Thousands/uL 149        Results from last 6 Months   Lab Units 12/19/22  0642 12/08/22  0634   POTASSIUM mmol/L 3 9 3 7   CHLORIDE mmol/L 110* 102   CO2 mmol/L 22 24   BUN mg/dL 16 7   CREATININE mg/dL 0 87 0 68*   CALCIUM mg/dL 9 0 9 0   ALBUMIN g/dL  --  3 6   ALK PHOS U/L  --  39*   ALT U/L  --  43   AST U/L  --  40        Hepatitis panel:        HIV results:       TB:        Imaging Studies: I have personally reviewed pertinent reports  EKG, Pathology, and Other Studies: I have personally reviewed pertinent reports  Counseling / Coordination of Care     Total time spent today 45 minutes  Greater than 50% of total time was spent with the patient and / or family counseling and / or coordination of care  ** Please Note: This note may have been constructed using a voice recognition system   **     Anthony Pierce DO

## 2023-02-16 NOTE — ASSESSMENT & PLAN NOTE
· I thoroughly discussed medication for AUD options including naltrexone, acamprosate, and disulfiram  I answered all of his questions  · He experienced significant muscle/joint aches with naltrexone the first time he received it  I explained to him that some patients do indeed develop that  However, they typically resolve with continued use of the medication  He was not aware of that fact  After further discussion and shared decision making, we decided to retry naltrexone at 25mg daily PO and see how that goes  He will call the office within the next week or 2 to check in  If he develops aches, we will decide at that time whether or not he wants to continue to see if they resolve or stop and try either acamprosate or disulfiram    · I reviewed PDMP and labs  There is no opioid use and recent transaminases are unremarkable

## 2023-02-17 ENCOUNTER — OFFICE VISIT (OUTPATIENT)
Dept: PHYSICAL THERAPY | Facility: OTHER | Age: 48
End: 2023-02-17

## 2023-02-17 DIAGNOSIS — G89.29 CHRONIC RIGHT SHOULDER PAIN: ICD-10-CM

## 2023-02-17 DIAGNOSIS — M25.511 CHRONIC RIGHT SHOULDER PAIN: ICD-10-CM

## 2023-02-17 DIAGNOSIS — M25.512 ACUTE PAIN OF LEFT SHOULDER: Primary | ICD-10-CM

## 2023-02-17 NOTE — PROGRESS NOTES
Daily Note     Today's date: 2023  Patient name: Celeste Doll  : 1975  MRN: [id number]  Referring provider: Varun Mckeon  Dx:   Encounter Diagnosis     ICD-10-CM    1  Acute pain of left shoulder  M25 512       2  Chronic right shoulder pain  M25 511     G89 29                      Subjective: doing better since last visit  Prom imporved     Objective: See treatment diary below      Assessment: Tolerated treatment well  Patient demonstrated fatigue post treatment      Plan: Continue per plan of care  Manuals 2  23      Prom PAIN FREE B/L shoulders L shoulder is recent post op    MJl 3min       laser Mj                     Neuro Re-Ed       pball YTI  pball 10x2      Wall walk  Side to side lv 2 band 4x       scap punches  #4  10x2      S/L ER  #3 10x3                    Ball into wall Red 20x      TB row and LPD IR/ER  gtb 10x3       Ther Ex                     pulleys 4min      Table slides 10''x10      Bicep Curls       Post capsule  10''x10      t-s Ext chair               Ther Activity                     Gait Training                     Modalities

## 2023-02-22 ENCOUNTER — APPOINTMENT (OUTPATIENT)
Dept: PHYSICAL THERAPY | Facility: OTHER | Age: 48
End: 2023-02-22

## 2023-03-03 ENCOUNTER — TELEPHONE (OUTPATIENT)
Dept: PSYCHIATRY | Facility: CLINIC | Age: 48
End: 2023-03-03

## 2023-03-03 NOTE — TELEPHONE ENCOUNTER
Patient called [facility] office stating that Dr Mary Ann Gonzalez requested patient to call if any symptoms had began following initiation of oral Naltrexone  Patient advised that extreme joint paint has been so severe since starting the prescription, patient does not feel comfortable continuing medication  This CM stated will forward information to provider to allow for further review and suggestion on additional medication       Patient does have follow up appt with Dr Mary Ann Gonzalez on 03/09/2023

## 2023-03-15 ENCOUNTER — HOSPITAL ENCOUNTER (INPATIENT)
Facility: HOSPITAL | Age: 48
LOS: 4 days | Discharge: HOME | End: 2023-03-19
Attending: INTERNAL MEDICINE | Admitting: EMERGENCY MEDICINE

## 2023-03-15 DIAGNOSIS — F10.10 ALCOHOL ABUSE: ICD-10-CM

## 2023-03-15 DIAGNOSIS — E87.1 HYPONATREMIA: Primary | ICD-10-CM

## 2023-03-15 DIAGNOSIS — F10.20 ALCOHOL USE DISORDER, SEVERE, DEPENDENCE (HCC): ICD-10-CM

## 2023-03-15 DIAGNOSIS — F10.939 ALCOHOL WITHDRAWAL (HCC): ICD-10-CM

## 2023-03-15 DIAGNOSIS — E87.29 HIGH ANION GAP METABOLIC ACIDOSIS: ICD-10-CM

## 2023-03-15 LAB
ALBUMIN SERPL BCP-MCNC: 4.5 G/DL (ref 3.5–5)
ALP SERPL-CCNC: 23 U/L (ref 34–104)
ALT SERPL W P-5'-P-CCNC: 94 U/L (ref 7–52)
ANION GAP SERPL CALCULATED.3IONS-SCNC: 24 MMOL/L (ref 4–13)
ANION GAP SERPL CALCULATED.3IONS-SCNC: 25 MMOL/L (ref 4–13)
AST SERPL W P-5'-P-CCNC: 483 U/L (ref 13–39)
BASOPHILS # BLD MANUAL: 0 THOUSAND/UL (ref 0–0.1)
BASOPHILS NFR MAR MANUAL: 0 % (ref 0–1)
BILIRUB SERPL-MCNC: 1.4 MG/DL (ref 0.2–1)
BUN SERPL-MCNC: 8 MG/DL (ref 5–25)
BUN SERPL-MCNC: 8 MG/DL (ref 5–25)
CALCIUM SERPL-MCNC: 7.9 MG/DL (ref 8.4–10.2)
CALCIUM SERPL-MCNC: 8.1 MG/DL (ref 8.4–10.2)
CHLORIDE SERPL-SCNC: 76 MMOL/L (ref 96–108)
CHLORIDE SERPL-SCNC: 76 MMOL/L (ref 96–108)
CO2 SERPL-SCNC: 11 MMOL/L (ref 21–32)
CO2 SERPL-SCNC: 12 MMOL/L (ref 21–32)
CREAT SERPL-MCNC: 0.56 MG/DL (ref 0.6–1.3)
CREAT SERPL-MCNC: 0.56 MG/DL (ref 0.6–1.3)
EOSINOPHIL # BLD MANUAL: 0 THOUSAND/UL (ref 0–0.4)
EOSINOPHIL NFR BLD MANUAL: 0 % (ref 0–6)
ERYTHROCYTE [DISTWIDTH] IN BLOOD BY AUTOMATED COUNT: 11.9 % (ref 11.6–15.1)
ETHANOL EXG-MCNC: 0.06 MG/DL
GFR SERPL CREATININE-BSD FRML MDRD: 124 ML/MIN/1.73SQ M
GFR SERPL CREATININE-BSD FRML MDRD: 124 ML/MIN/1.73SQ M
GLUCOSE SERPL-MCNC: 112 MG/DL (ref 65–140)
GLUCOSE SERPL-MCNC: 98 MG/DL (ref 65–140)
HCT VFR BLD AUTO: 36.9 % (ref 36.5–49.3)
HGB BLD-MCNC: 13.8 G/DL (ref 12–17)
INR PPP: 0.99 (ref 0.84–1.19)
LIPASE SERPL-CCNC: 121 U/L (ref 11–82)
LYMPHOCYTES # BLD AUTO: 0.51 THOUSAND/UL (ref 0.6–4.47)
LYMPHOCYTES # BLD AUTO: 3 % (ref 14–44)
MAGNESIUM SERPL-MCNC: 1.3 MG/DL (ref 1.9–2.7)
MCH RBC QN AUTO: 31.4 PG (ref 26.8–34.3)
MCHC RBC AUTO-ENTMCNC: 37.4 G/DL (ref 31.4–37.4)
MCV RBC AUTO: 84 FL (ref 82–98)
MONOCYTES # BLD AUTO: 0.84 THOUSAND/UL (ref 0–1.22)
MONOCYTES NFR BLD: 5 % (ref 4–12)
NEUTROPHILS # BLD MANUAL: 15.49 THOUSAND/UL (ref 1.85–7.62)
NEUTS SEG NFR BLD AUTO: 92 % (ref 43–75)
PHOSPHATE SERPL-MCNC: 1.4 MG/DL (ref 2.7–4.5)
PLATELET # BLD AUTO: 181 THOUSANDS/UL (ref 149–390)
PLATELET BLD QL SMEAR: ADEQUATE
PMV BLD AUTO: 9.7 FL (ref 8.9–12.7)
POTASSIUM SERPL-SCNC: 3.4 MMOL/L (ref 3.5–5.3)
POTASSIUM SERPL-SCNC: 3.4 MMOL/L (ref 3.5–5.3)
PROT SERPL-MCNC: 7.1 G/DL (ref 6.4–8.4)
PROTHROMBIN TIME: 13.4 SECONDS (ref 11.6–14.5)
RBC # BLD AUTO: 4.39 MILLION/UL (ref 3.88–5.62)
RBC MORPH BLD: NORMAL
SODIUM SERPL-SCNC: 112 MMOL/L (ref 135–147)
SODIUM SERPL-SCNC: 112 MMOL/L (ref 135–147)
URATE SERPL-MCNC: 5.7 MG/DL (ref 3.5–8.5)
WBC # BLD AUTO: 16.84 THOUSAND/UL (ref 4.31–10.16)

## 2023-03-15 PROCEDURE — HZ2ZZZZ DETOXIFICATION SERVICES FOR SUBSTANCE ABUSE TREATMENT: ICD-10-PCS | Performed by: EMERGENCY MEDICINE

## 2023-03-15 RX ORDER — FOLIC ACID 1 MG/1
1 TABLET ORAL DAILY
Status: DISCONTINUED | OUTPATIENT
Start: 2023-03-16 | End: 2023-03-15

## 2023-03-15 RX ORDER — DEXTROSE AND SODIUM CHLORIDE 5; .9 G/100ML; G/100ML
50 INJECTION, SOLUTION INTRAVENOUS CONTINUOUS
Status: DISCONTINUED | OUTPATIENT
Start: 2023-03-15 | End: 2023-03-16

## 2023-03-15 RX ORDER — MAGNESIUM SULFATE HEPTAHYDRATE 40 MG/ML
2 INJECTION, SOLUTION INTRAVENOUS ONCE
Status: COMPLETED | OUTPATIENT
Start: 2023-03-15 | End: 2023-03-15

## 2023-03-15 RX ORDER — PHENOBARBITAL SODIUM 130 MG/ML
260 INJECTION INTRAMUSCULAR ONCE
Status: COMPLETED | OUTPATIENT
Start: 2023-03-15 | End: 2023-03-15

## 2023-03-15 RX ORDER — POTASSIUM CHLORIDE 20 MEQ/1
20 TABLET, EXTENDED RELEASE ORAL ONCE
Status: COMPLETED | OUTPATIENT
Start: 2023-03-15 | End: 2023-03-15

## 2023-03-15 RX ORDER — LANOLIN ALCOHOL/MO/W.PET/CERES
100 CREAM (GRAM) TOPICAL DAILY
Status: DISCONTINUED | OUTPATIENT
Start: 2023-03-16 | End: 2023-03-15

## 2023-03-15 RX ORDER — POTASSIUM CHLORIDE 750 MG/1
40 TABLET, EXTENDED RELEASE ORAL ONCE
Status: COMPLETED | OUTPATIENT
Start: 2023-03-15 | End: 2023-03-15

## 2023-03-15 RX ORDER — ACETAMINOPHEN 325 MG/1
650 TABLET ORAL EVERY 6 HOURS PRN
Status: DISCONTINUED | OUTPATIENT
Start: 2023-03-15 | End: 2023-03-19 | Stop reason: HOSPADM

## 2023-03-15 RX ORDER — ENOXAPARIN SODIUM 100 MG/ML
40 INJECTION SUBCUTANEOUS DAILY
Status: DISCONTINUED | OUTPATIENT
Start: 2023-03-16 | End: 2023-03-19 | Stop reason: HOSPADM

## 2023-03-15 RX ORDER — LORAZEPAM 2 MG/ML
1 INJECTION INTRAMUSCULAR ONCE
Status: COMPLETED | OUTPATIENT
Start: 2023-03-15 | End: 2023-03-15

## 2023-03-15 RX ORDER — ONDANSETRON 2 MG/ML
4 INJECTION INTRAMUSCULAR; INTRAVENOUS EVERY 6 HOURS PRN
Status: DISCONTINUED | OUTPATIENT
Start: 2023-03-15 | End: 2023-03-19 | Stop reason: HOSPADM

## 2023-03-15 RX ORDER — MAGNESIUM HYDROXIDE/ALUMINUM HYDROXICE/SIMETHICONE 120; 1200; 1200 MG/30ML; MG/30ML; MG/30ML
30 SUSPENSION ORAL EVERY 6 HOURS PRN
Status: DISCONTINUED | OUTPATIENT
Start: 2023-03-15 | End: 2023-03-19 | Stop reason: HOSPADM

## 2023-03-15 RX ORDER — ONDANSETRON 2 MG/ML
4 INJECTION INTRAMUSCULAR; INTRAVENOUS ONCE
Status: COMPLETED | OUTPATIENT
Start: 2023-03-15 | End: 2023-03-15

## 2023-03-15 RX ADMIN — DEXTROSE AND SODIUM CHLORIDE 50 ML/HR: 5; .9 INJECTION, SOLUTION INTRAVENOUS at 22:07

## 2023-03-15 RX ADMIN — POTASSIUM CHLORIDE 40 MEQ: 750 TABLET, EXTENDED RELEASE ORAL at 19:00

## 2023-03-15 RX ADMIN — MAGNESIUM SULFATE HEPTAHYDRATE 2 G: 40 INJECTION, SOLUTION INTRAVENOUS at 21:12

## 2023-03-15 RX ADMIN — MAGNESIUM SULFATE HEPTAHYDRATE 2 G: 40 INJECTION, SOLUTION INTRAVENOUS at 19:09

## 2023-03-15 RX ADMIN — Medication 650 MG: at 21:12

## 2023-03-15 RX ADMIN — DIBASIC SODIUM PHOSPHATE, MONOBASIC POTASSIUM PHOSPHATE AND MONOBASIC SODIUM PHOSPHATE 1 TABLET: 852; 155; 130 TABLET ORAL at 22:38

## 2023-03-15 RX ADMIN — ONDANSETRON 4 MG: 2 INJECTION INTRAMUSCULAR; INTRAVENOUS at 17:13

## 2023-03-15 RX ADMIN — ONDANSETRON 4 MG: 2 INJECTION INTRAMUSCULAR; INTRAVENOUS at 23:16

## 2023-03-15 RX ADMIN — THIAMINE HYDROCHLORIDE 500 MG: 100 INJECTION, SOLUTION INTRAMUSCULAR; INTRAVENOUS at 23:16

## 2023-03-15 RX ADMIN — LORAZEPAM 1 MG: 2 INJECTION INTRAMUSCULAR; INTRAVENOUS at 17:16

## 2023-03-15 RX ADMIN — POTASSIUM CHLORIDE 20 MEQ: 1500 TABLET, EXTENDED RELEASE ORAL at 22:38

## 2023-03-15 RX ADMIN — PHENOBARBITAL SODIUM 260 MG: 130 INJECTION INTRAMUSCULAR at 23:16

## 2023-03-15 RX ADMIN — LORAZEPAM 1 MG: 2 INJECTION INTRAMUSCULAR; INTRAVENOUS at 18:12

## 2023-03-15 NOTE — ED PROVIDER NOTES
History  Chief Complaint   Patient presents with   • Detox Evaluation     Alcohol - yesterday  HPI  41year old man with alcohol use disorder presents to the ED for detox and rehab evaluation  Patient reports he recently relapsed on alcohol, he reports drinking alcohol every day for the last 10 days  Prior to this, he had months of sobriety  He reports he drinks fifth of vodka daily  He denies any other drugs or alcohol  He reports he currently feels unwell, nauseated, has vague abdominal pain and feels anxious  He is here with his mom, who has arranged rehab for him at at rehab  The rehab advised them to be seen in the ED because he has had issues with hyponatremia in the past   His mom reports his hyponatremia was as low as 106 when he required inpatient detox care along with nephrology consult  The patient is here to be medically cleared before entering detox and rehab  The patient denies any other medical complaints or concerns at this time  Prior to Admission Medications   Prescriptions Last Dose Informant Patient Reported? Taking? Multiple Vitamins-Minerals (multivitamin with minerals) tablet   Yes No   Sig: Take 1 tablet by mouth daily   b complex vitamins capsule   No No   Sig: Take 1 capsule by mouth daily   fenofibrate (TRICOR) 145 mg tablet   No No   Sig: TAKE 1 TABLET BY MOUTH EVERY DAY   folic acid (FOLVITE) 1 mg tablet   No No   Sig: Take 1 tablet (1 mg total) by mouth daily Do not start before December 9, 2022  irbesartan (AVAPRO) 150 mg tablet   No No   Sig: TAKE 1 TABLET (150 MG TOTAL) BY MOUTH IN THE MORNING    naltrexone (REVIA) 50 mg tablet   No No   Sig: Take 1 tablet (50 mg total) by mouth daily   omeprazole (PriLOSEC) 20 mg delayed release capsule   No No   Sig: TAKE 1 CAPSULE (20 MG TOTAL) BY MOUTH IN THE MORNING     sildenafil (VIAGRA) 100 mg tablet   No No   Sig: Take 1 tablet (100 mg total) by mouth daily as needed for erectile dysfunction      Facility-Administered Medications: None       Past Medical History:   Diagnosis Date   • Acute pain of both shoulders 4/20/2022   • Aftercare following surgery of the musculoskeletal system 11/21/2022   • Anxiety    • COVID-19 ruled out 2/3/2022   • GERD (gastroesophageal reflux disease)    • Hyperlipidemia    • Hypertension    • Sore throat 12/6/2022   • Tendinitis    • Thrombocytopenia ([address] ) 12/6/2022   • Urinary urgency 2/27/2018    Overview:  Last Assessment & Plan:  Obtain US       Past Surgical History:   Procedure Laterality Date   • KNEE SURGERY Left 1989    cyst removed   • [place] COLONOSCOPY FLX DX W/COLLJ SPEC WHEN PFRMD N/A 5/14/2018    Procedure: COLONOSCOPY;  Surgeon: Akosua Jain DO;  Location: [facility]; Service: General   • [place] SURGICAL ARTHROSCOPY SHOULDER W/ROTATOR CUFF RPR Left 9/9/2022    Procedure: SHOULDER ARTHROSCOPIC ROTATOR CUFF REPAIR;  Surgeon: Aminta Sena MD;  Location: [facility];  Service: Orthopedics   • SHOULDER ARTHROSCOPY Right     with biceps tenodesis       Family History   Problem Relation Age of Onset   • Melanoma Father    • Cancer Father    • Melanoma Brother    • Diabetes Maternal Grandfather    • Stroke Paternal Grandmother    • Hypertension Paternal Grandmother    • Coronary artery disease Paternal Grandmother    • Heart disease Paternal Grandmother    • Hypertension Paternal Grandfather    • Coronary artery disease Paternal Grandfather    • Brain cancer Paternal Grandfather    • Heart disease Paternal Grandfather    • Liver cancer Paternal Uncle      I have reviewed and agree with the history as documented      E-Cigarette/Vaping   • E-Cigarette Use Never User      E-Cigarette/Vaping Substances   • Nicotine No    • THC No    • CBD No    • Flavoring No    • Other No    • Unknown No      Social History     Tobacco Use   • Smoking status: Former     Packs/day: 1 00     Years: 20 00     Pack years: 20 00     Types: Cigarettes     Start date: 5/9/1992     Quit date: 5/9/2013 Years since quittin 8   • Smokeless tobacco: Never   Vaping Use   • Vaping Use: Never used   Substance Use Topics   • Alcohol use: Not Currently     Comment: 50oz vodka   • Drug use: No       Review of Systems   All other systems reviewed and are negative  Physical Exam  Physical Exam   Constitutional:  Well developed, well nourished, anxious  HEENT:  Conjunctiva normal  Oropharynx moist  Respiratory: Appears to be slightly hyperventilating, lungs are to call auscultation bilaterally, no wheezes or rales  Cardiovascular:  Normal rate, normal rhythm, no murmurs  GI:  Truncal obesity nondistended, normal bowel sounds, nontender  :  No costovertebral angle tenderness   Musculoskeletal:  No edema, no tenderness, no deformities     Integument:  Well hydrated, no rash   Lymphatic:  No lymphadenopathy noted   Neurologic:  Alert & oriented, normal motor function, normal sensory function, no focal deficits noted   Psychiatric: Anxious      Vital Signs  ED Triage Vitals [03/15/23 1652]   Temperature Pulse Respirations Blood Pressure SpO2   97 7 °F (36 5 °C) 101 (!) 24 (!) 184/83 98 %      Temp Source Heart Rate Source Patient Position - Orthostatic VS BP Location FiO2 (%)   Oral Monitor Sitting Left arm --      Pain Score       --           Vitals:    03/15/23 1652 03/15/23 1708 03/15/23 1823   BP: (!) 184/83 (!) 184/83 169/74   Pulse: 101 101 100   Patient Position - Orthostatic VS: Sitting  Sitting         Visual Acuity      ED Medications  Medications   thiamine tablet 100 mg (has no administration in time range)   folic acid (FOLVITE) tablet 1 mg (has no administration in time range)   magnesium sulfate 2 g/50 mL IVPB (premix) 2 g (has no administration in time range)   ondansetron (ZOFRAN) injection 4 mg (4 mg Intravenous Given 3/15/23 1713)   LORazepam (ATIVAN) injection 1 mg (1 mg Intravenous Given 3/15/23 171)   LORazepam (ATIVAN) injection 1 mg (1 mg Intravenous Given 3/15/23 1812)   potassium chloride (K-DUR,KLOR-CON) CR tablet 40 mEq (40 mEq Oral Given 3/15/23 1900)       Diagnostic Studies  Results Reviewed     Procedure Component Value Units Date/Time    Uric acid [[id number]]  (Normal) Collected: 03/15/23 1707    Lab Status: Final result Specimen: Blood from Arm, Right Updated: 03/15/23 1854     Uric Acid 5 7 mg/dL     Basic metabolic panel [[id number]]     Lab Status: No result Specimen: Blood     Osmolality, urine [[id number]]     Lab Status: No result Specimen: Urine     Chloride, urine, random [[id number]]     Lab Status: No result Specimen: Urine     Sodium, urine, random [[id number]]     Lab Status: No result Specimen: Urine     Protime-INR [[id number]]  (Normal) Collected: 03/15/23 1817    Lab Status: Final result Specimen: Blood from Arm, Left Updated: 03/15/23 1831     Protime 13 4 seconds      INR 0 99    POCT alcohol breath test [[id number]]  (Normal) Resulted: 03/15/23 1803    Lab Status: Final result Updated: 03/15/23 1804     EXTBreath Alcohol 0 059    Comprehensive metabolic panel [[id number]]  (Abnormal) Collected: 03/15/23 1707    Lab Status: Final result Specimen: Blood from Arm, Right Updated: 03/15/23 1752     Sodium 112 mmol/L      Potassium 3 4 mmol/L      Chloride 76 mmol/L      CO2 11 mmol/L      ANION GAP 25 mmol/L      BUN 8 mg/dL      Creatinine 0 56 mg/dL      Glucose 112 mg/dL      Calcium 7 9 mg/dL       U/L      ALT 94 U/L      Alkaline Phosphatase 23 U/L      Total Protein 7 1 g/dL      Albumin 4 5 g/dL      Total Bilirubin 1 40 mg/dL      eGFR 124 ml/min/1 73sq m     Narrative:      [place] guidelines for Chronic Kidney Disease (CKD):   •  Stage 1 with normal or high GFR (GFR > 90 mL/min/1 73 square meters)  •  Stage 2 Mild CKD (GFR = 60-89 mL/min/1 73 square meters)  •  Stage 3A Moderate CKD (GFR = 45-59 mL/min/1 73 square meters)  •  Stage 3B Moderate CKD (GFR = 30-44 mL/min/1 73 square meters)  •  Stage 4 Severe CKD (GFR = 15-29 mL/min/1 73 square meters)  •  Stage 5 End Stage CKD (GFR <15 mL/min/1 73 square meters)  Note: GFR calculation is accurate only with a steady state creatinine    Lipase [[id number]]  (Abnormal) Collected: 03/15/23 1707    Lab Status: Final result Specimen: Blood from Arm, Right Updated: 03/15/23 1750     Lipase 121 u/L     Magnesium [[id number]]  (Abnormal) Collected: 03/15/23 1707    Lab Status: Final result Specimen: Blood from Arm, Right Updated: 03/15/23 1750     Magnesium 1 3 mg/dL     Manual Differential(PHLEBS Do Not Order) [[id number]]  (Abnormal) Collected: 03/15/23 1707    Lab Status: Final result Specimen: Blood from Arm, Right Updated: 03/15/23 1742     Segmented % 92 %      Lymphocytes % 3 %      Monocytes % 5 %      Eosinophils, % 0 %      Basophils % 0 %      Absolute Neutrophils 15 49 Thousand/uL      Lymphocytes Absolute 0 51 Thousand/uL      Monocytes Absolute 0 84 Thousand/uL      Eosinophils Absolute 0 00 Thousand/uL      Basophils Absolute 0 00 Thousand/uL      Total Counted --     RBC Morphology Normal     Platelet Estimate Adequate    CBC and differential [[id number]]  (Abnormal) Collected: 03/15/23 1707    Lab Status: Final result Specimen: Blood from Arm, Right Updated: 03/15/23 1718     WBC 16 84 Thousand/uL      RBC 4 39 Million/uL      Hemoglobin 13 8 g/dL      Hematocrit 36 9 %      MCV 84 fL      MCH 31 4 pg      MCHC 37 4 g/dL      RDW 11 9 %      MPV 9 7 fL      Platelets 240 Thousands/uL     Narrative: This is an appended report  These results have been appended to a previously verified report                   No orders to display              Procedures  CriticalCare Time    Date/Time: 3/15/2023 7:02 PM  Performed by: Cielo Eubanks MD  Authorized by: Cielo Eubanks MD     Critical care provider statement:     Critical care time (minutes):  33    Critical care was necessary to treat or prevent imminent or life-threatening deterioration of the following conditions:  Metabolic crisis    Critical care was time spent personally by me on the following activities:  Blood draw for specimens, obtaining history from patient or surrogate, development of treatment plan with patient or surrogate, discussions with consultants, examination of patient, evaluation of patient's response to treatment, ordering and performing treatments and interventions and ordering and review of laboratory studies             ED Course  ED Course as of 03/15/23 1903   Wed Mar 15, 2023   1755 Sodium(!!): 112   1755 Anion Gap(!): 25   1756 Magnesium(!): 1 3   1756 AST(!): 483   1756 ALT(!): 94   1756 TOTAL BILIRUBIN(!): 1 40   1759 Lipase(!): 121  Decreased from previous                               SBIRT 20yo+    Flowsheet Row Most Recent Value   SBIRT (25 yo +)    In order to provide better care to our patients, we are screening all of our patients for alcohol and drug use  Would it be okay to ask you these screening questions? Yes Filed at: 03/15/2023 1756   Initial Alcohol Screen: US AUDIT-C     1  How often do you have a drink containing alcohol? 6 Filed at: 03/15/2023 1755   2  How many drinks containing alcohol do you have on a typical day you are drinking? 4 Filed at: 03/15/2023 1755   3a  Male UNDER 65: How often do you have five or more drinks on one occasion? 6 Filed at: 03/15/2023 1755   3b  FEMALE Any Age, or MALE 65+: How often do you have 4 or more drinks on one occassion? 0 Filed at: 03/15/2023 1755   Audit-C Score 16 Filed at: 03/15/2023 1755   Full Alcohol Screen: US AUDIT    4  How often during the last year have you found that you were not able to stop drinking once you had started? 3 Filed at: 03/15/2023 1755   5  How often during past year have you failed to do what was normally expected of you because of drinking? 3 Filed at: 03/15/2023 1755   6  How often in past year have you needed a first drink in the morning to get yourself going after a heavy drinking session? 3 Filed at: 03/15/2023 1755   7   How often in past year have you had feeling of guilt or remorse after drinking? 3 Filed at: 03/15/2023 1755   8  How often in past year have you been unable to remember what happened night before because you had been drinking? 3 Filed at: 03/15/2023 1755   9  Have you or someone else been injured as a result of your drinking? 0 Filed at: 03/15/2023 1755   10  Has a relative, friend, doctor or other health worker been concerned about your drinking and suggested you cut down? 4 Filed at: 03/15/2023 1755   AUDIT Total Score 35 Filed at: 03/15/2023 1755   TRISTA: How many times in the past year have you    Used an illegal drug or used a prescription medication for non-medical reasons? Never Filed at: 03/15/2023 36                    Medical Decision Making  41year old man with alcohol use disorder presents to the ED for detox and rehab evaluation  Patient reports he recently relapsed on alcohol, he reports drinking alcohol every day for the last 10 days  The patient is here to be medically cleared before entering another detox and rehab  Will obtain lab work for medical clearance along with EKG  Will treat symptoms as needed  Patient's labs were significant for multiple metabolic derangements including anion gap acidosis, hypernatremia  His magnesium was very low  He was also found to have elevated LFTs and bilirubin  Nephrology was consulted and recommended fluid restriction, holding of IV fluids, IV magnesium, potassium  Checking BMP every 4 hours  Mentating urine osmolarity studies  Goal of sodium of 118 by tomorrow  Patient is excepted by the detox unit to stepdown 2  Amount and/or Complexity of Data Reviewed  Labs: ordered  Decision-making details documented in ED Course  Risk  OTC drugs  Prescription drug management  Decision regarding hospitalization            Disposition  Final diagnoses:   Hyponatremia   Alcohol withdrawal (HCC)   Alcohol abuse   High anion gap metabolic acidosis     Time reflects when diagnosis was documented in both MDM as applicable and the Disposition within this note     Time User Action Codes Description Comment    3/15/2023  6:29 PM Lazarus Galla Add [E87 1] Hyponatremia     3/15/2023  6:29 PM Lazarus Galla Add [F10 939] Alcohol withdrawal ([address] )     3/15/2023  6:29 PM Lazarus Galla Add [F10 10] Alcohol abuse     3/15/2023  6:30 PM Jered Juli [E87 29] High anion gap metabolic acidosis       ED Disposition     ED Disposition   Admit    Condition   Stable    Date/Time   Wed Mar 15, 2023  6:29 PM    Comment   Case was discussed with Detox and the patient's admission status was agreed to be Admission Status: inpatient status to the service of Dr Mcnamara Prior   Follow-up Information    None         Patient's Medications   Discharge Prescriptions    No medications on file       No discharge procedures on file      PDMP Review       Value Time User    PDMP Reviewed  Yes 3/9/2023  8:46 AM Abhay Mosley, DO          ED Provider  Electronically Signed by           Lico Joseph MD  03/15/23 8755

## 2023-03-15 NOTE — TREATMENT PLAN
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

## 2023-03-16 ENCOUNTER — TELEPHONE (OUTPATIENT)
Dept: PSYCHIATRY | Facility: CLINIC | Age: 48
End: 2023-03-16

## 2023-03-16 PROBLEM — M24.811 INTERNAL DERANGEMENT OF RIGHT SHOULDER: Status: RESOLVED | Noted: 2022-11-28 | Resolved: 2023-03-16

## 2023-03-16 PROBLEM — R74.01 TRANSAMINITIS: Status: ACTIVE | Noted: 2023-03-16

## 2023-03-16 PROBLEM — E83.51 HYPOCALCEMIA: Status: ACTIVE | Noted: 2023-03-16

## 2023-03-16 PROBLEM — R74.8 ELEVATED LIPASE: Status: ACTIVE | Noted: 2023-03-16

## 2023-03-16 PROBLEM — M75.102 TEAR OF LEFT SUPRASPINATUS TENDON: Status: RESOLVED | Noted: 2022-07-18 | Resolved: 2023-03-16

## 2023-03-16 PROBLEM — E83.39 HYPOPHOSPHATEMIA: Status: ACTIVE | Noted: 2023-03-16

## 2023-03-16 LAB
ALBUMIN SERPL BCP-MCNC: 4.1 G/DL (ref 3.5–5)
ALP SERPL-CCNC: 21 U/L (ref 34–104)
ALT SERPL W P-5'-P-CCNC: 88 U/L (ref 7–52)
ANION GAP SERPL CALCULATED.3IONS-SCNC: 11 MMOL/L (ref 4–13)
ANION GAP SERPL CALCULATED.3IONS-SCNC: 12 MMOL/L (ref 4–13)
ANION GAP SERPL CALCULATED.3IONS-SCNC: 13 MMOL/L (ref 4–13)
ANION GAP SERPL CALCULATED.3IONS-SCNC: 14 MMOL/L (ref 4–13)
ANION GAP SERPL CALCULATED.3IONS-SCNC: 15 MMOL/L (ref 4–13)
ANION GAP SERPL CALCULATED.3IONS-SCNC: 19 MMOL/L (ref 4–13)
AST SERPL W P-5'-P-CCNC: 423 U/L (ref 13–39)
ATRIAL RATE: 104 BPM
ATRIAL RATE: 92 BPM
ATRIAL RATE: 98 BPM
BILIRUB DIRECT SERPL-MCNC: 0.46 MG/DL (ref 0–0.2)
BILIRUB SERPL-MCNC: 1.72 MG/DL (ref 0.2–1)
BUN SERPL-MCNC: 6 MG/DL (ref 5–25)
BUN SERPL-MCNC: 7 MG/DL (ref 5–25)
BUN SERPL-MCNC: 8 MG/DL (ref 5–25)
CALCIUM SERPL-MCNC: 8 MG/DL (ref 8.4–10.2)
CALCIUM SERPL-MCNC: 8.3 MG/DL (ref 8.4–10.2)
CALCIUM SERPL-MCNC: 8.4 MG/DL (ref 8.4–10.2)
CALCIUM SERPL-MCNC: 8.8 MG/DL (ref 8.4–10.2)
CALCIUM SERPL-MCNC: 8.9 MG/DL (ref 8.4–10.2)
CALCIUM SERPL-MCNC: 9.1 MG/DL (ref 8.4–10.2)
CHLORIDE SERPL-SCNC: 78 MMOL/L (ref 96–108)
CHLORIDE SERPL-SCNC: 80 MMOL/L (ref 96–108)
CHLORIDE SERPL-SCNC: 80 MMOL/L (ref 96–108)
CHLORIDE SERPL-SCNC: 81 MMOL/L (ref 96–108)
CHLORIDE SERPL-SCNC: 84 MMOL/L (ref 96–108)
CHLORIDE SERPL-SCNC: 86 MMOL/L (ref 96–108)
CHLORIDE UR-SCNC: 48 MMOL/L
CHOLEST SERPL-MCNC: 188 MG/DL
CO2 SERPL-SCNC: 15 MMOL/L (ref 21–32)
CO2 SERPL-SCNC: 17 MMOL/L (ref 21–32)
CO2 SERPL-SCNC: 18 MMOL/L (ref 21–32)
CO2 SERPL-SCNC: 19 MMOL/L (ref 21–32)
CO2 SERPL-SCNC: 20 MMOL/L (ref 21–32)
CO2 SERPL-SCNC: 21 MMOL/L (ref 21–32)
CREAT SERPL-MCNC: 0.61 MG/DL (ref 0.6–1.3)
CREAT SERPL-MCNC: 0.62 MG/DL (ref 0.6–1.3)
CREAT SERPL-MCNC: 0.7 MG/DL (ref 0.6–1.3)
CREAT SERPL-MCNC: 0.73 MG/DL (ref 0.6–1.3)
CREAT SERPL-MCNC: 0.84 MG/DL (ref 0.6–1.3)
CREAT SERPL-MCNC: 0.88 MG/DL (ref 0.6–1.3)
ERYTHROCYTE [DISTWIDTH] IN BLOOD BY AUTOMATED COUNT: 11.9 % (ref 11.6–15.1)
EST. AVERAGE GLUCOSE BLD GHB EST-MCNC: 108 MG/DL
GFR SERPL CREATININE-BSD FRML MDRD: 103 ML/MIN/1.73SQ M
GFR SERPL CREATININE-BSD FRML MDRD: 105 ML/MIN/1.73SQ M
GFR SERPL CREATININE-BSD FRML MDRD: 112 ML/MIN/1.73SQ M
GFR SERPL CREATININE-BSD FRML MDRD: 113 ML/MIN/1.73SQ M
GFR SERPL CREATININE-BSD FRML MDRD: 119 ML/MIN/1.73SQ M
GFR SERPL CREATININE-BSD FRML MDRD: 120 ML/MIN/1.73SQ M
GLUCOSE SERPL-MCNC: 107 MG/DL (ref 65–140)
GLUCOSE SERPL-MCNC: 115 MG/DL (ref 65–140)
GLUCOSE SERPL-MCNC: 115 MG/DL (ref 65–140)
GLUCOSE SERPL-MCNC: 126 MG/DL (ref 65–140)
GLUCOSE SERPL-MCNC: 142 MG/DL (ref 65–140)
GLUCOSE SERPL-MCNC: 202 MG/DL (ref 65–140)
HBA1C MFR BLD: 5.4 %
HCT VFR BLD AUTO: 34 % (ref 36.5–49.3)
HDLC SERPL-MCNC: 61 MG/DL
HGB BLD-MCNC: 12.5 G/DL (ref 12–17)
LDLC SERPL CALC-MCNC: 78 MG/DL (ref 0–100)
LIPASE SERPL-CCNC: 155 U/L (ref 11–82)
LIPASE SERPL-CCNC: 171 U/L (ref 11–82)
MAGNESIUM SERPL-MCNC: 2 MG/DL (ref 1.9–2.7)
MAGNESIUM SERPL-MCNC: 2.4 MG/DL (ref 1.9–2.7)
MCH RBC QN AUTO: 31.2 PG (ref 26.8–34.3)
MCHC RBC AUTO-ENTMCNC: 36.8 G/DL (ref 31.4–37.4)
MCV RBC AUTO: 85 FL (ref 82–98)
OSMOLALITY UR/SERPL-RTO: 240 MMOL/KG (ref 282–298)
OSMOLALITY UR: 885 MMOL/KG
OSMOLALITY UR: 887 MMOL/KG
P AXIS: 27 DEGREES
P AXIS: 34 DEGREES
P AXIS: 36 DEGREES
PHOSPHATE SERPL-MCNC: <1 MG/DL (ref 2.7–4.5)
PLATELET # BLD AUTO: 118 THOUSANDS/UL (ref 149–390)
PMV BLD AUTO: 9.5 FL (ref 8.9–12.7)
POTASSIUM SERPL-SCNC: 3.4 MMOL/L (ref 3.5–5.3)
POTASSIUM SERPL-SCNC: 3.6 MMOL/L (ref 3.5–5.3)
POTASSIUM SERPL-SCNC: 3.8 MMOL/L (ref 3.5–5.3)
POTASSIUM SERPL-SCNC: 4 MMOL/L (ref 3.5–5.3)
PR INTERVAL: 146 MS
PR INTERVAL: 148 MS
PR INTERVAL: 150 MS
PROT SERPL-MCNC: 6.5 G/DL (ref 6.4–8.4)
QRS AXIS: 6 DEGREES
QRS AXIS: 8 DEGREES
QRS AXIS: 9 DEGREES
QRSD INTERVAL: 90 MS
QRSD INTERVAL: 92 MS
QRSD INTERVAL: 94 MS
QT INTERVAL: 370 MS
QT INTERVAL: 392 MS
QT INTERVAL: 406 MS
QTC INTERVAL: 472 MS
QTC INTERVAL: 502 MS
QTC INTERVAL: 515 MS
RBC # BLD AUTO: 4.01 MILLION/UL (ref 3.88–5.62)
SODIUM 24H UR-SCNC: 26 MOL/L
SODIUM SERPL-SCNC: 111 MMOL/L (ref 135–147)
SODIUM SERPL-SCNC: 112 MMOL/L (ref 135–147)
SODIUM SERPL-SCNC: 112 MMOL/L (ref 135–147)
SODIUM SERPL-SCNC: 113 MMOL/L (ref 135–147)
SODIUM SERPL-SCNC: 116 MMOL/L (ref 135–147)
SODIUM SERPL-SCNC: 119 MMOL/L (ref 135–147)
T WAVE AXIS: -4 DEGREES
T WAVE AXIS: 14 DEGREES
T WAVE AXIS: 18 DEGREES
TRIGL SERPL-MCNC: 246 MG/DL
TSH SERPL DL<=0.05 MIU/L-ACNC: 2.86 UIU/ML (ref 0.45–4.5)
VENTRICULAR RATE: 104 BPM
VENTRICULAR RATE: 92 BPM
VENTRICULAR RATE: 98 BPM
WBC # BLD AUTO: 9.41 THOUSAND/UL (ref 4.31–10.16)

## 2023-03-16 RX ORDER — PHENOBARBITAL SODIUM 130 MG/ML
260 INJECTION INTRAMUSCULAR ONCE
Status: COMPLETED | OUTPATIENT
Start: 2023-03-16 | End: 2023-03-16

## 2023-03-16 RX ORDER — SODIUM CHLORIDE 9 MG/ML
60 INJECTION, SOLUTION INTRAVENOUS CONTINUOUS
Status: DISCONTINUED | OUTPATIENT
Start: 2023-03-16 | End: 2023-03-16

## 2023-03-16 RX ORDER — PHENOBARBITAL SODIUM 130 MG/ML
130 INJECTION INTRAMUSCULAR ONCE
Status: COMPLETED | OUTPATIENT
Start: 2023-03-16 | End: 2023-03-16

## 2023-03-16 RX ORDER — LOSARTAN POTASSIUM 50 MG/1
100 TABLET ORAL DAILY
Status: DISCONTINUED | OUTPATIENT
Start: 2023-03-16 | End: 2023-03-16

## 2023-03-16 RX ORDER — POTASSIUM CHLORIDE 20 MEQ/1
40 TABLET, EXTENDED RELEASE ORAL ONCE
Status: COMPLETED | OUTPATIENT
Start: 2023-03-16 | End: 2023-03-16

## 2023-03-16 RX ORDER — LOSARTAN POTASSIUM 50 MG/1
100 TABLET ORAL DAILY
Status: DISCONTINUED | OUTPATIENT
Start: 2023-03-17 | End: 2023-03-16

## 2023-03-16 RX ORDER — CALCIUM GLUCONATE 20 MG/ML
2 INJECTION, SOLUTION INTRAVENOUS ONCE
Status: COMPLETED | OUTPATIENT
Start: 2023-03-16 | End: 2023-03-16

## 2023-03-16 RX ORDER — LANOLIN ALCOHOL/MO/W.PET/CERES
6 CREAM (GRAM) TOPICAL
Status: DISCONTINUED | OUTPATIENT
Start: 2023-03-16 | End: 2023-03-19 | Stop reason: HOSPADM

## 2023-03-16 RX ORDER — PANTOPRAZOLE SODIUM 40 MG/1
40 TABLET, DELAYED RELEASE ORAL
Status: DISCONTINUED | OUTPATIENT
Start: 2023-03-16 | End: 2023-03-19 | Stop reason: HOSPADM

## 2023-03-16 RX ORDER — VITAMIN B COMPLEX
1 CAPSULE ORAL DAILY
Status: DISCONTINUED | OUTPATIENT
Start: 2023-03-16 | End: 2023-03-16 | Stop reason: RX

## 2023-03-16 RX ORDER — LOSARTAN POTASSIUM 50 MG/1
100 TABLET ORAL DAILY
Status: DISCONTINUED | OUTPATIENT
Start: 2023-03-16 | End: 2023-03-19 | Stop reason: HOSPADM

## 2023-03-16 RX ORDER — DEXTROSE MONOHYDRATE 50 MG/ML
150 INJECTION, SOLUTION INTRAVENOUS CONTINUOUS
Status: DISCONTINUED | OUTPATIENT
Start: 2023-03-16 | End: 2023-03-18

## 2023-03-16 RX ORDER — PHENOBARBITAL 64.8 MG/1
64.8 TABLET ORAL ONCE
Status: COMPLETED | OUTPATIENT
Start: 2023-03-16 | End: 2023-03-16

## 2023-03-16 RX ORDER — KETOROLAC TROMETHAMINE 30 MG/ML
30 INJECTION, SOLUTION INTRAMUSCULAR; INTRAVENOUS EVERY 6 HOURS PRN
Status: DISCONTINUED | OUTPATIENT
Start: 2023-03-16 | End: 2023-03-16

## 2023-03-16 RX ORDER — DIAZEPAM 5 MG/ML
10 INJECTION, SOLUTION INTRAMUSCULAR; INTRAVENOUS ONCE
Status: COMPLETED | OUTPATIENT
Start: 2023-03-16 | End: 2023-03-16

## 2023-03-16 RX ORDER — FENOFIBRATE 145 MG/1
145 TABLET, COATED ORAL DAILY
Status: DISCONTINUED | OUTPATIENT
Start: 2023-03-16 | End: 2023-03-19 | Stop reason: HOSPADM

## 2023-03-16 RX ORDER — SODIUM BICARBONATE 650 MG/1
1300 TABLET ORAL
Status: DISCONTINUED | OUTPATIENT
Start: 2023-03-16 | End: 2023-03-18

## 2023-03-16 RX ADMIN — PHENOBARBITAL 64.8 MG: 64.8 TABLET ORAL at 12:30

## 2023-03-16 RX ADMIN — CALCIUM GLUCONATE 2 G: 20 INJECTION, SOLUTION INTRAVENOUS at 10:15

## 2023-03-16 RX ADMIN — ACETAMINOPHEN 650 MG: 325 TABLET ORAL at 20:13

## 2023-03-16 RX ADMIN — DIBASIC SODIUM PHOSPHATE, MONOBASIC POTASSIUM PHOSPHATE AND MONOBASIC SODIUM PHOSPHATE 1 TABLET: 852; 155; 130 TABLET ORAL at 07:40

## 2023-03-16 RX ADMIN — THIAMINE HYDROCHLORIDE 500 MG: 100 INJECTION, SOLUTION INTRAMUSCULAR; INTRAVENOUS at 13:13

## 2023-03-16 RX ADMIN — POTASSIUM PHOSPHATE, MONOBASIC AND POTASSIUM PHOSPHATE, DIBASIC 30 MMOL: 224; 236 INJECTION, SOLUTION, CONCENTRATE INTRAVENOUS at 13:03

## 2023-03-16 RX ADMIN — SODIUM BICARBONATE 1300 MG: 650 TABLET ORAL at 12:30

## 2023-03-16 RX ADMIN — FENOFIBRATE 145 MG: 145 TABLET, FILM COATED ORAL at 08:24

## 2023-03-16 RX ADMIN — SODIUM CHLORIDE 100 ML/HR: 0.9 INJECTION, SOLUTION INTRAVENOUS at 12:23

## 2023-03-16 RX ADMIN — DIBASIC SODIUM PHOSPHATE, MONOBASIC POTASSIUM PHOSPHATE AND MONOBASIC SODIUM PHOSPHATE 1 TABLET: 852; 155; 130 TABLET ORAL at 16:30

## 2023-03-16 RX ADMIN — SODIUM BICARBONATE 1300 MG: 650 TABLET ORAL at 16:30

## 2023-03-16 RX ADMIN — LOSARTAN POTASSIUM 100 MG: 50 TABLET, FILM COATED ORAL at 08:24

## 2023-03-16 RX ADMIN — ENOXAPARIN SODIUM 40 MG: 40 INJECTION SUBCUTANEOUS at 08:24

## 2023-03-16 RX ADMIN — FOLIC ACID 1 MG: 5 INJECTION, SOLUTION INTRAMUSCULAR; INTRAVENOUS; SUBCUTANEOUS at 08:29

## 2023-03-16 RX ADMIN — PANTOPRAZOLE SODIUM 40 MG: 40 TABLET, DELAYED RELEASE ORAL at 05:24

## 2023-03-16 RX ADMIN — MULTIPLE VITAMINS W/ MINERALS TAB 1 TABLET: TAB ORAL at 08:24

## 2023-03-16 RX ADMIN — THIAMINE HYDROCHLORIDE 500 MG: 100 INJECTION, SOLUTION INTRAMUSCULAR; INTRAVENOUS at 05:24

## 2023-03-16 RX ADMIN — THIAMINE HYDROCHLORIDE 500 MG: 100 INJECTION, SOLUTION INTRAMUSCULAR; INTRAVENOUS at 21:44

## 2023-03-16 RX ADMIN — DEXTROSE 50 ML/HR: 5 SOLUTION INTRAVENOUS at 21:43

## 2023-03-16 RX ADMIN — DIAZEPAM 10 MG: 5 INJECTION INTRAMUSCULAR; INTRAVENOUS at 00:22

## 2023-03-16 RX ADMIN — PHENOBARBITAL 64.8 MG: 64.8 TABLET ORAL at 10:19

## 2023-03-16 RX ADMIN — PHENOBARBITAL SODIUM 130 MG: 130 INJECTION INTRAMUSCULAR at 00:22

## 2023-03-16 RX ADMIN — DIBASIC SODIUM PHOSPHATE, MONOBASIC POTASSIUM PHOSPHATE AND MONOBASIC SODIUM PHOSPHATE 1 TABLET: 852; 155; 130 TABLET ORAL at 12:34

## 2023-03-16 RX ADMIN — DIBASIC SODIUM PHOSPHATE, MONOBASIC POTASSIUM PHOSPHATE AND MONOBASIC SODIUM PHOSPHATE 1 TABLET: 852; 155; 130 TABLET ORAL at 21:44

## 2023-03-16 RX ADMIN — PHENOBARBITAL SODIUM 260 MG: 130 INJECTION INTRAMUSCULAR at 01:18

## 2023-03-16 RX ADMIN — Medication 6 MG: at 02:58

## 2023-03-16 RX ADMIN — PHENOBARBITAL SODIUM 260 MG: 130 INJECTION INTRAMUSCULAR at 02:53

## 2023-03-16 RX ADMIN — CALCIUM GLUCONATE 2 G: 20 INJECTION, SOLUTION INTRAVENOUS at 04:27

## 2023-03-16 RX ADMIN — POTASSIUM CHLORIDE 40 MEQ: 1500 TABLET, EXTENDED RELEASE ORAL at 12:30

## 2023-03-16 NOTE — PROGRESS NOTES
03/16/23 1201   Referral Data   Referral Source Other (Comment)   Referral Name [facility] ED   Referral Reason Drug/Alcohol [address] 72   Readmission Root Cause   30 Day Readmission No   Patient Information   Mental Status Alert   Primary Caregiver Self   Support System Immediate family   Legal Information   Legal Issues Denies   Activities of Daily Living Prior to Admission   Functional Status Independent   Assistive Device No device needed   Living Arrangement Lives with someone  (Pt resides with his mother)   Ambulation Independent   Access to Firearms   Access to Firearms No   Income [address] Employed  (Pt is currently employed at [employer])   [facility] of Transport to [employer]: Drives Self       Worker completed assessment  Worker explained role of case management  Worker confirmed name, address and phone number  Pt presented to [facility] ED for alcohol withdrawal, was transferred to  for withdrawal management  Pt was previously on  in December 2022, was scheduled to follow-up with [facility], missed last 2 appointments  Pt reports he relapsed 10 days ago, was drinking 1/5 of vodka daily  Pt reports he was taking naltrexone but reports he had bodyaches from it and stopped taking it  Pt is interested in another MAT pill  Pt reports first age of use 17  Pt reports binge drinking on and off since the age of 27  Pt reports longest period of time sober is 1 year  Pt reports blackouts  Pt denies any street drugs  Pt reports being in inpatient rehab twice within the last year, last was a few months ago at [place] [facility]  Pt at this time plans to return to [facility] office for MAT  AUDIT:16  PAWSS: 1  Alcohol:none done  UDS: none done     Pt is currently single, has no children  Pt lives alone with his cat at [address], [address]  Pt is currently employed at [employer]    Pt's highest level of education is HS diploma       Pt denies any legal issues       Pt denies any inpatient psychiatric hospitalizations  Pt denies any outpatient psychiatric services  Pt reports previous suicide attempt many years ago  Pt denies any family hx of mental health  Pt denies any abuse  Pt reports his grandparents  and his uncle  of liver cancer       Pt reports medical issues of HTN, PCP is [facility]  Pt reports having health insurance of [address], signed GEORGIE  Pt signed GEORGIE for his mother Lizandro Lopes, worker spoke with pt's mother and provided her with update  Pt's mother reports that pt was initially going to go [facility] for rehab but needed to be medically cleared prior to going  At this time pt not willing to go to [facility] due to changing jobs within the next 2 weeks  Relapse prevention plan was completed  Pt was not able to identify his warning signs  Pt reported coping skills such as AA but does not appear to utilize the necessary resources prior to relapsing  Pt's mother is his primary support  Clinical impression, pt was calm and cooperative  Pt does appear flat and vague regarding his alcohol use pattern  Pt would benefit from attending inpatient rehab to identify his warning signs  Pt does not appear to have any motivation towards his recovery as he does not contact his sponsor prior to relapse  Pt appears to have limited insight into his alcohol use  Pt at this time will follow-up with AA and [facility]  Pt is in the contemplation stage of change

## 2023-03-16 NOTE — ASSESSMENT & PLAN NOTE
Recent Labs     03/17/23  1241 03/17/23  1752 03/18/23  0014 03/18/23  0557   CALCIUM 9 [phone] 1     · Continue to monitor and optimize as needed

## 2023-03-16 NOTE — ASSESSMENT & PLAN NOTE
· K 3 4 on admission  · Mag 1 4  · Likely 2/2 poor oral intake in the setting of excessive alcohol use  · Repleted with KCl 40 mEq PO in the ED + 20 mEq PO upon arrival to the unit  · Continue monitoring and replete electrolytes as indicated

## 2023-03-16 NOTE — ASSESSMENT & PLAN NOTE
· Patient with a h/o HLD  · On Tricor 145 mg daily as home medication  · Repeat lipid panel pending  · Continue Tricor

## 2023-03-16 NOTE — PROGRESS NOTES
PROGRESS NOTE  DEPARTMENT OF MEDICAL TOXICOLOGY  LEVEL 4 MEDICAL DETOX UNIT  Boris Pap 41 y o  male MRN: [id number]  Unit/Bed#: 5T DETOX 505-01 Encounter: [id number]      Reason for Admission/Principal Problem: Alcohol withdrawal, hyponatremia    Rounding Provider: Jose Roberto Krueger MD  Attending Provider: Jose Roberto Krueger*   3/15/2023  4:43 PM     * Alcohol withdrawal syndrome with complication, with unspecified complication ([facility] )  Assessment & Plan  · Continue SEWS protocol with symptom-triggered, as needed phenobarbital    Alcohol use disorder, severe, dependence ([facility] )  Assessment & Plan  · Withdrawal management as above  · Continue vitamin supplementation including high dose thiamine  · Case management consulted for disposition planning  · Patient desires inpatient rehabilitation but recognizes starting a new job may be a barrier to treatment  · Interested in [address] and follows at [facility]; will discuss when clinical course is improving    Hyponatremia  Assessment & Plan  Recent Labs     03/15/23  1707 03/15/23  1909 03/15/23  2358 03/16/23  0434 03/16/23  0857   SODIUM 112* 112* 112* 113* 111*     · Suspect secondary to volume depletion and beer potomania  · Sodium worse this morning; discussed with nephrology--appreciate co-management and recommendations  · Will initiate NS at 100 mL/hr  · Fluid restriction per nephrology  · Continue to monitor and optimize electrolytes  · Urine studies and a m  cortisol pending  · Continue BMP every 4 hours    Hypokalemia  Assessment & Plan  Recent Labs     03/15/23  2358 03/16/23  0434 03/16/23  0857   K 3 8 3 6 3 4*     · Replace and monitor    Hypomagnesemia  Assessment & Plan  Recent Labs     03/15/23  1707 03/15/23  2358 03/16/23  0434   MG 1 3* 2 4 2 0       · Improved  · Continue to monitor and optimize as needed    Hypophosphatemia  Assessment & Plan  Recent Labs     03/15/23  1909 03/16/23  0434   PHOS 1 4* <1 0*     · Replace and continue to monitor and optimize    Alcoholic ketoacidosis  Assessment & Plan  Recent Labs     03/15/23  1707 03/15/23  1909 03/15/23  2358 03/16/23  0434 03/16/23  0857   CO2 11* 12* 15* 18* 17*     · Metabolic acidosis and anion gap continuing to improve  · Continue high dose thiamine  · Encourage PO intake as tolerated  · Nephrology initiated sodium bicarbonate tablets  · Continue to monitor    Hypocalcemia  Assessment & Plan  Recent Labs     03/15/23  1909 03/15/23  2358 03/16/23  0434 03/16/23  0857   CALCIUM 8 1* 8 3* 8 0* 8 4   ·   · Continue to monitor and optimize as needed    Prolonged Q-T interval on ECG  Assessment & Plan  · Improving  · Continue to optimize electrolytes  · Continue cardiac telemetry    Leukocytosis  Assessment & Plan  Recent Labs     03/15/23  1707 03/16/23  0434   WBC 16 84* 9 41   •   • Afebrile, no infectious symptoms  • Will monitor    Ambulatory dysfunction  Assessment & Plan  · Continue high dose thiamine  · PT consult    Elevated lipase  Assessment & Plan  Recent Labs     03/15/23  1707 03/15/23  2358 03/16/23  0434   LIPASE 121* 155* 171*       · Denies continued nausea/vomiting  · Tolerating small amounts of PO intake  · Advance diet as tolerated    Transaminitis  Assessment & Plan  Recent Labs     03/15/23  1707 03/16/23  0434   * 423*     Recent Labs     03/15/23  1707 03/16/23  0434   ALT 94* 88*     · Suspect secondary to alcohol use  · Improving  · Will continue to monitor    Benign essential hypertension  Assessment & Plan  Home medication restarted by nephrology with hold parameters  Continue to monitor during withdrawal management    Hyperlipidemia  Assessment & Plan  · Continue home fenofibrate    Esophageal reflux  Assessment & Plan  · Continue PPI  · Suspect acute exacerbation in setting of recent alcohol intake with possible gastritis  · Outpatient follow up    Obesity (BMI 30-39  9)  Assessment & Plan  · Body mass index is 33 52 kg/m²     · Hbg A1C 5 4  · Recommend healthy diet, lifestyle modifications, alcohol cessation    Internal derangement of right shoulder-resolved as of 3/16/2023  Assessment & Plan  • Pt follows with orthopedics and OP PT for chronic R shoulder pain   • MRI R shoulder 12/19/22 showed:   • "Moderate supraspinatus and infraspinatus tendinosis  • High-grade partial-thickness articular surface tear of the distal supraspinatus tendon as above  No full-thickness rotator cuff tear, tendon retraction, or significant muscular atrophy  • Posterior labral tear  • Moderate acromioclavicular joint osteoarthrosis "  • Received R CS injection at follow-up office visit with ortho 01/05/23  • Currently pt does not have any acute R shoulder complaints, denies any focal neuro sx; RUE neurovascularly intact on exam  • Supportive care  • Recommend continued OP ortho and PT follow-up    Tear of left supraspinatus tendon-resolved as of 3/16/2023  Assessment & Plan  • Pt has a h/o L supraspinatus tendon tear, status post-op L shoulder arthroscopy and rotator cuff repair 9/9/22  • He notes some continued L shoulder pain after surgery, denies any acute worsening, N/T, or upper extremity weakness  • Per chart review, at last OP f/u with ortho, recommended continued OP PT and if pain does not improve, they will consider further imaging  • Supportive care, pain control  • Continue OP f/u with PT and ortho      VTE Pharmacologic Prophylaxis:   Pharmacologic: Enoxaparin (Lovenox)  Mechanical VTE Prophylaxis in Place: yes    Code Status: Level 1 - Full Code    Patient Centered Rounds: I have performed bedside rounds with nursing staff today  Discussions with Specialists or Other Care Team Provider: Nephrology, case management     Education and Discussions with Family / Patient: Patient    Time Spent for Care: 90 minutes  More than 50% of total time spent on counseling and coordination of care as described above      Current Length of Stay: 1 day(s)    Current Patient Status: Inpatient     Certification Statement: The patient will continue to require additional inpatient hospital stay due to alcohol withdrawal management, hyponatremia, electrolyte derangements Discharge Plan: Case management consulted for disposition planning      Minutes of critical care time [de-identified]  -Critical care time was exclusive of separately billable procedures and teaching time    -Critical care was necessary to treat or prevent imminent or life-threatening deterioration of the following condition: withdrawal, CNS failure/compromise, metabolic crisis, dehydration and endocrine crisis  -Critical care time was spent personally by me on the following activities as well as the above as per the course and rest of chart: obtaining history from patient/surrogate, review of old charts, development of a treatment plan, discussions with referring provider(s) and/or consultants, examination of the patient, performing treatments and interventions, evaluation of patient's response to the treatment, re-evaluation of the patient's condition, ordering/interpreting laboratory studies, ordering/interpreting of radiographic studies  Subjective:   Patient states he feels better today  Notes dizzy when changing positions  Patient states for days prior to arrival, he was drinking alcohol and not eating   Patient also endorses at least 3 days of vomiting prior to arrival  States nausea has improved and is tolerating a small amount of PO intake    Objective:     Clinical Opiate Withdrawal Scale  Pulse: 98    SEWS Total Score: 2 (3/16/2023 10:09 AM)        Last 24 Hours Medication List:   Current Facility-Administered Medications   Medication Dose Route Frequency Provider Last Rate   • acetaminophen  650 mg Oral Q6H PRN Jeanette Albright PA-C     • aluminum-magnesium hydroxide-simethicone  30 mL Oral Q6H PRN Jeanette Albright PA-C     • enoxaparin  40 mg Subcutaneous Daily Jeanette Albright PA-C     • fenofibrate  145 mg Oral Daily Jeanette Velez PA-C     • folic acid IVPB  1 mg Intravenous Daily Jeanette Velez PA-C 1 mg (23 9827)   • ketorolac  30 mg Intravenous Q6H PRN Jeanette Velez PA-C     • losartan  100 mg Oral Daily Valki Glimpse, CRNP     • melatonin  6 mg Oral HS PRN Jeanette Velez PA-C     • multivitamin-minerals  1 tablet Oral Daily Jeanette Velez PA-C     • ondansetron  4 mg Intravenous Q6H PRN Jeanette Velez PA-C     • pantoprazole  40 mg Oral Early Morning Jeanette Velez PA-C     • potassium chloride  40 mEq Oral Once Celina Sánchez MD     • potassium phosphate  30 mmol Intravenous Once Celina Sánchez MD     • potassium-sodium phosphateS  1 tablet Oral 4x Daily (with meals and at bedtime) Jose Palumbo PA-C     • sodium bicarbonate  1,300 mg Oral TID after meals Valjhonatanan Jimmympse, CRNP     • sodium chloride  100 mL/hr Intravenous Continuous Celina Sánchez MD     • thiamine  500 mg Intravenous [facility] Jeanette Velez PA-C 500 mg (23 0524)         Vitals:   Temp (24hrs), Av 5 °F (36 9 °C), Min:97 7 °F (36 5 °C), Max:99 4 °F (37 4 °C)    Temp:  [97 7 °F (36 5 °C)-99 4 °F (37 4 °C)] 98 6 °F (37 °C)  HR:  [] 98  Resp:  [18-24] 20  BP: (147-184)/() 155/92  SpO2:  [96 %-100 %] 98 %  Body mass index is 33 52 kg/m²  Input and Output Summary (last 24 hours): Intake/Output Summary (Last 24 hours) at 3/16/2023 1153  Last data filed at 3/16/2023 0829  Gross per 24 hour   Intake 930 ml   Output 575 ml   Net 355 ml       Physical Exam:   Physical Exam  Vitals and nursing note reviewed  Constitutional:       General: He is not in acute distress  Appearance: Normal appearance  He is diaphoretic  He is not ill-appearing or toxic-appearing  HENT:      Head: Normocephalic and atraumatic  Mouth/Throat:      Mouth: Mucous membranes are dry  Eyes:      General: No scleral icterus  Right eye: No discharge  Left eye: No discharge  Extraocular Movements: Extraocular movements intact  Conjunctiva/sclera: Conjunctivae normal       Pupils: Pupils are equal, round, and reactive to light  Comments: No nystagmus   Cardiovascular:      Rate and Rhythm: Normal rate and regular rhythm  Heart sounds: No murmur heard  Pulmonary:      Effort: Pulmonary effort is normal  No respiratory distress  Breath sounds: No wheezing, rhonchi or rales  Abdominal:      General: There is no distension  Palpations: Abdomen is soft  Tenderness: There is no abdominal tenderness  There is no guarding or rebound  Musculoskeletal:         General: No swelling, tenderness or deformity  Cervical back: Neck supple  Skin:     General: Skin is warm  Findings: No rash  Neurological:      General: No focal deficit present  Mental Status: He is alert and oriented to person, place, and time  Motor: Weakness (global) present     Psychiatric:         Mood and Affect: Mood normal          Behavior: Behavior normal          Additional Data:     Labs:   Results from last 7 days   Lab Units 03/16/23  0434 03/15/23  1707   WBC Thousand/uL 9 41 16 84*   HEMOGLOBIN g/dL 12 5 13 8   HEMATOCRIT % 34 0* 36 9   PLATELETS Thousands/uL 118* 181   LYMPHO PCT %  --  3*   MONO PCT %  --  5   EOS PCT %  --  0      Results from last 7 days   Lab Units 03/16/23  0857 03/16/23  0434   SODIUM mmol/L 111* 113*   POTASSIUM mmol/L 3 4* 3 6   CHLORIDE mmol/L 80* 80*   CO2 mmol/L 17* 18*   BUN mg/dL 7 6   CREATININE mg/dL 0 70 0 61   ANION GAP mmol/L 14* 15*   CALCIUM mg/dL 8 4 8 0*   ALBUMIN g/dL  --  4 1   TOTAL BILIRUBIN mg/dL  --  1 72*   ALK PHOS U/L  --  21*   ALT U/L  --  88*   AST U/L  --  423*   GLUCOSE RANDOM mg/dL 202* 107      Results from last 7 days   Lab Units 03/15/23  1817   INR  0 99           Results from last 7 days   Lab Units 03/16/23  0434   HEMOGLOBIN A1C % 5 4               * I Have Reviewed All Lab Data Listed Above  * Additional Pertinent Lab Tests Reviewed: Ronald [facility] Admission Reviewed      Imaging Studies: I have personally reviewed pertinent reports  Recent Cultures (last 7 days): Today, Patient Was Seen By: Kim Franks MD    ** Please Note: Dictation voice to text software may have been used in the creation of this document   **

## 2023-03-16 NOTE — ASSESSMENT & PLAN NOTE
· Elevated lipase of 121 on admission   · Pt does report epigastric pain and nausea in the setting of excessive alcohol use  · Is tolerating PO fluids at this time  · No abd tenderness on current exam  · Epigastric pain possibly 2/2 mild acute pancreatitis and/or alcoholic gastritis   · Repeat labs in AM  · IVF hydration, pain control  · Initiate PPI  · Lo fiber/lo residue diet (given fluid restriction); advance as tolerated  · May consider imaging if worsening abd pain/GI sx or pt becomes unable to tolerate PO intake

## 2023-03-16 NOTE — UTILIZATION REVIEW
Initial Clinical Review    Admission: Date/Time/Statement:   Admission Orders (From admission, onward)     Ordered        03/15/23 1830  INPATIENT ADMISSION  Once                      Orders Placed This Encounter   Procedures   • INPATIENT ADMISSION     Standing Status:   Standing     Number of Occurrences:   1     Order Specific Question:   Level of Care     Answer:   Level 2 Stepdown / HOT [14]     Order Specific Question:   Estimated length of stay     Answer:   More than 2 Midnights     Order Specific Question:   Certification     Answer:   I certify that inpatient services are medically necessary for this patient for a duration of greater than two midnights  See H&P and MD Progress Notes for additional information about the patient's course of treatment  ED Arrival Information     Expected   -    Arrival   3/15/2023 16:14    Acuity   Emergent            Means of arrival   Walk-In    Escorted by   Jam Hendrickson [facility] Toxicology    Admission type   Emergency            Arrival complaint   detox eval            Chief Complaint   Patient presents with   • Detox Evaluation     Alcohol - yesterday  Initial Presentation: 41 y o  male to the ED from home with complaints of wanting rehab for alcohol  Admitted to inpatient medical detox [facility] STEP DOWN unit for alcohol withdrawal syndrome, alcohol use disorder, hyponatremia, hypokalemia, hypocalcemia  H/O AUD, HTN, HLD, GERD    Drinks a fifth vodka a day  Recently sober for months  Last drink  Was 3/14  Arrives tachycardic, tachypneic, hypertensive  HIs mother has arranged for rehab, but needs medical clearance first   Sodium on arrival is   112  , anion gap 25  Mag 1 3  Nephrology consult  Check BMP every 4 hours  Give IV Mag, potassium  Fluid restriction  Given initial dose of 650 mg phenobarbital  Gently iv hydration D5NS  Tolerating po intake  WBCs elevated likely lukemoid reaction     Nephrology treatment plan: Hyponatremia has been due to poor solute intake/beer potomania with history of overcorrection with hypertonic saline in the past Monitor q4hr BMP  Goal sNa 118 by 5pm tomorrow  Date: 3/16   Day 2: Continue with SEWS protocol, vitamin supplement  BMP every 4 hours  Urine studies, Cortisol pending  Pt follows with orthopedics and OP PT for chronic R shoulder pain   Pt follows with orthopedics and OP PT for chronic R shoulder pain      Clinical Opiate Withdrawal Scale  Pulse: 98     SEWS Total Score: 2 (3/16/2023 10:09 AM)    Nephrology consult:  Severe hyponatremia: Suspect beer potomania/low solute intake contributing, +/- SIADH with nausea  Urine sodium 26, urine chloride 48  Continue trending BMP every 4 hours  Hypocalcemia: Received 2 g calcium gluconate  Start on sodium bicarb tabs  -repeat am calcium level remains low at 8 0  Will give another 2 g of calcium gluconate  BP improved  Avoid hypotension  REpat sodium 113  MAg now 2, potassium 3 6  Good appetite  +diarrhea, nausea  EKG: normal EKG, normal sinus rhythm, no acute ST segment/T wave changes, normal axis,  ms, QRS 94 ms, prolonged QTc at 472 ms  Compared to previous tracing, QTc prolongation has improved    ED Triage Vitals   Temperature Pulse Respirations Blood Pressure SpO2   03/15/23 1652 03/15/23 1652 03/15/23 1652 03/15/23 1652 03/15/23 1652   97 7 °F (36 5 °C) 101 (!) 24 (!) 184/83 98 %      Temp Source Heart Rate Source Patient Position - Orthostatic VS BP Location FiO2 (%)   03/15/23 1652 03/15/23 1652 03/15/23 1652 03/15/23 1652 --   Oral Monitor Sitting Left arm       Pain Score       03/15/23 1700       No Pain          Wt Readings from Last 1 Encounters:   03/15/23 109 kg (240 lb 4 8 oz)     Additional Vital Signs:   Time Temp Pulse Resp BP MAP (mmHg) SpO2 O2 Device Patient Position - Orthostatic VS   03/16/23 1117 98 6 °F (37 °C) 98 20 155/92 113 98 % None (Room air) Sitting   03/16/23 1007 97 8 °F (36 6 °C) 114 Abnormal  -- 147/78 -- 96 % None (Room air) Sitting   03/16/23 0700 99 °F (37 2 °C) 92 18 151/89 109 99 % None (Room air) Lying   03/16/23 0245 97 9 °F (36 6 °C) 112 Abnormal  20 165/99 -- 98 % None (Room air) Sitting   03/16/23 0053 98 2 °F (36 8 °C) 119 Abnormal  20 162/113 Abnormal  129 97 % None (Room air) Sitting   03/16/23 0012 98 6 °F (37 °C) 114 Abnormal  20 154/102 Abnormal  -- 96 % None (Room air) Sitting   03/15/23 2231 99 3 °F (37 4 °C) 121 Abnormal  20 152/93 -- 96 % None (Room air) Sitting   03/15/23 1948 99 4 °F (37 4 °C) 105 20 176/73 Abnormal  -- 98 % None (Room air) Lying   03/15/23 1923 -- 107 Abnormal  20 176/73 Abnormal  -- 98 % None (Room air) Sitting   03/15/23 1823 -- 100 20 169/74 -- 100 % None (Room air) Sitting   03/15/23 1708 -- 101 -- 184/83 Abnormal  -- -- -- --   03/15/23 1652 97 7 °F (36 5 °C) 101 24 Abnormal  184/83 Abnormal  -- 98 % None (Room air) Sitting       Pertinent Labs/Diagnostic Test Results:     3/16 EKG: Normal sinus rhythm  Normal ECG  3/15 EKG:      Normal sinus rhythm  Nonspecific ST abnormality  Prolonged QT  Abnormal ECG  When compared with ECG of 05-DEC-2022 19:12,  No significant change was found  Results from last 7 days   Lab Units 03/16/23  0434 03/15/23  1707   WBC Thousand/uL 9 41 16 84*   HEMOGLOBIN g/dL 12 5 13 8   HEMATOCRIT % 34 0* 36 9   PLATELETS Thousands/uL 118* 181         Results from last 7 days   Lab Units 03/16/23  0857 03/16/23  0434 03/15/23  2358 03/15/23  1909 03/15/23  1707   SODIUM mmol/L 111* 113* 112* 112* 112*   POTASSIUM mmol/L 3 4* 3 6 3 8 3 4* 3 4*   CHLORIDE mmol/L 80* 80* 78* 76* 76*   CO2 mmol/L 17* 18* 15* 12* 11*   ANION GAP mmol/L 14* 15* 19* 24* 25*   BUN mg/dL 7 6 7 8 8   CREATININE mg/dL 0 70 0 61 0 62 0 56* 0 56*   EGFR ml/min/1 73sq m 113 120 119 124 124   CALCIUM mg/dL 8 4 8 0* 8 3* 8 1* 7 9*   MAGNESIUM mg/dL  --  2 0 2 4  --  1 3*   PHOSPHORUS mg/dL  --  <1 0*  --  1 4*  --      Results from last 7 days   Lab Units 03/16/23  0434 03/15/23  1707   AST U/L 423* 483*   ALT U/L 88* 94*   ALK PHOS U/L 21* 23*   TOTAL PROTEIN g/dL 6 5 7 1   ALBUMIN g/dL 4 1 4 5   TOTAL BILIRUBIN mg/dL 1 72* 1 40*   BILIRUBIN DIRECT mg/dL 0 46*  --          Results from last 7 days   Lab Units 03/16/23  0857 03/16/23  0434 03/15/23  2358 03/15/23  1909 03/15/23  1707   GLUCOSE RANDOM mg/dL 202* 107 115 98 112         Results from last 7 days   Lab Units 03/16/23  0434   HEMOGLOBIN A1C % 5 4   EAG mg/dl 108       Results from last 7 days   Lab Units 03/15/23  1817   PROTIME seconds 13 4   INR  0 99     Results from last 7 days   Lab Units 03/16/23  0857   TSH 3RD GENERATON uIU/mL 2  856       Results from last 7 days   Lab Units 03/16/23  0434 03/15/23  2358 03/15/23  1707   LIPASE u/L 171* 155* 121*       Results from last 7 days   Lab Units 03/16/23  0306   SODIUM UR  26       ED Treatment:   Medication Administration from 03/15/2023 1614 to 03/15/2023 1928       Date/Time Order Dose Route Action     03/15/2023 1713 EDT ondansetron (ZOFRAN) injection 4 mg 4 mg Intravenous Given     03/15/2023 1716 EDT LORazepam (ATIVAN) injection 1 mg 1 mg Intravenous Given     03/15/2023 1812 EDT LORazepam (ATIVAN) injection 1 mg 1 mg Intravenous Given     03/15/2023 1909 EDT magnesium sulfate 2 g/50 mL IVPB (premix) 2 g 2 g Intravenous New Bag     03/15/2023 1900 EDT potassium chloride (K-DUR,KLOR-CON) CR tablet 40 mEq 40 mEq Oral Given        Past Medical History:   Diagnosis Date   • Acute pain of both shoulders 4/20/2022   • Aftercare following surgery of the musculoskeletal system 11/21/2022   • Anxiety    • COVID-19 ruled out 2/3/2022   • GERD (gastroesophageal reflux disease)    • Hyperlipidemia    • Hypertension    • Sore throat 12/6/2022   • Tendinitis    • Thrombocytopenia ([address] ) 12/6/2022   • Urinary urgency 2/27/2018    Overview:  Last Assessment & Plan:  Obtain US       Admitting Diagnosis: Alcohol withdrawal ([facility] ) [F10 939]  Alcohol abuse [F10 10]  Hyponatremia [E87 1]  High anion gap metabolic acidosis [J15 65]  Withdrawn from alcohol detoxification program [Z78 9]  Age/Sex: 41 y o  male  Admission Orders:  Tele  BMP every 4 hours  1200 ml fluid restriction  Scheduled Medications:  enoxaparin, 40 mg, Subcutaneous, Daily  fenofibrate, 145 mg, Oral, Daily  folic acid IVPB, 1 mg, Intravenous, Daily  losartan, 100 mg, Oral, Daily  multivitamin-minerals, 1 tablet, Oral, Daily  pantoprazole, 40 mg, Oral, Early Morning  potassium chloride, 40 mEq, Oral, Once  potassium phosphate, 30 mmol, Intravenous, Once  potassium-sodium phosphateS, 1 tablet, Oral, 4x Daily (with meals and at bedtime)  sodium bicarbonate, 1,300 mg, Oral, TID after meals  thiamine, 500 mg, Intravenous, Q8H HARRISON    1560 mg PHENOBARBITAL IV    Continuous IV Infusions:  sodium chloride, 100 mL/hr, Intravenous, Continuous      PRN Meds:  acetaminophen, 650 mg, Oral, Q6H PRN  aluminum-magnesium hydroxide-simethicone, 30 mL, Oral, Q6H PRN  ketorolac, 30 mg, Intravenous, Q6H PRN  melatonin, 6 mg, Oral, HS PRN  ondansetron, 4 mg, Intravenous, Q6H PRN        IP CONSULT TO NEPHROLOGY  IP CONSULT TO CASE MANAGEMENT    Network Utilization Review Department  ATTENTION: Please call with any questions or concerns to [phone] and carefully listen to the prompts so that you are directed to the right person  All voicemails are confidential   Amber Rhodes all requests for admission clinical reviews, approved or denied determinations and any other requests to dedicated fax number below belonging to the campus where the patient is receiving treatment   List of dedicated fax numbers for the Facilities:  [address] DENIALS (Administrative/Medical Necessity) [phone]   [address] (Maternity/NICU/Pediatrics) Fanny [facility] [phone]   [facility] [phone]   [place] [phone]   [facility] [address] [facility] [address] [address] [facility] 134 275 [facility] [phone]

## 2023-03-16 NOTE — ASSESSMENT & PLAN NOTE
Recent Labs     03/17/23  0430 03/18/23  0557   * 341*     Recent Labs     03/17/23  0430 03/18/23  0557   ALT 96* 103*     · Suspect secondary to alcohol use  · Stabilized

## 2023-03-16 NOTE — PROCEDURES
EKG: normal EKG, normal sinus rhythm, no acute ST segment/T wave changes, normal axis,  ms, QRS 94 ms, prolonged QTc at 472 ms  Compared to previous tracing, QTc prolongation has improved

## 2023-03-16 NOTE — ASSESSMENT & PLAN NOTE
· Ca 8 1 on admission with normal albumin  · Replete with 2 g calcium gluconate IV on admission  · Continue monitoring and replete electrolytes as indicated

## 2023-03-16 NOTE — CONSULTS
Consultation - Nephrology   Mounika Burgess 41 y o  male MRN: [id number]  Unit/Bed#: 5T DETOX 505-01 Encounter: [id number]    ASSESSMENT/PLAN:  Severe hyponatremia: Suspect beer potomania/low solute intake contributing, +/- SIADH with nausea  -Baseline sodium level within normal range   -Per record review, patient had episode of acute hyponatremia in December 2022  Sodium level rapidly improved with hypertonic fluid and required hypotonic fluid administration   -Presents with sodium of 112  -Repeat am Na level minimally improved to 113  -Work-up: Urine sodium 26, urine chloride 48   -Check urine osm, serum osm, TSH, am cortisol level for completeness    -Would discontinue D5 normal saline at this time  Change to normal saline   -continue on fluid restiction  -Trending BMP every 4 hours  Awaiting results  -Recommend avoiding rapid correction, goal to correct 6-8 meq's in 24 hours  -Renal function remains stable  Anion gap acidosis: start on oral sodium bicarbonate  Continue to monitor and replace accordingly  Hypokalemia: Presented with potassium of 4 4 with mag of 1 4  Suspect due to poor oral intake in the setting of excessive alcohol use  -repeat potassium level 3 6 with Mag of 2 0   -Will continue to monitor and replace electrolytes as needed  Hypocalcemia: Received 2 g calcium gluconate  -repeat am calcium level remains low at 8 0  Will give another 2 g of calcium gluconate  Hypophosphatemia: due to poor oral intake   -Currently on K-Phos 4 times per day  Hopefully will be able to decrease and eventually discontinue once appetite improves  -Will continue to monitor and replace as needed  Hypertension: Blood pressure initially in the 180s, currently improved to the 150s  -Home medications: Irbesartan 150 mg daily   -Currently on losartan 100 mg daily (did not receive today) will modify order for first dose today    -Recommend avoiding hypotension or high fluctuations in blood pressure   -Commend holding parameters antihypertensive for systolic blood pressure less than 130  Alcohol abuse/elevated lipase: With daily alcohol use, 1/5 of vodka daily x10 days  -Monitoring withdrawal symptoms per primary care team   -Considering checking imaging if worsening abdominal pain  Other: GERD, hyperlipidemia, obesity  HISTORY OF PRESENT ILLNESS:  Requesting Physician: Constance Espinal*  Reason for Consult: Severe hyponatremia    Ita Draper is a 41y o  year old male with history of alcohol abuse, hypertension, GERD, hyperlipidemia, obesity, previous hyponatremia in December 2022, who was admitted to [facility] after presenting with alcohol withdrawal seeking detoxification  Per record, patient reports drinking 1/5 of vodka daily for the past 10 days  He denies history of substance abuse or withdrawal seizures in the past   He follows [facility] and has required inpatient and outpatient rehab  He denies chest discomfort or shortness of breath  He reports having some nausea prior to admission and episode of diarrhea  He reports poor oral intake of food  He currently feels dizzy but relates this to feeling " hung over"  His appetite has improved today  A renal consultation is requested today for assistance in the management of hyponatremia      PAST MEDICAL HISTORY:  Past Medical History:   Diagnosis Date   • Acute pain of both shoulders 4/20/2022   • Aftercare following surgery of the musculoskeletal system 11/21/2022   • Anxiety    • COVID-19 ruled out 2/3/2022   • GERD (gastroesophageal reflux disease)    • Hyperlipidemia    • Hypertension    • Sore throat 12/6/2022   • Tendinitis    • Thrombocytopenia ([address] ) 12/6/2022   • Urinary urgency 2/27/2018    Overview:  Last Assessment & Plan:  Obtain US       PAST SURGICAL HISTORY:  Past Surgical History:   Procedure Laterality Date   • KNEE SURGERY Left 1989    cyst removed   • NE COLONOSCOPY FLX DX W/COLLJ SPEC WHEN PFRMD N/A 2018    Procedure: COLONOSCOPY;  Surgeon: Yana Jefferson DO;  Location: [facility];   Service: General   • CA SURGICAL ARTHROSCOPY SHOULDER W/ROTATOR CUFF RPR Left 2022    Procedure: SHOULDER ARTHROSCOPIC ROTATOR CUFF REPAIR;  Surgeon: Romeo Garza MD;  Location: [facility];  Service: Orthopedics   • SHOULDER ARTHROSCOPY Right     with biceps tenodesis       ALLERGIES:  Allergies   Allergen Reactions   • Neomycin Hives   • Polymyxin B Hives       SOCIAL HISTORY:  Social History     Substance and Sexual Activity   Alcohol Use Not Currently    Comment: 50oz vodka     Social History     Substance and Sexual Activity   Drug Use No     Social History     Tobacco Use   Smoking Status Former   • Packs/day: 1 00   • Years: 20 00   • Pack years:  00   • Types: Cigarettes   • Start date: 1992   • Quit date: 2013   • Years since quittin 8   Smokeless Tobacco Never       FAMILY HISTORY:  Family History   Problem Relation Age of Onset   • Melanoma Father    • Cancer Father    • Melanoma Brother    • Diabetes Maternal Grandfather    • Stroke Paternal Grandmother    • Hypertension Paternal Grandmother    • Coronary artery disease Paternal Grandmother    • Heart disease Paternal Grandmother    • Hypertension Paternal Grandfather    • Coronary artery disease Paternal Grandfather    • Brain cancer Paternal Grandfather    • Heart disease Paternal Grandfather    • Liver cancer Paternal Uncle        MEDICATIONS:    Current Facility-Administered Medications:   •  acetaminophen (TYLENOL) tablet 650 mg, 650 mg, Oral, Q6H PRN, Jeanette Castro PA-C  •  aluminum-magnesium hydroxide-simethicone (MYLANTA) oral suspension 30 mL, 30 mL, Oral, Q6H PRN, Jeanette Castro PA-C  •  enoxaparin (LOVENOX) subcutaneous injection 40 mg, 40 mg, Subcutaneous, Daily, Jeanette Castro PA-C, 40 mg at 23 7280  •  fenofibrate (TRICOR) tablet 145 mg, 145 mg, Oral, Daily, Daysi Haney Ulisses Velez PA-C, 145 mg at 81/99/01 5389  •  folic acid 1 mg in sodium chloride 0 9 % 50 mL IVPB, 1 mg, Intravenous, Daily, Jeanette Croft PA-C, Last Rate: 100 mL/hr at 03/16/23 0829, 1 mg at 03/16/23 3263  •  ketorolac (TORADOL) injection 30 mg, 30 mg, Intravenous, Q6H PRN, Demian Parekh PA-C  •  [START ON 3/17/2023] losartan (COZAAR) tablet 100 mg, 100 mg, Oral, Daily, VIKTORIYA White  •  melatonin tablet 6 mg, 6 mg, Oral, HS PRN, Demian Parekh PA-C, 6 mg at 03/16/23 0258  •  multivitamin-minerals (CENTRUM) tablet 1 tablet, 1 tablet, Oral, Daily, Jeanette Croft PA-C, 1 tablet at 03/16/23 1601  •  ondansetron [facility]) injection 4 mg, 4 mg, Intravenous, Q6H PRN, Demian Parekh PA-C, 4 mg at 03/15/23 2316  •  pantoprazole (PROTONIX) EC tablet 40 mg, 40 mg, Oral, Early Morning, Jeanette Croft PA-C, 40 mg at 03/16/23 0395  •  potassium-sodium phosphateS (K-PHOS,PHOSPHA 250) -250 mg tablet 1 tablet, 1 tablet, Oral, 4x Daily (with meals and at bedtime), Demian Parekh PA-C, 1 tablet at 03/16/23 0740  •  thiamine (VITAMIN B1) 500 mg in sodium chloride 0 9 % 50 mL IVPB, 500 mg, Intravenous, Q8H [address], Jeanette Croft PA-C, Last Rate: 100 mL/hr at 03/16/23 0524, 500 mg at 03/16/23 0524    REVIEW OF SYSTEMS:  A complete review of systems was performed and found to be negative unless otherwise noted in the history of present illness  General: No fevers, chills  Cardiovascular:  - chest pain, - leg edema  Respiratory: No cough, sputum production,  - shortness of breath  Gastrointestinal:  + nausea/vomiting,  + diarrhea,  - abdominal pain  Genitourinary: No hematuria  No foamy urine    No dysuria    PHYSICAL EXAM:  Current Weight: Weight - Scale: 109 kg (240 lb 4 8 oz)  First Weight: Weight - Scale: 109 kg (240 lb 11 2 oz)  Vitals:    03/16/23 0012 03/16/23 0053 03/16/23 0245 03/16/23 0700   BP: (!) 154/102 (!) 162/113 165/99 151/89   BP Location: Right arm Right arm Right arm Left arm   Pulse: (!) 114 (!) 119 (!) 112 92   Resp: 20 20 20 18   Temp: 98 6 °F (37 °C) 98 2 °F (36 8 °C) 97 9 °F (36 6 °C) 99 °F (37 2 °C)   TempSrc: Temporal Temporal Temporal Temporal   SpO2: 96% 97% 98% 99%   Weight:       Height:           Intake/Output Summary (Last 24 hours) at 3/16/2023 0924  Last data filed at 3/16/2023 0700  Gross per 24 hour   Intake 740 ml   Output 575 ml   Net 165 ml     General: NAD  Skin: warm, dry, intact, no rash  HEENT: Moist mucous membranes, sclera anicteric, normocephalic, atraumatic  Neck: No apparent JVD appreciated  Chest:lung sounds clear B/L, on RA   CVS:Regular rate and rhythm, no murmer   Abdomen: Soft, round, non-tender, +BS  Extremities: No B/L LE edema present  Neuro: alert and oriented  Psych: appropriate mood and affect     Invasive Devices:      Lab Results:   Results from last 7 days   Lab Units 03/16/23  0434 03/15/23  2358 03/15/23  1909 03/15/23  1707   WBC Thousand/uL 9 41  --   --  16 84*   HEMOGLOBIN g/dL 12 5  --   --  13 8   HEMATOCRIT % 34 0*  --   --  36 9   PLATELETS Thousands/uL 118*  --   --  181   SODIUM mmol/L 113* 112* 112* 112*   POTASSIUM mmol/L 3 6 3 8 3 4* 3 4*   CHLORIDE mmol/L 80* 78* 76* 76*   CO2 mmol/L 18* 15* 12* 11*   BUN mg/dL 6 7 8 8   CREATININE mg/dL 0 61 0 62 0 56* 0 56*   CALCIUM mg/dL 8 0* 8 3* 8 1* 7 9*   MAGNESIUM mg/dL 2 0 2 4  --  1 3*   PHOSPHORUS mg/dL <1 0*  --  1 4*  --    ALK PHOS U/L 21*  --   --  23*   ALT U/L 88*  --   --  94*   AST U/L 423*  --   --  483*

## 2023-03-16 NOTE — ASSESSMENT & PLAN NOTE
Lab Results   Component Value Date    WBC 16 84 (H) 03/15/2023        • Elevated WBC on admission as above  • Possibly 2/2 leukemoid reaction from acute withdrawal and/or mild acute pacreatitis  • Pt hypertensive and tachycardic but remains afebrile at this time  • Continue monitoring CBC, VS

## 2023-03-16 NOTE — ASSESSMENT & PLAN NOTE
Recent Labs     03/17/23  1241 03/17/23  1752 03/18/23  0014 03/18/23  0557 03/18/23  1216 03/19/23  0526   SODIUM 122* 124* 126* 130* 129* 133*     · Suspect secondary to volume depletion and beer potomania  · Nephrology following-   · Cleared for discharge   · Repeat BMP in 7-10 days after discharge   · Continue 1 8 L fluid restriction   · Follow up with nephrology consulted

## 2023-03-16 NOTE — ASSESSMENT & PLAN NOTE
· Withdrawal management as above  · Continue vitamin supplementation including high dose thiamine  · Case management consulted for disposition planning  · Patient desires inpatient rehabilitation but recognizes starting a new job may be a barrier to treatment  · Interested in MAT and follows at [facility]; appointment scheduled for 3/21

## 2023-03-16 NOTE — ASSESSMENT & PLAN NOTE
Home medication restarted by nephrology with hold parameters  Currently adequately controlled   Recommend follow up with PCP outpatient

## 2023-03-16 NOTE — UTILIZATION REVIEW
NOTIFICATION OF INPATIENT ADMISSION   AUTHORIZATION REQUEST   SERVICING FACILITY:   [address] [place] [address]  [address] , [facility], [address] [place] [address]  Tax ID: [id number]  NPI: [id number] ATTENDING PROVIDER:  Attending Name and NPI#: Lisa Pierre Md [[id number]]  Address: [address] , [address]  Phone: [phone]     ADMISSION INFORMATION:  Place of Service: Inpatient [address] [facility]  [address]  Place of Service Code: 21  Inpatient Admission Date/Time: 3/15/23  6:30 PM  Discharge Date/Time: No discharge date for patient encounter  Admitting Diagnosis Code/Description:  Alcohol withdrawal ([facility] [address] ) [F10 939]  Alcohol abuse [F10 10]  Hyponatremia [E87 1]  High anion gap metabolic acidosis [Y99 91]  Withdrawn from alcohol detoxification program [Z78 9]     UTILIZATION REVIEW CONTACT:  Velma Martin Utilization   Network Utilization Review Department  Phone: [phone]  Fax [phone]  Email: [email or web address]  Contact for approvals/pending authorizations, clinical reviews, and discharge  PHYSICIAN ADVISORY SERVICES:  Medical Necessity Denial & Iq[employer]-dt-Tn[employer] Review  Phone: [phone]  Fax: [phone]  Email: [email or web address]

## 2023-03-16 NOTE — H&P
[facility]  H&P- Coby Patella 1975, 41 y o  male MRN: [id number]  Unit/Bed#: 5T DETOX 505-01 Encounter: [id number]  Primary Care Provider: Harrison Alonzo MD   Date and time admitted to hospital: 3/15/2023  4:43 PM  HISTORY & [facility]  LEVEL 4 MEDICAL DETOX UNIT  Coby Patella 41 y o  male MRN: [id number]  Unit/Bed#: 5T DETOX 505-01 Encounter: [id number]      Reason for Admission/Principal Problem: Ethanol withdrawal, Ethanol use disorder  Admitting Provider: Janet Nam PA-C  Attending Provider: Subha Linares*   3/15/2023  4:43 PM        * Alcohol withdrawal syndrome with complication, with unspecified complication [facility])  Assessment & Plan  · Patient with a history of chronic daily alcohol use  · Last drink pt reports was on Tuesday  · Breath alcohol 0 059 in the ED  · Received Ativan 2 mg total in the ED PTA  · [facility] medical management of alcohol withdrawal  · Current alcohol withdrawal signs/symptoms include anxiety, tremor, HTN, tachycardia, and nausea  · Received 650 mg of phenobarbital as initial dose  · Continue monitoring under protocol and administer phenobarbital as indicated  · Continuous pulse ox and telemetry monitoring    Alcohol use disorder, severe, dependence ([facility] [address] )  Assessment & Plan  · Pt with a h/o chronic heavy alcohol use, recently started drinking again after maintaining a period of sobriety  · Drinks 1 fifth of vodka daily x10 days  · Previous admission to the [address] [place] [address] Detox Unit 12/2022  · Denies H/o withdrawal seizures  · Follows with [facility] for AUD and PO naltrexone  · Pt reports side effect of myalgias/arthralgias with medication and has stopped taking since resuming alcohol use  · Interested in trying a different MAT medication such as Antabuse or Campral, as previously discussed with Dr Shamir Gómez at last [facility] appt on 2/16/23   · Withdrawal management as above  · Initiate IVFs, high dose IV thiamine, folic acid supplementation, and MVI  · Consult case management for assistance with aftercare resources - pt interested in possible IP rehab at this time    Hyponatremia  Assessment & Plan  · Na 112 on admission, unchanged   · Pt has a h/o significant hyponatremia 2/2 alcohol use  · No focal neurologic deficits on exam  · Initiate 1 2 L fluid restriction  · Gentle IVF hydration with D5NS at 50 mL/hr given concomitant AKA  · Nephrology following, appreciate recommendations  · Q4H BMPs  · Urine studies pending  · Monitor and optimize electrolytes as below  · Stepdown 2 level of care for close monitoring    Hypokalemia  Assessment & Plan  · K 3 4 on admission  · Mag 1 4  · Likely 2/2 poor oral intake in the setting of excessive alcohol use  · Repleted with KCl 40 mEq PO in the ED + 20 mEq PO upon arrival to the unit  · Continue monitoring and replete electrolytes as indicated       Hypocalcemia  Assessment & Plan  · Ca 8 1 on admission with normal albumin  · Replete with 2 g calcium gluconate IV on admission  · Continue monitoring and replete electrolytes as indicated    Hypophosphatemia  Assessment & Plan  · Phosphate 1 4 on admission  · In the setting of poor oral intake due to excessive alcohol use  · Initiate K Phos PO 4x daily AC and HS  · Monitor and replete electrolytes as indicated    Elevated lipase  Assessment & Plan  · Elevated lipase of 121 on admission   · Pt does report epigastric pain and nausea in the setting of excessive alcohol use  · Is tolerating PO fluids at this time  · No abd tenderness on current exam  · Epigastric pain possibly 2/2 mild acute pancreatitis and/or alcoholic gastritis   · Repeat labs in AM  · IVF hydration, pain control  · Initiate PPI  · Lo fiber/lo residue diet (given fluid restriction); advance as tolerated  · May consider imaging if worsening abd pain/GI sx or pt becomes unable to tolerate PO intake    Leukocytosis  Assessment & Plan  Lab Results   Component Value Date    WBC 16 84 (H) 03/15/2023        • Elevated WBC on admission as above  • Possibly 2/2 leukemoid reaction from acute withdrawal and/or mild acute pacreatitis  • Pt hypertensive and tachycardic but remains afebrile at this time  • Continue monitoring CBC, VS    Esophageal reflux  Assessment & Plan  · Pt with a h/o GERD  · Home medications include omeprazole 20 mg daily  · Current sx: epigastric pain, nausea w/o vomiting  · Continue daily PPI - will increase dosing to 40 mg daily  · Supportive care    Benign essential hypertension  Assessment & Plan  Patient with a history of HTN  Home medication regimen: irbesartan 150 mg daily  BP hypertensive upon arrival to the unit in the setting of acute alcohol withdrawal and chronic HTN  Most Recent Blood Pressure: 165/99    Will continue anti-HTN medication in the setting of marked HTN, tachycardia, and new murmur  Substitute irbesartan for formulary alternative losartan 100 mg daily  Continue monitoring BP  ECHO pending    Hyperlipidemia  Assessment & Plan  · Patient with a h/o HLD  · On Tricor 145 mg daily as home medication  · Repeat lipid panel pending  · Continue Tricor    Internal derangement of right shoulder  Assessment & Plan  • Pt follows with orthopedics and OP PT for chronic R shoulder pain   • MRI R shoulder 12/19/22 showed:   • "Moderate supraspinatus and infraspinatus tendinosis  • High-grade partial-thickness articular surface tear of the distal supraspinatus tendon as above  No full-thickness rotator cuff tear, tendon retraction, or significant muscular atrophy  • Posterior labral tear     • Moderate acromioclavicular joint osteoarthrosis "  • Received R CS injection at follow-up office visit with ortho 01/05/23  • Currently pt does not have any acute R shoulder complaints, denies any focal neuro sx; RUE neurovascularly intact on exam  • Supportive care  • Recommend continued OP ortho and PT follow-up    Tear of left supraspinatus tendon  Assessment & Plan  • Pt has a h/o L supraspinatus tendon tear, status post-op L shoulder arthroscopy and rotator cuff repair 9/9/22  • He notes some continued L shoulder pain after surgery, denies any acute worsening, N/T, or upper extremity weakness  • Per chart review, at last OP f/u with ortho, recommended continued OP PT and if pain does not improve, they will consider further imaging  • Supportive care, pain control  • Continue OP f/u with PT and ortho    Obesity (BMI 30-39  9)  Assessment & Plan  · Body mass index is 33 52 kg/m²  · Check Hgb A1c and lipid panel  · Recommend healthy diet, lifestyle modifications, alcohol cessation    Ambulatory dysfunction  Assessment & Plan  · Pt reports ambulatory dysfunction  · +Gait and finger-to-nose ataxia on exam  · Initiate high dose thiamine Q8H x9 doses  · PT eval and treat             VTE Prophylaxis: Enoxaparin (Lovenox)  / sequential compression device   Code Status: full code      Anticipated Length of Stay:  Patient will be admitted on an Inpatient basis with an anticipated length of stay of  >2  midnights  Justification for Hospital Stay: alcohol withdrawal, alcohol use disorder     For any questions or concerns, please Tiger Text the advanced practitioner in the role of [facility] [place] On Call      This patient qualifies for Level IV medically managed intensive inpatient services under the criteria set by the American Society of Addiction Medicine, including dimensions 1-3   The patient is in withdrawal (or is intoxicated with high risk of withdrawal), with severe and unstable medical and/or psychiatric (dual diagnosis) problems, requiring requires 24-hour medical and nursing care and the full resources of a [address] patient to medical detox unit and continue supportive care and stabilization of acute ethanol withdrawal per medical toxicology/detox treatment pathway  Monitor ethanol withdrawal severity via the Severity of Ethanol Withdrawal Scale (SEWS) Q4 hours and then hourly if/when SEWS > 6  Treat withdrawal per pathway and reassess Q30-60 minutes  Mild SEWS Score 1-6  Administer medications* (IV or PO; PO preferred):  • If initial SEWS score: diazepam 10mg PO/IV x 1 AND phenobarbital 65 mg PO/IV x 1  • If repeat SEWS score 1-6: phenobarbital 65 mg PO/IV q1 hour x 5 doses maximum   Reassessment:   • SEWS q1 hour after each dose until SEWS 0 x 2 hours  • VS q1 hours (until SEWS 0, then q4 hours)  • Notify provider for bedside evaluation if 5-dose maximum is reached, RASS of -3 to -5, or SEWS score escalates to moderate or severe  Moderate SEWS Score 7-12  Administer medications* (IV):  • If initial SEWS score: diazepam 10mg IV x 1 AND phenobarbital 260 mg IV x 1  • If repeat SEWS score 7-12 or score escalated from mild: phenobarbital 130 mg IV q30 minutes x 5 doses maximum   Reassessment:  • SEWS q30 minutes after each dose until SEWS < 7 (then hourly until SEWS 0 x 2 hours)  • VS q30 minutes until SEWS < 7 (then hourly until SEWS 0, then q4 hours)  • Notify provider for bedside evaluation if 5-dose maximum is reached, RASS of -3 to -5, or SEWS score escalates to severe  Severe SEWS Score ? 13  Administer medications* (IV):  • If initial SEWS score: Diazepam 10 mg IV x 1 AND phenobarbital 650 mg IV piggyback x 1 over 15-30 minutes  • If repeat SEWS score ? 13 or score escalated from mild or moderate: phenobarbital 130 mg IV q30 minutes x 5 doses maximum   Reassessment:  • SEWS q30 minutes after each dose until SEWS < 7 (then hourly until SEWS 0 x 2 hours)   • VS q30 minutes until SEWS < 7 (then hourly until SEWS 0, then q4 hours)  • Notify provider for bedside evaluation if 5-dose maximum is reached or RASS of -3 to -5   *Hold medications and notify provider if CNS depression, respirations < 10/min, or RASS of -3 to -5           Medications to be administered adjunctively if more than 2 grams of phenobarbital is needed for stabilization of withdrawal; require attending approval    • Dexmedetomidine infusion 0 1-1mcg/kg/hr IV infusion, titratable to reduced agitation (Goal: RASS -2)  • Ketamine   o Acute agitated delirium: 1-2 mg/kg IV or 4-5 mg/kg IM  o Refractory withdrawal: 0 1-1mg/kg/hr IV infusion, titratable to reduced agitation (Goal: RASS -2)    Further evaluation, screening and treatment:  Evaluate complete metabolic panel, transaminases, INR, and lipase  Assess hepatic ultrasound for any sign of alcoholic liver disease or cirrhosis, and ultimately refer for further hepatic evaluation and care as/if indicated  Additional medications for ethanol associated malnutrition: Thiamine 100 mg IV daily, increase to 500 mg TID for signs/symptoms of Wernicke's Encephalopathy or Wernicke Korsakoff Syndrome   Folic acid 1 mg IV daily   Multivitamin PO daily      Will offer first monthly injection of Naltrexone 380 mg IM, once patient is stabilized, as it has been shown to assist in decreasing cravings for ethanol  Evaluate and treat for coexisting substance use, such as opioids and nicotine  Discuss risk factors for infectious disease, such as history of intravenous drug abuse, and offer hepatitis and HIV screening if indicated  Case management consultation to assist with coordination of subsequent treatment after discharge  Hx and PE limited by: none, pt alert and cooperative     HPI: Angel Nunez is a 41y o  year old male with a PMH of AUD, HTN, HLD, GERD who presents with alcohol withdrawal seeking detoxification  Patient initially presented to the [facility] ED requesting alcohol detox and was subsequently transferred to the [facility] medical detox unit for medical management of alcohol withdrawal  Pt reports drinking 1 fifth of vodka daily x10 days, denies any specific trigger for his relapse  He reports his last drink was yesterday  Breath alcohol was 0 059 in the ED today  He reports current alcohol withdrawal sx of anxiety, tremor, and nausea  Denies current diaphoresis, vomiting, or AVH  Patient denies history of withdrawal seizures or any other substance use  Reports a past AUD treatment history consisting of inpatient and outpatient rehab  Pt follows with the [facility] program and does have a history of prior naltrexone therapy, which he reports gave him joint aches; he reports not currently taking naltrexone since he resumed drinking and would like to switch to an alternative MAT such as Antabuse or Campral   Patient is interested in possible IP rehab after discharge  Preferred alcoholic beverage(s): vodka  Quantity and frequency of alcohol intake: 1 fifth daily  Use of any ethanol substitutes (toxic alcohols): no  Date/Time of last alcohol intake: pt reports Tuesday   Current signs and symptoms of ethanol withdrawal: anxiety, tremor, HTN, tachycardia, and nausea    SEWS Total Score: 11 (3/16/2023  2:46 AM)          Ethanol Withdrawal History  Previous ethanol withdrawal? yes  Prior inpatient treatment for ethanol withdrawal? yes  Prior outpatient treatment for ethanol withdrawal? yes  History of seizures with prior ethanol withdrawal? no  Prior treatment with naltrexone (Vivitrol)? yes  Current treatment with naltrexone (Vivitrol)? No, pt reports he stopped taking  Other current treatment for ethanol use disorder?  no  Co-existing substance use? no    Review of PDMP: yes, no recent or new prescriptions since last review 2022    Social History     Substance and Sexual Activity   Alcohol Use Not Currently    Comment: 50oz vodka     Social History     Substance and Sexual Activity   Drug Use No     Social History     Tobacco Use   Smoking Status Former   • Packs/day: 1 00   • Years: 20 00   • Pack years: 20 00   • Types: Cigarettes   • Start date: 1992   • Quit date: 2013   • Years since quittin 8   Smokeless Tobacco Never Review of Systems   Constitutional: Positive for chills and diaphoresis  Negative for fever  HENT: Positive for sore throat (2/2 alcohol use and vomiting)  Negative for congestion and rhinorrhea  Eyes: Negative for visual disturbance  Respiratory: Positive for shortness of breath (this AM, resolved on its own)  Negative for cough and wheezing  Cardiovascular: Negative for chest pain  Gastrointestinal: Positive for abdominal pain (epigastric, non-radiating, 6/10), nausea and vomiting (bilious this AM, non-bloody)  Negative for blood in stool and diarrhea  Genitourinary: Positive for decreased urine volume  Negative for difficulty urinating  Musculoskeletal: Positive for arthralgias (chronic L shoulder pain, status post-op) and gait problem (feels unsteady on feet)  Negative for myalgias  Neurological: Positive for dizziness (with rapid head turning) and tremors  Negative for seizures, weakness, light-headedness, numbness and headaches  Psychiatric/Behavioral: Negative for hallucinations and suicidal ideas  The patient is nervous/anxious  Historical Information   Past Medical History:   Diagnosis Date   • Acute pain of both shoulders 4/20/2022   • Aftercare following surgery of the musculoskeletal system 11/21/2022   • Anxiety    • COVID-19 ruled out 2/3/2022   • GERD (gastroesophageal reflux disease)    • Hyperlipidemia    • Hypertension    • Sore throat 12/6/2022   • Tendinitis    • Thrombocytopenia ([address] ) 12/6/2022   • Urinary urgency 2/27/2018    Overview:  Last Assessment & Plan:  Obtain US     Past Surgical History:   Procedure Laterality Date   • KNEE SURGERY Left 1989    cyst removed   • TX COLONOSCOPY FLX DX W/COLLJ SPEC WHEN PFRMD N/A 5/14/2018    Procedure: COLONOSCOPY;  Surgeon: Gustabo Forrest DO;  Location: [facility];   Service: General   • TX SURGICAL ARTHROSCOPY SHOULDER W/ROTATOR CUFF RPR Left 9/9/2022    Procedure: SHOULDER ARTHROSCOPIC ROTATOR CUFF REPAIR;  Surgeon: Mago Antony MD;  Location[facility] MAIN OR;  Service: Orthopedics   • SHOULDER ARTHROSCOPY Right     with biceps tenodesis     Family History   Problem Relation Age of Onset   • Melanoma Father    • Cancer Father    • Melanoma Brother    • Diabetes Maternal Grandfather    • Stroke Paternal Grandmother    • Hypertension Paternal Grandmother    • Coronary artery disease Paternal Grandmother    • Heart disease Paternal Grandmother    • Hypertension Paternal Grandfather    • Coronary artery disease Paternal Grandfather    • Brain cancer Paternal Grandfather    • Heart disease Paternal Grandfather    • Liver cancer Paternal Uncle      Social History   Marital Status: Single   Occupation: works as  at present  Patient Pre-hospital Living Situation: lives alone  Patient Pre-hospital Level of Mobility: independent  Patient Pre-hospital Diet Restrictions: none    Allergies   Allergen Reactions   • Neomycin Hives   • Polymyxin B Hives       Prior to Admission medications    Medication Sig Start Date End Date Taking? Authorizing Provider   fenofibrate (TRICOR) 145 mg tablet TAKE 1 TABLET BY MOUTH EVERY DAY 1/13/23  Yes Parris Mohan, DO   Multiple Vitamins-Minerals (multivitamin with minerals) tablet Take 1 tablet by mouth daily   Yes Historical Provider, MD   naltrexone (REVIA) 50 mg tablet Take 1 tablet (50 mg total) by mouth daily 2/16/23 3/18/23 Yes Carmencita Ramirez, DO   b complex vitamins capsule Take 1 capsule by mouth daily 12/13/22   Lendia Cheadle, MD   folic acid (FOLVITE) 1 mg tablet Take 1 tablet (1 mg total) by mouth daily Do not start before December 9, 2022    Patient not taking: Reported on 3/15/2023 12/9/22   Sofia Turcios PA-C   irbesartan (AVAPRO) 150 mg tablet TAKE 1 TABLET (150 MG TOTAL) BY MOUTH IN THE MORNING  11/24/22 2/22/23  Lendia Cheadle, MD   omeprazole (PriLOSEC) 20 mg delayed release capsule TAKE 1 CAPSULE (20 MG TOTAL) BY MOUTH IN THE MORNING  11/24/22 2/22/23  Stef Ruvalcaba Marilee Ward MD   sildenafil (VIAGRA) 100 mg tablet Take 1 tablet (100 mg total) by mouth daily as needed for erectile dysfunction 12/13/22   Aniyah Veliz MD       Current Facility-Administered Medications   Medication Dose Route Frequency   • acetaminophen (TYLENOL) tablet 650 mg  650 mg Oral Q6H PRN   • aluminum-magnesium hydroxide-simethicone (MYLANTA) oral suspension 30 mL  30 mL Oral Q6H PRN   • calcium gluconate 2 g in sodium chloride 0 9% 100 mL (premix)  2 g Intravenous Once   • dextrose 5 % and sodium chloride 0 9 % infusion  50 mL/hr Intravenous Continuous   • enoxaparin (LOVENOX) subcutaneous injection 40 mg  40 mg Subcutaneous Daily   • fenofibrate (TRICOR) tablet 145 mg  145 mg Oral Daily   • folic acid 1 mg in sodium chloride 0 9 % 50 mL IVPB  1 mg Intravenous Daily   • ketorolac (TORADOL) injection 30 mg  30 mg Intravenous Q6H PRN   • losartan (COZAAR) tablet 100 mg  100 mg Oral Daily   • melatonin tablet 6 mg  6 mg Oral HS PRN   • multivitamin-minerals (CENTRUM) tablet 1 tablet  1 tablet Oral Daily   • ondansetron (ZOFRAN) injection 4 mg  4 mg Intravenous Q6H PRN   • pantoprazole (PROTONIX) EC tablet 40 mg  40 mg Oral Early Morning   • potassium-sodium phosphateS (K-PHOS,PHOSPHA 250) -250 mg tablet 1 tablet  1 tablet Oral 4x Daily (with meals and at bedtime)   • thiamine (VITAMIN B1) 500 mg in sodium chloride 0 9 % 50 mL IVPB  500 mg Intravenous Q8H [address]       Continuous Infusions:dextrose 5 % and sodium chloride 0 9 %, 50 mL/hr, Last Rate: 50 mL/hr (03/15/23 2207)             Objective       Intake/Output Summary (Last 24 hours) at 3/16/2023 0404  Last data filed at 3/16/2023 0301  Gross per 24 hour   Intake 300 ml   Output 575 ml   Net -275 ml       Invasive Devices:   Peripheral IV 03/15/23 Left;Upper;Ventral (anterior) Arm (Active)       Vitals   Vitals:    03/15/23 2231 03/16/23 0012 03/16/23 0053 03/16/23 0245   BP: 152/93 (!) 154/102 (!) 162/113 165/99   TempSrc: Temporal Temporal Temporal Temporal   Pulse: (!) 121 (!) 114 (!) 119 (!) 112   Resp: 20 20 20 20   Patient Position - Orthostatic VS: Sitting Sitting Sitting Sitting   Temp: 99 3 °F (37 4 °C) 98 6 °F (37 °C) 98 2 °F (36 8 °C) 97 9 °F (36 6 °C)       Physical Exam  Vitals and nursing note reviewed  Constitutional:       General: He is not in acute distress  Appearance: He is obese  He is not diaphoretic  HENT:      Head: Normocephalic and atraumatic  Right Ear: External ear normal       Left Ear: External ear normal       Nose: Nose normal       Mouth/Throat:      Mouth: Mucous membranes are moist       Comments: +Tongue fasciculations present  Eyes:      Extraocular Movements: Extraocular movements intact  Right eye: No nystagmus  Left eye: No nystagmus  Conjunctiva/sclera: Conjunctivae normal       Pupils: Pupils are equal, round, and reactive to light  Cardiovascular:      Rate and Rhythm: Regular rhythm  Tachycardia present  Pulses:           Dorsalis pedis pulses are 2+ on the right side and 2+ on the left side  Posterior tibial pulses are 2+ on the right side and 2+ on the left side  Heart sounds: Murmur (systolic) heard  No friction rub  No gallop  Pulmonary:      Effort: Pulmonary effort is normal  No respiratory distress  Breath sounds: Normal breath sounds  No wheezing, rhonchi or rales  Abdominal:      General: Bowel sounds are normal  There is no distension  Palpations: Abdomen is soft  Tenderness: There is no abdominal tenderness  There is no guarding or rebound  Musculoskeletal:         General: No swelling  Cervical back: Neck supple  Right lower leg: No edema  Left lower leg: No edema  Skin:     General: Skin is warm and dry  Findings: No rash  Neurological:      General: No focal deficit present  Mental Status: He is alert and oriented to person, place, and time  GCS: GCS eye subscore is 4  GCS verbal subscore is 5   GCS motor subscore is 6  Cranial Nerves: No dysarthria or facial asymmetry  Motor: Tremor (BUE intention tremor) present  Coordination: Finger-Nose-Finger Test abnormal       Gait: Gait abnormal       Comments: Moderate finger-to-nose and gait ataxia  Psychiatric:         Attention and Perception: Attention normal  He does not perceive auditory or visual hallucinations  Mood and Affect: Mood is anxious  Affect is blunt  Speech: Speech normal          Behavior: Behavior is cooperative  Thought Content: Thought content does not include homicidal or suicidal ideation  Thought content does not include homicidal or suicidal plan  Data:    EKG, Pathology, and Other Studies: I have personally reviewed pertinent reports  EKG: NSR at 92 bpm, no acute ST segment/T wave changes, normal axis and MS/QRS intervals, prolonged QTc at 502 ms      Lab Results:  CBC ETOH     Lab Results   Component Value Date    WBC 16 84 (H) 03/15/2023    WBC 0-5 12/14/2017    WBC 6 3 12/14/2017    RBC 4 39 03/15/2023    RBC 4 21 12/14/2017    HGB 13 8 03/15/2023    HGB 14 4 12/14/2017    HCT 36 9 03/15/2023    HCT 42 0 12/14/2017    MCV 84 03/15/2023    MCV 99 8 12/14/2017    MCH 31 4 03/15/2023    MCH 34 2 (H) 12/14/2017    MCHC 37 4 03/15/2023    MCHC 34 3 12/14/2017    RDW 11 9 03/15/2023    RDW 13 1 12/14/2017     03/15/2023     12/14/2017    MPV 9 7 03/15/2023    MPV 11 4 12/14/2017      Lab Results   Component Value Date    LACTICACID 1 0 12/04/2022      CMP UA         Component Value Date/Time     12/14/2017 0000    K 3 8 03/15/2023 2358    K 4 2 06/25/2021 0742    CL 78 (L) 03/15/2023 2358     06/25/2021 0742    CO2 15 (L) 03/15/2023 2358    CO2 24 06/25/2021 0742    BUN 7 03/15/2023 2358    BUN 17 06/25/2021 0742    CREATININE 0 62 03/15/2023 2358    CREATININE 0 80 12/14/2017 0000         Component Value Date/Time    CALCIUM 8 3 (L) 03/15/2023 3120    CALCIUM 9 1 06/25/2021 0742    ALKPHOS 23 (L) 03/15/2023 1707    ALKPHOS 15 (L) 06/25/2021 0742     (H) 03/15/2023 1707    AST 24 06/25/2021 0742    ALT 94 (H) 03/15/2023 1707    ALT 38 06/25/2021 0742    BILITOT 0 9 12/14/2017 0000      Lab Results   Component Value Date    CLARITYU Clear 12/04/2022    COLORU Light Orange 12/04/2022    COLORU DARK YELLOW 12/14/2017    SPECGRAV 1 038 (H) 12/04/2022    SPECGRAV 1 025 12/14/2017    PHUR 6 5 12/04/2022    PHUR 6 0 12/14/2017    GLUCOSEU Negative 12/04/2022    KETONESU 40 (2+) (A) 12/04/2022    KETONESU NEGATIVE 12/14/2017    BLOODU Negative 12/04/2022    PROTEIN UA 30 (1+) (A) 12/04/2022    NITRITE Negative 12/04/2022    NITRITE NEGATIVE 12/14/2017    BILIRUBINUR Negative 12/04/2022    BILIRUBINUR NEGATIVE 12/14/2017    UROBILINOGEN <2 0 12/04/2022    UROBILINOGEN 1 0 02/03/2022    LEUKOCYTESUR Negative 12/04/2022    LEUKOCYTESUR NEGATIVE 12/14/2017    WBCUA 1-2 12/04/2022    RBCUA 1-2 12/04/2022    HYALINE 0-3 (A) 12/04/2022    HYALINE 0-5 (A) 12/14/2017    BACTERIA Occasional 12/04/2022    BACTERIA NONE SEEN 12/14/2017    EPIS Occasional 12/04/2022        Liver Function Test: ASA     Lab Results   Component Value Date    TBILI 1 40 (H) 03/15/2023    TBILI 0 4 06/25/2021    BILIDIR 0 28 (H) 12/08/2022    ALKPHOS 23 (L) 03/15/2023    ALKPHOS 15 (L) 06/25/2021     (H) 03/15/2023    AST 24 06/25/2021    ALT 94 (H) 03/15/2023    ALT 38 06/25/2021    TP 7 1 03/15/2023    TP 6 5 06/25/2021    ALB 4 5 03/15/2023    ALB 4 3 12/14/2017      Lab Results   Component Value Date    SALICYLATE <3 (L) 41/86/7475      Troponin APAP     No results found for: TROPONINI   Lab Results   Component Value Date    ACTMNPHEN <2 (L) 12/04/2022      VBG HCG     No results found for: PHVEN, STS5PUB, PO2VEN, NCV1PIC, BEVEN, X6BREYUOS, Q3EDYRB   No results found for: HCGQUANT   ABG Urine Drug Screen     Lab Results   Component Value Date/Time    PHART 7 466 (H) 12/04/2022 01:46 PM    NTE8QIJ 21 2 (LL) 12/04/2022 01:46 PM    PO2ART 105 6 12/04/2022 01:46 PM    XTS2EKE 14 9 (L) 12/04/2022 01:46 PM    BEART -6 7 12/04/2022 01:46 PM    P3DQWNDGB 17 2 12/04/2022 01:46 PM    O2HGB 97 1 (H) 12/04/2022 01:46 PM    LIAM Yes 12/04/2022 01:46 PM      Lab Results   Component Value Date    AMPMETHUR Negative 12/04/2022    BARBTUR Negative 12/04/2022    BDZUR Positive (A) 12/04/2022    COCAINEUR Negative 12/04/2022    METHADONEUR Negative 12/04/2022    OPIATEUR Negative 12/04/2022    PCPUR Negative 12/04/2022    THCUR Negative 12/04/2022    OXYCODONEUR Negative 12/04/2022      Lactate INR     Lab Results   Component Value Date    LACTICACID 1 0 12/04/2022      Lab Results   Component Value Date    INR 0 99 03/15/2023      PTT Protime     No results found for: PTT     Lab Results   Component Value Date/Time    PROTIME 13 4 03/15/2023 06:17 PM              Imaging Studies: I have personally reviewed pertinent reports  Counseling / Coordination of Care  Total floor / unit time spent today 80 minutes  Greater than 50% of total time was spent with the patient and / or family counseling and / or coordination of care         Minutes of critical care time 61  -Critical care time was exclusive of separately billable procedures and teaching time    -Critical care was necessary to treat or prevent imminent or life-threatening deterioration of the following condition: CNS failure/compromise, toxidrome (ethanol withdrawal),  toxidrome, withdrawal, CNS failure/compromise, hepatic failure and metabolic crisis  -Critical care time was spent personally by me on the following activities as well as the above as per the course and rest of chart: obtaining history from patient/surrogate, development of a treatment plan, discussions with referring provider(s), evaluation of patient's response to the treatment, examination of the patient, performing treatments and interventions, re-evaluation of the patient's condition, review of old charts, ordering/interpreting laboratory studies, ordering/interpreting of radiographic studies  ** Please Note: This note has been constructed using a voice recognition system   **

## 2023-03-16 NOTE — ASSESSMENT & PLAN NOTE
Recent Labs     03/17/23  0430 03/18/23  0557   PHOS 1 4* 3 9     · Replace and continue to monitor and optimize

## 2023-03-16 NOTE — ASSESSMENT & PLAN NOTE
Recent Labs     03/15/23  2358 03/16/23  0434 03/17/23  0430   LIPASE 155* 171* 164*       · Trending down  · Denies continued nausea/vomiting  · Tolerating normal diet

## 2023-03-16 NOTE — ASSESSMENT & PLAN NOTE
· Body mass index is 33 52 kg/m²     · Check Hgb A1c and lipid panel  · Recommend healthy diet, lifestyle modifications, alcohol cessation

## 2023-03-16 NOTE — NUTRITION
03/16/23 1100   Recommendations/Interventions   Malnutrition/BMI Present No   360 Statement does not meet 2 malnutrition criteria   Summary no overt reasoning for low fiber/low residue diet outside of nausea that was present on admission  no diarrhea or other GI maladies that would warrant a low fiber diet  Low fiber does not correlate to his low sodium status  Fluid restriction is appropriate for this reason  Will advance diet to regular with 1200ml fluid restriction  Interventions/Recommendations Adjust diet order   Recommendations to Provider no overt reasoning for low fiber/low residue diet outside of nausea that was present on admission  no diarrhea or other GI maladies that would warrant a low fiber diet  Low fiber does not correlate to his low sodium status  Fluid restriction is appropriate for this reason  Will advance diet to regular with 1200ml fluid restriction

## 2023-03-16 NOTE — ASSESSMENT & PLAN NOTE
• Pt follows with orthopedics and OP PT for chronic R shoulder pain   • MRI R shoulder 12/19/22 showed:   • "Moderate supraspinatus and infraspinatus tendinosis  • High-grade partial-thickness articular surface tear of the distal supraspinatus tendon as above  No full-thickness rotator cuff tear, tendon retraction, or significant muscular atrophy  • Posterior labral tear     • Moderate acromioclavicular joint osteoarthrosis "  • Received R CS injection at follow-up office visit with ortho 01/05/23  • Currently pt does not have any acute R shoulder complaints, denies any focal neuro sx; RUE neurovascularly intact on exam  • Supportive care  • Recommend continued OP ortho and PT follow-up

## 2023-03-16 NOTE — ASSESSMENT & PLAN NOTE
Recent Labs     03/17/23  0430 03/17/23  0756 03/17/23  1241 03/17/23  1752 03/18/23  0014 03/18/23  0557   CO2 23 23 22 23 25 24     · Metabolic acidosis and anion gap resolved  · Encourage PO intake as tolerated

## 2023-03-16 NOTE — ASSESSMENT & PLAN NOTE
• Pt has a h/o L supraspinatus tendon tear, status post-op L shoulder arthroscopy and rotator cuff repair 9/9/22  • He notes some continued L shoulder pain after surgery, denies any acute worsening, N/T, or upper extremity weakness  • Per chart review, at last OP f/u with ortho, recommended continued OP PT and if pain does not improve, they will consider further imaging  • Supportive care, pain control  • Continue OP f/u with PT and ortho

## 2023-03-16 NOTE — ASSESSMENT & PLAN NOTE
· Pt with a h/o GERD  · Home medications include omeprazole 20 mg daily  · Current sx: epigastric pain, nausea w/o vomiting  · Continue daily PPI - will increase dosing to 40 mg daily  · Supportive care

## 2023-03-16 NOTE — ASSESSMENT & PLAN NOTE
· Body mass index is 33 52 kg/m²     · Hbg A1C 5 4  · Recommend healthy diet, lifestyle modifications, alcohol cessation

## 2023-03-16 NOTE — DISCHARGE INSTR - OTHER ORDERS
Drug and Alcohol Resources in Critical access hospital    If you have health insurance, including medical assistance, there should be a phone number on your insurance card that you can call to find out how to access services  The card may say, “For [address] or “For Drug and Alcohol Services” or “For Substance Abuse Services” call the number provided  [place] [address] [facility]  [facility], [place], [address]  [phone]  A  is available Monday through Friday from 8:00 am to 5:00 pm to provide you with assistance on accessing services for substance abuse  If you do not have health insurance and are in financial need, this office may also help you get the funding for the services that are necessary  [address] [facility] provides funding to support three Recovery Centers in Critical access hospital  These centers offer a safe, sober environment to those in recovery  A variety of programming including 12-Step Meetings, Travis Kaur, Life Skills Workshops, etc  is offered at each location  [address] [place] [address]  [phone]  www  cleanslatebangor  org [address] [employer]  Dylan Vicente, [address] [facility]  [phone]  tzenzh-lc-nuos  Guerita Webster [facility], [address] [facility]  [phone]   [address] [place] [address]  [phone]  www  [facility], [address] [place] [address]  [phone]  [facility]  Confidential free help, from public health agencies, to find substance use treatment and information  [phone]    Link for Zoom Codes for Virtual 12 step Meetings  Reynaldo melara uk  aspx  Alcoholics Anonymous  [facility]  [phone]    http://www ford com/  Narcotics Anonymous  [phone]  https://el?/

## 2023-03-16 NOTE — TELEPHONE ENCOUNTER
sw patient from detox regarding patient coming back to [facility]' patient stated that he had side effects with naltrexone and vivitrol and would like to try something else - patient does go to [place] [address] as well as has a sponsor, patient seemed unaware of the [facility] program and where I was coming from   Will discuss with Emanuel Otero tomorrow to clarify and keep appointment with me until I discuss further     Keeping appointment with patient will see if further recovery resources are available

## 2023-03-16 NOTE — ASSESSMENT & PLAN NOTE
· Patient with a history of chronic daily alcohol use  · Last drink pt reports was on Tuesday  · Breath alcohol 0 059 in the ED  · Received Ativan 2 mg total in the ED PTA  · [facility] for medical management of alcohol withdrawal  · Current alcohol withdrawal signs/symptoms include anxiety, tremor, HTN, tachycardia, and nausea  · Received 650 mg of phenobarbital as initial dose  · Continue monitoring under protocol and administer phenobarbital as indicated  · Continuous pulse ox and telemetry monitoring

## 2023-03-16 NOTE — ASSESSMENT & PLAN NOTE
Recent Labs     03/16/23  0434 03/17/23  0430 03/18/23  0557   MG 2 0 1 9 1 9       · Improved  · Continue to monitor and optimize as needed

## 2023-03-16 NOTE — ASSESSMENT & PLAN NOTE
· Na 112 on admission, unchanged   · Pt has a h/o significant hyponatremia 2/2 alcohol use  · No focal neurologic deficits on exam  · Initiate 1 2 L fluid restriction  · Gentle IVF hydration with D5NS at 50 mL/hr given concomitant AKA  · Nephrology following, appreciate recommendations  · Q4H BMPs  · Urine studies pending  · Monitor and optimize electrolytes as below  · Stepdown 2 level of care for close monitoring

## 2023-03-16 NOTE — ASSESSMENT & PLAN NOTE
· Continue PPI  · Suspect acute exacerbation in setting of recent alcohol intake with possible gastritis  · Improved  · Outpatient follow up

## 2023-03-16 NOTE — ASSESSMENT & PLAN NOTE
· Pt with a h/o chronic heavy alcohol use, recently started drinking again after maintaining a period of sobriety  · Drinks 1 fifth of vodka daily x10 days  · Previous admission to the [address] [place] [address] Detox Unit 12/2022  · Denies H/o withdrawal seizures  · Follows with [facility] for AUD and PO naltrexone  · Pt reports side effect of myalgias/arthralgias with medication and has stopped taking since resuming alcohol use  · Interested in trying a different MAT medication such as Antabuse or Campral, as previously discussed with Dr Patrice Woods at last [facility] appt on 2/16/23   · Withdrawal management as above  · Initiate IVFs, high dose IV thiamine, folic acid supplementation, and MVI  · Consult case management for assistance with aftercare resources - pt interested in possible IP rehab at this time

## 2023-03-16 NOTE — ASSESSMENT & PLAN NOTE
· [facility]- received 1790 mg phenobarbital total  · Denies any current withdrawal symptoms

## 2023-03-16 NOTE — ASSESSMENT & PLAN NOTE
Recent Labs     03/16/23  0434 03/17/23  0430 03/18/23  0557   WBC 9 41 7 55 5 00     • Afebrile, no infectious symptoms

## 2023-03-16 NOTE — ASSESSMENT & PLAN NOTE
· Continue high dose thiamine  · PT consult-  · Ambulatory dysfunction improved   · Recommended to continue outpatient PT after discharge

## 2023-03-16 NOTE — ASSESSMENT & PLAN NOTE
Recent Labs     03/17/23  1752 03/18/23  0014 03/18/23  0557   K 3 9 3 6 3 7     · Replace and monitor

## 2023-03-16 NOTE — ASSESSMENT & PLAN NOTE
· Phosphate 1 4 on admission  · In the setting of poor oral intake due to excessive alcohol use  · Initiate K Phos PO 4x daily AC and HS  · Monitor and replete electrolytes as indicated

## 2023-03-16 NOTE — ASSESSMENT & PLAN NOTE
Patient with a history of HTN  Home medication regimen: irbesartan 150 mg daily  BP hypertensive upon arrival to the unit in the setting of acute alcohol withdrawal and chronic HTN  Most Recent Blood Pressure: 165/99    Will continue anti-HTN medication in the setting of marked HTN, tachycardia, and new murmur  Substitute irbesartan for formulary alternative losartan 100 mg daily  Continue monitoring BP  ECHO pending

## 2023-03-17 ENCOUNTER — APPOINTMENT (INPATIENT)
Dept: NON INVASIVE DIAGNOSTICS | Facility: HOSPITAL | Age: 48
End: 2023-03-17

## 2023-03-17 PROBLEM — E87.6 HYPOKALEMIA: Status: RESOLVED | Noted: 2022-02-03 | Resolved: 2023-03-17

## 2023-03-17 PROBLEM — E87.29 ALCOHOLIC KETOACIDOSIS: Status: RESOLVED | Noted: 2022-12-04 | Resolved: 2023-03-17

## 2023-03-17 PROBLEM — E83.51 HYPOCALCEMIA: Status: RESOLVED | Noted: 2023-03-16 | Resolved: 2023-03-17

## 2023-03-17 PROBLEM — E83.42 HYPOMAGNESEMIA: Status: RESOLVED | Noted: 2022-12-04 | Resolved: 2023-03-17

## 2023-03-17 PROBLEM — D72.829 LEUKOCYTOSIS: Status: RESOLVED | Noted: 2022-12-04 | Resolved: 2023-03-17

## 2023-03-17 LAB
ALBUMIN SERPL BCP-MCNC: 4 G/DL (ref 3.5–5)
ALP SERPL-CCNC: 21 U/L (ref 34–104)
ALT SERPL W P-5'-P-CCNC: 96 U/L (ref 7–52)
ANION GAP SERPL CALCULATED.3IONS-SCNC: 10 MMOL/L (ref 4–13)
ANION GAP SERPL CALCULATED.3IONS-SCNC: 10 MMOL/L (ref 4–13)
ANION GAP SERPL CALCULATED.3IONS-SCNC: 12 MMOL/L (ref 4–13)
ANION GAP SERPL CALCULATED.3IONS-SCNC: 9 MMOL/L (ref 4–13)
ANION GAP SERPL CALCULATED.3IONS-SCNC: 9 MMOL/L (ref 4–13)
AORTIC ROOT: 3.6 CM
APICAL FOUR CHAMBER EJECTION FRACTION: 75 %
AST SERPL W P-5'-P-CCNC: 361 U/L (ref 13–39)
ATRIAL RATE: 84 BPM
ATRIAL RATE: 87 BPM
ATRIAL RATE: 98 BPM
AV PEAK GRADIENT: 70 MMHG
BILIRUB DIRECT SERPL-MCNC: 0.32 MG/DL (ref 0–0.2)
BILIRUB SERPL-MCNC: 1.21 MG/DL (ref 0.2–1)
BUN SERPL-MCNC: 7 MG/DL (ref 5–25)
BUN SERPL-MCNC: 8 MG/DL (ref 5–25)
BUN SERPL-MCNC: 8 MG/DL (ref 5–25)
CALCIUM SERPL-MCNC: 8.6 MG/DL (ref 8.4–10.2)
CALCIUM SERPL-MCNC: 8.8 MG/DL (ref 8.4–10.2)
CALCIUM SERPL-MCNC: 9 MG/DL (ref 8.4–10.2)
CALCIUM SERPL-MCNC: 9 MG/DL (ref 8.4–10.2)
CALCIUM SERPL-MCNC: 9.3 MG/DL (ref 8.4–10.2)
CHLORIDE SERPL-SCNC: 88 MMOL/L (ref 96–108)
CHLORIDE SERPL-SCNC: 89 MMOL/L (ref 96–108)
CHLORIDE SERPL-SCNC: 91 MMOL/L (ref 96–108)
CO2 SERPL-SCNC: 22 MMOL/L (ref 21–32)
CO2 SERPL-SCNC: 23 MMOL/L (ref 21–32)
CORTIS AM PEAK SERPL-MCNC: 20 UG/DL (ref 4.2–22.4)
CREAT SERPL-MCNC: 0.75 MG/DL (ref 0.6–1.3)
CREAT SERPL-MCNC: 0.77 MG/DL (ref 0.6–1.3)
CREAT SERPL-MCNC: 0.85 MG/DL (ref 0.6–1.3)
CREAT SERPL-MCNC: 0.92 MG/DL (ref 0.6–1.3)
CREAT SERPL-MCNC: 0.93 MG/DL (ref 0.6–1.3)
DOP CALC LVOT AREA: 2.27 CM2
DOP CALC LVOT DIAMETER: 1.7 CM
E WAVE DECELERATION TIME: 248 MS
ERYTHROCYTE [DISTWIDTH] IN BLOOD BY AUTOMATED COUNT: 12.3 % (ref 11.6–15.1)
FLUAV RNA RESP QL NAA+PROBE: NEGATIVE
FLUBV RNA RESP QL NAA+PROBE: NEGATIVE
FRACTIONAL SHORTENING: 36 % (ref 28–44)
GFR SERPL CREATININE-BSD FRML MDRD: 100 ML/MIN/1.73SQ M
GFR SERPL CREATININE-BSD FRML MDRD: 105 ML/MIN/1.73SQ M
GFR SERPL CREATININE-BSD FRML MDRD: 109 ML/MIN/1.73SQ M
GFR SERPL CREATININE-BSD FRML MDRD: 110 ML/MIN/1.73SQ M
GFR SERPL CREATININE-BSD FRML MDRD: 98 ML/MIN/1.73SQ M
GLUCOSE SERPL-MCNC: 116 MG/DL (ref 65–140)
GLUCOSE SERPL-MCNC: 118 MG/DL (ref 65–140)
GLUCOSE SERPL-MCNC: 131 MG/DL (ref 65–140)
GLUCOSE SERPL-MCNC: 136 MG/DL (ref 65–140)
GLUCOSE SERPL-MCNC: 149 MG/DL (ref 65–140)
HCT VFR BLD AUTO: 33.3 % (ref 36.5–49.3)
HGB BLD-MCNC: 12 G/DL (ref 12–17)
INTERVENTRICULAR SEPTUM IN DIASTOLE (PARASTERNAL SHORT AXIS VIEW): 1.2 CM
INTERVENTRICULAR SEPTUM: 1.2 CM (ref 0.6–1.1)
LAAS-AP2: 24.1 CM2
LAAS-AP4: 17.3 CM2
LEFT ATRIUM AREA SYSTOLE SINGLE PLANE A4C: 17.3 CM2
LEFT ATRIUM SIZE: 3.1 CM
LEFT INTERNAL DIMENSION IN SYSTOLE: 3 CM (ref 2.1–4)
LEFT VENTRICULAR INTERNAL DIMENSION IN DIASTOLE: 4.7 CM (ref 3.5–6)
LEFT VENTRICULAR POSTERIOR WALL IN END DIASTOLE: 1.2 CM
LEFT VENTRICULAR STROKE VOLUME: 65 ML
LIPASE SERPL-CCNC: 164 U/L (ref 11–82)
LVSV (TEICH): 65 ML
MAGNESIUM SERPL-MCNC: 1.9 MG/DL (ref 1.9–2.7)
MCH RBC QN AUTO: 31.5 PG (ref 26.8–34.3)
MCHC RBC AUTO-ENTMCNC: 36 G/DL (ref 31.4–37.4)
MCV RBC AUTO: 87 FL (ref 82–98)
MV PEAK A VEL: 0.86 M/S
MV PEAK E VEL: 75 CM/S
MV STENOSIS PRESSURE HALF TIME: 72 MS
MV VALVE AREA P 1/2 METHOD: 3.06 CM2
P AXIS: 15 DEGREES
P AXIS: 24 DEGREES
P AXIS: 26 DEGREES
PHOSPHATE SERPL-MCNC: 1.4 MG/DL (ref 2.7–4.5)
PLATELET # BLD AUTO: 121 THOUSANDS/UL (ref 149–390)
PMV BLD AUTO: 9.8 FL (ref 8.9–12.7)
POTASSIUM SERPL-SCNC: 3.4 MMOL/L (ref 3.5–5.3)
POTASSIUM SERPL-SCNC: 3.7 MMOL/L (ref 3.5–5.3)
POTASSIUM SERPL-SCNC: 3.8 MMOL/L (ref 3.5–5.3)
POTASSIUM SERPL-SCNC: 3.9 MMOL/L (ref 3.5–5.3)
POTASSIUM SERPL-SCNC: 4.1 MMOL/L (ref 3.5–5.3)
PR INTERVAL: 150 MS
PR INTERVAL: 150 MS
PR INTERVAL: 152 MS
PROT SERPL-MCNC: 6.4 G/DL (ref 6.4–8.4)
QRS AXIS: -3 DEGREES
QRS AXIS: -5 DEGREES
QRS AXIS: 3 DEGREES
QRSD INTERVAL: 86 MS
QRSD INTERVAL: 90 MS
QRSD INTERVAL: 94 MS
QT INTERVAL: 402 MS
QT INTERVAL: 416 MS
QT INTERVAL: 418 MS
QTC INTERVAL: 491 MS
QTC INTERVAL: 502 MS
QTC INTERVAL: 513 MS
RBC # BLD AUTO: 3.81 MILLION/UL (ref 3.88–5.62)
RIGHT ATRIUM AREA SYSTOLE A4C: 12.9 CM2
RSV RNA RESP QL NAA+PROBE: NEGATIVE
SARS-COV-2 RNA RESP QL NAA+PROBE: NEGATIVE
SL CV ECHO LV DYNAMIC OBSTRUCTION PEAK GRADIENT (REST): 40.2 MMHG
SL CV ECHO LV DYNAMIC OBSTRUCTION PEAK GRADIENT (VALSAL: 95.1 MMHG
SL CV LEFT ATRIUM LENGTH A2C: 5.7 CM
SL CV LV EF: 75
SL CV PED ECHO LEFT VENTRICLE DIASTOLIC VOLUME (MOD BIPLANE) 2D: 100 ML
SL CV PED ECHO LEFT VENTRICLE SYSTOLIC VOLUME (MOD BIPLANE) 2D: 35 ML
SODIUM SERPL-SCNC: 120 MMOL/L (ref 135–147)
SODIUM SERPL-SCNC: 121 MMOL/L (ref 135–147)
SODIUM SERPL-SCNC: 121 MMOL/L (ref 135–147)
SODIUM SERPL-SCNC: 122 MMOL/L (ref 135–147)
SODIUM SERPL-SCNC: 124 MMOL/L (ref 135–147)
T WAVE AXIS: -1 DEGREES
T WAVE AXIS: 0 DEGREES
T WAVE AXIS: 8 DEGREES
TRICUSPID ANNULAR PLANE SYSTOLIC EXCURSION: 1.7 CM
VENTRICULAR RATE: 84 BPM
VENTRICULAR RATE: 87 BPM
VENTRICULAR RATE: 98 BPM
WBC # BLD AUTO: 7.55 THOUSAND/UL (ref 4.31–10.16)

## 2023-03-17 RX ORDER — POTASSIUM CHLORIDE 14.9 MG/ML
20 INJECTION INTRAVENOUS ONCE
Status: DISCONTINUED | OUTPATIENT
Start: 2023-03-17 | End: 2023-03-19 | Stop reason: HOSPADM

## 2023-03-17 RX ORDER — POTASSIUM CHLORIDE 20 MEQ/1
40 TABLET, EXTENDED RELEASE ORAL ONCE
Status: COMPLETED | OUTPATIENT
Start: 2023-03-17 | End: 2023-03-17

## 2023-03-17 RX ORDER — DESMOPRESSIN ACETATE 4 UG/ML
INJECTION, SOLUTION INTRAVENOUS; SUBCUTANEOUS
Status: DISPENSED
Start: 2023-03-17 | End: 2023-03-17

## 2023-03-17 RX ORDER — DESMOPRESSIN ACETATE 4 UG/ML
1 INJECTION, SOLUTION INTRAVENOUS; SUBCUTANEOUS ONCE
Status: COMPLETED | OUTPATIENT
Start: 2023-03-17 | End: 2023-03-17

## 2023-03-17 RX ADMIN — DEXTROSE 250 ML: 5 SOLUTION INTRAVENOUS at 14:26

## 2023-03-17 RX ADMIN — SODIUM BICARBONATE 1300 MG: 650 TABLET ORAL at 16:31

## 2023-03-17 RX ADMIN — FOLIC ACID 1 MG: 5 INJECTION, SOLUTION INTRAMUSCULAR; INTRAVENOUS; SUBCUTANEOUS at 08:03

## 2023-03-17 RX ADMIN — SODIUM BICARBONATE 1300 MG: 650 TABLET ORAL at 12:22

## 2023-03-17 RX ADMIN — POTASSIUM PHOSPHATE, MONOBASIC AND POTASSIUM PHOSPHATE, DIBASIC 30 MMOL: 224; 236 INJECTION, SOLUTION, CONCENTRATE INTRAVENOUS at 10:59

## 2023-03-17 RX ADMIN — SODIUM BICARBONATE 1300 MG: 650 TABLET ORAL at 08:02

## 2023-03-17 RX ADMIN — ENOXAPARIN SODIUM 40 MG: 40 INJECTION SUBCUTANEOUS at 08:03

## 2023-03-17 RX ADMIN — LOSARTAN POTASSIUM 100 MG: 50 TABLET, FILM COATED ORAL at 08:02

## 2023-03-17 RX ADMIN — DIBASIC SODIUM PHOSPHATE, MONOBASIC POTASSIUM PHOSPHATE AND MONOBASIC SODIUM PHOSPHATE 2 TABLET: 852; 155; 130 TABLET ORAL at 21:46

## 2023-03-17 RX ADMIN — PANTOPRAZOLE SODIUM 40 MG: 40 TABLET, DELAYED RELEASE ORAL at 05:05

## 2023-03-17 RX ADMIN — DIBASIC SODIUM PHOSPHATE, MONOBASIC POTASSIUM PHOSPHATE AND MONOBASIC SODIUM PHOSPHATE 2 TABLET: 852; 155; 130 TABLET ORAL at 11:05

## 2023-03-17 RX ADMIN — ACETAMINOPHEN 650 MG: 325 TABLET ORAL at 15:10

## 2023-03-17 RX ADMIN — DESMOPRESSIN ACETATE 1 MCG: 4 SOLUTION INTRAVENOUS at 01:35

## 2023-03-17 RX ADMIN — DIBASIC SODIUM PHOSPHATE, MONOBASIC POTASSIUM PHOSPHATE AND MONOBASIC SODIUM PHOSPHATE 2 TABLET: 852; 155; 130 TABLET ORAL at 16:16

## 2023-03-17 RX ADMIN — DEXTROSE 100 ML/HR: 5 SOLUTION INTRAVENOUS at 08:13

## 2023-03-17 RX ADMIN — DIBASIC SODIUM PHOSPHATE, MONOBASIC POTASSIUM PHOSPHATE AND MONOBASIC SODIUM PHOSPHATE 2 TABLET: 852; 155; 130 TABLET ORAL at 08:02

## 2023-03-17 RX ADMIN — THIAMINE HYDROCHLORIDE 500 MG: 100 INJECTION, SOLUTION INTRAMUSCULAR; INTRAVENOUS at 05:05

## 2023-03-17 RX ADMIN — FENOFIBRATE 145 MG: 145 TABLET, FILM COATED ORAL at 08:03

## 2023-03-17 RX ADMIN — THIAMINE HYDROCHLORIDE 500 MG: 100 INJECTION, SOLUTION INTRAMUSCULAR; INTRAVENOUS at 15:33

## 2023-03-17 RX ADMIN — POTASSIUM CHLORIDE 40 MEQ: 1500 TABLET, EXTENDED RELEASE ORAL at 06:24

## 2023-03-17 RX ADMIN — Medication 6 MG: at 01:34

## 2023-03-17 RX ADMIN — THIAMINE HYDROCHLORIDE 500 MG: 100 INJECTION, SOLUTION INTRAMUSCULAR; INTRAVENOUS at 21:46

## 2023-03-17 RX ADMIN — MULTIPLE VITAMINS W/ MINERALS TAB 1 TABLET: TAB ORAL at 08:03

## 2023-03-17 RX ADMIN — DEXTROSE 125 ML/HR: 5 SOLUTION INTRAVENOUS at 21:46

## 2023-03-17 NOTE — PROCEDURES
EKG 3/17/23 0946  Read 1010     Rate- 98 bpm  SC interval- 150 ms  QRS duration- 94 ms  QT/QTC- 402/513 ms     Interpretation- Normal sinus rhythm  Minimal voltage criteria for LVH, may be normal variant  Prolonged QT

## 2023-03-17 NOTE — PROGRESS NOTES
NEPHROLOGY PROGRESS NOTE   Joceline Huang 41 y o  male MRN: [id number]  Unit/Bed#: 5T DETOX 505-01 Encounter: [id number]  Reason for Consult: Severe hyponatremia    22-year-old male with history of alcohol abuse, hypertension, GERD, hyperlipidemia, previous hyponatremia, who presents with alcohol withdrawal seeking detoxification  Nephrology is consulted for severe hyponatremia  ASSESSMENT/PLAN:  Severe hyponatremia: Suspect beer potomania/low solute intake contributing, +/- SIADH with nausea  -Baseline sodium level within normal range   -Per record review, patient had episode of acute hyponatremia in December 2022  Sodium level rapidly improved with hypertonic fluid and required hypotonic fluid administration   -Presented with sodium of 112   -treated with normal saline with rapid correction and required D5W since last night and DDAVP administration    -goal Na level by this morning 119, repeat am sodium level 121  Continue D5W increased to 125 cc/hr   -May discontinue hypotonic fluids and once sodium level has decreased to 119  Goal Na level 125-128 by this evening    -Work-up: Urine sodium 26, urine chloride 48, urine osm 887, serum osm 240, TSH and Am cortisol level    -continue on fluid restriction 1 2 L/24 hours  -Trending BMP every 8 hours  -Recommend avoiding rapid correction, goal to correct 6-8 meq's in 24 hours  -Renal function remains stable      Anion gap acidosis: continues on oral sodium bicarbonate  Continue to monitor and replace accordingly       Hypokalemia: Presented with potassium of 3 4 with mag of 1 4  Suspect due to poor oral intake in the setting of excessive alcohol use  (resolved)  -repeat potassium level 3 7 with Mag of 139   -Will continue to monitor and replace electrolytes as needed      Hypocalcemia: Received replacement with calcium gluconate  (resovled)    Hypophosphatemia: due to poor oral intake and ETOH abuse   -Currently on K-Phos 4 times per day    -Received potassium phosphate x1 3/16  Repeat today   -Will continue to monitor and replace as needed      Hypertension: Blood pressure initially in the 180s, currently improved  -Home medications: Irbesartan 150 mg daily   -Currently on losartan 100 mg daily    -Recommend avoiding hypotension or high fluctuations in blood pressure   -Commend holding parameters antihypertensive for systolic blood pressure less than 130      Alcohol abuse/elevated lipase: With daily alcohol use, 1/5 of vodka daily x10 days  -Monitoring withdrawal symptoms per primary care team   -Considering checking imaging if worsening abdominal pain      Other: GERD, hyperlipidemia, obesity        Disposition: Requiring additional stay due to medical needs  SUBJECTIVE:  The patient is sitting up at his bedside  He denies chest discomfort or shortness of breath  He denies nausea, vomiting, diarrhea  He reports urinating well  He ate his this morning  He reports having muscle pain all over his body      OBJECTIVE:  Current Weight: Weight - Scale: 109 kg (240 lb)  Vitals:    03/16/23 1954 03/16/23 2320 03/17/23 0512 03/17/23 0714   BP: 124/77 131/77 152/75 126/74   BP Location: Right arm Right arm Right arm Right arm   Pulse: (!) 113 (!) 110 98 90   Resp: 18 18 18 18   Temp: 97 9 °F (36 6 °C) 98 2 °F (36 8 °C) 98 °F (36 7 °C) 97 9 °F (36 6 °C)   TempSrc: Temporal Temporal Temporal Temporal   SpO2: 98% 99% 98% 100%   Weight:    109 kg (240 lb)   Height:    5' 11" (1 803 m)       Intake/Output Summary (Last 24 hours) at 3/17/2023 0945  Last data filed at 3/17/2023 0505  Gross per 24 hour   Intake 770 ml   Output 2025 ml   Net -1255 ml     General: NAD  Skin: warm, dry, intact, no rash  HEENT: Moist mucous membranes, sclera anicteric, normocephalic, atraumatic  Neck: No apparent JVD appreciated  Chest: lung sounds clear B/L, on RA   CVS:Regular rate and rhythm, no murmer   Abdomen: Soft, round, non-tender, +BS  Extremities: No B/L LE edema present  Neuro: alert and oriented  Psych: appropriate mood and affect     Medications:    Current Facility-Administered Medications:   •  acetaminophen (TYLENOL) tablet 650 mg, 650 mg, Oral, Q6H PRN, RANDELL CastroC, 650 mg at 03/16/23 2013  •  aluminum-magnesium hydroxide-simethicone (MYLANTA) oral suspension 30 mL, 30 mL, Oral, Q6H PRN, RANDELL CastroC  •  dextrose 5 % infusion, 100 mL/hr, Intravenous, Continuous, RANDELL CastroC, Last Rate: 100 mL/hr at 03/17/23 0813, 100 mL/hr at 03/17/23 0813  •  enoxaparin (LOVENOX) subcutaneous injection 40 mg, 40 mg, Subcutaneous, Daily, RANDELL CastroC, 40 mg at 03/17/23 7248  •  fenofibrate (TRICOR) tablet 145 mg, 145 mg, Oral, Daily, RANDELL CastroC, 145 mg at 71/36/80 8540  •  folic acid 1 mg in sodium chloride 0 9 % 50 mL IVPB, 1 mg, Intravenous, Daily, ZULY Castro-C, Last Rate: 100 mL/hr at 03/17/23 0803, 1 mg at 03/17/23 0803  •  losartan (COZAAR) tablet 100 mg, 100 mg, Oral, Daily, Tamara Villa, PHYLICIANP, 100 mg at 03/17/23 0802  •  melatonin tablet 6 mg, 6 mg, Oral, HS PRN, Hector Saxena PA-C, 6 mg at 03/17/23 0134  •  multivitamin-minerals (CENTRUM) tablet 1 tablet, 1 tablet, Oral, Daily, RNADELL CastroC, 1 tablet at 03/17/23 0803  •  ondansetron [facility]) injection 4 mg, 4 mg, Intravenous, Q6H PRN, Hector Saxena PA-C, 4 mg at 03/15/23 2316  •  pantoprazole (PROTONIX) EC tablet 40 mg, 40 mg, Oral, Early Morning, Jeanette Carmona PA-C, 40 mg at 03/17/23 0505  •  potassium chloride 20 mEq IVPB (premix), 20 mEq, Intravenous, Once, Hector Saxena PA-C  •  potassium-sodium phosphateS (K-PHOS,PHOSPHA 250) -250 mg tablet 2 tablet, 2 tablet, Oral, 4x Daily (with meals and at bedtime), Hector Saxena PA-C, 2 tablet at 03/17/23 0802  •  sodium bicarbonate tablet 1,300 mg, 1,300 mg, Oral, TID after meals, VIKTORIYA Castillo, 1,300 mg at 03/17/23 0802  •  thiamine (VITAMIN B1) 500 mg in sodium chloride 0 9 % 50 mL IVPB, 500 mg, Intravenous, Q8H [address], Jeanette Francois, MADDISON, Last Rate: 100 mL/hr at 03/17/23 0505, 500 mg at 03/17/23 0505    Laboratory Results:  Results from last 7 days   Lab Units 03/17/23  0756 03/17/23  0430 03/17/23  0015 03/16/23  0857 03/16/23  0434 03/15/23  2358 03/15/23  1909 03/15/23  1707   WBC Thousand/uL  --  7 55  --   --  9 41  --   --  16 84*   HEMOGLOBIN g/dL  --  12 0  --   --  12 5  --   --  13 8   HEMATOCRIT %  --  33 3*  --   --  34 0*  --   --  36 9   PLATELETS Thousands/uL  --  121*  --   --  118*  --   --  181   SODIUM mmol/L 121* 120* 121*   < > 113* 112* 112* 112*   POTASSIUM mmol/L 3 7 3 4* 3 8   < > 3 6 3 8 3 4* 3 4*   CHLORIDE mmol/L 89* 88* 88*   < > 80* 78* 76* 76*   CO2 mmol/L 23 23 23   < > 18* 15* 12* 11*   BUN mg/dL 7 7 7   < > 6 7 8 8   CREATININE mg/dL 0 75 0 77 0 85   < > 0 61 0 62 0 56* 0 56*   CALCIUM mg/dL 8 6 8 8 9 3   < > 8 0* 8 3* 8 1* 7 9*   MAGNESIUM mg/dL  --  1 9  --   --  2 0 2 4  --  1 3*   PHOSPHORUS mg/dL  --  1 4*  --   --  <1 0*  --  1 4*  --    ALK PHOS U/L  --  21*  --   --  21*  --   --  23*   ALT U/L  --  96*  --   --  88*  --   --  94*   AST U/L  --  361*  --   --  423*  --   --  483*    < > = values in this interval not displayed

## 2023-03-17 NOTE — CASE MANAGEMENT
Worker consulted with medical provider, pt not medically cleared for discharge today  Worker discussed discharge planning with pt, pt still plans to return to [facility] and attend outpatient at [facility]

## 2023-03-17 NOTE — PROCEDURES
EKG: normal sinus rhythm, ventricular rate 84 bpm, no acute ST segment/T wave changes, normal axis and NC/QRS intervals, prolonged QTc interval at 491 ms  Compared to prior EKG 3/16/2023 at 1728, QTc prolongation has improved

## 2023-03-17 NOTE — PHYSICAL THERAPY NOTE
Physical Therapy Evaluation    Patient's Name: Mark Jimenez    Admitting Diagnosis  Alcohol withdrawal ([facility] [address] ) [F10 939]  Alcohol abuse [F10 10]  Hyponatremia [E87 1]  High anion gap metabolic acidosis [K35 34]  Withdrawn from alcohol detoxification program [Z78 9]    Problem List  Patient Active Problem List   Diagnosis    Benign essential hypertension    Anxiety    Esophageal reflux    Essential hypertriglyceridemia    Incomplete tear of left rotator cuff    Depression    Chronic midline low back pain without sciatica    Hyponatremia    Erectile dysfunction    Alcohol withdrawal syndrome with complication, with unspecified complication ([address] )    Alcohol use disorder, severe, dependence ([facility] [address] )    Hyperlipidemia    Obesity (BMI 30-39  9)    Prolonged Q-T interval on ECG    Ambulatory dysfunction    Tear of right supraspinatus tendon    Elevated lipase    Hypophosphatemia    Transaminitis       Past Medical History  Past Medical History:   Diagnosis Date    Acute pain of both shoulders 4/20/2022    Aftercare following surgery of the musculoskeletal system 11/21/2022    Anxiety     COVID-19 ruled out 2/3/2022    GERD (gastroesophageal reflux disease)     Hyperlipidemia     Hypertension     Internal derangement of right shoulder 11/28/2022    Sore throat 12/6/2022    Tear of left supraspinatus tendon 7/18/2022    Tendinitis     Thrombocytopenia ([address] ) 12/6/2022    Urinary urgency 2/27/2018    Overview:  Last Assessment & Plan:  Obtain US       Past Surgical History  Past Surgical History:   Procedure Laterality Date    KNEE SURGERY Left 1989    cyst removed    MS COLONOSCOPY FLX DX W/COLLJ SPEC WHEN PFRMD N/A 5/14/2018    Procedure: COLONOSCOPY;  Surgeon: Dorina Cain DO;  Location: [facility];   Service: General    MS SURGICAL ARTHROSCOPY SHOULDER W/ROTATOR CUFF RPR Left 9/9/2022    Procedure: SHOULDER ARTHROSCOPIC ROTATOR CUFF REPAIR;  Surgeon: Park Arvizu MD;  Location[facility] MAIN OR;  Service: Orthopedics    SHOULDER ARTHROSCOPY Right     with biceps tenodesis       Recent Imaging  No orders to display       Recent Vital Signs  Vitals:    03/16/23 2320 03/17/23 0512 03/17/23 0714 03/17/23 0845   BP: 131/77 152/75 126/74 126/74   BP Location: Right arm Right arm Right arm    Pulse: (!) 110 98 90 100   Resp: 18 18 18    Temp: 98 2 °F (36 8 °C) 98 °F (36 7 °C) 97 9 °F (36 6 °C)    TempSrc: Temporal Temporal Temporal    SpO2: 99% 98% 100%    Weight:   109 kg (240 lb) 109 kg (240 lb)   Height:   5' 11" (1 803 m) 5' 11" (1 803 m)          03/17/23 0831   PT Last Visit   PT Visit Date 03/17/23   Note Type   Note type Evaluation   Pain Assessment   Pain Assessment Tool 0-10   Pain Score   (Not reported, increase pain with movement)   Pain Location/Orientation Orientation: Left; Location: Shoulder   Pain Onset/Description Onset: Ongoing; Descriptor: Discomfort   Restrictions/Precautions   Weight Bearing Precautions Per Order No   Other Precautions Multiple lines   Home Living   Type of Home Apartment   Home Layout Stairs to enter with rails   Prior Function   Level of [place] Independent with ADLs   Lives With Alone   Falls in the last 6 months 1 to 4   General   Family/Caregiver Present No   Cognition   Overall Cognitive Status WFL   Arousal/Participation Alert   Orientation Level Oriented X4   Following Commands Follows all commands and directions without difficulty   RUE Assessment   RUE Assessment WFL   LUE Assessment   LUE Assessment   (Receiving OP  therapy for RTC tear, limited movement due to pain)   RLE Assessment   RLE Assessment WFL  (3/5 at minimum with functional movements)   LLE Assessment   LLE Assessment WFL  (3/5 at minimum with functional movements)   Vision-Basic Assessment   Current Vision No visual deficits   Coordination   Movements are Fluid and Coordinated 0   Coordination and Movement Description Decreased LE coordination and mild unsteady with gait   Light Touch   RLE Light Touch Grossly intact   LLE Light Touch Grossly intact   Transfers   Sit to Stand 7  Independent   Stand to Sit 7  Independent   Ambulation/Elevation   Gait pattern Improper Weight shift;Narrow HUONG; Short stride; Step through pattern;Decreased heel strike;Decreased toe off;Decreased foot clearance   Gait Assistance 7  Independent   Distance 400ft   Balance   Static Sitting Good   Dynamic Sitting Good   Static Standing Fair +   Dynamic Standing Fair +   Ambulatory Fair +   Endurance Deficit   Endurance Deficit Yes   Endurance Deficit Description SOB and fatigue after gait assessment   Activity Tolerance   Activity Tolerance Patient tolerated treatment well   Medical Staff Made Aware spoke to CM   Nurse Made Aware spoke to RN   Assessment   Prognosis Good   Problem List Decreased endurance; Impaired balance;Decreased strength;Decreased range of motion;Decreased cognition   Barriers to Discharge Inaccessible home environment;Decreased caregiver support   Goals   Patient Goals to go home   Recommendation   PT Discharge Recommendation Home with outpatient rehabilitation  (Continue RTC therapy)   AM-PAC Basic Mobility Inpatient   Turning in Flat Bed Without Bedrails 4   Lying on Back to Sitting on Edge of Flat Bed Without Bedrails 4   Moving Bed to Chair 4   Standing Up From Chair Using Arms 4   Walk in Room 3   Climb 3-5 Stairs With Railing 3   Basic Mobility Inpatient Raw Score 22   Basic Mobility Standardized Score 47 4   Highest Level Of Mobility   JH-HLM Goal 7: Walk 25 feet or more   JH-HLM Achieved 8: Walk 250 feet ot more   End of Consult   Patient Position at End of Consult Seated edge of bed; All needs within reach         ASSESSMENT                                                                                                                     Kvng Peña is a 41 y o  male admitted to [facility] on 3/15/2023 for Hyponatremia   Pt  has a past medical history of Acute pain of both shoulders (4/20/2022), Aftercare following surgery of the musculoskeletal system (11/21/2022), Anxiety, COVID-19 ruled out (2/3/2022), GERD (gastroesophageal reflux disease), Hyperlipidemia, Hypertension, Internal derangement of right shoulder (11/28/2022), Sore throat (12/6/2022), Tear of left supraspinatus tendon (7/18/2022), Tendinitis, Thrombocytopenia ([address] ) (12/6/2022), and Urinary urgency (2/27/2018)  PT was consulted and pt was seen on 3/17/2023 for mobility assessment and d/c planning  [facility]  Throughout gait assessment patient has moments of unsteadiness with gait, and fatigue after 400ft ambulation  Impairments limiting pt at this time include impaired balance, decreased endurance, decreased coordination, decreased activity tolerance, SOB upon exertion, and decreased strength  Pt is currently functioning at a independent level for bed mobility, independent level for transfers, independent level for ambulation with no assistive device  The patient's AM-PAC Basic Mobility Inpatient Short Form Raw Score is 22  A Raw score of greater than 16 suggests the patient may benefit from discharge to home  Please also refer to the recommendation of the Physical Therapist for safe discharge planning      Recommendations                                                                                                                DME: None    Discharge Disposition:  Home with continued Outpatient Physical Therapy for RTC    Minus TOM Snow

## 2023-03-17 NOTE — PROGRESS NOTES
PROGRESS NOTE  DEPARTMENT OF MEDICAL TOXICOLOGY  LEVEL 4 MEDICAL DETOX UNIT  Shannon Slade 41 y o  male MRN: [id number]  Unit/Bed#: 5T DETOX 505-01 Encounter: [id number]      Reason for Admission/Principal Problem: Alcohol withdrawal  Rounding Provider: Dagoberto Botello PA-C  Attending Provider: Dilshad Perez DO   3/15/2023  4:43 PM           Alcohol withdrawal syndrome with complication, with unspecified complication [facility])  Assessment & Plan  · Patient taken off of SEWS protocol- received 1790 mg phenobarbital total  · Denies any current withdrawal symptoms   · Continue to monitor off of protocol    Alcohol use disorder, severe, dependence ([facility] [address] )  Assessment & Plan  · Withdrawal management as above  · Continue vitamin supplementation including high dose thiamine  · Case management consulted for disposition planning  · Patient desires inpatient rehabilitation but recognizes starting a new job may be a barrier to treatment  · Interested in [address] and follows at [facility]; will discuss when clinical course is improving    * Hyponatremia  Assessment & Plan  Recent Labs     03/16/23  1338 03/16/23  1640 03/16/23  2009 03/17/23  0015 03/17/23  0430 03/17/23  0756   SODIUM 112* 116* 119* 121* 120* 121*     · Suspect secondary to volume depletion and beer potomania  · Discussed with nephrology--appreciate co-management and recommendations  · Overcorrected overnight to 121   · Per nephrology-   · On D5W 125 mL/hr- plan to continue hypotonic fluids until Na 119   · Goal Na 125-128 this evening   · Fluid restriction per nephrology  · Continue to monitor and optimize electrolytes  · Urine studies and a m  cortisol pending  · Continue BMP every 4 hours    Prolonged Q-T interval on ECG  Assessment & Plan  · Improving  · Continue to optimize electrolytes  · Continue cardiac telemetry    Elevated lipase  Assessment & Plan  Recent Labs     03/15/23  2358 03/16/23  0434 03/17/23  0430   LIPASE 155* 171* 164*       · Trending down  · Denies continued nausea/vomiting  · Tolerating PO intake well   · Advance diet as tolerated    Transaminitis  Assessment & Plan  Recent Labs     03/15/23  1707 03/16/23  0434 03/17/23  0430   * 423* 361*     Recent Labs     03/15/23  1707 03/16/23  0434 03/17/23  0430   ALT 94* 88* 96*     · Suspect secondary to alcohol use  · Improving  · Will continue to monitor    Ambulatory dysfunction  Assessment & Plan  · Continue high dose thiamine  · PT consult-  · Ambulatory dysfunction improved   · Recommended to continue outpatient PT after discharge    Alcoholic ketoacidosis-resolved as of 3/17/2023  Assessment & Plan  Recent Labs     03/16/23  1338 03/16/23  1640 03/16/23 2009 03/17/23  0015 03/17/23  0430 03/17/23  0756   CO2 20* 19* 21 23 23 23     · Metabolic acidosis and anion gap resolved  · Encourage PO intake as tolerated  · Nephrology initiated sodium bicarbonate tablets    Hypocalcemia-resolved as of 3/17/2023  Assessment & Plan  Recent Labs     03/16/23 2009 03/17/23  0015 03/17/23  0430 03/17/23  0756   CALCIUM 8 9 9 3 8 8 8 6     · Continue to monitor and optimize as needed    Hypokalemia-resolved as of 3/17/2023  Assessment & Plan  Recent Labs     03/17/23  0015 03/17/23  0430 03/17/23  0756   K 3 8 3 4* 3 7     · Replace and monitor    Hypophosphatemia  Assessment & Plan  Recent Labs     03/15/23  1909 03/16/23  0434 03/17/23  0430   PHOS 1 4* <1 0* 1 4*     · Replace and continue to monitor and optimize    Hypomagnesemia-resolved as of 3/17/2023  Assessment & Plan  Recent Labs     03/15/23  2358 03/16/23  0434 03/17/23  0430   MG 2 4 2 0 1 9       · Improved  · Continue to monitor and optimize as needed    Benign essential hypertension  Assessment & Plan  Home medication restarted by nephrology with hold parameters  Continue to monitor during withdrawal management    Esophageal reflux  Assessment & Plan  · Continue PPI  · Suspect acute exacerbation in setting of recent alcohol intake with possible gastritis  · Outpatient follow up    Hyperlipidemia  Assessment & Plan  · Continue home fenofibrate    Leukocytosis-resolved as of 3/17/2023  Assessment & Plan  Recent Labs     03/15/23  1707 03/16/23  0434 03/17/23  0430   WBC 16 84* 9 41 7 55     • Afebrile, no infectious symptoms    Internal derangement of right shoulder-resolved as of 3/16/2023  Assessment & Plan  • Pt follows with orthopedics and OP PT for chronic R shoulder pain   • MRI R shoulder 12/19/22 showed:   • "Moderate supraspinatus and infraspinatus tendinosis  • High-grade partial-thickness articular surface tear of the distal supraspinatus tendon as above  No full-thickness rotator cuff tear, tendon retraction, or significant muscular atrophy  • Posterior labral tear  • Moderate acromioclavicular joint osteoarthrosis "  • Received R CS injection at follow-up office visit with ortho 01/05/23  • Currently pt does not have any acute R shoulder complaints, denies any focal neuro sx; RUE neurovascularly intact on exam  • Supportive care  • Recommend continued OP ortho and PT follow-up    Tear of left supraspinatus tendon-resolved as of 3/16/2023  Assessment & Plan  • Pt has a h/o L supraspinatus tendon tear, status post-op L shoulder arthroscopy and rotator cuff repair 9/9/22  • He notes some continued L shoulder pain after surgery, denies any acute worsening, N/T, or upper extremity weakness  • Per chart review, at last OP f/u with ortho, recommended continued OP PT and if pain does not improve, they will consider further imaging  • Supportive care, pain control  • Continue OP f/u with PT and ortho    Obesity (BMI 30-39  9)  Assessment & Plan  · Body mass index is 33 52 kg/m²     · Hbg A1C 5 4  · Recommend healthy diet, lifestyle modifications, alcohol cessation            VTE Pharmacologic Prophylaxis:   Pharmacologic: Enoxaparin (Lovenox)  Mechanical VTE Prophylaxis in Place: yes    Code Status: Level 1 - Full Code    Patient Centered Rounds: I have performed bedside rounds with nursing staff today  Discussions with Specialists or Other Care Team Provider: Attending provider     Education and Discussions with Family / Patient: Spoke with patient regarding treatment plan     Time Spent for Care: 30 minutes  More than 50% of total time spent on counseling and coordination of care as described above  Current Length of Stay: 2 day(s)    Current Patient Status: Inpatient     Certification Statement: The patient will continue to require additional inpatient hospital stay due to hyponatremia management Discharge Plan: Plan for discharge to home with outpatient follow up once medically stable  Subjective:   Patient seen and examined in bed this morning  Patient notes mild throat irritation due to vomiting prior to admission  Patient is currently on a fluid restriction due to hyponatremia but reports improvement using throat lozenges  He has not tolerated oral naltrexone in the past but is interested in potentially starting a different medication for alcohol use before discharge  Questions answered to patient's satisfaction       Objective:     Clinical Opiate Withdrawal Scale  Pulse: 98    SEWS Total Score: 0 (3/17/2023  8:00 AM)        Last 24 Hours Medication List:   Current Facility-Administered Medications   Medication Dose Route Frequency Provider Last Rate   • acetaminophen  650 mg Oral Q6H PRN Jeanette Betancur PA-C     • aluminum-magnesium hydroxide-simethicone  30 mL Oral Q6H PRN Jeanette Betancur PA-C     • dextrose  125 mL/hr Intravenous Continuous VIKTORIYA Corley 125 mL/hr (03/17/23 1001)   • enoxaparin  40 mg Subcutaneous Daily Jeanette Betancur PA-C     • fenofibrate  145 mg Oral Daily Jeanette Betancur PA-C     • folic acid IVPB  1 mg Intravenous Daily Jeanette Betancur PA-C 100 mL/hr at 03/17/23 1001   • losartan  100 mg Oral Daily VIKTORIYA Corley     • melatonin  6 mg Oral HS PRN Jeanette Paula Bullard PA-C     • multivitamin-minerals  1 tablet Oral Daily Jeanette Bullard PA-C     • ondansetron  4 mg Intravenous Q6H PRN Ashley Goel PA-C     • pantoprazole  40 mg Oral Early Morning Jeanette Bullard PA-C     • potassium chloride  20 mEq Intravenous Once Beena Kathia Acosta PA-C     • potassium phosphate  30 mmol Intravenous Once PHYLICIA VicenteNP 30 mmol (23 1059)   • potassium-sodium phosphateS  2 tablet Oral 4x Daily (with meals and at bedtime) Ashley Goel PA-C     • sodium bicarbonate  1,300 mg Oral TID after meals Nimisha Hines, CRNP     • thiamine  500 mg Intravenous Q8H Cris Gallagher PA-C 100 mL/hr at 23 1001         Vitals:   Temp (24hrs), Av °F (36 7 °C), Min:97 9 °F (36 6 °C), Max:98 2 °F (36 8 °C)    Temp:  [97 9 °F (36 6 °C)-98 2 °F (36 8 °C)] 97 9 °F (36 6 °C)  HR:  [] 98  Resp:  [18] 18  BP: (124-152)/(74-93) 125/75  SpO2:  [98 %-100 %] 98 %  Body mass index is 33 47 kg/m²  Input and Output Summary (last 24 hours): Intake/Output Summary (Last 24 hours) at 3/17/2023 1131  Last data filed at 3/17/2023 1001  Gross per 24 hour   Intake 1370 ml   Output 2325 ml   Net -955 ml       Physical Exam:   Physical Exam  Constitutional:       General: He is not in acute distress  Eyes:      Extraocular Movements:      Right eye: No nystagmus  Left eye: No nystagmus  Pupils: Pupils are equal, round, and reactive to light  Cardiovascular:      Rate and Rhythm: Normal rate and regular rhythm  Pulses: Normal pulses  Heart sounds: Normal heart sounds  No murmur heard  Pulmonary:      Effort: Pulmonary effort is normal  No respiratory distress  Breath sounds: Normal breath sounds  No wheezing or rales  Abdominal:      General: Abdomen is flat  Bowel sounds are normal  There is no distension  Palpations: Abdomen is soft  Tenderness: There is no abdominal tenderness   There is no guarding  Musculoskeletal:         General: Normal range of motion  Skin:     General: Skin is warm and dry  Capillary Refill: Capillary refill takes less than 2 seconds  Neurological:      Mental Status: He is alert and oriented to person, place, and time  Motor: Weakness present  Gait: Gait is intact  Psychiatric:         Mood and Affect: Mood normal          Speech: Speech normal          Behavior: Behavior is cooperative  Additional Data:      Labs:   Results from last 7 days   Lab Units 03/17/23  0430 03/16/23  0434 03/15/23  1707   WBC Thousand/uL 7 55   < > 16 84*   HEMOGLOBIN g/dL 12 0   < > 13 8   HEMATOCRIT % 33 3*   < > 36 9   PLATELETS Thousands/uL 121*   < > 181   LYMPHO PCT %  --   --  3*   MONO PCT %  --   --  5   EOS PCT %  --   --  0    < > = values in this interval not displayed  Results from last 7 days   Lab Units 03/17/23  0756 03/17/23  0430   SODIUM mmol/L 121* 120*   POTASSIUM mmol/L 3 7 3 4*   CHLORIDE mmol/L 89* 88*   CO2 mmol/L 23 23   BUN mg/dL 7 7   CREATININE mg/dL 0 75 0 77   ANION GAP mmol/L 9 9   CALCIUM mg/dL 8 6 8 8   ALBUMIN g/dL  --  4 0   TOTAL BILIRUBIN mg/dL  --  1 21*   ALK PHOS U/L  --  21*   ALT U/L  --  96*   AST U/L  --  361*   GLUCOSE RANDOM mg/dL 118 131      Results from last 7 days   Lab Units 03/15/23  1817   INR  0 99           Results from last 7 days   Lab Units 03/16/23  0434   HEMOGLOBIN A1C % 5 4               * I Have Reviewed All Lab Data Listed Above  * Additional Pertinent Lab Tests Reviewed: Ronald 66 Admission Reviewed      Imaging Studies: I have personally reviewed pertinent reports  Recent Cultures (last 7 days): Today, Patient Was Seen By: Kristine Leggett PA-C    ** Please Note: Dictation voice to text software may have been used in the creation of this document   **

## 2023-03-17 NOTE — TREATMENT PLAN
Sodium correcting rapidly to 119 , already 8 meq increase since am    Plan stop NS    Started D5w at 50 ml/hr  Goal sodium by tomorrow am around 119 meq

## 2023-03-17 NOTE — PROCEDURES
EKG 3/17/23 1624  Read 1629     Rate- 87 bpm  RI interval- 150 ms  QRS duration- 86 ms  QT/QTC- 418/502 ms     Interpretation-sinus rhythm  Minimal voltage criteria for LVH, may be normal variant  Nonspecific T wave abnormality    Prolonged QT  Prolonged QT slightly improved from last EKG

## 2023-03-17 NOTE — PLAN OF CARE
Problem: Nutrition/Hydration-ADULT  Goal: Nutrient/Hydration intake appropriate for improving, restoring or maintaining nutritional needs  Description: Monitor and assess patient's nutrition/hydration status for malnutrition  Collaborate with interdisciplinary team and initiate plan and interventions as ordered  Monitor patient's weight and dietary intake as ordered or per policy  Utilize nutrition screening tool and intervene as necessary  Determine patient's food preferences and provide high-protein, high-caloric foods as appropriate       INTERVENTIONS:  - Monitor oral intake, urinary output, labs, and treatment plans  - Assess nutrition and hydration status and recommend course of action  - Evaluate amount of meals eaten  - Assist patient with eating if necessary   - Allow adequate time for meals  - Recommend/ encourage appropriate diets, oral nutritional supplements, and vitamin/mineral supplements  - Order, calculate, and assess calorie counts as needed  - Recommend, monitor, and adjust tube feedings and TPN/PPN based on assessed needs  - Assess need for intravenous fluids  - Provide specific nutrition/hydration education as appropriate  - Include patient/family/caregiver in decisions related to nutrition  Outcome: Progressing     Problem: MOBILITY - ADULT  Goal: Maintain or return to baseline ADL function  Description: INTERVENTIONS:  -  Assess patient's ability to carry out ADLs; assess patient's baseline for ADL function and identify physical deficits which impact ability to perform ADLs (bathing, care of mouth/teeth, toileting, grooming, dressing, etc )  - Assess/evaluate cause of self-care deficits   - Assess range of motion  - Assess patient's mobility; develop plan if impaired  - Assess patient's need for assistive devices and provide as appropriate  - Encourage maximum independence but intervene and supervise when necessary  - Involve family in performance of ADLs  - Assess for home care needs following discharge   - Consider OT consult to assist with ADL evaluation and planning for discharge  - Provide patient education as appropriate  Outcome: Progressing  Goal: Maintains/Returns to pre admission functional level  Description: INTERVENTIONS:  - Perform BMAT or MOVE assessment daily    - Set and communicate daily mobility goal to care team and patient/family/caregiver     - Collaborate with rehabilitation services on mobility goals if consulted  - Out of bed for toileting  - Record patient progress and toleration of activity level   Outcome: Progressing     Problem: SUBSTANCE USE/ABUSE  Goal: By discharge, will develop insight into their chemical dependency and sustain motivation to continue in recovery  Description: INTERVENTIONS:  - Attends all daily group sessions and scheduled AA groups  - Actively practices coping skills through participation in the therapeutic community and adherence to program rules  - Reviews and completes assignments from individual treatment plan  - Assist patient development of understanding of their personal cycle of addiction and relapse triggers  Outcome: Progressing  Goal: By discharge, patient will have ongoing treatment plan addressing chemical dependency  Description: INTERVENTIONS:  - Assist patient with resources and/or appointments for ongoing recovery based living  Outcome: Progressing

## 2023-03-18 LAB
ALBUMIN SERPL BCP-MCNC: 4 G/DL (ref 3.5–5)
ALP SERPL-CCNC: 20 U/L (ref 34–104)
ALT SERPL W P-5'-P-CCNC: 103 U/L (ref 7–52)
ANION GAP SERPL CALCULATED.3IONS-SCNC: 11 MMOL/L (ref 4–13)
ANION GAP SERPL CALCULATED.3IONS-SCNC: 11 MMOL/L (ref 4–13)
ANION GAP SERPL CALCULATED.3IONS-SCNC: 9 MMOL/L (ref 4–13)
AST SERPL W P-5'-P-CCNC: 341 U/L (ref 13–39)
BILIRUB DIRECT SERPL-MCNC: 0.23 MG/DL (ref 0–0.2)
BILIRUB SERPL-MCNC: 0.71 MG/DL (ref 0.2–1)
BUN SERPL-MCNC: 7 MG/DL (ref 5–25)
BUN SERPL-MCNC: 7 MG/DL (ref 5–25)
BUN SERPL-MCNC: 8 MG/DL (ref 5–25)
CALCIUM SERPL-MCNC: 8.9 MG/DL (ref 8.4–10.2)
CALCIUM SERPL-MCNC: 9.1 MG/DL (ref 8.4–10.2)
CALCIUM SERPL-MCNC: 9.2 MG/DL (ref 8.4–10.2)
CHLORIDE SERPL-SCNC: 92 MMOL/L (ref 96–108)
CHLORIDE SERPL-SCNC: 95 MMOL/L (ref 96–108)
CHLORIDE SERPL-SCNC: 95 MMOL/L (ref 96–108)
CO2 SERPL-SCNC: 23 MMOL/L (ref 21–32)
CO2 SERPL-SCNC: 24 MMOL/L (ref 21–32)
CO2 SERPL-SCNC: 25 MMOL/L (ref 21–32)
CREAT SERPL-MCNC: 0.75 MG/DL (ref 0.6–1.3)
CREAT SERPL-MCNC: 0.76 MG/DL (ref 0.6–1.3)
CREAT SERPL-MCNC: 0.85 MG/DL (ref 0.6–1.3)
ERYTHROCYTE [DISTWIDTH] IN BLOOD BY AUTOMATED COUNT: 12.6 % (ref 11.6–15.1)
GFR SERPL CREATININE-BSD FRML MDRD: 105 ML/MIN/1.73SQ M
GFR SERPL CREATININE-BSD FRML MDRD: 110 ML/MIN/1.73SQ M
GFR SERPL CREATININE-BSD FRML MDRD: 110 ML/MIN/1.73SQ M
GLUCOSE SERPL-MCNC: 103 MG/DL (ref 65–140)
GLUCOSE SERPL-MCNC: 110 MG/DL (ref 65–140)
GLUCOSE SERPL-MCNC: 139 MG/DL (ref 65–140)
HCT VFR BLD AUTO: 32.5 % (ref 36.5–49.3)
HGB BLD-MCNC: 11.5 G/DL (ref 12–17)
MAGNESIUM SERPL-MCNC: 1.9 MG/DL (ref 1.9–2.7)
MCH RBC QN AUTO: 31.9 PG (ref 26.8–34.3)
MCHC RBC AUTO-ENTMCNC: 35.4 G/DL (ref 31.4–37.4)
MCV RBC AUTO: 90 FL (ref 82–98)
PHOSPHATE SERPL-MCNC: 3.9 MG/DL (ref 2.7–4.5)
PLATELET # BLD AUTO: 111 THOUSANDS/UL (ref 149–390)
PMV BLD AUTO: 10.2 FL (ref 8.9–12.7)
POTASSIUM SERPL-SCNC: 3.6 MMOL/L (ref 3.5–5.3)
POTASSIUM SERPL-SCNC: 3.7 MMOL/L (ref 3.5–5.3)
POTASSIUM SERPL-SCNC: 3.9 MMOL/L (ref 3.5–5.3)
PROT SERPL-MCNC: 6.5 G/DL (ref 6.4–8.4)
RBC # BLD AUTO: 3.61 MILLION/UL (ref 3.88–5.62)
SODIUM SERPL-SCNC: 126 MMOL/L (ref 135–147)
SODIUM SERPL-SCNC: 129 MMOL/L (ref 135–147)
SODIUM SERPL-SCNC: 130 MMOL/L (ref 135–147)
WBC # BLD AUTO: 5 THOUSAND/UL (ref 4.31–10.16)

## 2023-03-18 RX ORDER — HYDROXYZINE 50 MG/1
50 TABLET, FILM COATED ORAL EVERY 6 HOURS PRN
Status: DISCONTINUED | OUTPATIENT
Start: 2023-03-18 | End: 2023-03-19 | Stop reason: HOSPADM

## 2023-03-18 RX ADMIN — DIBASIC SODIUM PHOSPHATE, MONOBASIC POTASSIUM PHOSPHATE AND MONOBASIC SODIUM PHOSPHATE 2 TABLET: 852; 155; 130 TABLET ORAL at 09:00

## 2023-03-18 RX ADMIN — THIAMINE HYDROCHLORIDE 500 MG: 100 INJECTION, SOLUTION INTRAMUSCULAR; INTRAVENOUS at 13:28

## 2023-03-18 RX ADMIN — SODIUM BICARBONATE 1300 MG: 650 TABLET ORAL at 12:10

## 2023-03-18 RX ADMIN — HYDROXYZINE HYDROCHLORIDE 50 MG: 50 TABLET, FILM COATED ORAL at 23:05

## 2023-03-18 RX ADMIN — PANTOPRAZOLE SODIUM 40 MG: 40 TABLET, DELAYED RELEASE ORAL at 05:46

## 2023-03-18 RX ADMIN — ACETAMINOPHEN 650 MG: 325 TABLET ORAL at 13:42

## 2023-03-18 RX ADMIN — ENOXAPARIN SODIUM 40 MG: 40 INJECTION SUBCUTANEOUS at 09:00

## 2023-03-18 RX ADMIN — ACETAMINOPHEN 650 MG: 325 TABLET ORAL at 21:23

## 2023-03-18 RX ADMIN — DIBASIC SODIUM PHOSPHATE, MONOBASIC POTASSIUM PHOSPHATE AND MONOBASIC SODIUM PHOSPHATE 2 TABLET: 852; 155; 130 TABLET ORAL at 12:10

## 2023-03-18 RX ADMIN — FENOFIBRATE 145 MG: 145 TABLET, FILM COATED ORAL at 09:00

## 2023-03-18 RX ADMIN — SODIUM BICARBONATE 1300 MG: 650 TABLET ORAL at 09:00

## 2023-03-18 RX ADMIN — FOLIC ACID 1 MG: 5 INJECTION, SOLUTION INTRAMUSCULAR; INTRAVENOUS; SUBCUTANEOUS at 09:10

## 2023-03-18 RX ADMIN — MULTIPLE VITAMINS W/ MINERALS TAB 1 TABLET: TAB ORAL at 09:00

## 2023-03-18 RX ADMIN — LOSARTAN POTASSIUM 100 MG: 50 TABLET, FILM COATED ORAL at 09:01

## 2023-03-18 RX ADMIN — THIAMINE HYDROCHLORIDE 500 MG: 100 INJECTION, SOLUTION INTRAMUSCULAR; INTRAVENOUS at 05:45

## 2023-03-18 NOTE — PROGRESS NOTES
NEPHROLOGY PROGRESS NOTE   Kvng Peña 41 y o  male MRN: [id number]  Unit/Bed#: 5T DETOX 505-01 Encounter: [id number]      HPI/24hr EVENTS:    -53-year-old male past medical history of alcohol use, hypertension, hyperlipidemia, history of hyponatremia presented for alcohol detoxification  Nephrology consult for management of hyponatremia    -Sodium improving to 129 most recently    ASSESSMENT/PLAN:    Hyponatremia  -Etiology suspected due to hypovolemic hyponatremia with decreased oral intake as well as low solute intake/beer potomania  -Work-up urine sodium 26, urine osmolality 887, urine chloride 48, serum osmolality 240, a m  cortisol 20  -Sodium on admission was 112 had rapid correction requiring IV D5W and DDAVP  -Goal sodium was 130 at noon  today, most recent sodium is 129 at goal  -Discontinue D5W  -Fluid restrictions were lifted prior due to overcorrection, okay to monitor off for now  -Repeat BMP tomorrow morning  -Goal sodium tomorrow at 6 AM would be > 135    Hypophosphatemia  -Phosphorus prior 1 4, improved to 3 9 with repletion, can discontinue oral K-Phos now  -Likely secondary to reduced oral intake    Hypertension  -Currently on losartan 100 mg daily  -Blood pressures are acceptable    Anion gap metabolic acidosis  -Now resolved recent bicarb is 23  -discontinue bicarb tabs  -Suspect second to alcoholic/elevation ketosis    Alcohol abuse  -Care per primary team    Anemia  -Recent hemoglobin 11 5  -Recommend transfuse goal greater than 7 per primary team    Discussed with medical toxicology KATY Rosario    SUBJECTIVE:  Patient reports he feels well, reports appetite is stable and improved, denies any nausea/vomiting/diarrhea/dysuria    ROS:  Review of Systems   Constitutional: Negative  HENT: Negative  Respiratory: Negative  Cardiovascular: Negative  Gastrointestinal: Negative  Genitourinary: Negative         A complete 10 point review of systems was performed and found to be negative unless otherwise noted above or in the HPI  OBJECTIVE:  Current Weight: Weight - Scale: 109 kg (240 lb)  Vitals:    03/17/23 1500 03/17/23 1947 03/18/23 0606 03/18/23 0910   BP: 115/74 132/74 149/83 131/76   BP Location: Right arm Right arm Right arm Right arm   Pulse: 98 99 98 102   Resp: 18 18 18 18   Temp: 98 4 °F (36 9 °C) 98 4 °F (36 9 °C) 98 °F (36 7 °C) (!) 97 4 °F (36 3 °C)   TempSrc: Temporal Temporal Temporal Temporal   SpO2: 99% 100% 98% 100%   Weight:       Height:           Intake/Output Summary (Last 24 hours) at 3/18/2023 1324  Last data filed at 3/18/2023 1307  Gross per 24 hour   Intake 2278 ml   Output 3450 ml   Net -1172 ml     Physical Exam  Vitals and nursing note reviewed  Constitutional:       General: He is not in acute distress  Appearance: Normal appearance  He is not toxic-appearing  Comments: Awake sitting in chair   HENT:      Head: Normocephalic and atraumatic  Nose: Nose normal       Mouth/Throat:      Mouth: Mucous membranes are moist    Eyes:      General: No scleral icterus  Cardiovascular:      Rate and Rhythm: Normal rate and regular rhythm  Pulses: Normal pulses  Pulmonary:      Effort: Pulmonary effort is normal  No respiratory distress  Breath sounds: Normal breath sounds  No wheezing or rales  Abdominal:      General: Abdomen is flat  Musculoskeletal:      Cervical back: Neck supple  Right lower leg: No edema  Left lower leg: No edema  Skin:     General: Skin is warm and dry  Capillary Refill: Capillary refill takes less than 2 seconds  Neurological:      General: No focal deficit present  Mental Status: He is alert and oriented to person, place, and time            Medications:    Current Facility-Administered Medications:   •  acetaminophen (TYLENOL) tablet 650 mg, 650 mg, Oral, Q6H PRN, Jose Phillips PA-C, 650 mg at 03/17/23 1510  •  aluminum-magnesium hydroxide-simethicone (MYLANTA) oral suspension 30 mL, 30 mL, Oral, Q6H PRN, Jeanette Caballero PA-C  •  dextrose 5 % infusion, 150 mL/hr, Intravenous, Continuous, VIKTORIYA Farnsworth, Last Rate: 150 mL/hr at 03/18/23 1000, 150 mL/hr at 03/18/23 1000  •  enoxaparin (LOVENOX) subcutaneous injection 40 mg, 40 mg, Subcutaneous, Daily, Jeanette Caballero PA-C, 40 mg at 03/18/23 0900  •  fenofibrate (TRICOR) tablet 145 mg, 145 mg, Oral, Daily, Jeanette Caballero PA-C, 145 mg at 38/15/41 0438  •  folic acid 1 mg in sodium chloride 0 9 % 50 mL IVPB, 1 mg, Intravenous, Daily, Jeanette Caballero PA-C, Last Rate: 100 mL/hr at 03/18/23 0910, 1 mg at 03/18/23 0910  •  losartan (COZAAR) tablet 100 mg, 100 mg, Oral, Daily, Madge Romberg, CRNP, 100 mg at 03/18/23 0901  •  melatonin tablet 6 mg, 6 mg, Oral, HS PRN, Viktor Gonzales PA-C, 6 mg at 03/17/23 0134  •  multivitamin-minerals (CENTRUM) tablet 1 tablet, 1 tablet, Oral, Daily, Jeanette Caballero PA-C, 1 tablet at 03/18/23 0900  •  ondansetron (ZOFRAN) injection 4 mg, 4 mg, Intravenous, Q6H PRN, Jeanette Caballero PA-C, 4 mg at 03/15/23 2316  •  pantoprazole (PROTONIX) EC tablet 40 mg, 40 mg, Oral, Early Morning, Jeanette Caballero PA-C, 40 mg at 03/18/23 0546  •  phenol (CHLORASEPTIC) 1 4 % mucosal liquid 1 spray, 1 spray, Mouth/Throat, Q2H PRN, Mayda Kmep PA-C  •  potassium chloride 20 mEq IVPB (premix), 20 mEq, Intravenous, Once, Viktor Gonzales PA-C  •  potassium-sodium phosphateS (K-PHOS,PHOSPHA 250) -250 mg tablet 2 tablet, 2 tablet, Oral, 4x Daily (with meals and at bedtime), Viktor Gonzales PA-C, 2 tablet at 03/18/23 1210  •  sodium bicarbonate tablet 1,300 mg, 1,300 mg, Oral, TID after meals, Madge Romberg, CRNP, 1,300 mg at 03/18/23 1210  •  thiamine (VITAMIN B1) 500 mg in sodium chloride 0 9 % 50 mL IVPB, 500 mg, Intravenous, Q8H [facility], Jeanette Gopi Caballero PA-C, Last Rate: 100 mL/hr at 03/18/23 0545, 500 mg at 03/18/23 3706    Laboratory Results:  Results from last 7 days   Lab Units 03/18/23  1216 03/18/23  0557 03/18/23  0014 03/17/23  1752 03/17/23  1241 03/17/23  0756 03/17/23  0430 03/16/23  0857 03/16/23  0434 03/15/23  2358 03/15/23  1909 03/15/23  1707   WBC Thousand/uL  --  5 00  --   --   --   --  7 55  --  9 41  --   --  16 84*   HEMOGLOBIN g/dL  --  11 5*  --   --   --   --  12 0  --  12 5  --   --  13 8   HEMATOCRIT %  --  32 5*  --   --   --   --  33 3*  --  34 0*  --   --  36 9   PLATELETS Thousands/uL  --  111*  --   --   --   --  121*  --  118*  --   --  181   POTASSIUM mmol/L 3 9 3 7 3 6 3 9 4 1 3 7 3 4*   < > 3 6 3 8 3 4* 3 4*   CHLORIDE mmol/L 95* 95* 92* 91* 88* 89* 88*   < > 80* 78* 76* 76*   CO2 mmol/L 23 24 25 23 22 23 23   < > 18* 15* 12* 11*   BUN mg/dL 7 7 8 8 8 7 7   < > 6 7 8 8   CREATININE mg/dL 0 76 0 75 0 85 0 92 0 93 0 75 0 77   < > 0 61 0 62 0 56* 0 56*   CALCIUM mg/dL 9 2 9 1 8 9 9 0 9 0 8 6 8 8   < > 8 0* 8 3* 8 1* 7 9*   MAGNESIUM mg/dL  --  1 9  --   --   --   --  1 9  --  2 0 2 4  --  1 3*   PHOSPHORUS mg/dL  --  3 9  --   --   --   --  1 4*  --  <1 0*  --  1 4*  --     < > = values in this interval not displayed  I have personally reviewed the blood work as stated above and in my note  I have personally reviewed medical toxicology note

## 2023-03-18 NOTE — PROGRESS NOTES
PROGRESS NOTE  DEPARTMENT OF MEDICAL TOXICOLOGY  LEVEL 4 MEDICAL DETOX UNIT  Tod Jaramillo 41 y o  male MRN: [id number]  Unit/Bed#: 5T DETOX 505-01 Encounter: [id number]      Reason for Admission/Principal Problem: Alcohol withdrawal  Rounding Provider: Rafael Evangelista PA-C  Attending Provider: Ramone Gomes MD   3/15/2023  4:43 PM           Alcohol withdrawal syndrome with complication, with unspecified complication [facility])  Assessment & Plan  · Patient taken off of SEWS protocol- received 1790 mg phenobarbital total  · Denies any current withdrawal symptoms   · Continue to monitor off of protocol    Alcohol use disorder, severe, dependence ([facility] [address] )  Assessment & Plan  · Withdrawal management as above  · Continue vitamin supplementation including high dose thiamine  · Case management consulted for disposition planning  · Patient desires inpatient rehabilitation but recognizes starting a new job may be a barrier to treatment  · Interested in [address] and follows at [facility]; will discuss when clinical course is improving    * Hyponatremia  Assessment & Plan  Recent Labs     03/17/23  0430 03/17/23  0756 03/17/23  1241 03/17/23  1752 03/18/23  0014 03/18/23  0557   SODIUM 120* 121* 122* 124* 126* 130*     · Suspect secondary to volume depletion and beer potomania  · Nephrology following-   · IV fluids and fluid restriction as per nephrology  · Continue to monitor and optimize electrolytes  · Cortisol level normal   · Continue BMP every 6 hours    Prolonged Q-T interval on ECG  Assessment & Plan  · Improving  · Continue to optimize electrolytes  · Continue cardiac telemetry    Elevated lipase  Assessment & Plan  Recent Labs     03/15/23  2358 03/16/23  0434 03/17/23  0430   LIPASE 155* 171* 164*       · Trending down  · Denies continued nausea/vomiting  · Tolerating PO intake well   · Advance diet as tolerated    Transaminitis  Assessment & Plan  Recent Labs     03/15/23  1707 03/16/23  0434 03/17/23  0430 03/18/23  0557   AST [facility]     Recent Labs     03/15/23  1707 03/16/23  0434 03/17/23  0430 03/18/23  0557   ALT 94* 88* 96* 103*     · Suspect secondary to alcohol use  · Improving  · Will continue to monitor    Ambulatory dysfunction  Assessment & Plan  · Continue high dose thiamine  · PT consult-  · Ambulatory dysfunction improved   · Recommended to continue outpatient PT after discharge    Alcoholic ketoacidosis-resolved as of 3/17/2023  Assessment & Plan  Recent Labs     03/17/23  0430 03/17/23  0756 03/17/23  1241 03/17/23  1752 03/18/23  0014 03/18/23  0557   CO2 23 23 22 23 25 24     · Metabolic acidosis and anion gap resolved  · Encourage PO intake as tolerated  · Nephrology initiated sodium bicarbonate tablets    Hypocalcemia-resolved as of 3/17/2023  Assessment & Plan  Recent Labs     03/17/23  1241 03/17/23  1752 03/18/23  0014 03/18/23  0557   CALCIUM 9 0 9 0 8 9 9 1     · Continue to monitor and optimize as needed    Hypokalemia-resolved as of 3/17/2023  Assessment & Plan  Recent Labs     03/17/23  1752 03/18/23  0014 03/18/23  0557   K 3 9 3 6 3 7     · Replace and monitor    Hypophosphatemia  Assessment & Plan  Recent Labs     03/15/23  1909 03/16/23  0434 03/17/23  0430 03/18/23  0557   PHOS 1 4* <1 0* 1 4* 3 9     · Replace and continue to monitor and optimize    Hypomagnesemia-resolved as of 3/17/2023  Assessment & Plan  Recent Labs     03/16/23  0434 03/17/23  0430 03/18/23  0557   MG 2 0 1 9 1 9       · Improved  · Continue to monitor and optimize as needed    Benign essential hypertension  Assessment & Plan  Home medication restarted by nephrology with hold parameters  Continue to monitor during withdrawal management    Esophageal reflux  Assessment & Plan  · Continue PPI  · Suspect acute exacerbation in setting of recent alcohol intake with possible gastritis  · Outpatient follow up    Hyperlipidemia  Assessment & Plan  · Continue home fenofibrate    Leukocytosis-resolved as of 3/17/2023  Assessment & Plan  Recent Labs     03/16/23  0434 03/17/23  0430 03/18/23  0557   WBC 9 41 7 55 5 00     • Afebrile, no infectious symptoms    Internal derangement of right shoulder-resolved as of 3/16/2023  Assessment & Plan  • Pt follows with orthopedics and OP PT for chronic R shoulder pain   • MRI R shoulder 12/19/22 showed:   • "Moderate supraspinatus and infraspinatus tendinosis  • High-grade partial-thickness articular surface tear of the distal supraspinatus tendon as above  No full-thickness rotator cuff tear, tendon retraction, or significant muscular atrophy  • Posterior labral tear  • Moderate acromioclavicular joint osteoarthrosis "  • Received R CS injection at follow-up office visit with ortho 01/05/23  • Currently pt does not have any acute R shoulder complaints, denies any focal neuro sx; RUE neurovascularly intact on exam  • Supportive care  • Recommend continued OP ortho and PT follow-up    Tear of left supraspinatus tendon-resolved as of 3/16/2023  Assessment & Plan  • Pt has a h/o L supraspinatus tendon tear, status post-op L shoulder arthroscopy and rotator cuff repair 9/9/22  • He notes some continued L shoulder pain after surgery, denies any acute worsening, N/T, or upper extremity weakness  • Per chart review, at last OP f/u with ortho, recommended continued OP PT and if pain does not improve, they will consider further imaging  • Supportive care, pain control  • Continue OP f/u with PT and ortho    Obesity (BMI 30-39  9)  Assessment & Plan  · Body mass index is 33 52 kg/m²  · Hbg A1C 5 4  · Recommend healthy diet, lifestyle modifications, alcohol cessation          VTE Pharmacologic Prophylaxis:   Pharmacologic: Enoxaparin (Lovenox)  Mechanical VTE Prophylaxis in Place: yes    Code Status: Level 1 - Full Code    Patient Centered Rounds: I have performed bedside rounds with nursing staff today      Discussions with Specialists or Other Care Team Provider: Attending provider     Education and Discussions with Family / Patient: Spoke to patient regarding treatment plan    Time Spent for Care: 30 minutes  More than 50% of total time spent on counseling and coordination of care as described above  Current Length of Stay: 3 day(s)    Current Patient Status: Inpatient     Certification Statement: The patient will continue to require additional inpatient hospital stay due to hyponatremia management Discharge Plan: Plan for discharge to home once medically stable  Subjective:   Patient seen and examined in bed this morning  Reports improvement in sore throat with increased oral fluid intake  Noted some muscle cramping yesterday that feels like muscle soreness and is improving today  He otherwise is without any active complaints  Tolerating PO well  Patient is aware of the need for continued hospital stay due to hyponatremia management         Objective:     Clinical Opiate Withdrawal Scale  Pulse: 98    SEWS Total Score: 0 (3/17/2023  8:00 AM)        Last 24 Hours Medication List:   Current Facility-Administered Medications   Medication Dose Route Frequency Provider Last Rate   • acetaminophen  650 mg Oral Q6H PRN Jeanette Pandya PA-C     • aluminum-magnesium hydroxide-simethicone  30 mL Oral Q6H PRN Jeanette Pandya PA-C     • dextrose  125 mL/hr Intravenous Continuous Michael Franc, CRNP 125 mL/hr (03/17/23 2146)   • enoxaparin  40 mg Subcutaneous Daily Jeanette Pandya PA-C     • fenofibrate  145 mg Oral Daily Jeanette Pandya PA-C     • folic acid IVPB  1 mg Intravenous Daily Jeanette Pandya PA-C Stopped (03/17/23 1844)   • losartan  100 mg Oral Daily Michael Franc, CRNP     • melatonin  6 mg Oral HS PRN Jeanette Pandya PA-C     • multivitamin-minerals  1 tablet Oral Daily Jeanette Pandya PA-C     • ondansetron  4 mg Intravenous Q6H PRN Jeanette Pandya PA-C     • pantoprazole  40 mg Oral Early Morning Jeanette Pandya PA-C     • phenol  1 spray Mouth/Throat Q2H PRN Reggie Kennedy PA-C     • potassium chloride  20 mEq Intravenous Once Nish Acosta PA-C     • potassium-sodium phosphateS  2 tablet Oral 4x Daily (with meals and at bedtime) Jose Palumbo PA-C     • sodium bicarbonate  1,300 mg Oral TID after meals VIKTORIYA Sauer     • thiamine  500 mg Intravenous [facility] Jeanette Velez PA-C 500 mg (23 0545)         Vitals:   Temp (24hrs), Av 3 °F (36 8 °C), Min:98 °F (36 7 °C), Max:98 4 °F (36 9 °C)    Temp:  [98 °F (36 7 °C)-98 4 °F (36 9 °C)] 98 °F (36 7 °C)  HR:  [] 98  Resp:  [18] 18  BP: (115-149)/(74-83) 149/83  SpO2:  [98 %-100 %] 98 %  Body mass index is 33 47 kg/m²  Input and Output Summary (last 24 hours): Intake/Output Summary (Last 24 hours) at 3/18/2023 0836  Last data filed at 3/18/2023 0545  Gross per 24 hour   Intake 1680 ml   Output 3475 ml   Net -1795 ml       Physical Exam:   Physical Exam    Additional Data:     Labs:   Results from last 7 days   Lab Units 23  0557 23  0434 03/15/23  1707   WBC Thousand/uL 5 00   < > 16 84*   HEMOGLOBIN g/dL 11 5*   < > 13 8   HEMATOCRIT % 32 5*   < > 36 9   PLATELETS Thousands/uL 111*   < > 181   LYMPHO PCT %  --   --  3*   MONO PCT %  --   --  5   EOS PCT %  --   --  0    < > = values in this interval not displayed  Results from last 7 days   Lab Units 23  0557   SODIUM mmol/L 130*   POTASSIUM mmol/L 3 7   CHLORIDE mmol/L 95*   CO2 mmol/L 24   BUN mg/dL 7   CREATININE mg/dL 0 75   ANION GAP mmol/L 11   CALCIUM mg/dL 9 1   ALBUMIN g/dL 4 0   TOTAL BILIRUBIN mg/dL 0 71   ALK PHOS U/L 20*   ALT U/L 103*   AST U/L 341*   GLUCOSE RANDOM mg/dL 110      Results from last 7 days   Lab Units 03/15/23  1817   INR  0 99           Results from last 7 days   Lab Units 23  0434   HEMOGLOBIN A1C % 5 4               * I Have Reviewed All Lab Data Listed Above  * Additional Pertinent Lab Tests Reviewed:  All Labs For Current Hospital Admission Reviewed      Imaging Studies: I have personally reviewed pertinent reports  Recent Cultures (last 7 days): Today, Patient Was Seen By: Ernesto Frank PA-C    ** Please Note: Dictation voice to text software may have been used in the creation of this document   **

## 2023-03-19 VITALS
RESPIRATION RATE: 18 BRPM | BODY MASS INDEX: 33.6 KG/M2 | OXYGEN SATURATION: 97 % | WEIGHT: 240 LBS | TEMPERATURE: 96.7 F | HEART RATE: 80 BPM | DIASTOLIC BLOOD PRESSURE: 98 MMHG | SYSTOLIC BLOOD PRESSURE: 131 MMHG | HEIGHT: 71 IN

## 2023-03-19 PROBLEM — F10.939 ALCOHOL WITHDRAWAL SYNDROME WITH COMPLICATION, WITH UNSPECIFIED COMPLICATION (HCC): Status: RESOLVED | Noted: 2022-12-04 | Resolved: 2023-03-19

## 2023-03-19 PROBLEM — E83.39 HYPOPHOSPHATEMIA: Status: RESOLVED | Noted: 2023-03-16 | Resolved: 2023-03-19

## 2023-03-19 PROBLEM — R74.8 ELEVATED LIPASE: Status: RESOLVED | Noted: 2023-03-16 | Resolved: 2023-03-19

## 2023-03-19 PROBLEM — R94.31 PROLONGED Q-T INTERVAL ON ECG: Status: RESOLVED | Noted: 2022-12-04 | Resolved: 2023-03-19

## 2023-03-19 PROBLEM — R26.2 AMBULATORY DYSFUNCTION: Status: RESOLVED | Noted: 2022-12-07 | Resolved: 2023-03-19

## 2023-03-19 LAB
ANION GAP SERPL CALCULATED.3IONS-SCNC: 9 MMOL/L (ref 4–13)
ATRIAL RATE: 81 BPM
BUN SERPL-MCNC: 9 MG/DL (ref 5–25)
CALCIUM SERPL-MCNC: 9.3 MG/DL (ref 8.4–10.2)
CHLORIDE SERPL-SCNC: 99 MMOL/L (ref 96–108)
CO2 SERPL-SCNC: 25 MMOL/L (ref 21–32)
CREAT SERPL-MCNC: 0.76 MG/DL (ref 0.6–1.3)
ERYTHROCYTE [DISTWIDTH] IN BLOOD BY AUTOMATED COUNT: 13.2 % (ref 11.6–15.1)
GFR SERPL CREATININE-BSD FRML MDRD: 110 ML/MIN/1.73SQ M
GLUCOSE SERPL-MCNC: 94 MG/DL (ref 65–140)
HCT VFR BLD AUTO: 34.6 % (ref 36.5–49.3)
HGB BLD-MCNC: 11.6 G/DL (ref 12–17)
MCH RBC QN AUTO: 31.6 PG (ref 26.8–34.3)
MCHC RBC AUTO-ENTMCNC: 33.5 G/DL (ref 31.4–37.4)
MCV RBC AUTO: 94 FL (ref 82–98)
P AXIS: 20 DEGREES
PLATELET # BLD AUTO: 141 THOUSANDS/UL (ref 149–390)
PMV BLD AUTO: 10 FL (ref 8.9–12.7)
POTASSIUM SERPL-SCNC: 4 MMOL/L (ref 3.5–5.3)
PR INTERVAL: 152 MS
QRS AXIS: 14 DEGREES
QRSD INTERVAL: 86 MS
QT INTERVAL: 356 MS
QTC INTERVAL: 413 MS
RBC # BLD AUTO: 3.67 MILLION/UL (ref 3.88–5.62)
SODIUM SERPL-SCNC: 133 MMOL/L (ref 135–147)
T WAVE AXIS: -9 DEGREES
VENTRICULAR RATE: 81 BPM
WBC # BLD AUTO: 5.55 THOUSAND/UL (ref 4.31–10.16)

## 2023-03-19 RX ORDER — LANOLIN ALCOHOL/MO/W.PET/CERES
100 CREAM (GRAM) TOPICAL DAILY
Status: DISCONTINUED | OUTPATIENT
Start: 2023-03-19 | End: 2023-03-19 | Stop reason: HOSPADM

## 2023-03-19 RX ORDER — FOLIC ACID 1 MG/1
1 TABLET ORAL DAILY
Status: DISCONTINUED | OUTPATIENT
Start: 2023-03-19 | End: 2023-03-19 | Stop reason: HOSPADM

## 2023-03-19 RX ADMIN — ACETAMINOPHEN 650 MG: 325 TABLET ORAL at 08:21

## 2023-03-19 RX ADMIN — MULTIPLE VITAMINS W/ MINERALS TAB 1 TABLET: TAB ORAL at 08:01

## 2023-03-19 RX ADMIN — LOSARTAN POTASSIUM 100 MG: 50 TABLET, FILM COATED ORAL at 08:00

## 2023-03-19 RX ADMIN — PANTOPRAZOLE SODIUM 40 MG: 40 TABLET, DELAYED RELEASE ORAL at 05:27

## 2023-03-19 RX ADMIN — THIAMINE HCL TAB 100 MG 100 MG: 100 TAB at 09:15

## 2023-03-19 RX ADMIN — FOLIC ACID 1 MG: 1 TABLET ORAL at 09:15

## 2023-03-19 RX ADMIN — FENOFIBRATE 145 MG: 145 TABLET, FILM COATED ORAL at 08:01

## 2023-03-19 NOTE — PROCEDURES
EKG 3/19/23 0830  Read 0839     Rate- 81 bpm  PA interval- 152 ms  QRS duration- 86 ms  QT/QTC- 356/413 ms     Interpretation- Normal sinus rhythm with sinus arrhythmia   Nonspecific T wave abnormality    - QTc prolongation resolved

## 2023-03-19 NOTE — PLAN OF CARE
Problem: Nutrition/Hydration-ADULT  Goal: Nutrient/Hydration intake appropriate for improving, restoring or maintaining nutritional needs  Description: Monitor and assess patient's nutrition/hydration status for malnutrition  Collaborate with interdisciplinary team and initiate plan and interventions as ordered  Monitor patient's weight and dietary intake as ordered or per policy  Utilize nutrition screening tool and intervene as necessary  Determine patient's food preferences and provide high-protein, high-caloric foods as appropriate       INTERVENTIONS:  - Monitor oral intake, urinary output, labs, and treatment plans  - Assess nutrition and hydration status and recommend course of action  - Evaluate amount of meals eaten  - Assist patient with eating if necessary   - Allow adequate time for meals  - Recommend/ encourage appropriate diets, oral nutritional supplements, and vitamin/mineral supplements  - Order, calculate, and assess calorie counts as needed  - Recommend, monitor, and adjust tube feedings and TPN/PPN based on assessed needs  - Assess need for intravenous fluids  - Provide specific nutrition/hydration education as appropriate  - Include patient/family/caregiver in decisions related to nutrition  3/19/2023 1141 by Mikie Currie RN  Outcome: Adequate for Discharge  3/19/2023 1014 by Mikie Currie RN  Outcome: Progressing

## 2023-03-19 NOTE — DISCHARGE SUMMARY
MEDICAL DETOX UNIT, LEVEL 4  Department of Medical Toxicology  Reason for Admission/Principal Problem: ETOH withdrawal / Hyponatremia and ETOH use disorder  Admitting provider: MADDISON Brady MD   3/15/2023  4:43 PM       Discharging Physician / Practitioner: Ernesto Frank PA-C  PCP: Corinne Ferries, MD  Admission Date:   Admission Orders (From admission, onward)     Ordered        03/15/23 1830  INPATIENT ADMISSION  Once                      Discharge Date: 03/19/23    Medical Problems     Resolved Problems  Date Reviewed: 3/15/2023          Resolved    Hypokalemia 3/17/2023     Resolved by  Ernesto Frank, PA-C    Tear of left supraspinatus tendon 3/16/2023     Resolved by  Ernesto Frank, PA-C    Internal derangement of right shoulder 3/16/2023     Resolved by  Ernesto Frank, PA-YRN    Alcohol withdrawal syndrome with complication, with unspecified complication ([facility] [address] ) 1/26/2588     Resolved by  Ernesto Frank, PA-C    Alcoholic ketoacidosis 1/51/6628     Resolved by  Ernesto Frank, PA-C    Hypomagnesemia 3/17/2023     Resolved by  Ernesto Frank, PA-C    Prolonged Q-T interval on ECG 3/19/2023     Resolved by  Ernesto Frank, PA-C    Leukocytosis 3/17/2023     Resolved by  Ernesto Frank, PA-C    Ambulatory dysfunction 3/19/2023     Resolved by  Ernesto Frank, PA-C    Elevated lipase 3/19/2023     Resolved by  Erensto Frnak, PA-C    Hypophosphatemia 3/19/2023     Resolved by  Ernesto Frank, PA-C    Hypocalcemia 3/17/2023     Resolved by  Ernesto Frank, PA-C          Alcohol withdrawal syndrome with complication, with unspecified complication (HCC)-resolved as of 3/19/2023  Assessment & Plan  · Patient taken off of SEWS protocol- received 1790 mg phenobarbital total  · Denies any current withdrawal symptoms     Alcohol use disorder, severe, dependence ([address] )  Assessment & Plan  · Withdrawal management as above  · Continue vitamin supplementation including high dose thiamine  · Case management consulted for disposition planning  · Patient desires inpatient rehabilitation but recognizes starting a new job may be a barrier to treatment  · Interested in [address] and follows at [facility]; appointment scheduled for 3/21    * Hyponatremia  Assessment & Plan  Recent Labs     03/17/23  1241 03/17/23  1752 03/18/23  0014 03/18/23  0557 03/18/23  1216 03/19/23  0526   SODIUM 122* 124* 126* 130* 129* 133*     · Suspect secondary to volume depletion and beer potomania  · Nephrology following-   · Cleared for discharge   · Repeat BMP in 7-10 days after discharge   · Continue 1 8 L fluid restriction   · Follow up with nephrology consulted    Prolonged Q-T interval on ECG-resolved as of 3/19/2023  Assessment & Plan  · Within normal limits     Elevated lipase-resolved as of 3/19/2023  Assessment & Plan  Recent Labs     03/15/23  2358 03/16/23  0434 03/17/23  0430   LIPASE 155* 171* 164*       · Trending down  · Denies continued nausea/vomiting  · Tolerating normal diet    Transaminitis  Assessment & Plan  Recent Labs     03/17/23  0430 03/18/23  0557   * 341*     Recent Labs     03/17/23  0430 03/18/23  0557   ALT 96* 103*     · Suspect secondary to alcohol use  · Stabilized     Ambulatory dysfunction-resolved as of 3/19/2023  Assessment & Plan  · Continue high dose thiamine  · PT consult-  · Ambulatory dysfunction improved   · Recommended to continue outpatient PT after discharge    Alcoholic ketoacidosis-resolved as of 3/17/2023  Assessment & Plan  Recent Labs     03/17/23  0430 03/17/23  0756 03/17/23  1241 03/17/23  1752 03/18/23  0014 03/18/23  0557   CO2 23 23 22 23 25 24     · Metabolic acidosis and anion gap resolved  · Encourage PO intake as tolerated    Hypocalcemia-resolved as of 3/17/2023  Assessment & Plan  Recent Labs     03/17/23  1241 03/17/23  1752 03/18/23  0014 03/18/23  0557   CALCIUM 9 0 9 0 8 9 9 1     · Continue to monitor and optimize as needed    Hypokalemia-resolved as of 3/17/2023  Assessment & Plan  Recent Labs     03/17/23  1752 03/18/23  0014 03/18/23  0557   K 3 9 3 6 3 7     · Replace and monitor    Hypophosphatemia-resolved as of 3/19/2023  Assessment & Plan  Recent Labs     03/17/23  0430 03/18/23  0557   PHOS 1 4* 3 9     · Replace and continue to monitor and optimize    Hypomagnesemia-resolved as of 3/17/2023  Assessment & Plan  Recent Labs     03/16/23  0434 03/17/23  0430 03/18/23  0557   MG 2 0 1 9 1 9       · Improved  · Continue to monitor and optimize as needed    Benign essential hypertension  Assessment & Plan  Home medication restarted by nephrology with hold parameters  Currently adequately controlled   Recommend follow up with PCP outpatient    Esophageal reflux  Assessment & Plan  · Continue PPI  · Suspect acute exacerbation in setting of recent alcohol intake with possible gastritis  · Improved  · Outpatient follow up    Hyperlipidemia  Assessment & Plan  · Continue home fenofibrate    Leukocytosis-resolved as of 3/17/2023  Assessment & Plan  Recent Labs     03/16/23  0434 03/17/23  0430 03/18/23  0557   WBC 9 41 7 55 5 00     • Afebrile, no infectious symptoms    Internal derangement of right shoulder-resolved as of 3/16/2023  Assessment & Plan  • Pt follows with orthopedics and OP PT for chronic R shoulder pain   • MRI R shoulder 12/19/22 showed:   • "Moderate supraspinatus and infraspinatus tendinosis  • High-grade partial-thickness articular surface tear of the distal supraspinatus tendon as above  No full-thickness rotator cuff tear, tendon retraction, or significant muscular atrophy  • Posterior labral tear     • Moderate acromioclavicular joint osteoarthrosis "  • Received R CS injection at follow-up office visit with ortho 01/05/23  • Currently pt does not have any acute R shoulder complaints, denies any focal neuro sx; RUE neurovascularly intact on exam  • Supportive care  • Recommend continued OP ortho and PT follow-up    Tear of left supraspinatus tendon-resolved as of 3/16/2023  Assessment & Plan  • Pt has a h/o L supraspinatus tendon tear, status post-op L shoulder arthroscopy and rotator cuff repair 9/9/22  • He notes some continued L shoulder pain after surgery, denies any acute worsening, N/T, or upper extremity weakness  • Per chart review, at last OP f/u with ortho, recommended continued OP PT and if pain does not improve, they will consider further imaging  • Supportive care, pain control  • Continue OP f/u with PT and ortho    Obesity (BMI 30-39  9)  Assessment & Plan  · Body mass index is 33 52 kg/m²  · Hbg A1C 5 4  · Recommend healthy diet, lifestyle modifications, alcohol cessation      Consultations During Hospital Stay:  · Case management  · Physical Therapy   · Nephrology     Procedures Performed:   · None    Significant Findings / Test Results:   · Hyponatremia: improving, 133 at discharge   · Hypokalemia: supplemented   · Hypocalcemia: supplemented  · Hypophosphatemia: supplemented   · Elevated lipase: resolved  · Leukocytosis: improved  · ECHO: The left ventricular ejection fraction is > 75% by visual estimation    Incidental Findings:   · None    Test Results Pending at Discharge (will require follow up): · None     Outpatient Tests Requested:  · BMP in 3-56 days     Complications:  none    Reason for Admission: ETOH withdrawal     Hospital Course:     Ana Shaikh is a 41 y o  male patient who originally presented to the hospital on 3/15/2023 due to alcohol withdrawal  Patient initially presented to the [facility] ED requesting detoxification from alcohol  Patient was admitted to the [facility] medical detox unit under [facility] protocol for medically assisted alcohol withdrawal and received a total of 1790 mg phenobarbital without complication   Patient's alcohol withdrawal symptoms subsequently resolved, and he remained without objective evidence of alcohol withdrawal  During this hospitalization, patient was found to have hypokalemia, hypomagnesemia, hypocalcemia, and hypophosphatemia, which resolved with electrolyte supplementation  Nephrology was consulted on recommendations for hyponatremia with rapid correction, which included administration of DDAVP, D5W IVF, and electrolyte optimization  Prior to discharge, sodium level 133  Patient received high dose thiamine for ambulatory dysfunction  PT was consulted prior to discharge for assessment of needs upon discharge  He did not require PT services after discharge  Case management was consulted for assistance with aftercare resources, and patient will be discharged with outpatient resources and follow up with nephrology  Please see above list of diagnoses and related plan for additional information  Condition at Discharge: fair     Discharge Day Visit / Exam:     Subjective:  Patient examined in room this morning  Patient states that he is feeling well and has no acute complaints  He was informed that is sodium is improved enough for him to be discharged today  He is agreeable to following up with nephrology outpatient and doing follow up lab work  All questions were answered to patient's satisfaction  Vitals: Blood Pressure: 131/98 (03/19/23 0753)  Pulse: 80 (03/19/23 0753)  Temperature: (!) 96 7 °F (35 9 °C) (03/19/23 0753)  Temp Source: Temporal (03/19/23 0753)  Respirations: 18 (03/19/23 0753)  Height: 5' 11" (180 3 cm) (03/17/23 0845)  Weight - Scale: 109 kg (240 lb) (03/17/23 0845)  SpO2: 97 % (03/19/23 0753)  Exam:   Physical Exam  Constitutional:       Appearance: He is not ill-appearing  HENT:      Mouth/Throat:      Mouth: Mucous membranes are moist    Eyes:      Pupils: Pupils are equal, round, and reactive to light  Cardiovascular:      Rate and Rhythm: Normal rate and regular rhythm  Pulses: Normal pulses  Heart sounds: Normal heart sounds  No murmur heard  No gallop  Pulmonary:      Effort: Pulmonary effort is normal  No respiratory distress        Breath sounds: Normal breath sounds  No wheezing or rales  Abdominal:      General: Abdomen is flat  Bowel sounds are normal  There is no distension  Palpations: Abdomen is soft  Tenderness: There is no abdominal tenderness  There is no guarding  Musculoskeletal:         General: Normal range of motion  Skin:     General: Skin is warm and dry  Capillary Refill: Capillary refill takes less than 2 seconds  Neurological:      Mental Status: He is alert and oriented to person, place, and time  Psychiatric:         Mood and Affect: Mood normal          Behavior: Behavior normal        Discussion with Family: Spoke with patient regarding treatment plan    Discharge instructions/Information to patient and family:   See after visit summary for information provided to patient and family  Provisions for Follow-Up Care:  See after visit summary for information related to follow-up care and any pertinent home health orders  Disposition:     Home    For Discharges to [facility]:   · Not Applicable to this Patient - Not Applicable to this Patient    Planned Readmission: NA      Discharge Statement:  I spent 40 minutes discharging the patient  This time was spent on the day of discharge  I had direct contact with the patient on the day of discharge  Greater than 50% of the total time was spent examining patient, answering all patient questions, arranging and discussing plan of care with patient as well as directly providing post-discharge instructions  Additional time then spent on discharge activities  Discharge Medications:  See after visit summary for reconciled discharge medications provided to patient and family        ** Please Note: This note has been constructed using a voice recognition system **

## 2023-03-19 NOTE — CASE MANAGEMENT
Case Management Discharge Planning Note    Patient name David Ponce  Location 5T DETOX 505/5T DETOX 50* MRN [id number]  : 1975 Date 3/19/2023       Current Admission Date: 3/15/2023  Current Admission Diagnosis:Hyponatremia   Patient Active Problem List    Diagnosis Date Noted   • Transaminitis 2023   • Tear of right supraspinatus tendon 2023   • Alcohol use disorder, severe, dependence ([address] ) 2022   • Obesity (BMI 30-39 9) 2022   • Hyperlipidemia    • Erectile dysfunction 2022   • Hyponatremia 2022   • Chronic midline low back pain without sciatica 2019   • Incomplete tear of left rotator cuff 2018   • Anxiety 2013   • Benign essential hypertension 10/02/2012   • Essential hypertriglyceridemia 2012   • Esophageal reflux 2012   • Depression 2012      LOS (days): 4  Geometric Mean LOS (GMLOS) (days):   Days to GMLOS:     OBJECTIVE:  Risk of Unplanned Readmission Score: 13 43         Current admission status: Inpatient   Preferred Pharmacy:   [id number] [address] [place] [address]  Phone: [phone] Fax: [phone]    Primary Care Provider: Cristiana Morel MD    Primary Insurance: BLUE CROSS  Secondary Insurance:     DISCHARGE DETAILS: CM met with pt to review plan for discharge; pt will follow up with outpatient substance use treatment at [facility] and follow up with his PCP  CM provided pt with note for work  Pt reports that his mother will provide transportation home       Discharge planning discussed with[de-identified] Patient  Freedom of Choice: Yes                   Contacts  Patient Contacts: SHARE  Relationship to Patient[de-identified] Treatment Provider  Contact Method: Phone  Phone Number: [phone]  Reason/Outcome: Continuity of Care, Discharge Planning              Other Referral/Resources/Interventions Provided:  Referrals Provided[de-identified] Crisis Hotline, IOP, Peer Specialist, Support Group, Therapist (inpatient substance use treatment)  Other:  (Work note)    Would you like to participate in our [address] service program?  : No - Declined                                              Family notified[de-identified] Pt reports that he has spoken with his mother and informed her of plan for discharge

## 2023-03-20 ENCOUNTER — TELEPHONE (OUTPATIENT)
Dept: NEPHROLOGY | Facility: CLINIC | Age: 48
End: 2023-03-20

## 2023-03-20 NOTE — UTILIZATION REVIEW
NOTIFICATION OF ADMISSION DISCHARGE   This is a Notification of Discharge from [facility]  Please be advised that this patient has been discharge from our facility  Below you will find the admission and discharge date and time including the patient’s disposition  UTILIZATION REVIEW CONTACT:  Nathalia Chatman MA  Utilization   Network Utilization Review Department  Phone: [phone] x carefully listen to the prompts  All voicemails are confidential   Email: [email or web address]     ADMISSION INFORMATION  PRESENTATION DATE: 3/15/2023  4:43 PM  OBERVATION ADMISSION DATE:   INPATIENT ADMISSION DATE: 3/15/23  6:30 PM   DISCHARGE DATE: 3/19/2023 11:41 AM   DISPOSITION:Home/Self Care    IMPORTANT INFORMATION:  Send all requests for admission clinical reviews, approved or denied determinations and any other requests to dedicated fax number below belonging to the campus where the patient is receiving treatment   List of dedicated fax numbers:  [address] DENIALS (Administrative/Medical Necessity) [phone]   [address] (Maternity/NICU/Pediatrics) [phone]   [facility] [phone]   [place] [address] [phone]   [address] [phone]   220 [facility] [phone]   90 [facility] [phone]   [address] [place] [address] 119 [phone]   [facility]  [phone]   4055 [facility] [phone]   412 [facility] [address] [facility] [phone]

## 2023-03-21 ENCOUNTER — TRANSITIONAL CARE MANAGEMENT (OUTPATIENT)
Dept: INTERNAL MEDICINE CLINIC | Facility: CLINIC | Age: 48
End: 2023-03-21

## 2023-03-22 ENCOUNTER — DOCUMENTATION (OUTPATIENT)
Dept: PSYCHIATRY | Facility: CLINIC | Age: 48
End: 2023-03-22

## 2023-03-22 ENCOUNTER — TELEPHONE (OUTPATIENT)
Dept: PSYCHIATRY | Facility: CLINIC | Age: 48
End: 2023-03-22

## 2023-03-22 NOTE — PROGRESS NOTES
Note Type:  Note                  Date of Service:  03/22/2023  Service:  [id number] Office    Note:  Note  Evelyne Vargas 41 y o  male [id number]  Attending  Substance Use History     Social History     Substance and Sexual Activity   Alcohol Use Not Currently    Comment: 50oz eliseo        Social History     Substance and Sexual Activity   Drug Use No       Encounter Type:   Patient Face-to-Face    Start Time 1010  End Time 1100    Recovery needs addressed at this meeting:  Peer support     Note  Patient present in office appears remorseful and "out of it" since last detox stay  Patient complains of left shoulder pain and swollen arm does have appt with PCP on Friday,     Patient and I discussed most recent relapse  Gale Andre describes as had no plan to drink, stopped taking naltrexone (as it wasn't working) the day before and next day went to liquor store and continued to drink  Patient lives on his own, with his cat  No children  Family history of addiction,   Patient has a history of DUI's (4 in total but only 1 within the past 10 years)    Patient does attend AA regularly and has a sponsor and "co-sponsor" that calls him daily since relapse  Patient and I discussed medications to help with urges  Patient tried naltrexone and vivitrol shot and feels that they a re not working  Patient is interested in trying antabuse   Scheduled with Dr Shashi Cervantes tomorrow but will message him to see if appropriate and available for patient     Referrals made  Tox   AA  Psychotherapy     Next appointment date and time  03/23/2023 1015

## 2023-03-24 ENCOUNTER — DOCUMENTATION (OUTPATIENT)
Dept: PSYCHIATRY | Facility: CLINIC | Age: 48
End: 2023-03-24

## 2023-03-24 ENCOUNTER — TELEPHONE (OUTPATIENT)
Dept: PSYCHIATRY | Facility: CLINIC | Age: 48
End: 2023-03-24

## 2023-05-11 ENCOUNTER — TELEMEDICINE (OUTPATIENT)
Dept: INTERNAL MEDICINE CLINIC | Facility: CLINIC | Age: 48
End: 2023-05-11

## 2023-05-11 ENCOUNTER — TELEPHONE (OUTPATIENT)
Dept: INTERNAL MEDICINE CLINIC | Facility: CLINIC | Age: 48
End: 2023-05-11

## 2023-05-11 DIAGNOSIS — F10.20 ALCOHOL USE DISORDER, SEVERE, DEPENDENCE (HCC): Primary | ICD-10-CM

## 2023-05-11 DIAGNOSIS — E78.1 ESSENTIAL HYPERTRIGLYCERIDEMIA: ICD-10-CM

## 2023-05-11 RX ORDER — FENOFIBRATE 145 MG/1
145 TABLET, COATED ORAL DAILY
Qty: 90 TABLET | Refills: 1 | Status: SHIPPED | OUTPATIENT
Start: 2023-05-11

## 2023-05-11 NOTE — PROGRESS NOTES
Virtual Regular Visit    Verification of patient location:    Patient is located at Other in the following state in which I hold an active license [place]      Assessment/Plan:  Seems to be doing well in rehab  Form completed    Problem List Items Addressed This Visit        Other    Essential hypertriglyceridemia    Relevant Medications    fenofibrate (TRICOR) 145 mg tablet    Alcohol use disorder, severe, dependence ([facility] [address] ) - Primary            Reason for visit is   Chief Complaint   Patient presents with   • Virtual Regular Visit        Encounter provider Agus Corral MD    Provider located at [address] [place] [id number]      Recent Visits  No visits were found meeting these conditions  Showing recent visits within past 7 days and meeting all other requirements  Today's Visits  Date Type Provider Dept   05/11/23 Telemedicine Agus Corral MD [facility] today's visits and meeting all other requirements  Future Appointments  No visits were found meeting these conditions  Showing future appointments within next 150 days and meeting all other requirements       The patient was identified by name and date of birth  Andrew Antony was informed that this is a telemedicine visit and that the visit is being conducted through the [address] [employer] platform  He agrees to proceed     My office door was closed  No one else was in the room  He acknowledged consent and understanding of privacy and security of the video platform  The patient has agreed to participate and understands they can discontinue the visit at any time  Patient is aware this is a billable service  Subjective  nAdrew Antony is a 41 y o  male with alcohol dependence   HPI   Currently in [facility] for alcohol dependnce   He has been there for 10 out of 90 days  No insurance at this time  He was working for [employer] and on 3/15 was admitted for alcohol withdrawals  He was admitted to [facility] until 3/19  He was supposed to return to work on 3/22 but was [place] for a different job so was asked to not return  Needs a form completed for STD covering the period he was admitted at [facility]    Past Medical History:   Diagnosis Date   • Acute pain of both shoulders 4/20/2022   • Aftercare following surgery of the musculoskeletal system 11/21/2022   • Anxiety    • COVID-19 ruled out 2/3/2022   • GERD (gastroesophageal reflux disease)    • Hyperlipidemia    • Hypertension    • Internal derangement of right shoulder 11/28/2022   • Sore throat 12/6/2022   • Tear of left supraspinatus tendon 7/18/2022   • Tendinitis    • Thrombocytopenia ([address] ) 12/6/2022   • Urinary urgency 2/27/2018    Overview:  Last Assessment & Plan:  Obtain US       Past Surgical History:   Procedure Laterality Date   • KNEE SURGERY Left 1989    cyst removed   • KS COLONOSCOPY FLX DX W/COLLJ SPEC WHEN PFRMD N/A 5/14/2018    Procedure: COLONOSCOPY;  Surgeon: Rosy Corbin DO;  [facility]: [facility]; Service: General   • KS SURGICAL ARTHROSCOPY SHOULDER W/ROTATOR CUFF RPR Left 9/9/2022    Procedure: SHOULDER ARTHROSCOPIC ROTATOR CUFF REPAIR;  Surgeon: Becca Burgess MD;  [facility]: [facility];  Service: Orthopedics   • SHOULDER ARTHROSCOPY Right     with biceps tenodesis       Current Outpatient Medications   Medication Sig Dispense Refill   • b complex vitamins capsule Take 1 capsule by mouth daily     • fenofibrate (TRICOR) 145 mg tablet Take 1 tablet (145 mg total) by mouth daily 90 tablet 1   • irbesartan (AVAPRO) 150 mg tablet TAKE 1 TABLET (150 MG TOTAL) BY MOUTH IN THE MORNING  90 tablet 0   • Multiple Vitamins-Minerals (multivitamin with minerals) tablet Take 1 tablet by mouth daily     • omeprazole (PriLOSEC) 20 mg delayed release capsule TAKE 1 CAPSULE (20 MG TOTAL) BY MOUTH IN THE MORNING   90 capsule 0   • sildenafil (VIAGRA) 100 mg tablet Take 1 tablet (100 mg total) by mouth daily as needed for erectile dysfunction 10 tablet 0     No current facility-administered medications for this visit  Allergies   Allergen Reactions   • Neomycin Hives   • Polymyxin B Hives       Review of Systems    Video Exam    There were no vitals filed for this visit  Physical Exam  Constitutional:       Appearance: He is not ill-appearing  Pulmonary:      Effort: No respiratory distress     Psychiatric:         Mood and Affect: Mood normal          Behavior: Behavior normal           Visit Time  Total Visit Duration: 30

## 2023-05-15 DIAGNOSIS — I10 BENIGN ESSENTIAL HYPERTENSION: ICD-10-CM

## 2023-05-15 RX ORDER — IRBESARTAN 150 MG/1
150 TABLET ORAL DAILY
Qty: 90 TABLET | Refills: 0 | Status: SHIPPED | OUTPATIENT
Start: 2023-05-15 | End: 2023-08-13

## 2023-06-05 DIAGNOSIS — K21.9 GASTROESOPHAGEAL REFLUX DISEASE: ICD-10-CM

## 2023-06-05 RX ORDER — OMEPRAZOLE 20 MG/1
20 CAPSULE, DELAYED RELEASE ORAL DAILY
Qty: 90 CAPSULE | Refills: 0 | Status: SHIPPED | OUTPATIENT
Start: 2023-06-05 | End: 2023-09-03

## 2023-08-07 DIAGNOSIS — I10 BENIGN ESSENTIAL HYPERTENSION: ICD-10-CM

## 2023-08-07 RX ORDER — IRBESARTAN 150 MG/1
150 TABLET ORAL DAILY
Qty: 90 TABLET | Refills: 3 | Status: SHIPPED | OUTPATIENT
Start: 2023-08-07 | End: 2023-11-05

## 2023-08-11 DIAGNOSIS — I10 BENIGN ESSENTIAL HYPERTENSION: ICD-10-CM

## 2023-08-14 RX ORDER — IRBESARTAN 150 MG/1
150 TABLET ORAL DAILY
Qty: 90 TABLET | Refills: 0 | OUTPATIENT
Start: 2023-08-14 | End: 2023-11-12

## 2023-08-24 NOTE — TELEPHONE ENCOUNTER
Attempted to talk with patient. Patient requested to speak with only Nelia's nurse from today.  Anita Velásquez LPN     Please advise stat labs this morning--orders in chart-he can go to [facility]  He has history of very low sodium r/t alcohol intake  If his levels are off he may have nausea   If labs are abnormal he must go to ED    Note in chart

## 2023-09-03 DIAGNOSIS — K21.9 GASTROESOPHAGEAL REFLUX DISEASE: ICD-10-CM

## 2023-09-03 RX ORDER — OMEPRAZOLE 20 MG/1
20 CAPSULE, DELAYED RELEASE ORAL DAILY
Qty: 90 CAPSULE | Refills: 0 | Status: SHIPPED | OUTPATIENT
Start: 2023-09-03

## 2023-10-06 DIAGNOSIS — E78.1 ESSENTIAL HYPERTRIGLYCERIDEMIA: ICD-10-CM

## 2023-10-06 RX ORDER — FENOFIBRATE 145 MG/1
145 TABLET, COATED ORAL DAILY
Qty: 90 TABLET | Refills: 1 | Status: SHIPPED | OUTPATIENT
Start: 2023-10-06

## 2023-10-06 NOTE — TELEPHONE ENCOUNTER
Requested medication(s) are due for refill today: Yes  Patient has already received a courtesy refill: Yes  Other reason request has been forwarded to provider: Patient needs updated blood work. Please place lab orders.

## 2023-10-11 NOTE — ASSESSMENT & PLAN NOTE
· Pt reports ambulatory dysfunction  · +Gait and finger-to-nose ataxia on exam  · Initiate high dose thiamine Q8H x9 doses  · PT eval and treat PAST SURGICAL HISTORY:  H/O breast biopsy     H/O hemorrhoidectomy     H/O ovarian cystectomy     History of left heart catheterization (LHC) 9/2020    Kidney stones eswl, cystocoscopy, lithotripsy and stent-multiple

## 2023-10-16 NOTE — TELEPHONE ENCOUNTER
Celia Moreno @ [facility] is requesting a call back to make a hospital F/U appointment
Scheduled TCM with PCP for 12/13/22
No

## 2024-06-17 DIAGNOSIS — Z00.6 ENCOUNTER FOR EXAMINATION FOR NORMAL COMPARISON OR CONTROL IN CLINICAL RESEARCH PROGRAM: ICD-10-CM

## 2024-07-24 ENCOUNTER — OFFICE VISIT (OUTPATIENT)
Dept: OBGYN CLINIC | Facility: CLINIC | Age: 49
End: 2024-07-24
Payer: COMMERCIAL

## 2024-07-24 VITALS
SYSTOLIC BLOOD PRESSURE: 136 MMHG | WEIGHT: 262 LBS | OXYGEN SATURATION: 98 % | DIASTOLIC BLOOD PRESSURE: 72 MMHG | TEMPERATURE: 97.6 F | BODY MASS INDEX: 36.68 KG/M2 | HEART RATE: 78 BPM | HEIGHT: 71 IN

## 2024-07-24 DIAGNOSIS — G89.29 CHRONIC LEFT SHOULDER PAIN: Primary | ICD-10-CM

## 2024-07-24 DIAGNOSIS — M25.512 CHRONIC LEFT SHOULDER PAIN: Primary | ICD-10-CM

## 2024-07-24 DIAGNOSIS — G89.29 CHRONIC RIGHT SHOULDER PAIN: ICD-10-CM

## 2024-07-24 DIAGNOSIS — M25.511 CHRONIC RIGHT SHOULDER PAIN: ICD-10-CM

## 2024-07-24 DIAGNOSIS — M75.112 NONTRAUMATIC INCOMPLETE TEAR OF LEFT ROTATOR CUFF: ICD-10-CM

## 2024-07-24 DIAGNOSIS — M75.101 TEAR OF RIGHT SUPRASPINATUS TENDON: ICD-10-CM

## 2024-07-24 PROCEDURE — 99214 OFFICE O/P EST MOD 30 MIN: CPT | Performed by: ORTHOPAEDIC SURGERY

## 2024-07-24 RX ORDER — ROSUVASTATIN CALCIUM 5 MG/1
10 TABLET, COATED ORAL
COMMUNITY
Start: 2024-06-17 | End: 2024-12-14

## 2024-07-24 NOTE — PROGRESS NOTES
ASSESSMENT/PLAN:    Diagnoses and all orders for this visit:    Chronic left shoulder pain    Chronic right shoulder pain    Nontraumatic incomplete tear of left rotator cuff    Tear of right supraspinatus tendon    Other orders  -     rosuvastatin (CRESTOR) 5 mg tablet; 10 mg        Plan: He would like to see Dr. iM who had originally performed his left rotator cuff surgery several years ago.  He indicates that he has met his deductible's and would like to have surgery done as quickly as possible.  We will try to get him scheduled to see Dr. Mi in a timely fashion and allow treatment options to be discussed.  His were not immediately available for my review although a disc was being loaded from his outside MRI/arthrogram images.    Return for Schedule with Dr. Mi.      _____________________________________________________  CHIEF COMPLAINT:  Chief Complaint   Patient presents with    Left Shoulder - Pain    Right Shoulder - Pain         SUBJECTIVE:  Ke Loya is a 48 y.o. year old right-handed male who presents for evaluation of bilateral shoulder pain.  He initially indicated injury to his shoulder several years ago with subsequent left rotator cuff repair.  He denied any surgery to the right shoulder.  He subsequently states he had retore his left rotator cuff although he does not recall any specific injury.  He has recently been treating through a physician in the [place], [place] area and was told that he would need surgery done.  However, this physician is not an surgeon.  He presents today to discuss treatment and wants to proceed with surgery as quickly as possible.  He complains of more symptoms in his left shoulder than his right.  The left shoulder pain is more anterior.  The right shoulder pain is more superior and lateral.  He has had physical therapy within the last few months.  He recently underwent MRI/arthrogram evaluations of his shoulders as well as x-rays although  x-rays are not available for my review.  He denies pain radiating distally and denies upper extremity paresthesias.  He denies pain at rest but notes pain with any kind of activity.  He does experience occasional popping in the right shoulder.    PAST MEDICAL HISTORY:  Past Medical History:   Diagnosis Date    Acute pain of both shoulders 4/20/2022    Aftercare following surgery of the musculoskeletal system 11/21/2022    Anxiety     COVID-19 ruled out 2/3/2022    GERD (gastroesophageal reflux disease)     Hyperlipidemia     Hypertension     Internal derangement of right shoulder 11/28/2022    Sore throat 12/6/2022    Tear of left supraspinatus tendon 7/18/2022    Tendinitis     Thrombocytopenia (HCC) 12/6/2022    Urinary urgency 2/27/2018    Overview:  Last Assessment & Plan:  Obtain US       PAST SURGICAL HISTORY:  Past Surgical History:   Procedure Laterality Date    KNEE SURGERY Left 1989    cyst removed    SD COLONOSCOPY FLX DX W/COLLJ SPEC WHEN PFRMD N/A 5/14/2018    Procedure: COLONOSCOPY;  Surgeon: Rodriguez Durant DO;  Location: [facility];  Service: General    SD SURGICAL ARTHROSCOPY SHOULDER W/ROTATOR CUFF RPR Left 9/9/2022    Procedure: SHOULDER ARTHROSCOPIC ROTATOR CUFF REPAIR;  Surgeon: Braxton Mi MD;  Location: [facility];  Service: Orthopedics    SHOULDER ARTHROSCOPY Right     with biceps tenodesis       FAMILY HISTORY:  Family History   Problem Relation Age of Onset    Melanoma Father     Cancer Father     Melanoma Brother     Diabetes Maternal Grandfather     Stroke Paternal Grandmother     Hypertension Paternal Grandmother     Coronary artery disease Paternal Grandmother     Heart disease Paternal Grandmother     Cancer Paternal Grandmother     Hypertension Paternal Grandfather     Coronary artery disease Paternal Grandfather     Brain cancer Paternal Grandfather     Heart disease Paternal Grandfather     Liver cancer Paternal Uncle        SOCIAL HISTORY:  Social History  "    Tobacco Use    Smoking status: Former     Current packs/day: 0.00     Average packs/day: 1 pack/day for 21.0 years (21.0 ttl pk-yrs)     Types: Cigarettes     Start date: 1992     Quit date: 2013     Years since quittin.2    Smokeless tobacco: Never   Vaping Use    Vaping status: Never Used   Substance Use Topics    Alcohol use: Not Currently     Alcohol/week: 2.0 - 3.0 standard drinks of alcohol     Types: 2 - 3 Shots of liquor per week    Drug use: No       MEDICATIONS:    Current Outpatient Medications:     b complex vitamins capsule, Take 1 capsule by mouth daily, Disp: , Rfl:     fenofibrate (TRICOR) 145 mg tablet, TAKE 1 TABLET BY MOUTH EVERY DAY, Disp: 90 tablet, Rfl: 1    irbesartan (AVAPRO) 150 mg tablet, Take 1 tablet (150 mg total) by mouth daily, Disp: 90 tablet, Rfl: 3    Multiple Vitamins-Minerals (multivitamin with minerals) tablet, Take 1 tablet by mouth daily, Disp: , Rfl:     omeprazole (PriLOSEC) 20 mg delayed release capsule, TAKE 1 CAPSULE BY MOUTH EVERY DAY, Disp: 90 capsule, Rfl: 0    rosuvastatin (CRESTOR) 5 mg tablet, 10 mg, Disp: , Rfl:     ALLERGIES:  Allergies   Allergen Reactions    Neomycin Hives    Polymyxin B Hives       Review of systems:   Constitutional: Negative for fatigue, fever or loss of apetite.   HENT: Negative.    Respiratory: Negative for shortness of breath, dyspnea.    Cardiovascular: Negative for chest pain/tightness.   Gastrointestinal: Negative for abdominal pain, N/V.   Endocrine: Negative for cold/heat intolerance, unexplained weight loss/gain.   Genitourinary: Negative for flank pain, dysuria, hematuria.   Musculoskeletal: Positive as in the HPI  Skin: Negative for rash.    Neurological: Negative  Psychiatric/Behavioral: Negative for agitation.  _____________________________________________________  PHYSICAL EXAMINATION:    Blood pressure 136/72, pulse 78, temperature 97.6 °F (36.4 °C), temperature source Temporal, height 5' 11\" (1.803 m), weight " 119 kg (262 lb), SpO2 98%.    General: well developed and well nourished, alert, oriented times 3, and appears comfortable  Psychiatric: Normal  HEENT: Benign  Cardiovascular: Regular    Pulmonary: No wheezing or stridor  Abdomen: Soft, Nontender  Skin: No masses, erythema, lacerations, fluctation, ulcerations  Neurovascular: Motor and sensory exams are grossly intact and pulses palpable.    MUSCULOSKELETAL EXAMINATION:    The right shoulder exam demonstrates abduction to about 120 degrees and forward flexion of about 100 degrees with complaints of pain during elevation above shoulder height.  He has pain with Jobes and Wilsons's testing.  He demonstrated 5/5 strength of resisted internal rotation and 4+/5 strength of resisted external rotation and abduction of the right shoulder.  He has negative external rotation lag, Hornblower sign and drop arm sign.  He denies any tenderness to palpation of the right shoulder.    The left shoulder exam demonstrates abduction to about 80 degrees, forward flexion to about 90 degrees with complaints of pain during motion.  He has 5/5 strength of internal rotation, 4/5 strength of external rotation and abduction with pain during external rotation and abduction.  He has no localized tenderness.  He has negative external rotation lag, Hornblower and drop arm signs.  He did complain of pain with Jobes and Wilsons's testing.    The remainder of the upper extremity examination bilaterally is benign.      _____________________________________________________  STUDIES REVIEWED:  The MRI/arthrogram's of his shoulders were loading and were not immediately available for my review today.          Jose Brink

## 2024-07-26 ENCOUNTER — TELEPHONE (OUTPATIENT)
Dept: OBGYN CLINIC | Facility: OTHER | Age: 49
End: 2024-07-26

## 2024-07-26 NOTE — TELEPHONE ENCOUNTER
Called patient @ # on file  No answer  LVM    Called patient to schedule appointment with Dr. Mi for Left Shoulder pain. He is being referred to Dr. Mi from Dr. Brink.     If/When patient calls back   Please schedule accordingly  Thank you

## 2024-08-01 ENCOUNTER — OFFICE VISIT (OUTPATIENT)
Dept: OBGYN CLINIC | Facility: OTHER | Age: 49
End: 2024-08-01
Payer: COMMERCIAL

## 2024-08-01 VITALS
SYSTOLIC BLOOD PRESSURE: 122 MMHG | WEIGHT: 261 LBS | HEART RATE: 80 BPM | HEIGHT: 71 IN | DIASTOLIC BLOOD PRESSURE: 79 MMHG | BODY MASS INDEX: 36.54 KG/M2

## 2024-08-01 DIAGNOSIS — M75.101 TEAR OF RIGHT SUPRASPINATUS TENDON: ICD-10-CM

## 2024-08-01 DIAGNOSIS — M75.102 TEAR OF LEFT SUPRASPINATUS TENDON: Primary | ICD-10-CM

## 2024-08-01 PROCEDURE — 99214 OFFICE O/P EST MOD 30 MIN: CPT | Performed by: ORTHOPAEDIC SURGERY

## 2024-08-01 NOTE — PATIENT INSTRUCTIONS
You are being scheduled for a shoulder arthroscopy to treat your symptoms.  Below are some instructions and information on what to expect before and after your surgery.        Pre-Surgical Preparation for Arthroscopic Shoulder Surgery:     You will be contacted the evening prior to your surgery to confirm the scheduled time of the procedure and when to arrive at the hospital.   Do not eat or drink anything after midnight the night before your surgery.  Since you are having out-patient surgery, make sure that you have someone who can drive you home later in the day.  Also, prepare that person for a long day, as the process of safely preparing for and recovering from the procedure is more time consuming than the actual procedure!      As you will be in a sling after surgery, please wear or bring a loose fitting button-down shirt so that you can easily place this over the sling when you leave the surgical suite.  This avoids having to place the operative arm in a sleeve.  Most patients find that this is the easiest outfit to wear for the first week or so after surgery so you may want to plan accordingly.    Most patients find that lying down in bed after shoulder surgery accentuates their discomfort.  This is likely related to the effect of gravity on the swelling in the shoulder.  As a result, most patients sleep better in a recliner or in bed with pillows propped up behind their back for the first few days or weeks after surgery.  It is a good idea to plan for this ahead of time so there will be less hassle getting things set up the night after surgery.       What to Expect After Arthroscopic Shoulder Surgery:    It is normal to have swelling and discomfort in the shoulder for several days or a week after surgery.  It is also normal to have a small amount of drainage from the surgical wounds (especially the first few days after surgery), as we put fluid into the shoulder to visualize the structures during surgery.   "It is NOT normal to have foul smelling, purulent drainage and if this is noted, please contact the office immediately or proceed to the emergency room for evaluation as this may indicate an infection.     Applying ice bags to the shoulder may help with pain that is not controlled by the regional block.  Ice should be applied 20-30 minutes at a time, every hour or two.  Make sure to put a thin towel or T-shirt next to your skin to avoid direct contact of the ice with the skin. Icing is most helpful in the first 48 hours, although many people find that continuing past this time frame lessens their postoperative pain.  Please note that your post-operative dressing is not conductive to ice, so if you need to, it is okay to remove that dressing even the night after surgery and place band-aids over the wounds in order for the ice to take effect.     Pain Control    Most patients will receive a nerve block, the local anesthetic may keep your whole arm numb for up to 4 days.  You will be given a prescription for narcotic pain medication when you are discharged from the hospital.  With the newer nerve block that is being utilized, patients are rarely requiring the use of this narcotic pain medication.  If you find you do not tolerate that type of pain medicine well, call our office and we will try another one.     In addition to the narcotic pain medication, it is safe to use an anti-inflammatory (unless the patient has a medical condition that would not allow safe use of this mediation).  This includes the Advil, Motrin, Ibuprofen and Alleve category of medications.  Simply follow the over the counter dosing on the package and use as indicated as another adjunct.  Importantly since these medications are all very similar, use only one of them.  Tylenol is a separate medication that can be utilized as well and can be taken at the same time as the other medication or given in a \"staggered\" manner.  Just make sure that you " follow the dosing on the over the counter bottle instructions.  Also make sure that the pain medication prescribed by Dr Mi's team does not contain acetaminophen (this is found in Percocet and Vicodin).   Typically we do not prescribe those types of pain medications but if for some reason that has been prescribed DO NOT add more Tylenol (acetaminophen) as you could end up taking too much of that medication.    As mentioned above, most patients find that lying down accentuates their discomfort. You might sleep better in a recliner, or propped up in bed.     Dr. Mi encourages patients to safely ambulate around the house as much as possible in the first few days after the procedure as this can help with blood circulation in the legs. While the incidence of blood clots is very rare following shoulder surgery, early ambulation is a great way to help decrease the already low rate.    24 hours after the surgery you may remove the bandage and cover incisions with Band-Aids if needed. At that time you may shower, the wounds will have a surgical glue that will protect them from shower water but do not submerge your incisions directly (bathing or swimming) until at least 2 weeks post-operatively.  It is safe to let the arm hang at the side and take a shower and put on a shirt without the sling on.  Just make sure that you do not use the operative are to reach out and grab anything as that may damage the repair.  When you are done showering and getting dressed please return the operative arm to the sling.    Unless noted otherwise in your discharge paperwork, Dr. Mi uses absorbable sutures so they do not need to be removed.    Dr. Mi’s physician assistant (PA) will see you in the office a few days after the procedure to review the intra-operative findings and to initiate physical therapy if appropriate.  A post-operative appointment should have been scheduled for you already, but if for some reason this did  not happen, please call the office to make one.     Physical therapy is important after nearly all shoulder surgeries and a detailed rehabilitation plan based on the specific intra-operative findings and procedures will be provided to your therapist at the first post-operative office visit.  Most patients have post-operative therapy appointments scheduled pre-operatively, but if you do not, that will be handled at the first post-operative office visit.  Unless expressly directed otherwise it is safe to remove the sling even the first day after the surgery and let the arm hang by the side.  This allows patients to shower and even put a shirt on (bad arm in the sleeve first).  It is also safe to flex and extend their wrist, hand and fingers as much as possible when the block wears off.  These simple motions can serve to pump fluid out of the forearm and decrease swelling in the arm.

## 2024-08-01 NOTE — PROGRESS NOTES
Assessment  Diagnoses and all orders for this visit:    1)  Recurrent tear of left supraspinatus tendon    2) Tear of right supraspinatus tendon      Discussion and Plan:    The patient continues to be symptomatic with his left recurrent supraspinatus tendon tear, fortunately there has been minimal retraction and no atrophy so this is still a very repairable tear and given the persistence of symptoms and his lack of improvement he is indicated for revision repair.  He did state to me today that he feels he failed his initial repair secondary to a postoperative incident related to a relapse of alcoholism.  He does state that he has moved on with his life and is in a much better position to recover from surgical intervention at this time and is aware that postoperative injury can result in failure of these repairs so he will be very careful.    A thorough discussion was performed with the patient regarding the risks and benefit of operative and nonoperative treatment of their rotator cuff tear.  Risks discussed include but were not limited to infection, neurovascular injury, recurrent tear, nonhealing of the repair, need for further surgery, need for biceps tenodesis or tenotomy, stiffness, need for prolonged rehabilitation, as well as the risk of anesthesia.  After this discussion all questions were answered and informed consent was obtained in the office for arthroscopic rotator cuff repair of the left shoulder.  The patient will be scheduled for this procedure accordingly.     We will schedule a tentative date for right shoulder arthroscopic rotator cuff repair at his 1st PO visit to avoid any confusions.  The patient knows that in the setting of labral pathology and rotator cuff pathology we do address the rotator cuff tear with repair but we do not always have to address the labrum with repair and instead typically provide debridement to minimize postoperative adhesive capsulitis.  The patient wishes to be as  "efficient as possible with scheduling and I think that is very reasonable, as long as he continues to recover from his left revision repair we can consider moving forward with his right rotator cuff repair in 2 to 3 months based on his progress.  We will continue to assess him in the postoperative phase.      Subjective:   Patient ID: Ke Loya is a 48 y.o. male      HPI  Patient presents today for evaluation of left shoulder pain. Patient is s/p left shoulder arthroscopic rotator cuff repair 9/09/22.  Patient was last seen 1/05/23 at which time the right shoulder was more symptomatic.  We recommended continued PT and if symptoms persist we were going to repeat his imaging.  Patient has had two courses of physical therapy since that time without benefit.       The following portions of the patient's history were reviewed and updated as appropriate: allergies, current medications, past family history, past medical history, past social history, past surgical history and problem list.        Objective:  /79 (BP Location: Right arm, Patient Position: Sitting, Cuff Size: Adult)   Pulse 80   Ht 5' 11\" (1.803 m)   Wt 118 kg (261 lb)   BMI 36.40 kg/m²       Left Shoulder Exam     Range of Motion   External rotation:  40   Forward flexion:  120   Internal rotation 0 degrees:  Lumbar     Muscle Strength   Abduction: 4/5   External rotation: 5/5     Tests   Kimball test: positive    Other   Erythema: absent  Sensation: normal  Pulse: present             Physical Exam  Vitals and nursing note reviewed.   Constitutional:       Appearance: He is well-developed.   HENT:      Head: Normocephalic and atraumatic.   Eyes:      Pupils: Pupils are equal, round, and reactive to light.   Cardiovascular:      Rate and Rhythm: Normal rate and regular rhythm.      Pulses: Normal pulses.      Heart sounds: Normal heart sounds.   Pulmonary:      Effort: Pulmonary effort is normal. No respiratory distress.      Breath sounds: " Normal breath sounds.   Abdominal:      General: Abdomen is flat. There is no distension.      Palpations: Abdomen is soft.   Musculoskeletal:      Cervical back: Normal range of motion and neck supple.   Skin:     General: Skin is warm and dry.   Neurological:      Mental Status: He is alert and oriented to person, place, and time.   Psychiatric:         Mood and Affect: Mood normal.         Behavior: Behavior normal.           I have personally reviewed pertinent films in PACS and my interpretation is as follows.    MRI arthrogram left shoulder demonstrates full thickness supraspinatus, no atrophy, 1 cm retraction.    MRI arthrogram right shoulder shows a nondisplaced full-thickness tear of the supraspinatus with no retraction and no atrophy, there is contrast leaking through the rotator cuff tear into the subacromial space.  There is changes of the posterior glenoid labrum consistent with posterior labral pathology in addition to the rotator cuff pathology    Scribe Attestation      I,:  Yana Vásquez MA am acting as a scribe while in the presence of the attending physician.:       I,:  Braxton Mi MD personally performed the services described in this documentation    as scribed in my presence.:

## 2024-08-05 ENCOUNTER — ANESTHESIA EVENT (OUTPATIENT)
Dept: PERIOP | Facility: AMBULARY SURGERY CENTER | Age: 49
End: 2024-08-05
Payer: COMMERCIAL

## 2024-08-07 RX ORDER — ERGOCALCIFEROL 1.25 MG/1
CAPSULE ORAL WEEKLY
COMMUNITY

## 2024-08-07 NOTE — PRE-PROCEDURE INSTRUCTIONS
Pre-Surgery Instructions:   Medication Instructions    irbesartan (AVAPRO) 150 mg tablet Hold day of surgery.    omeprazole (PriLOSEC) 20 mg delayed release capsule Take day of surgery.    rosuvastatin (CRESTOR) 5 mg tablet Take day of surgery.    Vitamin D, Ergocalciferol, 54112 units CAPS Stop taking 7 days prior to surgery.   Medication instructions for day surgery reviewed. Please use only a sip of water to take your instructed medications. Avoid all over the counter vitamins, supplements and NSAIDS for one week prior to surgery per anesthesia guidelines. Tylenol is ok to take as needed.     You will receive a call one business day prior to surgery with an arrival time and hospital directions. If your surgery is scheduled on a Monday, the hospital will be calling you on the Friday prior to your surgery. If you have not heard from anyone by 8pm, please call the hospital supervisor through the hospital  at [phone]. ([place] [phone] or [place] [phone]).    Do not eat or drink anything after midnight the night before your surgery, including candy, mints, lifesavers, or chewing gum. Do not drink alcohol 24hrs before your surgery. Try not to smoke at least 24hrs before your surgery.       Follow the pre surgery showering instructions as listed in the “My Surgical Experience Booklet” or otherwise provided by your surgeon's office. Do not use a blade to shave the surgical area 1 week before surgery. It is okay to use a clean electric clippers up to 24 hours before surgery. Do not apply any lotions, creams, including makeup, cologne, deodorant, or perfumes after showering on the day of your surgery. Do not use dry shampoo, hair spray, hair gel, or any type of hair products.     No contact lenses, eye make-up, or artificial eyelashes. Remove nail polish, including gel polish, and any artificial, gel, or acrylic nails if possible. Remove all jewelry including rings and body piercing jewelry.      Wear causal clothing that is easy to take on and off. Consider your type of surgery.    Keep any valuables, jewelry, piercings at home. Please bring any specially ordered equipment (sling, braces) if indicated.    Arrange for a responsible person to drive you to and from the hospital on the day of your surgery. Please confirm the visitor policy for the day of your procedure when you receive your phone call with an arrival time.     Call the surgeon's office with any new illnesses, exposures, or additional questions prior to surgery.    Please reference your “My Surgical Experience Booklet” for additional information to prepare for your upcoming surgery.

## 2024-08-10 ENCOUNTER — APPOINTMENT (OUTPATIENT)
Dept: LAB | Facility: CLINIC | Age: 49
End: 2024-08-10
Payer: COMMERCIAL

## 2024-08-10 DIAGNOSIS — E87.1 HYPONATREMIA: ICD-10-CM

## 2024-08-10 DIAGNOSIS — Z00.6 ENCOUNTER FOR EXAMINATION FOR NORMAL COMPARISON OR CONTROL IN CLINICAL RESEARCH PROGRAM: ICD-10-CM

## 2024-08-10 DIAGNOSIS — I10 BENIGN ESSENTIAL HYPERTENSION: ICD-10-CM

## 2024-08-10 LAB
ANION GAP SERPL CALCULATED.3IONS-SCNC: 8 MMOL/L (ref 4–13)
BUN SERPL-MCNC: 12 MG/DL (ref 5–25)
CALCIUM SERPL-MCNC: 9.4 MG/DL (ref 8.4–10.2)
CHLORIDE SERPL-SCNC: 104 MMOL/L (ref 96–108)
CO2 SERPL-SCNC: 26 MMOL/L (ref 21–32)
CREAT SERPL-MCNC: 0.85 MG/DL (ref 0.6–1.3)
GFR SERPL CREATININE-BSD FRML MDRD: 103 ML/MIN/1.73SQ M
GLUCOSE P FAST SERPL-MCNC: 104 MG/DL (ref 65–99)
POTASSIUM SERPL-SCNC: 4.3 MMOL/L (ref 3.5–5.3)
SODIUM SERPL-SCNC: 138 MMOL/L (ref 135–147)

## 2024-08-10 PROCEDURE — 36415 COLL VENOUS BLD VENIPUNCTURE: CPT

## 2024-08-10 PROCEDURE — 80048 BASIC METABOLIC PNL TOTAL CA: CPT

## 2024-08-13 NOTE — ANESTHESIA PREPROCEDURE EVALUATION
Procedure:  SHOULDER ARTHROSCOPIC ROTATOR CUFF REPAIR (Left: Shoulder)    Relevant Problems   CARDIO   (+) Benign essential hypertension      GI/HEPATIC   (+) Esophageal reflux      MUSCULOSKELETAL   (+) Chronic midline low back pain without sciatica      NEURO/PSYCH   (+) Anxiety   (+) Chronic left shoulder pain   (+) Chronic midline low back pain without sciatica   (+) Chronic right shoulder pain   (+) Depression      Other   (+) Alcohol use disorder, severe, dependence (HCC)   Stopped 7 months     Physical Exam    Airway    Mallampati score: III  TM Distance: >3 FB  Neck ROM: full     Dental   Comment: Upper R Front Implant     Cardiovascular      Pulmonary      Other Findings        Anesthesia Plan  ASA Score- 3     Anesthesia Type- general with ASA Monitors.         Additional Monitors:     Airway Plan: LMA.    Comment: Interscalene Block planned..       Plan Factors-Exercise tolerance (METS): >4 METS.    Chart reviewed.   Existing labs reviewed. Patient summary reviewed.    Patient is not a current smoker.              Induction- intravenous.    Postoperative Plan-         Informed Consent- Anesthetic plan and risks discussed with patient and mother.  I personally reviewed this patient with the CRNA. Discussed and agreed on the Anesthesia Plan with the CRNA..    Discussed with Patient the procedure for ISB, side effects including extended numbness of the extremity and potential for an incomplete Block. All questions answered. Consent given.

## 2024-08-14 ENCOUNTER — ANESTHESIA (OUTPATIENT)
Dept: PERIOP | Facility: AMBULARY SURGERY CENTER | Age: 49
End: 2024-08-14
Payer: COMMERCIAL

## 2024-08-14 ENCOUNTER — HOSPITAL ENCOUNTER (OUTPATIENT)
Facility: AMBULARY SURGERY CENTER | Age: 49
Setting detail: OUTPATIENT SURGERY
Discharge: HOME | End: 2024-08-14
Attending: ORTHOPAEDIC SURGERY | Admitting: ORTHOPAEDIC SURGERY
Payer: COMMERCIAL

## 2024-08-14 VITALS
RESPIRATION RATE: 18 BRPM | TEMPERATURE: 98 F | SYSTOLIC BLOOD PRESSURE: 140 MMHG | DIASTOLIC BLOOD PRESSURE: 78 MMHG | OXYGEN SATURATION: 95 % | WEIGHT: 260 LBS | BODY MASS INDEX: 36.4 KG/M2 | HEART RATE: 83 BPM | HEIGHT: 71 IN

## 2024-08-14 DIAGNOSIS — M75.102 TEAR OF LEFT SUPRASPINATUS TENDON: Primary | ICD-10-CM

## 2024-08-14 DIAGNOSIS — E87.1 HYPONATREMIA: ICD-10-CM

## 2024-08-14 DIAGNOSIS — E78.1 ESSENTIAL HYPERTRIGLYCERIDEMIA: ICD-10-CM

## 2024-08-14 DIAGNOSIS — I10 BENIGN ESSENTIAL HYPERTENSION: ICD-10-CM

## 2024-08-14 PROCEDURE — C1713 ANCHOR/SCREW BN/BN,TIS/BN: HCPCS | Performed by: ORTHOPAEDIC SURGERY

## 2024-08-14 PROCEDURE — C9290 INJ, BUPIVACAINE LIPOSOME: HCPCS | Performed by: ANESTHESIOLOGY

## 2024-08-14 PROCEDURE — 29827 SHO ARTHRS SRG RT8TR CUF RPR: CPT | Performed by: ORTHOPAEDIC SURGERY

## 2024-08-14 PROCEDURE — 29826 SHO ARTHRS SRG DECOMPRESSION: CPT | Performed by: ORTHOPAEDIC SURGERY

## 2024-08-14 PROCEDURE — 29827 SHO ARTHRS SRG RT8TR CUF RPR: CPT | Performed by: PHYSICIAN ASSISTANT

## 2024-08-14 PROCEDURE — 29826 SHO ARTHRS SRG DECOMPRESSION: CPT | Performed by: PHYSICIAN ASSISTANT

## 2024-08-14 DEVICE — BIO-COMP SWVLK C, CLD 4.75X19.1MM
Type: IMPLANTABLE DEVICE | Site: SHOULDER | Status: FUNCTIONAL
Brand: ARTHREX®

## 2024-08-14 DEVICE — SP FIBERTAK RC, DBLOAD TAPE BL/W, BLK/W
Type: IMPLANTABLE DEVICE | Site: SHOULDER | Status: FUNCTIONAL
Brand: ARTHREX®

## 2024-08-14 RX ORDER — ONDANSETRON 2 MG/ML
4 INJECTION INTRAMUSCULAR; INTRAVENOUS ONCE AS NEEDED
Status: DISCONTINUED | OUTPATIENT
Start: 2024-08-14 | End: 2024-08-14 | Stop reason: HOSPADM

## 2024-08-14 RX ORDER — DEXAMETHASONE SODIUM PHOSPHATE 10 MG/ML
INJECTION, SOLUTION INTRAMUSCULAR; INTRAVENOUS AS NEEDED
Status: DISCONTINUED | OUTPATIENT
Start: 2024-08-14 | End: 2024-08-14

## 2024-08-14 RX ORDER — ONDANSETRON 2 MG/ML
INJECTION INTRAMUSCULAR; INTRAVENOUS AS NEEDED
Status: DISCONTINUED | OUTPATIENT
Start: 2024-08-14 | End: 2024-08-14

## 2024-08-14 RX ORDER — BUPIVACAINE HYDROCHLORIDE 5 MG/ML
INJECTION, SOLUTION EPIDURAL; INTRACAUDAL AS NEEDED
Status: DISCONTINUED | OUTPATIENT
Start: 2024-08-14 | End: 2024-08-14

## 2024-08-14 RX ORDER — SODIUM CHLORIDE, SODIUM LACTATE, POTASSIUM CHLORIDE, CALCIUM CHLORIDE 600; 310; 30; 20 MG/100ML; MG/100ML; MG/100ML; MG/100ML
125 INJECTION, SOLUTION INTRAVENOUS CONTINUOUS
Status: DISCONTINUED | OUTPATIENT
Start: 2024-08-14 | End: 2024-08-14 | Stop reason: HOSPADM

## 2024-08-14 RX ORDER — SODIUM CHLORIDE, SODIUM LACTATE, POTASSIUM CHLORIDE, CALCIUM CHLORIDE 600; 310; 30; 20 MG/100ML; MG/100ML; MG/100ML; MG/100ML
INJECTION, SOLUTION INTRAVENOUS CONTINUOUS PRN
Status: DISCONTINUED | OUTPATIENT
Start: 2024-08-14 | End: 2024-08-14

## 2024-08-14 RX ORDER — ACETAMINOPHEN 325 MG/1
650 TABLET ORAL EVERY 6 HOURS PRN
Status: DISCONTINUED | OUTPATIENT
Start: 2024-08-14 | End: 2024-08-14 | Stop reason: HOSPADM

## 2024-08-14 RX ORDER — OXYCODONE HYDROCHLORIDE 5 MG/1
5 TABLET ORAL EVERY 4 HOURS PRN
Status: DISCONTINUED | OUTPATIENT
Start: 2024-08-14 | End: 2024-08-14 | Stop reason: HOSPADM

## 2024-08-14 RX ORDER — MIDAZOLAM HYDROCHLORIDE 2 MG/2ML
INJECTION, SOLUTION INTRAMUSCULAR; INTRAVENOUS AS NEEDED
Status: DISCONTINUED | OUTPATIENT
Start: 2024-08-14 | End: 2024-08-14

## 2024-08-14 RX ORDER — OXYCODONE HYDROCHLORIDE 5 MG/1
TABLET ORAL
Qty: 13 TABLET | Refills: 0 | Status: SHIPPED | OUTPATIENT
Start: 2024-08-14

## 2024-08-14 RX ORDER — LIDOCAINE HYDROCHLORIDE 10 MG/ML
0.5 INJECTION, SOLUTION EPIDURAL; INFILTRATION; INTRACAUDAL; PERINEURAL ONCE AS NEEDED
Status: DISCONTINUED | OUTPATIENT
Start: 2024-08-14 | End: 2024-08-14 | Stop reason: HOSPADM

## 2024-08-14 RX ORDER — FENTANYL CITRATE 50 UG/ML
INJECTION, SOLUTION INTRAMUSCULAR; INTRAVENOUS AS NEEDED
Status: DISCONTINUED | OUTPATIENT
Start: 2024-08-14 | End: 2024-08-14

## 2024-08-14 RX ORDER — PROPOFOL 10 MG/ML
INJECTION, EMULSION INTRAVENOUS AS NEEDED
Status: DISCONTINUED | OUTPATIENT
Start: 2024-08-14 | End: 2024-08-14

## 2024-08-14 RX ORDER — FENTANYL CITRATE/PF 50 MCG/ML
25 SYRINGE (ML) INJECTION
Status: DISCONTINUED | OUTPATIENT
Start: 2024-08-14 | End: 2024-08-14 | Stop reason: HOSPADM

## 2024-08-14 RX ORDER — ONDANSETRON 2 MG/ML
4 INJECTION INTRAMUSCULAR; INTRAVENOUS EVERY 6 HOURS PRN
Status: DISCONTINUED | OUTPATIENT
Start: 2024-08-14 | End: 2024-08-14 | Stop reason: HOSPADM

## 2024-08-14 RX ORDER — SODIUM CHLORIDE, SODIUM LACTATE, POTASSIUM CHLORIDE, CALCIUM CHLORIDE 600; 310; 30; 20 MG/100ML; MG/100ML; MG/100ML; MG/100ML
100 INJECTION, SOLUTION INTRAVENOUS CONTINUOUS
Status: DISCONTINUED | OUTPATIENT
Start: 2024-08-14 | End: 2024-08-14 | Stop reason: HOSPADM

## 2024-08-14 RX ORDER — LIDOCAINE HYDROCHLORIDE 20 MG/ML
INJECTION, SOLUTION EPIDURAL; INFILTRATION; INTRACAUDAL; PERINEURAL AS NEEDED
Status: DISCONTINUED | OUTPATIENT
Start: 2024-08-14 | End: 2024-08-14

## 2024-08-14 RX ORDER — CEFAZOLIN SODIUM 2 G/50ML
2000 SOLUTION INTRAVENOUS ONCE
Status: COMPLETED | OUTPATIENT
Start: 2024-08-14 | End: 2024-08-14

## 2024-08-14 RX ADMIN — ONDANSETRON 4 MG: 2 INJECTION INTRAMUSCULAR; INTRAVENOUS at 10:22

## 2024-08-14 RX ADMIN — BUPIVACAINE HYDROCHLORIDE 7 ML: 5 INJECTION, SOLUTION EPIDURAL; INTRACAUDAL at 09:26

## 2024-08-14 RX ADMIN — PROPOFOL 250 MG: 10 INJECTION, EMULSION INTRAVENOUS at 10:22

## 2024-08-14 RX ADMIN — DEXAMETHASONE SODIUM PHOSPHATE 10 MG: 10 INJECTION INTRAMUSCULAR; INTRAVENOUS at 10:22

## 2024-08-14 RX ADMIN — MIDAZOLAM 1 MG: 1 INJECTION INTRAMUSCULAR; INTRAVENOUS at 09:20

## 2024-08-14 RX ADMIN — SODIUM CHLORIDE, SODIUM LACTATE, POTASSIUM CHLORIDE, AND CALCIUM CHLORIDE: .6; .31; .03; .02 INJECTION, SOLUTION INTRAVENOUS at 09:20

## 2024-08-14 RX ADMIN — CEFAZOLIN SODIUM 2000 MG: 2 SOLUTION INTRAVENOUS at 10:24

## 2024-08-14 RX ADMIN — FENTANYL CITRATE 50 MCG: 50 INJECTION INTRAMUSCULAR; INTRAVENOUS at 09:20

## 2024-08-14 RX ADMIN — LIDOCAINE HYDROCHLORIDE 100 MG: 20 INJECTION, SOLUTION EPIDURAL; INFILTRATION; INTRACAUDAL at 10:22

## 2024-08-14 RX ADMIN — MIDAZOLAM 1 MG: 1 INJECTION INTRAMUSCULAR; INTRAVENOUS at 10:17

## 2024-08-14 RX ADMIN — BUPIVACAINE 20 ML: 13.3 INJECTION, SUSPENSION, LIPOSOMAL INFILTRATION at 09:26

## 2024-08-14 RX ADMIN — FENTANYL CITRATE 50 MCG: 50 INJECTION INTRAMUSCULAR; INTRAVENOUS at 10:17

## 2024-08-14 NOTE — ANESTHESIA POSTPROCEDURE EVALUATION
Post-Op Assessment Note    CV Status:  Stable    Pain management: adequate       Mental Status:  Alert and awake   Hydration Status:  Euvolemic   PONV Controlled:  Controlled   Airway Patency:  Patent     Post Op Vitals Reviewed: Yes    No anethesia notable event occurred.    Staff: CRNA               /78 (08/14/24 1128)    Temp (!) 97.4 °F (36.3 °C) (08/14/24 1128)    Pulse 104 (08/14/24 1128)   Resp 15 (08/14/24 1128)    SpO2   97

## 2024-08-14 NOTE — OP NOTE
OPERATIVE REPORT  PATIENT NAME: Ke Loya    :  1975  MRN: [id number]  Pt Location: [facility] OR ROOM 06    SURGERY DATE: 2024     SURGEON: Braxton Mi MD     ASSISTANT: Cecilia Weaver PA-C     NOTE: Cecilia Weaver PA-C was present throughout the entire procedure and performed essential assistance with patient prepping, draping, positioning, suture management, wound closure, sterile dressing application and sling application, all under my direct supervision.     NOTE: No qualified resident physician was available for assistance    PREOPERATIVE DIAGNOSIS:  Left Shoulder Recurrent Supraspinatus Tear    POSTOPERATIVE DIAGNOSIS: Same    PROCEDURES: Surgical Arthroscopy Left Shoulder with Revision Rotator Cuff Repair and Subacromial Decompression    ANESTHESIA STAFF:  Yang Davies MD      ANESTHESIA TYPE: General LMA with ultrasound guided interscalene block (Exparel). The interscalene block was provided by the anesthesia staff per my request for postoperative pain control and to decrease the use of postoperative narcotic medication for pain control.    COMPLICATIONS: None    FINDINGS: Supraspinatus Tear    SPECIMEN(S):  None    ESTIMATED BLOOD LOSS: Minimal    INDICATION:  Briefly, the patient is a 49 y.o.  male with left shoulder pain following an arthroscopic rotator cuff repair.  Postoperative MRI arthrogram scan confirmed a recurrent full thickness supraspinatus tear. The patient elected for revision arthroscopic treatment. Informed consent was obtained after a thorough discussion of the risks and benefits of the procedure, as well as alternatives to the procedure.     OPERATIVE TECHNIQUE:  On the day of surgery, I identified the patient’s left shoulder and marked it with my initials. The patient was taken to the operating room where anesthesia was induced and 2 grams of IV Cefazolin were given. The patient was examined in the supine position and was found to have full range of motion of the  left shoulder with no instability . The patient was then positioned in the semilateral [facility] chair position. All bony prominences were padded. The shoulder was prepped and draped in normal sterile fashion. After a time-out for safety, a standard posterolateral arthroscopic portal was made. Glenohumeral evaluation revealed intact glenohumeral articular cartilage with no loose bodies. There was a recurrent full-thickness supraspinatus tear encompassing the posterior two thirds of the tendon insertion.  The previous repair was evident with multiple sutures loose in the area of recurrent tear, the mode of failure appeared to be pull-through of the tendon from the sutures.  The infraspinatus and subscapularis were intact and the long head biceps was seen exiting normally, this was anterior to the recurrent supraspinatus tendon tear and did not demonstrate instability.   After the intra-articular evaluation was completed, the scope was then placed in the subacromial space through the same portal where a thorough bursectomy was performed. The sutures were removed through a lateral cannula, 3 separate sutures were easily removed and were consistent with the prior repair. The full thickness supraspinatus tear was found to measure 1.4 cm from anterior to posterior and was able to be easily reduced to the tuberosity.  There was however significant degeneration of the bursal side of the posterior aspect of the tear and a large flap of the bursal anterior supraspinatus all of which were able to be incorporated in the repair.  The tuberosity was prepared in routine fashion and a double row suture bridge configuration repair with one medial 2.6 mm double loaded Arthrex All-Suture anchor and one lateral 4.75 mm Arthrex BioComposite SwiveLock anchor using four stiches through the tendon in horizontal mattress fashion was performed achieving anatomic reduction of the rotator cuff tendon to the tuberosity.  The lateral row  anchor was placed slightly more superior than usual given the difficulty of accessing the low lateral proximal humerus but the anchor was in good position with excellent purchase in the bone.The long head of biceps was not indicated for tenodesis given intra-operative appearance. The CA ligament was frayed so it was released off the anterolateral edge of acromion and a gentle acromioplasty was performed. The area was then irrigated. Scope was withdrawn. Wounds were closed with 4-0 Monocryl and Histoacryl. Sterile dressings and a sling with an abduction pillow was placed. The patient was awoken without complication and returned to the recovery room in good condition. We will see the patient back in the office next week to initiate therapy following standard rotator cuff repair rehabilitation protocol. At the end of procedure, the counts were correct.     NB: The patient is interested in scheduling his right rotator cuff repair so at his first postoperative visit we can look at a date to schedule the right shoulder anticipating a normal recovery we can undergo that procedure 3 months from this procedure.  Of course if he develops issues in the recovery that would have to be rescheduled.    PATIENT DISPOSITION:  Stable to PACU      SIGNATURE: Braxton Mi MD  DATE: August 14, 2024  TIME: 11:24 AM

## 2024-08-14 NOTE — INTERVAL H&P NOTE
H&P reviewed. After examining the patient I find no changes in the patients condition since the H&P had been written.    Vitals:    08/14/24 0838   BP: 126/85   Pulse: 70   Resp: 18   Temp: (!) 97.2 °F (36.2 °C)   SpO2: 96%

## 2024-08-14 NOTE — ANESTHESIA PROCEDURE NOTES
Peripheral Block    Patient location during procedure: holding area  Start time: 8/14/2024 9:20 AM  Reason for block: at surgeon's request and post-op pain management  Staffing  Performed by: Yang Davies MD  Authorized by: Yang Davies MD    Preanesthetic Checklist  Completed: patient identified, IV checked, site marked, risks and benefits discussed, surgical consent, monitors and equipment checked, pre-op evaluation and timeout performed  Peripheral Block  Patient position: sitting  Prep: ChloraPrep  Patient monitoring: continuous pulse oximetry and heart rate  Block type: Interscalene  Laterality: left  Injection technique: single-shot  Procedures: ultrasound guided, Ultrasound guidance required for the procedure to increase accuracy and safety of medication placement and decrease risk of complications.  Ultrasound permanent image saved    Needle  Needle type: Stimuplex   Needle gauge: 20 G  Needle length: 4 in  Needle localization: ultrasound guidance  Needle insertion depth: 2 cm  Assessment  Injection assessment: frequent aspiration, injected with ease, negative aspiration, no paresthesia on injection, no symptoms of intraneural/intravenous injection, negative for heart rate change, needle tip visualized at all times and incremental injection  Paresthesia pain: none  Post-procedure:  site cleaned  patient tolerated the procedure well with no immediate complications

## 2024-08-16 ENCOUNTER — TELEPHONE (OUTPATIENT)
Age: 49
End: 2024-08-16

## 2024-08-16 NOTE — TELEPHONE ENCOUNTER
Caller: Yoandy from [facility]    Doctor/Office: Sadie    CB#: [phone]      What needs to be faxed: Yoandy asking if the referral for Physical Therapy for the left RTC Repair to be faxed to number below.    ATTN to: Yoandy    Fax#: [phone]

## 2024-08-20 ENCOUNTER — TELEPHONE (OUTPATIENT)
Age: 49
End: 2024-08-20

## 2024-08-20 NOTE — TELEPHONE ENCOUNTER
Rhonda calling back from [facility] - they received the PT script but need the PO protocol as well. Please fax to [phone]. Thank you.

## 2024-08-20 NOTE — TELEPHONE ENCOUNTER
Caller: Jae    Doctor: Shmuel    Reason for call: patient would like to return to work tomorrow. 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 [facility]  Please advise patient and if allowed to return if the note could be put in [facility]  Thank you  Call back#: [phone]

## 2024-08-21 ENCOUNTER — TELEPHONE (OUTPATIENT)
Age: 49
End: 2024-08-21

## 2024-08-21 NOTE — TELEPHONE ENCOUNTER
Caller: Jae     Doctor: Shmuel     Reason for call: [facility] and he would like to know if you can place the PT Protocol into his my chart so he can print them and take them ti PT with him    Call back#: [phone]

## 2024-08-22 ENCOUNTER — OFFICE VISIT (OUTPATIENT)
Dept: OBGYN CLINIC | Facility: OTHER | Age: 49
End: 2024-08-22
Payer: COMMERCIAL

## 2024-08-22 VITALS
HEART RATE: 106 BPM | DIASTOLIC BLOOD PRESSURE: 83 MMHG | WEIGHT: 260.14 LBS | SYSTOLIC BLOOD PRESSURE: 115 MMHG | BODY MASS INDEX: 36.42 KG/M2 | HEIGHT: 71 IN

## 2024-08-22 DIAGNOSIS — M75.101 TEAR OF RIGHT SUPRASPINATUS TENDON: Primary | ICD-10-CM

## 2024-08-22 DIAGNOSIS — Z98.890 S/P LEFT ROTATOR CUFF REPAIR: ICD-10-CM

## 2024-08-22 LAB
APOB+LDLR+PCSK9 GENE MUT ANL BLD/T: NOT DETECTED
BRCA1+BRCA2 DEL+DUP + FULL MUT ANL BLD/T: NOT DETECTED
MLH1+MSH2+MSH6+PMS2 GN DEL+DUP+FUL M: NOT DETECTED

## 2024-08-22 PROCEDURE — 99213 OFFICE O/P EST LOW 20 MIN: CPT | Performed by: PHYSICIAN ASSISTANT

## 2024-08-22 PROCEDURE — 99024 POSTOP FOLLOW-UP VISIT: CPT | Performed by: PHYSICIAN ASSISTANT

## 2024-08-22 RX ORDER — SODIUM CHLORIDE, SODIUM LACTATE, POTASSIUM CHLORIDE, CALCIUM CHLORIDE 600; 310; 30; 20 MG/100ML; MG/100ML; MG/100ML; MG/100ML
125 INJECTION, SOLUTION INTRAVENOUS CONTINUOUS
OUTPATIENT
Start: 2024-08-22

## 2024-08-22 NOTE — PROGRESS NOTES
"Surgery: SALS w/revision RCR and SAD on 8/14/24    S: Patient is doing well.  Denies acute post-op events. Started PT at an outside facility      /83   Pulse (!) 106   Ht 5' 11\" (1.803 m)   Wt 118 kg (260 lb 2.3 oz)   BMI 36.28 kg/m²     O: LEFT shoulder  Shoulder in sling  Arthroscopic portals without erythema or drainage  Mild ecchymosis around portals  Good elbow, wrist and hand range of motion  Skin - warm and dry  SI  NVI    A/P: 8 days following arthroscopic LEFT shoulder revision rotator cuff repair  NWB LUE in sling x 6 weeks (9/25/24)  Intra-operative pictures were reviewed in detail  Formal physical therapy - following standard RCR rehab protocol  HEP - per therapy.  For now, may start elbow/wrist/hand range of motion.  Pendulums to tolerance  Pain control - Tylenol 1000 mg every 8 hours  Ice as needed - 20 minutes on and 20 minutes off  Follow up in 8 weeks       In addition, Jae has right shoulder pain.  He has had a full work up for the right shoulder as well as imaging of the right shoulder.  MRI confirms rotator cuff repair as well as a posterior labral tear.  He wants to go ahead and secure a surgical date for his right shoulder 3-4 months from now.    Right shoulder pain occurs with overhead work.  Pain improves with rest.  He has been compliant with a HEP.   Right shoulder  -FF: 160  -Abd: 90  -ER: 40  ER resistance: 4/5  Abd resistance: 4/5  +Kimball  +impingement  +Drop arm  +Empty Can    A/P: right shoulder rotator cuff tear  Schedule right shoulder RCR with Dr Mi for later this year (>3 months from now)  Patient will use current sling for post-op  We will reorder PT closer to surgery.   Recheck shoulder as we get closer to surgery (do at next left shoulder post-op visit)  "

## 2024-09-23 ENCOUNTER — OFFICE VISIT (OUTPATIENT)
Dept: OBGYN CLINIC | Facility: OTHER | Age: 49
End: 2024-09-23

## 2024-09-23 VITALS
DIASTOLIC BLOOD PRESSURE: 91 MMHG | WEIGHT: 271 LBS | HEART RATE: 92 BPM | HEIGHT: 71 IN | SYSTOLIC BLOOD PRESSURE: 142 MMHG | BODY MASS INDEX: 37.94 KG/M2

## 2024-09-23 DIAGNOSIS — Z98.890 S/P LEFT ROTATOR CUFF REPAIR: Primary | ICD-10-CM

## 2024-09-23 DIAGNOSIS — M75.102 TEAR OF LEFT SUPRASPINATUS TENDON: ICD-10-CM

## 2024-09-23 PROCEDURE — 99024 POSTOP FOLLOW-UP VISIT: CPT | Performed by: ORTHOPAEDIC SURGERY

## 2024-09-23 NOTE — PROGRESS NOTES
Assessment:  1. S/P left rotator cuff repair        2. Tear of left supraspinatus tendon              Surgery: Shoulder Arthroscopic Rotator Cuff Repair Revision, Subacromial Decompression - Left  Date of Surgery: 8/14/2024        Discussion and Plan:   Discussed with the patient that we may never be able to understand why he had such a significant increase in his pain but it does not appear he is structurally reinjured the shoulder given his limited exam today and I am encouraged by his improved symptoms but I do not feel he requires further intervention at this time.  Could be simple as he may have slept wrong causing his increased pain or the stress from [place] even while wearing the sling was just too much for the shoulder at that point in the recovery process.  Since pain is improving I recommend continuing PT per protocol.   Patient may discontinue sling at this point.   Follow-up as scheduled at his 2-month postop visit    Follow Up:  Return for as scheduled.        CHIEF COMPLAINT:  Chief Complaint   Patient presents with    Left Shoulder - Post-op         SUBJECTIVE:  Ke Loya is a 49 y.o. year old male who presents for follow up 6 weeks after Shoulder Arthroscopic Rotator Cuff Repair Revision, Subacromial Decompression - Left. Patient is attending PT at an outside facility. Patient report [place].  He had his sling on the entire time and denies any injury.  Patient reports he went to sleep that night and woke up with increased pain the following day.  He went to PT a few days later and PT was unable to perform PROM so the therapist recommend a follow up.       PHYSICAL EXAMINATION:  General: well developed and well nourished, alert, oriented times 3, and appears comfortable  Psychiatric: Normal    MUSCULOSKELETAL EXAMINATION:  Incision: Clean, dry, intact and healed  Surgery Site: normal, no evidence of infection   Range of Motion: As expected  Neurovascular  status: Neuro intact, good cap refill  Activity Restrictions:  sling         Scribe Attestation      I,:  Yana Vásquez MA am acting as a scribe while in the presence of the attending physician.:       I,:  Braxton Mi MD personally performed the services described in this documentation    as scribed in my presence.:

## 2024-10-21 ENCOUNTER — OFFICE VISIT (OUTPATIENT)
Dept: OBGYN CLINIC | Facility: OTHER | Age: 49
End: 2024-10-21

## 2024-10-21 VITALS
BODY MASS INDEX: 37.94 KG/M2 | WEIGHT: 271 LBS | DIASTOLIC BLOOD PRESSURE: 86 MMHG | HEIGHT: 71 IN | SYSTOLIC BLOOD PRESSURE: 125 MMHG | HEART RATE: 84 BPM

## 2024-10-21 DIAGNOSIS — G89.29 CHRONIC LEFT SHOULDER PAIN: ICD-10-CM

## 2024-10-21 DIAGNOSIS — M75.102 TEAR OF LEFT SUPRASPINATUS TENDON: ICD-10-CM

## 2024-10-21 DIAGNOSIS — Z98.890 S/P LEFT ROTATOR CUFF REPAIR: Primary | ICD-10-CM

## 2024-10-21 DIAGNOSIS — M25.512 CHRONIC LEFT SHOULDER PAIN: ICD-10-CM

## 2024-10-21 PROCEDURE — 99024 POSTOP FOLLOW-UP VISIT: CPT | Performed by: ORTHOPAEDIC SURGERY

## 2024-10-21 NOTE — PROGRESS NOTES
Assessment:  1. S/P left rotator cuff repair        2. Tear of left supraspinatus tendon        3. Chronic left shoulder pain            Surgery: Shoulder Arthroscopic Rotator Cuff Repair Revision, Subacromial Decompression - Left  Date of Surgery: 8/14/2024      Discussion and Plan:   Discussed that we can order MRI of the shoulder to further evaluate for cause of increased pain, limitation with ROM. However, he is only 10 weeks out from his surgery and patient is agreeable to continued PT to determine if he can achieve any more improvement before proceeding with MRI evaluation. Discussed that after surgery and revision, an MRI may not be clear as to cause of his continued pain.   Continue with PT plan of care until discharged  Follow-up in two months for re-evaluation  He does have right rotator cuff revision scheduled for 12/6/2024, however if he does not experience significant improvement in his left shoulder by then, he will call the office to reschedule the right shoulder surgery.     Follow Up:  Return in about 8 weeks (around 12/16/2024) for re-evaluation.        CHIEF COMPLAINT:  Chief Complaint   Patient presents with    Left Shoulder - Post-op, Pain         SUBJECTIVE:  Ke Loya is a 49 y.o. year old male who presents for follow up approximately 10 weeks after Shoulder Arthroscopic Rotator Cuff Repair Revision, Subacromial Decompression - Left performed 8/14/2024. Patient cannot recall any injury but believes he is not improving appropriately.  He notes significant pain with reaching, lifting, motion. He is completing physical therapy where he notes increased pain, soreness after sessions, difficulty sleeping at night after PT.     PHYSICAL EXAMINATION:  General: well developed and well nourished, alert, oriented times 3, and appears comfortable  Psychiatric: Normal    MUSCULOSKELETAL EXAMINATION:  Incision: Clean, dry, intact and healed  Surgery Site: normal, no evidence of infection   Range of  Motion: Limited due to pain and Limited due to stiffness  Neurovascular status: Neuro intact, good cap refill  Activity Restrictions:  none         Scribe Attestation      I,:  Francisca Pacheco am acting as a scribe while in the presence of the attending physician.:       I,:  Braxton Mi MD personally performed the services described in this documentation    as scribed in my presence.:

## 2024-11-21 ENCOUNTER — ANESTHESIA EVENT (OUTPATIENT)
Dept: PERIOP | Facility: AMBULARY SURGERY CENTER | Age: 49
End: 2024-11-21
Payer: COMMERCIAL

## 2024-11-26 RX ORDER — BUPROPION HYDROCHLORIDE 300 MG/1
300 TABLET ORAL EVERY MORNING
COMMUNITY
Start: 2024-11-18

## 2024-11-26 NOTE — PRE-PROCEDURE INSTRUCTIONS
Pre-Surgery Instructions:   Medication Instructions    buPROPion (WELLBUTRIN XL) 300 mg 24 hr tablet Take day of surgery.    irbesartan (AVAPRO) 150 mg tablet Hold day of surgery.    omeprazole (PriLOSEC) 20 mg delayed release capsule Take day of surgery.    rosuvastatin (CRESTOR) 5 mg tablet Take day of surgery.    Vitamin D, Ergocalciferol, 95248 units CAPS Stop taking 7 days prior to surgery.    Medication instructions for day surgery reviewed. Please use only a sip of water to take your instructed medications. Avoid all over the counter vitamins, supplements and NSAIDS for one week prior to surgery per anesthesia guidelines. Tylenol is ok to take as needed.     You will receive a call one business day prior to surgery with an arrival time and hospital directions. If your surgery is scheduled on a Monday, the hospital will be calling you on the Friday prior to your surgery. If you have not heard from anyone by 8pm, please call the hospital supervisor through the hospital  at [phone]. ([place] [phone] or [place] [phone]).    Do not eat or drink anything after midnight the night before your surgery, including candy, mints, lifesavers, or chewing gum. Do not drink alcohol 24hrs before your surgery. Try not to smoke at least 24hrs before your surgery.       Follow the pre surgery showering instructions as listed in the “My Surgical Experience Booklet” or otherwise provided by your surgeon's office. Do not use a blade to shave the surgical area 1 week before surgery. It is okay to use a clean electric clippers up to 24 hours before surgery. Do not apply any lotions, creams, including makeup, cologne, deodorant, or perfumes after showering on the day of your surgery. Do not use dry shampoo, hair spray, hair gel, or any type of hair products.     No contact lenses, eye make-up, or artificial eyelashes. Remove nail polish, including gel polish, and any artificial, gel, or acrylic nails if  possible. Remove all jewelry including rings and body piercing jewelry.     Wear causal clothing that is easy to take on and off. Consider your type of surgery.    Keep any valuables, jewelry, piercings at home. Please bring any specially ordered equipment (sling, braces) if indicated.    Arrange for a responsible person to drive you to and from the hospital on the day of your surgery. Please confirm the visitor policy for the day of your procedure when you receive your phone call with an arrival time.     Call the surgeon's office with any new illnesses, exposures, or additional questions prior to surgery.    Please reference your “My Surgical Experience Booklet” for additional information to prepare for your upcoming surgery.

## 2024-12-06 ENCOUNTER — ANESTHESIA (OUTPATIENT)
Dept: PERIOP | Facility: AMBULARY SURGERY CENTER | Age: 49
End: 2024-12-06
Payer: COMMERCIAL

## 2024-12-06 ENCOUNTER — HOSPITAL ENCOUNTER (OUTPATIENT)
Facility: AMBULARY SURGERY CENTER | Age: 49
Setting detail: OUTPATIENT SURGERY
Discharge: HOME | End: 2024-12-06
Attending: ORTHOPAEDIC SURGERY | Admitting: ORTHOPAEDIC SURGERY
Payer: COMMERCIAL

## 2024-12-06 VITALS
SYSTOLIC BLOOD PRESSURE: 125 MMHG | DIASTOLIC BLOOD PRESSURE: 79 MMHG | WEIGHT: 272.8 LBS | HEART RATE: 74 BPM | BODY MASS INDEX: 39.05 KG/M2 | OXYGEN SATURATION: 97 % | TEMPERATURE: 97 F | RESPIRATION RATE: 18 BRPM | HEIGHT: 70 IN

## 2024-12-06 DIAGNOSIS — M75.101 TEAR OF RIGHT SUPRASPINATUS TENDON: Primary | ICD-10-CM

## 2024-12-06 PROCEDURE — 29827 SHO ARTHRS SRG RT8TR CUF RPR: CPT | Performed by: ORTHOPAEDIC SURGERY

## 2024-12-06 PROCEDURE — 29827 SHO ARTHRS SRG RT8TR CUF RPR: CPT | Performed by: PHYSICIAN ASSISTANT

## 2024-12-06 PROCEDURE — NC001 PR NO CHARGE: Performed by: ORTHOPAEDIC SURGERY

## 2024-12-06 PROCEDURE — 29823 SHO ARTHRS SRG XTNSV DBRDMT: CPT | Performed by: ORTHOPAEDIC SURGERY

## 2024-12-06 PROCEDURE — C9290 INJ, BUPIVACAINE LIPOSOME: HCPCS | Performed by: ANESTHESIOLOGY

## 2024-12-06 PROCEDURE — 29823 SHO ARTHRS SRG XTNSV DBRDMT: CPT | Performed by: PHYSICIAN ASSISTANT

## 2024-12-06 PROCEDURE — C1713 ANCHOR/SCREW BN/BN,TIS/BN: HCPCS | Performed by: ORTHOPAEDIC SURGERY

## 2024-12-06 DEVICE — SELF-PUNCHING TRIPLE LOADED FIBERTAK
Type: IMPLANTABLE DEVICE | Site: SHOULDER | Status: FUNCTIONAL
Brand: ARTHREX®

## 2024-12-06 RX ORDER — CEFAZOLIN SODIUM 2 G/50ML
2000 SOLUTION INTRAVENOUS ONCE
Status: DISCONTINUED | OUTPATIENT
Start: 2024-12-06 | End: 2024-12-06

## 2024-12-06 RX ORDER — PROPOFOL 10 MG/ML
INJECTION, EMULSION INTRAVENOUS AS NEEDED
Status: DISCONTINUED | OUTPATIENT
Start: 2024-12-06 | End: 2024-12-06

## 2024-12-06 RX ORDER — OXYCODONE HYDROCHLORIDE 5 MG/1
5 TABLET ORAL EVERY 6 HOURS PRN
Qty: 13 TABLET | Refills: 0 | Status: SHIPPED | OUTPATIENT
Start: 2024-12-06

## 2024-12-06 RX ORDER — ONDANSETRON 2 MG/ML
INJECTION INTRAMUSCULAR; INTRAVENOUS AS NEEDED
Status: DISCONTINUED | OUTPATIENT
Start: 2024-12-06 | End: 2024-12-06

## 2024-12-06 RX ORDER — CEFAZOLIN SODIUM 1 G/50ML
1000 SOLUTION INTRAVENOUS ONCE
Status: DISCONTINUED | OUTPATIENT
Start: 2024-12-06 | End: 2024-12-06 | Stop reason: HOSPADM

## 2024-12-06 RX ORDER — CHLORHEXIDINE GLUCONATE ORAL RINSE 1.2 MG/ML
15 SOLUTION DENTAL EVERY 12 HOURS SCHEDULED
Status: COMPLETED | OUTPATIENT
Start: 2024-12-06 | End: 2024-12-06

## 2024-12-06 RX ORDER — CEFAZOLIN SODIUM 2 G/50ML
2000 SOLUTION INTRAVENOUS ONCE
Status: DISCONTINUED | OUTPATIENT
Start: 2024-12-06 | End: 2024-12-06 | Stop reason: HOSPADM

## 2024-12-06 RX ORDER — SODIUM CHLORIDE, SODIUM LACTATE, POTASSIUM CHLORIDE, CALCIUM CHLORIDE 600; 310; 30; 20 MG/100ML; MG/100ML; MG/100ML; MG/100ML
125 INJECTION, SOLUTION INTRAVENOUS CONTINUOUS
Status: DISCONTINUED | OUTPATIENT
Start: 2024-12-06 | End: 2024-12-06 | Stop reason: HOSPADM

## 2024-12-06 RX ORDER — FENTANYL CITRATE 50 UG/ML
INJECTION, SOLUTION INTRAMUSCULAR; INTRAVENOUS AS NEEDED
Status: DISCONTINUED | OUTPATIENT
Start: 2024-12-06 | End: 2024-12-06

## 2024-12-06 RX ORDER — ALBUTEROL SULFATE 0.83 MG/ML
2.5 SOLUTION RESPIRATORY (INHALATION) ONCE AS NEEDED
Status: DISCONTINUED | OUTPATIENT
Start: 2024-12-06 | End: 2024-12-06 | Stop reason: HOSPADM

## 2024-12-06 RX ORDER — FENTANYL CITRATE/PF 50 MCG/ML
50 SYRINGE (ML) INJECTION
Status: DISCONTINUED | OUTPATIENT
Start: 2024-12-06 | End: 2024-12-06 | Stop reason: HOSPADM

## 2024-12-06 RX ORDER — MIDAZOLAM HYDROCHLORIDE 2 MG/2ML
INJECTION, SOLUTION INTRAMUSCULAR; INTRAVENOUS AS NEEDED
Status: DISCONTINUED | OUTPATIENT
Start: 2024-12-06 | End: 2024-12-06

## 2024-12-06 RX ORDER — LIDOCAINE HYDROCHLORIDE 10 MG/ML
INJECTION, SOLUTION EPIDURAL; INFILTRATION; INTRACAUDAL; PERINEURAL AS NEEDED
Status: DISCONTINUED | OUTPATIENT
Start: 2024-12-06 | End: 2024-12-06

## 2024-12-06 RX ORDER — ONDANSETRON 2 MG/ML
4 INJECTION INTRAMUSCULAR; INTRAVENOUS EVERY 6 HOURS PRN
Status: DISCONTINUED | OUTPATIENT
Start: 2024-12-06 | End: 2024-12-06 | Stop reason: HOSPADM

## 2024-12-06 RX ORDER — DIPHENHYDRAMINE HYDROCHLORIDE 50 MG/ML
12.5 INJECTION INTRAMUSCULAR; INTRAVENOUS ONCE AS NEEDED
Status: DISCONTINUED | OUTPATIENT
Start: 2024-12-06 | End: 2024-12-06 | Stop reason: HOSPADM

## 2024-12-06 RX ORDER — ACETAMINOPHEN 325 MG/1
650 TABLET ORAL EVERY 6 HOURS PRN
Status: DISCONTINUED | OUTPATIENT
Start: 2024-12-06 | End: 2024-12-06 | Stop reason: HOSPADM

## 2024-12-06 RX ORDER — HYDROMORPHONE HCL/PF 1 MG/ML
0.5 SYRINGE (ML) INJECTION
Status: DISCONTINUED | OUTPATIENT
Start: 2024-12-06 | End: 2024-12-06 | Stop reason: HOSPADM

## 2024-12-06 RX ORDER — OXYCODONE HYDROCHLORIDE 5 MG/1
5 TABLET ORAL EVERY 4 HOURS PRN
Refills: 0 | Status: DISCONTINUED | OUTPATIENT
Start: 2024-12-06 | End: 2024-12-06 | Stop reason: HOSPADM

## 2024-12-06 RX ORDER — KETOROLAC TROMETHAMINE 30 MG/ML
INJECTION, SOLUTION INTRAMUSCULAR; INTRAVENOUS AS NEEDED
Status: DISCONTINUED | OUTPATIENT
Start: 2024-12-06 | End: 2024-12-06

## 2024-12-06 RX ORDER — DEXAMETHASONE SODIUM PHOSPHATE 10 MG/ML
INJECTION, SOLUTION INTRAMUSCULAR; INTRAVENOUS AS NEEDED
Status: DISCONTINUED | OUTPATIENT
Start: 2024-12-06 | End: 2024-12-06

## 2024-12-06 RX ORDER — BUPIVACAINE HYDROCHLORIDE 5 MG/ML
INJECTION, SOLUTION EPIDURAL; INTRACAUDAL
Status: COMPLETED | OUTPATIENT
Start: 2024-12-06 | End: 2024-12-06

## 2024-12-06 RX ORDER — SODIUM CHLORIDE, SODIUM LACTATE, POTASSIUM CHLORIDE, CALCIUM CHLORIDE 600; 310; 30; 20 MG/100ML; MG/100ML; MG/100ML; MG/100ML
75 INJECTION, SOLUTION INTRAVENOUS CONTINUOUS
Status: DISCONTINUED | OUTPATIENT
Start: 2024-12-06 | End: 2024-12-06

## 2024-12-06 RX ORDER — PROMETHAZINE HYDROCHLORIDE 25 MG/ML
12.5 INJECTION, SOLUTION INTRAMUSCULAR; INTRAVENOUS ONCE AS NEEDED
Status: DISCONTINUED | OUTPATIENT
Start: 2024-12-06 | End: 2024-12-06 | Stop reason: HOSPADM

## 2024-12-06 RX ADMIN — MIDAZOLAM 2 MG: 1 INJECTION INTRAMUSCULAR; INTRAVENOUS at 07:24

## 2024-12-06 RX ADMIN — DEXAMETHASONE SODIUM PHOSPHATE 10 MG: 10 INJECTION INTRAMUSCULAR; INTRAVENOUS at 08:09

## 2024-12-06 RX ADMIN — CHLORHEXIDINE GLUCONATE 15 ML: 1.2 RINSE ORAL at 07:02

## 2024-12-06 RX ADMIN — PROPOFOL 200 MG: 10 INJECTION, EMULSION INTRAVENOUS at 08:09

## 2024-12-06 RX ADMIN — BUPIVACAINE 20 ML: 13.3 INJECTION, SUSPENSION, LIPOSOMAL INFILTRATION at 07:30

## 2024-12-06 RX ADMIN — KETOROLAC TROMETHAMINE 15 MG: 30 INJECTION, SOLUTION INTRAMUSCULAR; INTRAVENOUS at 09:09

## 2024-12-06 RX ADMIN — LIDOCAINE HYDROCHLORIDE 50 MG: 10 INJECTION, SOLUTION EPIDURAL; INFILTRATION; INTRACAUDAL at 08:09

## 2024-12-06 RX ADMIN — BUPIVACAINE HYDROCHLORIDE 10 ML: 5 INJECTION, SOLUTION EPIDURAL; INTRACAUDAL at 07:30

## 2024-12-06 RX ADMIN — FENTANYL CITRATE 50 MCG: 50 INJECTION INTRAMUSCULAR; INTRAVENOUS at 08:09

## 2024-12-06 RX ADMIN — SODIUM CHLORIDE, SODIUM LACTATE, POTASSIUM CHLORIDE, AND CALCIUM CHLORIDE: .6; .31; .03; .02 INJECTION, SOLUTION INTRAVENOUS at 09:09

## 2024-12-06 RX ADMIN — ONDANSETRON 4 MG: 2 INJECTION, SOLUTION INTRAMUSCULAR; INTRAVENOUS at 08:09

## 2024-12-06 RX ADMIN — FENTANYL CITRATE 25 MCG: 50 INJECTION INTRAMUSCULAR; INTRAVENOUS at 08:21

## 2024-12-06 RX ADMIN — FENTANYL CITRATE 25 MCG: 50 INJECTION INTRAMUSCULAR; INTRAVENOUS at 08:32

## 2024-12-06 RX ADMIN — SODIUM CHLORIDE, SODIUM LACTATE, POTASSIUM CHLORIDE, AND CALCIUM CHLORIDE: .6; .31; .03; .02 INJECTION, SOLUTION INTRAVENOUS at 07:20

## 2024-12-06 RX ADMIN — CEFAZOLIN SODIUM 2000 MG: 2 SOLUTION INTRAVENOUS at 08:04

## 2024-12-06 RX ADMIN — CEFAZOLIN SODIUM 1000 MG: 2 SOLUTION INTRAVENOUS at 08:12

## 2024-12-06 NOTE — ANESTHESIA PREPROCEDURE EVALUATION
Procedure:  REPAIR ROTATOR CUFF  ARTHROSCOPIC (Right: Shoulder)    Relevant Problems   CARDIO   (+) Benign essential hypertension   (+) Essential hypertriglyceridemia   (+) Hyperlipidemia      GI/HEPATIC   (+) Esophageal reflux      MUSCULOSKELETAL   (+) Chronic midline low back pain without sciatica      NEURO/PSYCH   (+) Anxiety   (+) Chronic left shoulder pain   (+) Chronic midline low back pain without sciatica   (+) Chronic right shoulder pain   (+) Depression        Physical Exam    Airway    Mallampati score: III  TM Distance: >3 FB  Neck ROM: full     Dental       Cardiovascular  Rhythm: regular, Rate: normal    Pulmonary   Breath sounds clear to auscultation    Other Findings        Anesthesia Plan  ASA Score- 2     Anesthesia Type- general with ASA Monitors.         Additional Monitors:     Airway Plan: ETT.    Comment: GA with ETT; pre-procedure interscalene block.       Plan Factors-    Chart reviewed.        Patient is not a current smoker.              Induction- intravenous.    Postoperative Plan- Plan for postoperative opioid use.     Perioperative Resuscitation Plan - Level 1 - Full Code.       Informed Consent- Anesthetic plan and risks discussed with patient.  I personally reviewed this patient with the CRNA. Discussed and agreed on the Anesthesia Plan with the CRNA..

## 2024-12-06 NOTE — ANESTHESIA POSTPROCEDURE EVALUATION
Post-Op Assessment Note    CV Status:  Stable  Pain Score: 0    Pain management: adequate       Mental Status:  Alert and awake   Hydration Status:  Euvolemic   PONV Controlled:  Controlled   Airway Patency:  Patent     Post Op Vitals Reviewed: Yes    No anethesia notable event occurred.    Staff: Anesthesiologist, CRNA           Last Filed PACU Vitals:  Vitals Value Taken Time   Temp 98.4    Pulse 103 12/06/24 0919   /70    Resp 16    SpO2 97 % 12/06/24 0919   Vitals shown include unfiled device data.    Modified Rohit:  No data recorded

## 2024-12-06 NOTE — ANESTHESIA POSTPROCEDURE EVALUATION
Post-Op Assessment Note    CV Status:  Stable  Pain Score: 0    Pain management: adequate       Mental Status:  Awake   Hydration Status:  Stable   PONV Controlled:  None   Airway Patency:  Patent     Post Op Vitals Reviewed: Yes    No anethesia notable event occurred.    Staff: Anesthesiologist           Last Filed PACU Vitals:  Vitals Value Taken Time   Temp 97.2 °F (36.2 °C) 12/06/24 0930   Pulse 78 12/06/24 0949   /64 12/06/24 0945   Resp 92 12/06/24 0945   SpO2 94 % 12/06/24 0949   Vitals shown include unfiled device data.    Modified Rohit:  Activity: 2 (12/6/2024  9:30 AM)  Respiration: 2 (12/6/2024  9:30 AM)  Circulation: 2 (12/6/2024  9:30 AM)  Consciousness: 2 (12/6/2024  9:30 AM)  Oxygen Saturation: 2 (12/6/2024  9:30 AM)  Modified Rohit Score: 10 (12/6/2024  9:30 AM)

## 2024-12-06 NOTE — OP NOTE
OPERATIVE REPORT  PATIENT NAME: Ke Loya    :  1975  MRN: [id number]  Pt Location: [facility] OR ROOM 06    SURGERY DATE: 2024     SURGEON: Braxton Mi MD     ASSISTANT: Cecilia Weaver PA-C     NOTE: Cecilia Weaver PA-C was present throughout the entire procedure and performed essential assistance with patient prepping, draping, positioning, suture management, wound closure, sterile dressing application and sling application, all under my direct supervision.     NOTE: No qualified resident physician was available for assistance    PREOPERATIVE DIAGNOSIS:  Right Shoulder Supraspinatus Tear    POSTOPERATIVE DIAGNOSIS: Same    PROCEDURES: Surgical Arthroscopy Right Shoulder with Rotator Cuff Repair and Extensive Debridement    ANESTHESIA STAFF:  MD Seema      ANESTHESIA TYPE: General LMA with ultrasound guided interscalene block (Exparel). The interscalene block was provided by the anesthesia staff per my request for postoperative pain control and to decrease the use of postoperative narcotic medication for pain control.    COMPLICATIONS: None    FINDINGS: Supraspinatus Tear, Degenerative Labrum with Evidence of Prior Repair    SPECIMEN(S):  None    ESTIMATED BLOOD LOSS: Minimal    INDICATION:  Briefly, the patient is a 49 y.o.  male with right shoulder pain. MRI scan confirmed a near full thickness supraspinatus tear. The patient elected for arthroscopic treatment. Informed consent was obtained after a thorough discussion of the risks and benefits of the procedure, as well as alternatives to the procedure.     OPERATIVE TECHNIQUE:  On the day of surgery, I identified the patient’s right shoulder and marked it with my initials. The patient was taken to the operating room where anesthesia was induced and 2 grams of IV Cefazolin were given. The patient was examined in the supine position and was found to have full range of motion of the right shoulder with no instability . The patient was  then positioned in the semilateral [facility] position. All bony prominences were padded. The shoulder was prepped and draped in normal sterile fashion. After a time-out for safety, a standard posterior arthroscopic portal was made. Glenohumeral evaluation revealed intact glenohumeral articular cartilage with no loose bodies. There was a near full thickness supraspinatus tear with some wispy articular sided fibers still intact.  The subscapularis had superior border fraying but was not torn off of the lesser tuberosity and the infraspinatus was intact.  The long head biceps was seen exiting normally without instability or partial tear.   There was significant degenerative change of the circumferential glenoid labrum including the superior labrum at the long head biceps tendon anchor complex the anterior and posterior labrum and there was evidence of prior posterior-superior labral repair with sutures in place but no recurrent detachment of the labrum from the glenoid.  An anterior cannula was established and an extensive debridement of the glenohumeral joint was performed with a shaving device to a stable border, structures debrided and include the anterior and posterior glenoid labrum, the superior glenoid labrum at the long head biceps tendon anchor complex, the superior border subscapularis as well as the retained sutures posteriorly.   After the intra-articular work was completed, the scope was then placed in the subacromial space through posterolateral portal where a thorough bursectomy was performed. The near full thickness supraspinatus tear was found to measure 1.0 cm from anterior to posterior and was able to be easily reduced to the tuberosity.  The remaining articular sided fibers were released to allow this to undergo a full-thickness repair, the tuberosity was prepared in routine fashion and a repair of the supraspinatus was performed using a triple loaded 2.6 mm Arthrex all suture anchor,  three simple stitches were placed through the supraspinatus tendon which was then tied down anatomically to the greater tuberosity.   The long head of biceps was not indicated for tenodesis given intra-operative appearance. The patient appeared to have undergone a previous subacromial decompression and while there was scar on the undersurface of the acromion as well as the lateral gutter there was no recurrent subacromial spur so a revision decompression was not required.  The area was then irrigated. Scope was withdrawn. Wounds were closed with 4-0 Monocryl and Histoacryl. Sterile dressings and a sling with an abduction pillow was placed. The patient was awoken without complication and returned to the recovery room in good condition. We will see the patient back in the office next week to initiate therapy following accelerated rotator cuff repair rehabilitation protocol. At the end of procedure, the counts were correct.     PATIENT DISPOSITION:  Stable to PACU      SIGNATURE: Braxton Mi MD  DATE: December 6, 2024  TIME: 9:16 AM

## 2024-12-06 NOTE — H&P
I identified and marked the patient in the pre-op holding area after confirming the surgical consent.  No changes to medical health since the H&P was preformed.     The patient's [facility] to ensure compliance with applicable state laws.      Assessment  Diagnoses and all orders for this visit:     Tear of right supraspinatus tendon        Discussion and Plan:     We will schedule a tentative date for right shoulder arthroscopic rotator cuff repair at his 1st PO visit to avoid any confusions.  The patient knows that in the setting of labral pathology and rotator cuff pathology we do address the rotator cuff tear with repair but we do not always have to address the labrum with repair and instead typically provide debridement to minimize postoperative adhesive capsulitis.  The patient wishes to be as efficient as possible with scheduling and I think that is very reasonable, as long as he continues to recover from his left revision repair we can consider moving forward with his right rotator cuff repair in 2 to 3 months based on his progress.  We will continue to assess him in the postoperative phase.         A thorough discussion was performed with the patient regarding the risks and benefit of operative and nonoperative treatment of their rotator cuff tear.  Risks discussed include but were not limited to infection, neurovascular injury, recurrent tear, nonhealing of the repair, need for further surgery, need for biceps tenodesis or tenotomy, stiffness, need for prolonged rehabilitation, as well as the risk of anesthesia.  After this discussion all questions were answered and informed consent was obtained in the office for arthroscopic rotator cuff repair of the left shoulder.  The patient will be scheduled for this procedure accordingly.           Subjective:   Patient ID: Ke Loya is a 48 y.o. male        HPI  Patient presents today for  "evaluation of left shoulder pain. Patient is s/p left shoulder arthroscopic rotator cuff repair 9/09/22.  Patient was last seen 1/05/23 at which time the right shoulder was more symptomatic.  We recommended continued PT and if symptoms persist we were going to repeat his imaging.  Patient has had two courses of physical therapy since that time without benefit.         The following portions of the patient's history were reviewed and updated as appropriate: allergies, current medications, past family history, past medical history, past social history, past surgical history and problem list.           Objective:  /79 (BP Location: Right arm, Patient Position: Sitting, Cuff Size: Adult)   Pulse 80   Ht 5' 11\" (1.803 m)   Wt 118 kg (261 lb)   BMI 36.40 kg/m²         Right Shoulder Exam      Range of Motion   External rotation:  40   Forward flexion:  120   Internal rotation 0 degrees:  Lumbar      Muscle Strength   Abduction: 4/5   External rotation: 5/5      Tests   Kimball test: positive     Other   Erythema: absent  Sensation: normal  Pulse: present                  Physical Exam  Vitals and nursing note reviewed.   Constitutional:       Appearance: He is well-developed.   HENT:      Head: Normocephalic and atraumatic.   Eyes:      Pupils: Pupils are equal, round, and reactive to light.   Cardiovascular:      Rate and Rhythm: Normal rate and regular rhythm.      Pulses: Normal pulses.      Heart sounds: Normal heart sounds.   Pulmonary:      Effort: Pulmonary effort is normal. No respiratory distress.      Breath sounds: Normal breath sounds.   Abdominal:      General: Abdomen is flat. There is no distension.      Palpations: Abdomen is soft.   Musculoskeletal:      Cervical back: Normal range of motion and neck supple.   Skin:     General: Skin is warm and dry.   Neurological:      Mental Status: He is alert and oriented to person, place, and time.   Psychiatric:         Mood and Affect: Mood normal.       "   Behavior: Behavior normal.               I have personally reviewed pertinent films in PACS and my interpretation is as follows.     MRI arthrogram left shoulder demonstrates full thickness supraspinatus, no atrophy, 1 cm retraction.     MRI arthrogram right shoulder shows a nondisplaced full-thickness tear of the supraspinatus with no retraction and no atrophy, there is contrast leaking through the rotator cuff tear into the subacromial space.  There is changes of the posterior glenoid labrum consistent with posterior labral pathology in addition to the rotator cuff pathology

## 2024-12-06 NOTE — ANESTHESIA PROCEDURE NOTES
Peripheral Block    Patient location during procedure: holding area  Start time: 12/6/2024 7:30 AM  Reason for block: procedure for pain, at surgeon's request and post-op pain management  Staffing  Performed by: Ke Knox MD  Authorized by: Ke Knox MD    Preanesthetic Checklist  Completed: patient identified, IV checked, site marked, risks and benefits discussed, surgical consent, monitors and equipment checked, pre-op evaluation and timeout performed  Peripheral Block  Patient position: supine  Prep: ChloraPrep  Patient monitoring: continuous pulse oximetry and heart rate  Block type: Interscalene  Laterality: right  Injection technique: single-shot  Procedures: ultrasound guided, Ultrasound guidance required for the procedure to increase accuracy and safety of medication placement and decrease risk of complications.  Ultrasound permanent image saved  bupivacaine (PF) (MARCAINE) 0.5 % injection 20 mL - Perineural   10 mL - 12/6/2024 7:30:00 AM  bupivacaine liposomal (EXPAREL) 1.3 % injection 20 mL - Perineural   20 mL - 12/6/2024 7:30:00 AM  Needle  Needle type: Stimuplex   Needle gauge: 22 G  Needle length: 4 in  Needle localization: ultrasound guidance  Assessment  Injection assessment: frequent aspiration, injected with ease, negative aspiration, no paresthesia on injection, no symptoms of intraneural/intravenous injection, negative for heart rate change, needle tip visualized at all times and incremental injection  Paresthesia pain: none  Post-procedure:  site cleaned  patient tolerated the procedure well with no immediate complications

## 2024-12-09 ENCOUNTER — TELEPHONE (OUTPATIENT)
Dept: OBGYN CLINIC | Facility: HOSPITAL | Age: 49
End: 2024-12-09

## 2024-12-09 ENCOUNTER — OFFICE VISIT (OUTPATIENT)
Dept: OBGYN CLINIC | Facility: OTHER | Age: 49
End: 2024-12-09

## 2024-12-09 VITALS
WEIGHT: 272 LBS | HEIGHT: 70 IN | DIASTOLIC BLOOD PRESSURE: 88 MMHG | HEART RATE: 65 BPM | BODY MASS INDEX: 38.94 KG/M2 | SYSTOLIC BLOOD PRESSURE: 122 MMHG

## 2024-12-09 DIAGNOSIS — M75.101 TEAR OF RIGHT SUPRASPINATUS TENDON: ICD-10-CM

## 2024-12-09 DIAGNOSIS — Z98.890 S/P RIGHT ROTATOR CUFF REPAIR: Primary | ICD-10-CM

## 2024-12-09 PROCEDURE — 99024 POSTOP FOLLOW-UP VISIT: CPT | Performed by: PHYSICIAN ASSISTANT

## 2024-12-09 NOTE — PROGRESS NOTES
"Surgery: SARS w/RCR, extensive debridement on 12/6/24    S: Patient is doing well.  Denies acute post-op events. Doesn't have therapy scheduled but will call them after his visit here today.  Reports block didn't last 48 hours.    /88   Pulse 65   Ht 5' 10\" (1.778 m)   Wt 123 kg (272 lb)   BMI 39.03 kg/m²     O: RIGHT shoulder  Shoulder in sling  Arthroscopic portals without erythema or drainage  Mild ecchymosis around portals  Good elbow, wrist and hand range of motion  Skin - warm and dry  SI  NVI    A/P: 3 days following arthroscopic RIGHT shoulder rotator cuff repair  NWB RUE in sling x 4 weeks (remove Tung 3)  Intra-operative pictures were reviewed in detail  Formal physical therapy - following accelerated RCR protocol  HEP - per therapy.  For now, may start elbow/wrist/hand range of motion.  Pendulums to tolerance  Pain control - Tylenol 1000 mg every 8 hours  Ice as needed - 20 minutes on and 20 minutes off  Follow up in 6 weeks - we will check progress of left shoulder at that visit as well.  He states he is doing well from left shoulder standpoint  "

## 2024-12-09 NOTE — PATIENT INSTRUCTIONS
Rotator Cuff Repair Rehabilitation :   Accelerated Protocol    (adapted from Erma KAMARA et al. “Rehabilitation of the Rotator Cuff: An Evaluation Based Approach”. JAAOS 2006; 14:599-609)  Updated 10.2014  Braxton Mi MD  [facility]  [address] [address]  [place] [address]  [phone]  Phase 1 (Weeks 0-4)   Immediate Postoperative Period   Goals:    Diminish Pain and Inflammation  Maintain and Protect Integrity of the Repair    Gradually Increase Passive ROM (NO Active or Active Assist until Week 6)    Become Independent with Modified ADLs   Precautions:  Maintain Arm in Abduction Sling, Remove Only for Directed Exercises (may remove Abduction Pillow after Day 21 for comfort)    Keep Incisions Clean & Dry (okay to shower in 48 hours, band-aids over incisions)  No Immersion (pool) until Wounds Totally Sealed (usually not prior to day #10)  Passive Shoulder Motion ONLY, No Lifting/Holding Objects, Reaching Behind Back  Okay to Type/Write/Use Mouse at Desktop with Arm in Sling   Day 0-6    Elbow, Wrist, Hand AROM Exercises     Start Cervical AROM and Scapula Isometrics    Cryotherapy/Ice for Pain and Inflammation    Instruct in Hygiene, Posture, and Positioning   May Start Pendulum Exercises     Day 7-28    Continue Above  May add Heat prior to PROM Exercises, Ice after Exercises    May Start Supine, Pain Free, PT assisted PROM  Progress PROM to Goal of full PROM by Week 4.  May add Gentle Mobilizations (GH and Scapulothoracic) to Regain full PROM if Needed.  Begin to incorporate AAROM by Week 3 as Tolerated in Prep for AROM Phase  Can Introduce light Cardio (Walking, Stationary Bike)    Aqua Therapy may begin at week 3 (day 21) as long as no wound problems    Phase 2 (Weeks 4-8)   Protection and Active Motion   Goals of Phase:    Allow Healing of Soft Tissues    Decrease Pain and Inflammation  Add ADLs and Regain AROM by End of Phase   Precautions:    NO STRENGTHENING until Phase  3    Repair is Most Prone to Failure during this Phase!    No Lifting Objects > 3 lbs (Coffee Cup OK), no Sudden Motions    Avoid Upper Extremity Bike and Ergometer   Day 29-60    Discontinue Sling  Continue AAROM & Initiate AROM Forward Flexion, ER, IR and Abd, Rotator Cuff Isometrics    Continue Periscapular Exercises, add Stretching if PROM Lacking  No Strengthening until Week 8 (Minimum Time Needed for Cuff Healing Sufficient to withstand Strengthening)     Phase 3 (Weeks 8-12)   Early Strengthening   Goal of Phase:    Gain full AROM, Maintain PROM    Gradual return of Shoulder Strength, Power and Endurance    Gradual return to Functional Activities   Precautions:    No Lifting > 10lbs, Sudden Lifting or Pushing activities, Overhead Lifting    No Upper Extremity Bike or Ergometer   Week 8    Initiate Strengthening Program (10 lb Maximum until Phase 4)      ER/IR with Bands (Standing)      ER in Lateral Decubitius Position      Lateral Raises      Full Can in Scapular Plane      Prone Rowing, Horizontal Abduction, Extension      Elbow Flexion/Extension   Week 12    Initiate light Functional Activities as Permitted  Progress to Fundamental Shoulder Exercises  Phase 4 (Variable but Weeks 12-18)   Aggressive Rehab  Sport Specific or Activity Specific    Goals of Phase:    Maintain Full Pain-free AROM    Advanced Conditioning Exercises for enhanced Functional use    Continue regaining Shoulder Strength, Power and Endurance    Eventual return to full Functional Activities   Precautions:    None   Week 14    Continue ROM and stretching if appropriate    Progress Strengthening, Proprioceptive and Neuromuscular Training    Light Sports if Progressing Well (Chipping/Putting, easy ground strokes etc.)   Week 16    Continue Strengthening and Stretching    Initiate Interval Sports Program as Appropriate    Note:   This is a general program which may be modified based on intra-operative findings, additional procedures  preformed, repair stability, and patient biological factors.  If in doubt, please check with my office for individual patient specifics.  The ultimate goal of the surgery and rehabilitation is to get the rotator cuff to heal with the least residual functional deficit due to stiffness.  That being said, I would rather have a stiff shoulder with a healed rotator cuff repair, than a loose shoulder with a failed repair!

## 2024-12-09 NOTE — LETTER
December 9, 2024     Patient: Ke Loya  YOB: 1975  Date of Visit: 12/9/2024      To Whom it May Concern:    Ke Loya is under my professional care. Ke was seen in my office on 12/9/2024. Ke may return to work on 12/12/2024 .    If you have any questions or concerns, please don't hesitate to call.         Sincerely,          Cecilia Weaver PA-C        CC: No Recipients

## 2024-12-09 NOTE — TELEPHONE ENCOUNTER
Caller: Patient    Doctor: Shmuel    Reason for call: Patient had SX 12/6/24 and is doing his PT outside of . Please fax PT script to [facility] @ [phone]    Call back#: [phone]

## 2025-01-20 ENCOUNTER — OFFICE VISIT (OUTPATIENT)
Dept: OBGYN CLINIC | Facility: OTHER | Age: 50
End: 2025-01-20
Payer: COMMERCIAL

## 2025-01-20 VITALS — BODY MASS INDEX: 38.94 KG/M2 | HEIGHT: 70 IN | WEIGHT: 272 LBS

## 2025-01-20 DIAGNOSIS — M75.102 TEAR OF LEFT SUPRASPINATUS TENDON: Primary | ICD-10-CM

## 2025-01-20 DIAGNOSIS — Z98.890 S/P RIGHT ROTATOR CUFF REPAIR: ICD-10-CM

## 2025-01-20 PROCEDURE — 99024 POSTOP FOLLOW-UP VISIT: CPT | Performed by: PHYSICIAN ASSISTANT

## 2025-01-20 PROCEDURE — 99213 OFFICE O/P EST LOW 20 MIN: CPT | Performed by: PHYSICIAN ASSISTANT

## 2025-01-20 NOTE — PROGRESS NOTES
Assessment  Diagnoses and all orders for this visit:    Tear of left supraspinatus tendon    S/P right rotator cuff repair        Discussion and Plan:    Right shoulder:   Continue with formal PT following accelerated rotator cuff repair  HEP per therapist  Tylenol and Ice for pain  Follow up in 6-8 weeks    Left shoulder  Persistent left shoulder pain despite time and physical therapy  MR arthrogram left shoulder  Follow up with dr Mi after MRI to discuss results.  Jae is a little stiff with ROM so recommend he continue to work on motion    Subjective:   Patient ID: Ke Loya is a 49 y.o. male    Jae presents to the office in follow up of his right shoulder.  He is 6 weeks s/p rotator cuff repair.  He has been compliant with formal PT and his HEP.  He has been out of sling for about 2 weeks.  Still sleeping in a recliner.      Reports LEFT>RIGHT shoulder pain    Jae is 5 months s/p arthroscopic left shoulder Revision rotator cuff repair.  He completed formal PT for the left shoulder.  He reported pain early in post-op recover but felt he just needed to stretch more.  Given he had scheduled his right shoulder surgery in December, decision was made to give the left shoulder more time.  Despite more time and PT, Jae does not feel as though his shoulder is progressing.  He continues to have pain with ADLs and sleep.  Pain with any range of motion.  Pain with strengthening exercises.  Denies paresthesias.      The following portions of the patient's history were reviewed and updated as appropriate: allergies, current medications, past family history, past medical history, past social history, past surgical history and problem list.    Review of Systems   Constitutional:  Negative for chills and fever.   HENT:  Negative for hearing loss.    Eyes:  Negative for visual disturbance.   Respiratory:  Negative for shortness of breath.    Cardiovascular:  Negative for chest pain.   Gastrointestinal:  Negative for  "abdominal pain.   Musculoskeletal:         As reviewed in the HPI   Skin:  Negative for rash.   Neurological:         As reviewed in the HPI   Psychiatric/Behavioral:  Negative for agitation.        Objective:  Ht 5' 10\" (1.778 m)   Wt 123 kg (272 lb)   BMI 39.03 kg/m²       Right Shoulder Exam     Tenderness   The patient is experiencing no tenderness.    Range of Motion   Passive abduction:  90   External rotation:  0   Forward flexion:  160     Other   Erythema: absent  Sensation: normal  Pulse: present      Left Shoulder Exam     Tenderness   The patient is experiencing no tenderness.     Range of Motion   Passive abduction:  80   External rotation:  20   Forward flexion:  140     Muscle Strength   External rotation: 3/5   Supraspinatus: 3/5     Tests   Kimball test: positive  Impingement: positive  Drop arm: positive    Other   Erythema: absent  Sensation: normal  Pulse: present             Physical Exam  Constitutional:       Appearance: He is well-developed.   HENT:      Head: Normocephalic.   Eyes:      Extraocular Movements: Extraocular movements intact.   Pulmonary:      Effort: No respiratory distress.      Breath sounds: No wheezing.   Musculoskeletal:      Cervical back: Normal range of motion.   Skin:     General: Skin is warm and dry.   Neurological:      Mental Status: He is alert and oriented to person, place, and time.   Psychiatric:         Behavior: Behavior normal.         Thought Content: Thought content normal.         Judgment: Judgment normal.             Records Reviewed: Therapy notes from 12/13 for right shoulder.  No therapy notes to review for left shoulder.  "

## 2025-01-31 NOTE — PRE-PROCEDURE INSTRUCTIONS
Call placed to patient to discuss upcoming MRI arthrogram at [facility]. Allergies reviewed and verified patient does not currently take any anticoagulant medications, nor is diabetic. Pre-procedure instructions discussed. Patient instructed he may eat normally and take medications as usual before the procedure. Procedure and post procedure expectations and instructions reviewed.   Appointment reminder given: 2/7/25 @ 1115 with 1100 arrival time. Patient verbalized understanding and denied any questions at this time.

## 2025-02-07 ENCOUNTER — HOSPITAL ENCOUNTER (OUTPATIENT)
Dept: MRI IMAGING | Facility: HOSPITAL | Age: 50
End: 2025-02-07
Payer: COMMERCIAL

## 2025-02-07 ENCOUNTER — HOSPITAL ENCOUNTER (OUTPATIENT)
Dept: RADIOLOGY | Facility: HOSPITAL | Age: 50
End: 2025-02-07
Payer: COMMERCIAL

## 2025-02-07 DIAGNOSIS — M75.102 TEAR OF LEFT SUPRASPINATUS TENDON: ICD-10-CM

## 2025-02-07 PROCEDURE — 77002 NEEDLE LOCALIZATION BY XRAY: CPT

## 2025-02-07 PROCEDURE — 23350 INJECTION FOR SHOULDER X-RAY: CPT

## 2025-02-07 PROCEDURE — 73222 MRI JOINT UPR EXTREM W/DYE: CPT

## 2025-02-07 PROCEDURE — A9585 GADOBUTROL INJECTION: HCPCS | Performed by: PHYSICIAN ASSISTANT

## 2025-02-07 RX ORDER — LIDOCAINE HYDROCHLORIDE 10 MG/ML
1 INJECTION, SOLUTION EPIDURAL; INFILTRATION; INTRACAUDAL; PERINEURAL
Status: DISCONTINUED | OUTPATIENT
Start: 2025-02-07 | End: 2025-02-11 | Stop reason: HOSPADM

## 2025-02-07 RX ORDER — GADOBUTROL 604.72 MG/ML
1 INJECTION INTRAVENOUS
Status: COMPLETED | OUTPATIENT
Start: 2025-02-07 | End: 2025-02-07

## 2025-02-07 RX ADMIN — IOHEXOL 4 ML: 300 INJECTION, SOLUTION INTRAVENOUS at 11:45

## 2025-02-07 RX ADMIN — GADOBUTROL 1 ML: 604.72 INJECTION INTRAVENOUS at 12:18

## 2025-02-13 ENCOUNTER — TELEPHONE (OUTPATIENT)
Age: 50
End: 2025-02-13

## 2025-02-13 ENCOUNTER — OFFICE VISIT (OUTPATIENT)
Dept: OBGYN CLINIC | Facility: OTHER | Age: 50
End: 2025-02-13

## 2025-02-13 DIAGNOSIS — M75.102 TEAR OF LEFT SUPRASPINATUS TENDON: Primary | ICD-10-CM

## 2025-02-13 PROCEDURE — 99024 POSTOP FOLLOW-UP VISIT: CPT | Performed by: ORTHOPAEDIC SURGERY

## 2025-02-13 NOTE — TELEPHONE ENCOUNTER
Called pt- advised he would need to be here by 345 in order to be seen- pt states he should be able to be there a few min early

## 2025-02-13 NOTE — TELEPHONE ENCOUNTER
Caller: Patient    Doctor: Dr. Sheppard    Reason for call:     Patient returned call for his appointment today, he cannot make it earlier with work for his appointment  .  He is asking if the doctor can do a virtual appointment instead??  Please call an advise the patient about his appointment.    Call back#: [phone]

## 2025-02-13 NOTE — TELEPHONE ENCOUNTER
Caller: Patient    Doctor: Dr. Mi    Reason for call: The patient stated he will be in a meeting this afternoon. He will be available  3pm and after    Call back#: [phone]

## 2025-02-13 NOTE — TELEPHONE ENCOUNTER
Called pt back and lvm- Dr. Mi will actually just call pt with mri results- wanted to know what time works best for pt either later today or tomorrow.

## 2025-02-14 NOTE — PROGRESS NOTES
I did contact the patient by phone to review the MRI findings of his left shoulder.  There is a recurrent tear and it does appear to be ammenable to revision repair.  He will continue to rehab his right shoulder and we will see him in 1 month and possibly plan for revision of the left shoulder if he has made enough progress with his right shoulder.

## 2025-02-20 ENCOUNTER — OFFICE VISIT (OUTPATIENT)
Dept: OBGYN CLINIC | Facility: CLINIC | Age: 50
End: 2025-02-20
Payer: COMMERCIAL

## 2025-02-20 ENCOUNTER — HOSPITAL ENCOUNTER (OUTPATIENT)
Dept: RADIOLOGY | Facility: CLINIC | Age: 50
End: 2025-02-20
Payer: COMMERCIAL

## 2025-02-20 VITALS — WEIGHT: 268 LBS | HEIGHT: 70 IN | BODY MASS INDEX: 38.37 KG/M2

## 2025-02-20 DIAGNOSIS — M25.562 CHRONIC PAIN OF LEFT KNEE: ICD-10-CM

## 2025-02-20 DIAGNOSIS — G89.29 CHRONIC PAIN OF LEFT KNEE: ICD-10-CM

## 2025-02-20 DIAGNOSIS — M11.262 CHONDROCALCINOSIS OF LEFT KNEE: Primary | ICD-10-CM

## 2025-02-20 DIAGNOSIS — M25.562 LEFT KNEE PAIN, UNSPECIFIED CHRONICITY: ICD-10-CM

## 2025-02-20 PROCEDURE — 73564 X-RAY EXAM KNEE 4 OR MORE: CPT

## 2025-02-20 PROCEDURE — 20610 DRAIN/INJ JOINT/BURSA W/O US: CPT | Performed by: STUDENT IN AN ORGANIZED HEALTH CARE EDUCATION/TRAINING PROGRAM

## 2025-02-20 PROCEDURE — 73562 X-RAY EXAM OF KNEE 3: CPT

## 2025-02-20 PROCEDURE — 99214 OFFICE O/P EST MOD 30 MIN: CPT | Performed by: STUDENT IN AN ORGANIZED HEALTH CARE EDUCATION/TRAINING PROGRAM

## 2025-02-20 RX ORDER — ACETAMINOPHEN 500 MG
1000 TABLET ORAL EVERY 6 HOURS PRN
COMMUNITY

## 2025-02-20 RX ORDER — TRIAMCINOLONE ACETONIDE 40 MG/ML
40 INJECTION, SUSPENSION INTRA-ARTICULAR; INTRAMUSCULAR
Status: COMPLETED | OUTPATIENT
Start: 2025-02-20 | End: 2025-02-20

## 2025-02-20 RX ORDER — BUPIVACAINE HYDROCHLORIDE 5 MG/ML
3 INJECTION, SOLUTION EPIDURAL; INTRACAUDAL
Status: COMPLETED | OUTPATIENT
Start: 2025-02-20 | End: 2025-02-20

## 2025-02-20 RX ORDER — ROSUVASTATIN CALCIUM 10 MG/1
5 TABLET, COATED ORAL DAILY
COMMUNITY
Start: 2024-12-24

## 2025-02-20 RX ORDER — IBUPROFEN 200 MG
600 TABLET ORAL EVERY 6 HOURS PRN
COMMUNITY

## 2025-02-20 RX ADMIN — BUPIVACAINE HYDROCHLORIDE 3 ML: 5 INJECTION, SOLUTION EPIDURAL; INTRACAUDAL at 07:30

## 2025-02-20 RX ADMIN — TRIAMCINOLONE ACETONIDE 40 MG: 40 INJECTION, SUSPENSION INTRA-ARTICULAR; INTRAMUSCULAR at 07:30

## 2025-02-20 NOTE — PROGRESS NOTES
Assessment & Plan  Chondrocalcinosis of left knee    Orders:    XR knee 3 vw left non injury; Future    XR knee 4+ vw right injury; Future    Chronic pain of left knee         Ke Loya is a 49 y.o. year old male with chronic left knee pain due to early arthritis, pseudogout and chondrocalcinosis.      We had a shared medical decision making discussion with the patient regarding their diagnosis of knee arthritis  We reviewed the imaging and physical exam findings together that confirm the diagnosis of  Left knee chondrocalcinosis.   We discussed the following treatments, and included the risks and benefits of each.   Lifestyle modifications, which include diet, exercise, and health changes, as well as changes in activities.  The benefits are clear of overall health improvements and there is minimal risk.   Physical therapy, which has been shown in several studies to be beneficial in reducing pain and improving function, but does not repair the cartilage.    Use of over-the-counter, non-narcotic pain relievers and anti-inflammatory medications are usually the first choice of therapy for arthritis of the knee. I think the most effective medications are nonsteroidal anti-inflammatory drugs, or NSAIDs. NSAIDs, such as ibuprofen, celebrex, and naproxen, are available both over-the-counter and by prescription and help by decreasing inflammation.  Injections such as steroid and viscosupplementation--both of which can help in patients with OA, but have the limitation of an overall small effect size.  We also discussed 'biologics' including PRP and stem cell injections. There is limited data right now to support the use of these treatment strategies, but some evidence that PRP can improve pain and function better than steroid injections.   Finally, we discussed surgical options, including arthroscopy, debridement, and osteotomies, Ke Loya is not a candidate for these given the extent of their disease. We  discussed the role of meeting with a total knee arthroplasty surgeon if the patient fails to improve with non-operative treatment.    At this point, the patient wishes to proceed with Corticosteroid injection. He was counseled on the risk of injection including pain, swelling and infection.      General  Chief Complaint   Patient presents with    Left Knee - Pain    New Patient Visit        Subjective    Jae Loya is a 49 y.o. male who presents with LEFT knee pain:    Chief Complaint   Patient presents with    Left Knee - Pain    New Patient Visit          History of Present Illness   The patient complains of left knee pain. The pain started 20+ years ago without injury.  The patient states he broke his left femur as a 8-year-old child subsequently developed a parameniscal cyst which he was told was related to his initial injury he had an arthroscopic surgery in his early 20s with an open excision of the cyst.  He endorses chronic left knee pain from that time forward which is atraumatically acutely exacerbated over the last several months.  Most complains of global knee pain, with swelling and soreness, and restricted range of motion.    The pain is 6/10. The pain is located Anterior, Medial, and Lateral. The pain is described as dull, aching, and sharp. They have tried NSAIDS and Tylenol for their problem. Pain improves with rest. Pain worsens with prolonged standing, walking, deep squating.    The patient did not hear a pop. The patient does not have swelling.  The patient does not feel unstable.    Ortho Sports Medicine Patient Answers  Failed to redirect to the Timeline version of the [employer] SmartLink.  Allergies   Allergen Reactions    Neomycin Hives    Polymyxin B Hives     Outpatient Encounter Medications as of 2/20/2025   Medication Sig Dispense Refill    acetaminophen (TYLENOL) 500 mg tablet Take 1,000 mg by mouth every 6 (six) hours as needed for mild pain      ibuprofen (MOTRIN) 200 mg tablet Take  600 mg by mouth every 6 (six) hours as needed for mild pain      irbesartan (AVAPRO) 150 mg tablet Take 1 tablet (150 mg total) by mouth daily 90 tablet 3    Multiple Vitamin (MULTIVITAMIN ADULT PO) 1 tablet in the morning      omeprazole (PriLOSEC) 20 mg delayed release capsule TAKE 1 CAPSULE BY MOUTH EVERY DAY 90 capsule 0    rosuvastatin (CRESTOR) 10 MG tablet Take 5 mg by mouth daily      Vitamin D, Ergocalciferol, 06842 units CAPS Take by mouth once a week      buPROPion (WELLBUTRIN XL) 300 mg 24 hr tablet Take 300 mg by mouth every morning      [DISCONTINUED] rosuvastatin (CRESTOR) 5 mg tablet Take 5 mg by mouth daily       No facility-administered encounter medications on file as of 2/20/2025.     Past Medical History:   Diagnosis Date    Acute pain of both shoulders 4/20/2022    Aftercare following surgery of the musculoskeletal system 11/21/2022    Anxiety     COVID-19 ruled out 2/3/2022    GERD (gastroesophageal reflux disease)     Hyperlipidemia     Hypertension     Internal derangement of right shoulder 11/28/2022    Sore throat 12/6/2022    Tear of left supraspinatus tendon 7/18/2022    Tendinitis     Thrombocytopenia (HCC) 12/6/2022    Urinary urgency 2/27/2018    Overview:  Last Assessment & Plan:  Obtain US     Past Surgical History:   Procedure Laterality Date    FL INJECTION LEFT SHOULDER (ARTHROGRAM)  2/7/2025    KNEE SURGERY Left 1989    cyst removed    VT COLONOSCOPY FLX DX W/COLLJ SPEC WHEN PFRMD N/A 5/14/2018    Procedure: COLONOSCOPY;  Surgeon: Rodriguez Durant DO;  Location: [facility];  Service: General    VT SURGICAL ARTHROSCOPY SHOULDER W/ROTATOR CUFF RPR Left 9/9/2022    Procedure: SHOULDER ARTHROSCOPIC ROTATOR CUFF REPAIR;  Surgeon: Braxton Mi MD;  Location: [facility] MAIN OR;  Service: Orthopedics    VT SURGICAL ARTHROSCOPY SHOULDER W/ROTATOR CUFF RPR Left 8/14/2024    Procedure: SHOULDER ARTHROSCOPIC ROTATOR CUFF REPAIR REVISION, SUBACROMIAL DECOMPRESSION;  Surgeon: Braxton  Jaydon Mi MD;  Location: [facility];  Service: Orthopedics    NE SURGICAL ARTHROSCOPY SHOULDER W/ROTATOR CUFF RPR Right 2024    Procedure: REPAIR ROTATOR CUFF  ARTHROSCOPIC, EXTENSIVE DEBRIDEMENT;  Surgeon: Braxton Mi MD;  Location: [facility] MAIN OR;  Service: Orthopedics    SHOULDER ARTHROSCOPY Right     with biceps tenodesis     Family History   Problem Relation Age of Onset    Melanoma Father     Cancer Father     Melanoma Brother     Diabetes Maternal Grandfather     Stroke Paternal Grandmother     Hypertension Paternal Grandmother     Coronary artery disease Paternal Grandmother     Heart disease Paternal Grandmother     Cancer Paternal Grandmother     Hypertension Paternal Grandfather     Coronary artery disease Paternal Grandfather     Brain cancer Paternal Grandfather     Heart disease Paternal Grandfather     Liver cancer Paternal Uncle      Social History     Tobacco Use    Smoking status: Former     Current packs/day: 0.00     Average packs/day: 1 pack/day for 21.0 years (21.0 ttl pk-yrs)     Types: Cigarettes     Start date: 1992     Quit date: 2013     Years since quittin.7    Smokeless tobacco: Never   Vaping Use    Vaping status: Never Used   Substance Use Topics    Alcohol use: Not Currently     Alcohol/week: 2.0 - 3.0 standard drinks of alcohol     Types: 2 - 3 Shots of liquor per week    Drug use: Not Currently     Types: Marijuana           Objective    There were no vitals filed for this visit.  Body mass index is 38.45 kg/m².  Physical Exam  Knee Exam     left   Inspection: Incision healed without signs of infections   Gait: Antalgic   Quadriceps atrophy: Mild   Tenderness: Medial Joint Line, Lateral Joint Line, Medial Retinaculum, and Lateral Retinaculum   ROM: 0-110   Effusion: None   Meniscus Exam   left   Medial Meniscus: Joint Line Tenderness   Lateral Meniscus: Joint Line Tenderness   Ligament Exam   Right   ACL Lachman: negative    Anterior  Drawer:  negative   PCL Posterior Drawer:negative       MCL Stable at 0 and 30 degrees of flexion   LCL Stable at 0 and 30 degrees of flexion   PLC Deferred     Patellofemoral exam   left   Patella grind test positive   Patella instability: negative  1 Quadrants of translation   Patellar Translation Apprehension negative     Distally the patient's neurovascular status is normal.    Review of Prior Testing  I independently interpreted the following test: X-rays of the left knee taken today, including weight bearing and merchant views.  Multiple views of the knee show minimal joint space narrowing significant chondrocalcinosis of the medial lateral meniscus with some evidence of chondrocalcinosis in the patellofemoral joint and early osteophyte formation.    Large joint arthrocentesis: L knee  Universal Protocol:  procedure performed by consultantConsent: Verbal consent obtained.  Risks and benefits: risks, benefits and alternatives were discussed  Consent given by: patient  Patient understanding: patient states understanding of the procedure being performed  Radiology Images displayed and confirmed. If images not available, report reviewed: imaging studies available  Supporting Documentation  Indications: pain and joint swelling   Procedure Details  Location: knee - L knee  Preparation: Patient was prepped and draped in the usual sterile fashion  Needle size: 22 G  Ultrasound guidance: no  Approach: anterolateral  Medications administered: 40 mg triamcinolone acetonide 40 mg/mL; 3 mL bupivacaine (PF) 0.5 %    Patient tolerance: patient tolerated the procedure well with no immediate complications  Dressing:  Sterile dressing applied            Follow Up: Return if symptoms worsen or fail to improve.    All questions answered and patient agrees with plan.

## 2025-03-10 VITALS — WEIGHT: 268 LBS | BODY MASS INDEX: 38.37 KG/M2 | HEIGHT: 70 IN

## 2025-03-10 DIAGNOSIS — M75.101 TEAR OF RIGHT SUPRASPINATUS TENDON: Primary | ICD-10-CM

## 2025-03-10 DIAGNOSIS — Z98.890 S/P RIGHT ROTATOR CUFF REPAIR: ICD-10-CM

## 2025-03-10 PROCEDURE — 99213 OFFICE O/P EST LOW 20 MIN: CPT | Performed by: PHYSICIAN ASSISTANT

## 2025-03-10 NOTE — PROGRESS NOTES
"  Assessment & Plan  Tear of right supraspinatus tendon  Continue with formal PT  HEP - per therapist  Tylenol and ice if needed  Follow up as needed for right shoulder  S/P right rotator cuff repair        Jae will be returning in a few weeks to meet with Dr Mi for left shoulder.  He has a recurrent rotator cuff tear.  Jae is wanting to pursue revision rotator cuff repair at end of April or beginning of May pending his work schedule.  In meantime, he will continue with PT and his HEP as outlined by his therapist      Subjective:   Patient ID: Ke Loya is a 49 y.o. male    Jae presents to the office in follow up of the right shoulder.  He is 3 months s/p arthroscopic rotator cuff repair.  He has been compliant with formal PT per his reports.  He notes progress.  We do not have any therapy notes to review.  Jae is pleased with his progress.  He is doing more with the shoulder.  He is able to move the shoulder more than he has been able to move it in years.  Denies pain at night or pain with PT exercises.      Therapy has been working with the left shoulder as well.  They are stretching the shoulder and he notes progress with his ROM since last office visit.      The following portions of the patient's history were reviewed and updated as appropriate: allergies, current medications, past family history, past medical history, past social history, past surgical history and problem list.      Objective:  Ht 5' 10\" (1.778 m)   Wt 122 kg (268 lb)   BMI 38.45 kg/m²       Right Shoulder Exam     Range of Motion   Passive abduction:  90   Forward flexion:  170     Muscle Strength   External rotation: 5/5   Supraspinatus: 5/5     Other   Erythema: absent  Sensation: normal  Pulse: present      Left Shoulder Exam     Range of Motion   Passive abduction:  90   Forward flexion:  160              Physical Exam  Constitutional:       Appearance: He is well-developed.   HENT:      Head: Normocephalic.   Eyes:      " Extraocular Movements: Extraocular movements intact.   Pulmonary:      Effort: No respiratory distress.      Breath sounds: No wheezing.   Musculoskeletal:      Cervical back: Normal range of motion.   Skin:     General: Skin is warm and dry.   Neurological:      Mental Status: He is alert and oriented to person, place, and time.   Psychiatric:         Behavior: Behavior normal.         Thought Content: Thought content normal.         Judgment: Judgment normal.           Records Reviewed: none - patient does not have therapy reports to review.

## 2025-03-10 NOTE — ASSESSMENT & PLAN NOTE
Continue with formal PT  HEP - per therapist  Tylenol and ice if needed  Follow up as needed for right shoulder

## 2025-03-25 ENCOUNTER — TELEPHONE (OUTPATIENT)
Age: 50
End: 2025-03-25

## 2025-03-25 NOTE — TELEPHONE ENCOUNTER
Caller: Jae    Doctor: Dr. Mi     Reason for call: Has an appt for 3/27 at 315 to discuss his surgery date- patient asking if there is any way this appointment can be a virtual appt as he lives over an hour away and will not make it on time.     He is looking to see if he can schedule around the 3rd week of May     Call back#: [phone]

## 2025-04-10 ENCOUNTER — OFFICE VISIT (OUTPATIENT)
Dept: OBGYN CLINIC | Facility: OTHER | Age: 50
End: 2025-04-10
Payer: COMMERCIAL

## 2025-04-10 VITALS — HEIGHT: 70 IN | WEIGHT: 250 LBS | BODY MASS INDEX: 35.79 KG/M2

## 2025-04-10 DIAGNOSIS — M75.102 TEAR OF LEFT SUPRASPINATUS TENDON: Primary | ICD-10-CM

## 2025-04-10 PROCEDURE — 99214 OFFICE O/P EST MOD 30 MIN: CPT | Performed by: ORTHOPAEDIC SURGERY

## 2025-04-10 NOTE — ASSESSMENT & PLAN NOTE
Unfortunately the patient does have a recurrent tear of his left supraspinatus tendon repair.  I have discussed this with him in the past and he is recovered enough with his right shoulder and is very happy with that recovery that he wishes to undergo revision repair of the left supraspinatus.  We did discuss the difficulty of getting a tendon to heal with a second revision as this would be his third surgery for this, but I do feel he is a candidate for revision repair given the good tendon quality and the good muscle quality as well as his young age and desire to return to his preinjury activity level.  We will have available adjuvants including allograft patches and subacromial balloon spacers to utilize if we feel intraoperatively he required some augmentation of the repair.    A thorough discussion was performed with the patient regarding the risks and benefit of operative and nonoperative treatment of their rotator cuff tear.  Risks discussed include but were not limited to infection, neurovascular injury, recurrent tear, nonhealing of the repair, need for further surgery, need for biceps tenodesis or tenotomy, stiffness, need for prolonged rehabilitation, as well as the risk of anesthesia.  After this discussion all questions were answered and informed consent was obtained in the office for arthroscopic rotator cuff repair of the left shoulder.  The patient will be scheduled for this procedure accordingly.   Orders:    Ambulatory Referral to Physical Therapy; Future

## 2025-04-10 NOTE — PROGRESS NOTES
Assessment & Plan  Tear of left supraspinatus tendon  Unfortunately the patient does have a recurrent tear of his left supraspinatus tendon repair.  I have discussed this with him in the past and he is recovered enough with his right shoulder and is very happy with that recovery that he wishes to undergo revision repair of the left supraspinatus.  We did discuss the difficulty of getting a tendon to heal with a second revision as this would be his third surgery for this, but I do feel he is a candidate for revision repair given the good tendon quality and the good muscle quality as well as his young age and desire to return to his preinjury activity level.  We will have available adjuvants including allograft patches and subacromial balloon spacers to utilize if we feel intraoperatively he required some augmentation of the repair.    A thorough discussion was performed with the patient regarding the risks and benefit of operative and nonoperative treatment of their rotator cuff tear.  Risks discussed include but were not limited to infection, neurovascular injury, recurrent tear, nonhealing of the repair, need for further surgery, need for biceps tenodesis or tenotomy, stiffness, need for prolonged rehabilitation, as well as the risk of anesthesia.  After this discussion all questions were answered and informed consent was obtained in the office for arthroscopic rotator cuff repair of the left shoulder.  The patient will be scheduled for this procedure accordingly.   Orders:    Ambulatory Referral to Physical Therapy; Future      Subjective:   Patient ID: Ke Loya is a 49 y.o. male      GAUDENCIO Rowe presents to the office in follow up of the left shoulder. He is 8 months s/p arthroscopic rotator cuff repair. He has been compliant with formal PT per his reports. MRI was discussed over the phone on 2/13/25.  He reports persistent pain and weakness.  Worse with activities improved with rest.       The following  "portions of the patient's history were reviewed and updated as appropriate: allergies, current medications, past family history, past medical history, past social history, past surgical history and problem list.    Review of Systems   Constitutional:  Negative for chills and fever.   HENT:  Negative for drooling and hearing loss.    Eyes:  Negative for visual disturbance.   Respiratory:  Negative for cough and shortness of breath.    Cardiovascular:  Negative for chest pain.   Gastrointestinal:  Negative for abdominal pain.   Skin:  Negative for rash.   Psychiatric/Behavioral:  Negative for agitation.        Objective:  Ht 5' 10\" (1.778 m)   Wt 113 kg (250 lb)   BMI 35.87 kg/m²       Left Shoulder Exam      Tenderness   The patient is experiencing no tenderness.      Range of Motion   Passive abduction:  80   External rotation:  20   Forward flexion:  140      Muscle Strength   External rotation: 3/5   Supraspinatus: 3/5      Tests   Kimball test: positive  Impingement: positive  Drop arm: positive     Other   Erythema: absent  Sensation: normal  Pulse: present     Physical Exam  Vitals and nursing note reviewed.   Constitutional:       Appearance: He is well-developed.   HENT:      Head: Normocephalic and atraumatic.   Eyes:      Pupils: Pupils are equal, round, and reactive to light.   Cardiovascular:      Rate and Rhythm: Normal rate and regular rhythm.      Pulses: Normal pulses.      Heart sounds: Normal heart sounds.   Pulmonary:      Effort: Pulmonary effort is normal. No respiratory distress.      Breath sounds: Normal breath sounds.   Abdominal:      General: Abdomen is flat. There is no distension.      Palpations: Abdomen is soft.   Musculoskeletal:      Cervical back: Normal range of motion and neck supple.   Skin:     General: Skin is warm and dry.   Neurological:      Mental Status: He is alert and oriented to person, place, and time.   Psychiatric:         Mood and Affect: Mood normal.         " Behavior: Behavior normal.           I have personally reviewed pertinent films in PACS and my interpretation is as follows.    MRI left shoulder demonstrates a full thickness recurrent supraspinatus tear with no muscle atrophy and only mild retraction.      Scribe Attestation      I,:  Yana Vásquez MA am acting as a scribe while in the presence of the attending physician.:       I,:  Braxton Mi MD personally performed the services described in this documentation    as scribed in my presence.:

## 2025-05-06 ENCOUNTER — TELEPHONE (OUTPATIENT)
Age: 50
End: 2025-05-06

## 2025-05-06 NOTE — TELEPHONE ENCOUNTER
Patient is starting a new job and will have new insurance, would like to cancel until he knows more. He will call to reschedule when he is ready.

## 2025-05-06 NOTE — TELEPHONE ENCOUNTER
Caller: Jae- Patient     Doctor: Dr. Mi    Reason for call: Patient would like to cancel surgery on 05/23/2025 due to new job and insurance. Patient will call back when ready to reschedule.     Call back#: [phone]

## (undated) DEVICE — SUT MONOCRYL 4-0 PS-2 27 IN Y426H

## (undated) DEVICE — THREADED CLEAR CANNULA WITH OBTURATOR 7MM X 75MM

## (undated) DEVICE — DRESSING MEPILEX AG BORDER 4 X 4 IN

## (undated) DEVICE — THREADED CLEAR CANNULA WITH OBTURATOR 8.5MM X 75MM

## (undated) DEVICE — GLOVE INDICATOR PI UNDERGLOVE SZ 7.5 BLUE

## (undated) DEVICE — EXPRESSEW III SUTURE NEEDLE FOR USE WITH EXPRESSEW II OR III SUTURE PASSER: Brand: EXPRESSEW

## (undated) DEVICE — SKN PRP WNG SPNGE PVP SCRB STR: Brand: MEDLINE INDUSTRIES, INC.

## (undated) DEVICE — BLADE SHAVER DISSECTOR 4MM 13CM COOLCUT

## (undated) DEVICE — 3M™ IOBAN™ 2 ANTIMICROBIAL INCISE DRAPE 6650EZ: Brand: IOBAN™ 2

## (undated) DEVICE — GLOVE SRG BIOGEL ECLIPSE 7

## (undated) DEVICE — INTENDED FOR TISSUE SEPARATION, AND OTHER PROCEDURES THAT REQUIRE A SHARP SURGICAL BLADE TO PUNCTURE OR CUT.: Brand: BARD-PARKER ® CARBON RIB-BACK BLADES

## (undated) DEVICE — GLOVE SRG BIOGEL 7.5

## (undated) DEVICE — SUT MONOCRYL 4-0 PS-2 18 IN Y496G

## (undated) DEVICE — PROBE ABLATION  APOLLO RF ASPIRING 90 DEG

## (undated) DEVICE — PACK PBDS SHOULDER ARTHROSCOPY RF

## (undated) DEVICE — SPONGE PVP SCRUB WING STERILE

## (undated) DEVICE — 3M™ STERI-STRIP™ REINFORCED ADHESIVE SKIN CLOSURES, R1547, 1/2 IN X 4 IN (12 MM X 100 MM), 6 STRIPS/ENVELOPE: Brand: 3M™ STERI-STRIP™

## (undated) DEVICE — SHOULDER SUSPENSION KIT 6 PER BOX

## (undated) DEVICE — TUBING SUCTION 5MM X 12 FT

## (undated) DEVICE — ADHESIVE SKIN HIGH VISCOSITY EXOFIN 1ML

## (undated) DEVICE — VAPR COOLPULSE 90 ELECTRODE 90 DEGREES SUCTION WITH INTEGRATED HANDPIECE: Brand: VAPR COOLPULSE

## (undated) DEVICE — BLADE SHAVER DISSECTOR 3.5MM 13CM COOLCUT

## (undated) DEVICE — DISPOSABLE EQUIPMENT COVER: Brand: SMALL TOWEL DRAPE